# Patient Record
Sex: FEMALE | Race: WHITE | NOT HISPANIC OR LATINO | Employment: OTHER | URBAN - METROPOLITAN AREA
[De-identification: names, ages, dates, MRNs, and addresses within clinical notes are randomized per-mention and may not be internally consistent; named-entity substitution may affect disease eponyms.]

---

## 2018-01-12 NOTE — PROGRESS NOTES
Assessment    1  Trigger index finger of right hand (727 03) (M65 321)    Plan  Trigger index finger of right hand    · Please refer to the patient information sheet that was provided at your office visit today for  information on  your current condition ; Status:Complete;   Done: [id number]   · Follow Up After Surgery Evaluation and Treatment  Follow-up  Status: Complete  Done:  [id number]    Discussion/Summary    Right hand index finger trigger finger  After lengthy discussion patient wishes to undergo surgical release under local medication  Paperwork was filled out today  Follow-up after surgery  Outs were provided  The anatomy and physiology of trigger finger was discussed with the patient today in the office  Edema and increased contact pressure within the flexor tendons at the A1 pulley can cause pain, crepitation, and limitation of function  Treatment options include resting MP blocking splints to decrease edema, oral anti-inflammatory medications, home or formal therapy exercises, up to 2 steroid injections within the tendon sheath, or surgical release  While majority of patients do respond to conservative treatment, up to 20% may require surgical release  The patient has elected to undergo a release of the A1 pulley (trigger finger)  A small incision will be made over the palmar aspect of the hand, the tendon sheath holding the flexor tendons will be released  In the postoperative period, light activities are allowed immediately, driving is allowed when narcotic medication has stopped, and the incision may get wet after 5 days  Heavy activities (lifting more than approximately 10 pounds) will be allowed after the sutures are removed at approximately 10-14 days  While the pain and discomfort within the wrist typically improves rapidly, some residual discomfort may be present for up to 6 weeks   The nodule that is typically palpable in the palmar aspect of the hand will not be removed, as this would necessitate removal of a portion of the flexor tendon, however the catching, clicking, and locking should resolve  Approximate success rate is 98%  The risks and benefits of the procedure were explained to the patient, which include, but are not limited to: Bleeding, infection, recurrence, pain, scar, damage to tendons, damage to nerves, and damage to blood vessels, and complications related to anesthesia  These risks, along with alternative conservative treatment options, and postoperative protocols were voiced back and understood by the patient  All questions were answered to the patient's satisfaction  The patient agrees to comply with a standard postoperative protocol, and is willing to proceed  Education was provided via written and auditory forms  There were no barriers to learning  Written handouts regarding wound care, incision and scar care, and general preoperative information was provided to the patient  Prior to surgery, the patient is requested to stop all anti-inflammatory medications  Prophylactic aspirin, Plavix, and Coumadin are allowed to be continued  Medications including vitamin E , ginkgo, and fish oil are requested to be stopped approximately one week prior to surgery  Hypertensive medications and beta blockers, if taken, should be continued  Chief Complaint    1  Finger Problem    History of Present Illness  HPI: Patient is an 80-year-old female who presents to me today with pain localized into her right hand index finger which is moderate to severe in intensity made worse with gripping associated with catching and pain over the A1 pulley sharp in nature  It does radiate along the flexor tendon sheath with no fevers chills constitutional symptoms change in weight numbness or tingling  No previous injuries  No previous treatments tried      The past medical history, surgical history, family history, social history, medications, allergies, and review of systems have been read and reviewed on the chart and have been updated  Review of Systems was recorded in the office today on a separate evaluation sheet and is listed below  Review of Systems    Constitutional: No fever, no chills, feels well, no tiredness, no recent weight gain or loss  Eyes: No complaints of eyesight problems, no red eyes  ENT: no loss of hearing, no nosebleeds, no sore throat  Cardiovascular: No complaints of chest pain, no palpitations, no leg claudication or lower extremity edema  Respiratory: no compliants of shortness of breath, no wheezing, no cough  Gastrointestinal: no complaints of abdominal pain, no constipation, no nausea or diarrhea, no vomiting, no bloody stools  Genitourinary: no complaints of dysuria, no incontinence  Musculoskeletal: as noted in HPI  Integumentary: no complaints of skin rash or lesion, no itching or dry skin, no skin wounds  Neurological: no complaints of headache, no confusion, no numbness or tingling, no dizziness  Endocrine: No complaints of muscle weakness, no feelings of weakness, no frequent urination, no excessive thirst    Psychiatric: No suicidal thoughts, no anxiety, no feelings of depression  Active Problems    1  Trigger index finger of right hand (727 03) (M65 321)    Social History    · Caffeine use (V49 89) (F15 90)   · Former smoker (V15 82) (Y44 938)   · Occasional alcohol use    Current Meds   1  Acetaminophen 8 Hour TABS Recorded   2  Aspirin TABS; Therapy: (Recorded:21Igy2330) to Recorded   3  D3-50 95430 UNIT Oral Capsule Recorded   4  Lisinopril 20 MG Oral Tablet Recorded   5  Metoprolol Tartrate 25 MG Oral Tablet; Therapy: (Recorded:64Prv3766) to Recorded   6  Probiotic CAPS; Therapy: (Recorded:03Feb2016) to Recorded    Allergies    1  Keflex CAPS   2  PredniSONE TABS   3   Zithromax TABS    Vitals  Signs [Data Includes: Current Encounter]    Heart Rate: 77  Systolic: 095  Diastolic: 76  Height: 5 ft 2 in  Weight: 143 lb   BMI Calculated: 26 15  BSA Calculated: 1 66    Physical Exam      General: No acute distress, age-appropriate  Neck: Supple, trachea midline  HEENT: Normocephalic atraumatic, mucous membranes are moist, sclera are nonicteric  Cardiovascular: No discernable arrhythmia  Respiratory: Breathing is even and unlabored, no stridor or audible wheezing  Psychiatric: Awake alert and oriented x3, normal mood and affect  Abdomen: Without rebound or guarding  Right Trigger Finger: Crepitus, Nodules and Locking (Index finger)    Skin: was evaluated and demonstrated no masses, no abrasions, no erythema, no effusion, no edema, no fluctuance, no induration, no laceration, no ulceration, no lymphadenopathy  Right Upper Neurovascular: were evaluated and demonstrated: The patient has normal motor strength to the median, ulnar and radial nerve  Normal sensation to the median, ulnar, and radial nerve  Normal pulses in the radial and ulnar artery  Capillary refill is < 2 seconds  Future Appointments    Date/Time Provider Specialty Site   02/23/2016 08:30 AM MILI Borja   [address] [place] [address]     Signatures   Electronically signed by : MILI Gavin ; Feb  3 2016  5:07PM EST                       (Author)

## 2018-01-13 NOTE — CONSULTS
Chief Complaint    1  Finger Problem    History of Present Illness  HPI: Patient is an 45-year-old female who presents to me today with pain localized into her right hand index finger which is moderate to severe in intensity made worse with gripping associated with catching and pain over the A1 pulley sharp in nature  It does radiate along the flexor tendon sheath with no fevers chills constitutional symptoms change in weight numbness or tingling  No previous injuries  No previous treatments tried  The past medical history, surgical history, family history, social history, medications, allergies, and review of systems have been read and reviewed on the chart and have been updated  Review of Systems was recorded in the office today on a separate evaluation sheet and is listed below  Review of Systems    Constitutional: No fever, no chills, feels well, no tiredness, no recent weight gain or loss  Eyes: No complaints of eyesight problems, no red eyes  ENT: no loss of hearing, no nosebleeds, no sore throat  Cardiovascular: No complaints of chest pain, no palpitations, no leg claudication or lower extremity edema  Respiratory: no compliants of shortness of breath, no wheezing, no cough  Gastrointestinal: no complaints of abdominal pain, no constipation, no nausea or diarrhea, no vomiting, no bloody stools  Genitourinary: no complaints of dysuria, no incontinence  Musculoskeletal: as noted in HPI  Integumentary: no complaints of skin rash or lesion, no itching or dry skin, no skin wounds  Neurological: no complaints of headache, no confusion, no numbness or tingling, no dizziness  Endocrine: No complaints of muscle weakness, no feelings of weakness, no frequent urination, no excessive thirst    Psychiatric: No suicidal thoughts, no anxiety, no feelings of depression  Active Problems    1   Trigger index finger of right hand (727 03) (M65 321)    Social History    · Caffeine use (V42 89) (F15 90)   · Former smoker (E45 57) (W27 795)   · Occasional alcohol use    Current Meds   1  Acetaminophen 8 Hour TABS Recorded   2  Aspirin TABS; Therapy: (Recorded:81Wfn1198) to Recorded   3  D3-50 17266 UNIT Oral Capsule Recorded   4  Lisinopril 20 MG Oral Tablet Recorded   5  Metoprolol Tartrate 25 MG Oral Tablet; Therapy: (Recorded:58Uvn3567) to Recorded   6  Probiotic CAPS; Therapy: (Recorded:03Feb2016) to Recorded    Allergies    1  Keflex CAPS   2  PredniSONE TABS   3  Zithromax TABS    Vitals  Signs [Data Includes: Current Encounter]    Heart Rate: 12MRCYAUCO: 552MHBXTYVOP: 01DGOGUS: 5 ft 2 inWeight: 143 lb BMI Calculated: 26 15BSA Calculated: 1 66    Physical Exam      General: No acute distress, age-appropriate  Neck: Supple, trachea midline  HEENT: Normocephalic atraumatic, mucous membranes are moist, sclera are nonicteric  Cardiovascular: No discernable arrhythmia  Respiratory: Breathing is even and unlabored, no stridor or audible wheezing  Psychiatric: Awake alert and oriented x3, normal mood and affect  Abdomen: Without rebound or guarding  Right Trigger Finger: Crepitus, Nodules and Locking (Index finger)    Skin: was evaluated and demonstrated no masses, no abrasions, no erythema, no effusion, no edema, no fluctuance, no induration, no laceration, no ulceration, no lymphadenopathy  Right Upper Neurovascular: were evaluated and demonstrated: The patient has normal motor strength to the median, ulnar and radial nerve  Normal sensation to the median, ulnar, and radial nerve  Normal pulses in the radial and ulnar artery  Capillary refill is < 2 seconds  Assessment    1   Trigger index finger of right hand (727 03) (M65 321)    Plan     Trigger index finger of right hand    · Please refer to the patient information sheet that was provided at your office visit today for  information on  your current condition ; Status:Complete;   Done: 20HYY7310   · Follow Up After Surgery Evaluation and Treatment  Follow-up  Status: Complete  Done:  26BKQ4551    Discussion/Summary    Right hand index finger trigger finger  After lengthy discussion patient wishes to undergo surgical release under local medication  Paperwork was filled out today  Follow-up after surgery  Outs were provided  The anatomy and physiology of trigger finger was discussed with the patient today in the office  Edema and increased contact pressure within the flexor tendons at the A1 pulley can cause pain, crepitation, and limitation of function  Treatment options include resting MP blocking splints to decrease edema, oral anti-inflammatory medications, home or formal therapy exercises, up to 2 steroid injections within the tendon sheath, or surgical release  While majority of patients do respond to conservative treatment, up to 20% may require surgical release  The patient has elected to undergo a release of the A1 pulley (trigger finger)  A small incision will be made over the palmar aspect of the hand, the tendon sheath holding the flexor tendons will be released  In the postoperative period, light activities are allowed immediately, driving is allowed when narcotic medication has stopped, and the incision may get wet after 5 days  Heavy activities (lifting more than approximately 10 pounds) will be allowed after the sutures are removed at approximately 10-14 days  While the pain and discomfort within the wrist typically improves rapidly, some residual discomfort may be present for up to 6 weeks  The nodule that is typically palpable in the palmar aspect of the hand will not be removed, as this would necessitate removal of a portion of the flexor tendon, however the catching, clicking, and locking should resolve  Approximate success rate is 98%    The risks and benefits of the procedure were explained to the patient, which include, but are not limited to: Bleeding, infection, recurrence, pain, scar, damage to tendons, damage to nerves, and damage to blood vessels, and complications related to anesthesia  These risks, along with alternative conservative treatment options, and postoperative protocols were voiced back and understood by the patient  All questions were answered to the patient's satisfaction  The patient agrees to comply with a standard postoperative protocol, and is willing to proceed  Education was provided via written and auditory forms  There were no barriers to learning  Written handouts regarding wound care, incision and scar care, and general preoperative information was provided to the patient  Prior to surgery, the patient is requested to stop all anti-inflammatory medications  Prophylactic aspirin, Plavix, and Coumadin are allowed to be continued  Medications including vitamin E , ginkgo, and fish oil are requested to be stopped approximately one week prior to surgery  Hypertensive medications and beta blockers, if taken, should be continued         Signatures   Electronically signed by : MILI Sharif ; Feb  3 2016  5:07PM EST                       (Author)

## 2018-01-17 NOTE — CONSULTS
Chief Complaint    1  Finger Problem    History of Present Illness  HPI: Patient is an 29-year-old female who presents to me today with pain localized into her right hand index finger which is moderate to severe in intensity made worse with gripping associated with catching and pain over the A1 pulley sharp in nature  It does radiate along the flexor tendon sheath with no fevers chills constitutional symptoms change in weight numbness or tingling  No previous injuries  No previous treatments tried  The past medical history, surgical history, family history, social history, medications, allergies, and review of systems have been read and reviewed on the chart and have been updated  Review of Systems was recorded in the office today on a separate evaluation sheet and is listed below  Review of Systems  Focused-Female Orthopedics:   Constitutional: No fever, no chills, feels well, no tiredness, no recent weight gain or loss  Eyes: No complaints of eyesight problems, no red eyes  ENT: no loss of hearing, no nosebleeds, no sore throat  Cardiovascular: No complaints of chest pain, no palpitations, no leg claudication or lower extremity edema  Respiratory: no compliants of shortness of breath, no wheezing, no cough  Gastrointestinal: no complaints of abdominal pain, no constipation, no nausea or diarrhea, no vomiting, no bloody stools  Genitourinary: no complaints of dysuria, no incontinence  Musculoskeletal: as noted in HPI  Integumentary: no complaints of skin rash or lesion, no itching or dry skin, no skin wounds  Neurological: no complaints of headache, no confusion, no numbness or tingling, no dizziness  Endocrine: No complaints of muscle weakness, no feelings of weakness, no frequent urination, no excessive thirst    Psychiatric: No suicidal thoughts, no anxiety, no feelings of depression  Active Problems    1   Trigger index finger of right hand (727 03) (D44 248)    Social History    · Caffeine use (V49 89) (F15 90)   · Former smoker (V15 82) (M05 429)   · Occasional alcohol use    Current Meds   1  Acetaminophen 8 Hour TABS Recorded   2  Aspirin TABS; Therapy: (Recorded:03Feb2016) to Recorded   3  D3-50 99096 UNIT Oral Capsule Recorded   4  Lisinopril 20 MG Oral Tablet Recorded   5  Metoprolol Tartrate 25 MG Oral Tablet; Therapy: (Recorded:03Feb2016) to Recorded   6  Probiotic CAPS; Therapy: (Recorded:03Feb2016) to Recorded    Allergies    1  Keflex CAPS   2  PredniSONE TABS   3  Zithromax TABS    Vitals  Signs [Data Includes: Current Encounter]   Recorded: [id number] 10:57AM   Heart Rate: 77  Systolic: 624  Diastolic: 76  Height: 5 ft 2 in  Weight: 143 lb   BMI Calculated: 26 15  BSA Calculated: 1 66    Physical Exam      General: No acute distress, age-appropriate  Neck: Supple, trachea midline  HEENT: Normocephalic atraumatic, mucous membranes are moist, sclera are nonicteric  Cardiovascular: No discernable arrhythmia  Respiratory: Breathing is even and unlabored, no stridor or audible wheezing  Psychiatric: Awake alert and oriented x3, normal mood and affect  Abdomen: Without rebound or guarding  Right Trigger Finger: Crepitus, Nodules and Locking (Index finger)    Skin: was evaluated and demonstrated no masses, no abrasions, no erythema, no effusion, no edema, no fluctuance, no induration, no laceration, no ulceration, no lymphadenopathy  Right Upper Neurovascular: were evaluated and demonstrated: The patient has normal motor strength to the median, ulnar and radial nerve  Normal sensation to the median, ulnar, and radial nerve  Normal pulses in the radial and ulnar artery  Capillary refill is < 2 seconds  Assessment    1  Trigger index finger of right hand (727 03) (M65 321)    Plan  Trigger index finger of right hand    1   Please refer to the patient information sheet that was provided at your office visit today for   information on  your current condition ; Status:Complete;   Done: 17DDR8740   2  Follow Up After Surgery Evaluation and Treatment  Follow-up  Status: Complete  Done:   18OFD8970    Discussion/Summary  Discussion Summary:   Right hand index finger trigger finger  After lengthy discussion patient wishes to undergo surgical release under local medication  Paperwork was filled out today  Follow-up after surgery  Outs were provided  The anatomy and physiology of trigger finger was discussed with the patient today in the office  Edema and increased contact pressure within the flexor tendons at the A1 pulley can cause pain, crepitation, and limitation of function  Treatment options include resting MP blocking splints to decrease edema, oral anti-inflammatory medications, home or formal therapy exercises, up to 2 steroid injections within the tendon sheath, or surgical release  While majority of patients do respond to conservative treatment, up to 20% may require surgical release  The patient has elected to undergo a release of the A1 pulley (trigger finger)  A small incision will be made over the palmar aspect of the hand, the tendon sheath holding the flexor tendons will be released  In the postoperative period, light activities are allowed immediately, driving is allowed when narcotic medication has stopped, and the incision may get wet after 5 days  Heavy activities (lifting more than approximately 10 pounds) will be allowed after the sutures are removed at approximately 10-14 days  While the pain and discomfort within the wrist typically improves rapidly, some residual discomfort may be present for up to 6 weeks  The nodule that is typically palpable in the palmar aspect of the hand will not be removed, as this would necessitate removal of a portion of the flexor tendon, however the catching, clicking, and locking should resolve  Approximate success rate is 98%    The risks and benefits of the procedure were explained to the patient, which include, but are not limited to: Bleeding, infection, recurrence, pain, scar, damage to tendons, damage to nerves, and damage to blood vessels, and complications related to anesthesia  These risks, along with alternative conservative treatment options, and postoperative protocols were voiced back and understood by the patient  All questions were answered to the patient's satisfaction  The patient agrees to comply with a standard postoperative protocol, and is willing to proceed  Education was provided via written and auditory forms  There were no barriers to learning  Written handouts regarding wound care, incision and scar care, and general preoperative information was provided to the patient  Prior to surgery, the patient is requested to stop all anti-inflammatory medications  Prophylactic aspirin, Plavix, and Coumadin are allowed to be continued  Medications including vitamin E , ginkgo, and fish oil are requested to be stopped approximately one week prior to surgery  Hypertensive medications and beta blockers, if taken, should be continued         Future Appointments    Signatures   Electronically signed by : MILI Mirza ; Feb  3 2016  5:07PM EST                       (Author)

## 2018-08-15 ENCOUNTER — OFFICE VISIT (OUTPATIENT)
Dept: OBGYN CLINIC | Facility: HOSPITAL | Age: 83
End: 2018-08-15
Payer: MEDICARE

## 2018-08-15 VITALS
WEIGHT: 137.6 LBS | HEART RATE: 65 BPM | SYSTOLIC BLOOD PRESSURE: 149 MMHG | DIASTOLIC BLOOD PRESSURE: 73 MMHG | BODY MASS INDEX: 25.32 KG/M2 | HEIGHT: 62 IN

## 2018-08-15 DIAGNOSIS — M65.322 TRIGGER FINGER, LEFT INDEX FINGER: Primary | ICD-10-CM

## 2018-08-15 DIAGNOSIS — R22.31 MASS OF RIGHT FINGER: ICD-10-CM

## 2018-08-15 PROCEDURE — 99214 OFFICE O/P EST MOD 30 MIN: CPT | Performed by: ORTHOPAEDIC SURGERY

## 2018-08-15 PROCEDURE — 1123F ACP DISCUSS/DSCN MKR DOCD: CPT | Performed by: PATHOLOGY

## 2018-08-15 RX ORDER — MESALAMINE 400 MG/1
800 CAPSULE, DELAYED RELEASE ORAL 4 TIMES DAILY
Refills: 3 | Status: ON HOLD | COMMUNITY
Start: 2018-07-22 | End: 2021-08-10 | Stop reason: SDUPTHER

## 2018-08-15 RX ORDER — MELATONIN
1000 DAILY
COMMUNITY
End: 2021-06-08

## 2018-08-15 RX ORDER — TRIAMTERENE AND HYDROCHLOROTHIAZIDE 37.5; 25 MG/1; MG/1
1 CAPSULE ORAL DAILY
Refills: 3 | COMMUNITY
Start: 2018-07-31 | End: 2021-06-08

## 2018-08-15 RX ORDER — CLINDAMYCIN PHOSPHATE 900 MG/50ML
900 INJECTION INTRAVENOUS ONCE
Status: CANCELLED | OUTPATIENT
Start: 2018-08-15 | End: 2018-08-15

## 2018-08-15 RX ORDER — METOPROLOL SUCCINATE 25 MG/1
TABLET, EXTENDED RELEASE ORAL
Refills: 1 | Status: ON HOLD | COMMUNITY
Start: 2018-05-14 | End: 2021-04-27 | Stop reason: ALTCHOICE

## 2018-08-15 RX ORDER — BIMATOPROST 0.01 %
DROPS OPHTHALMIC (EYE)
Refills: 3 | COMMUNITY
Start: 2018-07-24

## 2018-08-15 RX ORDER — ATORVASTATIN CALCIUM 10 MG/1
10 TABLET, FILM COATED ORAL DAILY
Refills: 1 | Status: ON HOLD | COMMUNITY
Start: 2018-05-14 | End: 2021-08-10 | Stop reason: ALTCHOICE

## 2018-08-15 RX ORDER — LISINOPRIL 20 MG/1
20 TABLET ORAL DAILY
Refills: 1 | Status: ON HOLD | COMMUNITY
Start: 2018-07-18 | End: 2021-04-27 | Stop reason: ALTCHOICE

## 2018-08-15 RX ORDER — LIDOCAINE HYDROCHLORIDE AND EPINEPHRINE 10; 10 MG/ML; UG/ML
10 INJECTION, SOLUTION INFILTRATION; PERINEURAL ONCE
Status: CANCELLED | OUTPATIENT
Start: 2018-08-15 | End: 2018-08-15

## 2018-08-15 RX ORDER — METHOCARBAMOL 500 MG/1
500 TABLET, FILM COATED ORAL AS NEEDED
COMMUNITY
End: 2021-06-09 | Stop reason: SDUPTHER

## 2018-08-15 RX ORDER — BUSPIRONE HYDROCHLORIDE 10 MG/1
10 TABLET ORAL AS NEEDED
COMMUNITY

## 2018-08-15 RX ORDER — LANOLIN ALCOHOL/MO/W.PET/CERES
CREAM (GRAM) TOPICAL DAILY
Status: ON HOLD | COMMUNITY
End: 2021-04-27 | Stop reason: ALTCHOICE

## 2018-08-15 RX ORDER — DIPHENOXYLATE HCL/ATROPINE 2.5-.025/5
5 LIQUID (ML) ORAL 2 TIMES DAILY PRN
COMMUNITY

## 2018-08-15 RX ORDER — OMEPRAZOLE 20 MG/1
20 CAPSULE, DELAYED RELEASE ORAL DAILY
Refills: 1 | Status: ON HOLD | COMMUNITY
Start: 2018-06-16 | End: 2021-04-27 | Stop reason: ALTCHOICE

## 2018-08-15 NOTE — H&P
ASSESSMENT/PLAN:    Assessment:   Left hand index finger trigger finger and right hand small finger growing mass over the proximal phalanx    Plan:   Plan is for surgical excision of mass right hand small finger proximal phalanx  We will do this under general anesthesia and sent for biopsy is this is a growing mass over the last 2 months  Patient for left hand index finger trigger finger release under local medication  Paperwork was filled out  We will do this in a staged fashion    To Do Next Visit:  Sutures out    General Discussions:       Operative Discussions:  Trigger Finger Release: The anatomy and physiology of trigger finger was discussed with the patient today in the office  Edema and increased contact pressure within the flexor tendons at the A1 pulley can cause pain, crepitation, and limitation of function  Treatment options include resting MP blocking splints to decrease edema, oral anti-inflammatory medications, home or formal therapy exercises, up to 2 steroid injections or surgical release  While majority of patients do respond to conservative treatment, up to 20% may require surgical release  The patient has elected release of the trigger finger  The patient has elected to undergo a release of the A1 pulley (trigger finger)  A small incision will be made over the palmar aspect of the hand, the tendon sheath holding the flexor tendons will be released  In the postoperative period, light activities are allowed immediately, driving is allowed when narcotic medication has stopped, and the incision may get wet after 2 days  Heavy activities (lifting more than approximately 10 pounds) will be allowed after the follow up appointment in 1-2 weeks  While the pain and discomfort within the wrist typically improves rapidly, some residual discomfort may be present for up to 6 weeks    The nodule that is typically palpable in the palmar aspect of the hand will not be removed, as this would necessitate removal of a portion of the flexor tendon, however the catching, clicking, and locking should resolve  Approximate success rate is 98%  The risks and benefits of the procedure were explained to the patient, which include, but are not limited to: Bleeding, infection, recurrence, pain, scar, damage to tendons, damage to nerves, and damage to blood vessels, need for future surgery and complications related to anesthesia  If bony work is done, risks also include malunion and nonunion  These risks, along with alternative conservative treatment options, and postoperative protocols were voiced back and understood by the patient  All questions were answered to the patient's satisfaction  The patient agrees to comply with a standard postoperative protocol, and is willing to proceed  Education was provided via written and auditory forms  There were no barriers to learning   Written handouts regarding wound care, incision and scar care, and general preoperative information, as well as risks and benefits were provided to the patient       _____________________________________________________  CHIEF COMPLAINT:  Chief Complaint   Patient presents with    Left Index Finger - Locking    Right Little Finger - Cyst         SUBJECTIVE:  Sonny Clements is a 80y o  year old female who presents        PAST MEDICAL HISTORY:  Past Medical History:   Diagnosis Date    Bell's palsy 1940    Hemorrhoids     Hx of colonoscopy 1996, 1998, 2000, 2002, 2003, 2007, 2012       PAST SURGICAL HISTORY:  Past Surgical History:   Procedure Laterality Date    BACK SURGERY  1971    ruptured disk    BACK SURGERY  1972    bone fusion lumbar spine    BLADDER REPAIR  2002    BLADDER REPAIR  2012    BREAST SURGERY  2011   [address]  2014    stent   [address]  08/14/2014    CATARACT EXTRACTION Right 2015    EYE SURGERY Left     GALLBLADDER SURGERY  1995    GANGLION CYST EXCISION  2004    left wrist    HERNIA REPAIR  2001    HERNIA REPAIR  2003    MASTECTOMY  2012    OVARY SURGERY  1984    PARTIAL HYSTERECTOMY  1964    [place] INCISE FINGER TENDON SHEATH Right 4/5/2016    Procedure: INDEX TRIGGER FINGER AND RING TRIGGER FINGER RELEASE ;  Surgeon: aMrcelle Hanley MD;  Location: [facility];  Service: Orthopedics    SPLENECTOMY, PARTIAL  1995    TONSILLECTOMY  1939    WRIST SURGERY Right 1952       FAMILY HISTORY:  Family History   Problem Relation Age of Onset    Cancer Mother     Breast cancer Sister        SOCIAL HISTORY:  Social History   Substance Use Topics    Smoking status: Former Smoker    Smokeless tobacco: Never Used    Alcohol use Not on file       MEDICATIONS:    Current Outpatient Prescriptions:     ACETAMINOPHEN PO, Take by mouth, Disp: , Rfl:     ASPIRIN 81 PO, Take by mouth, Disp: , Rfl:     atorvastatin (LIPITOR) 10 mg tablet, Take 10 mg by mouth daily, Disp: , Rfl: 1    busPIRone (BUSPAR) 10 mg tablet, Take 10 mg by mouth as needed, Disp: , Rfl:     cholecalciferol (VITAMIN D3) 1,000 units tablet, Take 1,000 Units by mouth daily, Disp: , Rfl:     cyanocobalamin (VITAMIN B-12) 1,000 mcg tablet, Take by mouth daily, Disp: , Rfl:     DELZICOL 400 MG, Take 800 mg by mouth 2 (two) times a day, Disp: , Rfl: 3    diphenoxylate-atropine (LOMOTIL) 2 5-0 025 mg/5 mL liquid, Take 5 mL by mouth 2 (two) times a day as needed for diarrhea, Disp: , Rfl:     lisinopril (ZESTRIL) 20 mg tablet, Take 20 mg by mouth daily, Disp: , Rfl: 1    LUMIGAN 0 01 % ophthalmic drops, ONE DROP IN BOTH EYES AT BEDTIME, Disp: , Rfl: 3    methocarbamol (ROBAXIN) 500 mg tablet, Take 500 mg by mouth as needed for muscle spasms, Disp: , Rfl:     metoprolol succinate (TOPROL-XL) 25 mg 24 hr tablet, TAKE 1/2 TABLET BY ORAL ROUTE TWICE EVERY DAY, Disp: , Rfl: 1    NITROGLYCERIN PO, Take by mouth, Disp: , Rfl:     omeprazole (PriLOSEC) 20 mg delayed release capsule, Take 20 mg by mouth daily, Disp: , Rfl: 1    Probiotic Product (PROBIOTIC PO), Take by mouth, Disp: , Rfl:     triamterene-hydrochlorothiazide (DYAZIDE) 37 5-25 mg per capsule, Take 1 capsule by mouth daily, Disp: , Rfl: 3    HYDROcodone-acetaminophen (NORCO) 5-325 mg per tablet, 1 tab by mouth every 6 hours for pain (Patient not taking: Reported on 8/15/2018 ), Disp: 20 tablet, Rfl: 0    ALLERGIES:  Allergies   Allergen Reactions    Amlodipine Shortness Of Breath     And fatigue    Zithromax [Azithromycin] Swelling     tongue    Aspirin Other (See Comments)     bleeding    Corn-Containing Products Other (See Comments)     headache    Effient [Prasugrel]     Keflex [Cephalexin] Other (See Comments)     unknow    Lasix [Furosemide] GI Intolerance    Lipitor [Atorvastatin]     Other      liptol and lipol cause GI upset  Adhesive tape    Prednisone Other (See Comments)     "I go berzerk!"    Sulfamethizole Other (See Comments)     unkown    Latex Rash       REVIEW OF SYSTEMS:  Pertinent items are noted in HPI  A comprehensive review of systems was negative  LABS:  HgA1c: No results found for: HGBA1C  BMP: No results found for: GLUCOSE, CALCIUM, NA, K, CO2, CL, BUN, CREATININE    _____________________________________________________  PHYSICAL EXAMINATION:  General: well developed and well nourished, alert, oriented times 3 and appears comfortable  Psychiatric: Normal  HEENT: Trachea Midline, No torticollis  Cardiovascular: No discernable arrhythmia  Pulmonary: No wheezing or stridor  Skin:  Mass localized to the volar aspect of the right hand small finger proximal phalanx measuring 1 5 x 1 cm  Firm, not attached to flexor tendons  Neurovascular: Sensation Intact to the Median, Ulnar, Radial Nerve, Motor Intact to the Median, Ulnar, Radial Nerve and Pulses Intact    MUSCULOSKELETAL EXAMINATION:  Extremities:  Right hand small finger with decreased range of motion secondary to mass effect    Patient has swelling, crepitation to index finger of left hand with nodule and active triggering  No instability noted  Remainder flexor and extensor tendons are intact  No subluxation of the extensor tendons        _____________________________________________________  STUDIES REVIEWED:  No Studies to review      PROCEDURES PERFORMED:  Procedures  No Procedures performed today

## 2018-08-15 NOTE — PROGRESS NOTES
ASSESSMENT/PLAN:    Assessment:   Left hand index finger trigger finger and right hand small finger growing mass over the proximal phalanx    Plan:   Plan is for surgical excision of mass right hand small finger proximal phalanx  We will do this under general anesthesia and sent for biopsy is this is a growing mass over the last 2 months  Patient for left hand index finger trigger finger release under local medication  Paperwork was filled out  We will do this in a staged fashion    To Do Next Visit:  Sutures out    General Discussions:       Operative Discussions:  Trigger Finger Release: The anatomy and physiology of trigger finger was discussed with the patient today in the office  Edema and increased contact pressure within the flexor tendons at the A1 pulley can cause pain, crepitation, and limitation of function  Treatment options include resting MP blocking splints to decrease edema, oral anti-inflammatory medications, home or formal therapy exercises, up to 2 steroid injections or surgical release  While majority of patients do respond to conservative treatment, up to 20% may require surgical release  The patient has elected release of the trigger finger  The patient has elected to undergo a release of the A1 pulley (trigger finger)  A small incision will be made over the palmar aspect of the hand, the tendon sheath holding the flexor tendons will be released  In the postoperative period, light activities are allowed immediately, driving is allowed when narcotic medication has stopped, and the incision may get wet after 2 days  Heavy activities (lifting more than approximately 10 pounds) will be allowed after the follow up appointment in 1-2 weeks  While the pain and discomfort within the wrist typically improves rapidly, some residual discomfort may be present for up to 6 weeks    The nodule that is typically palpable in the palmar aspect of the hand will not be removed, as this would necessitate removal of a portion of the flexor tendon, however the catching, clicking, and locking should resolve  Approximate success rate is 98%  The risks and benefits of the procedure were explained to the patient, which include, but are not limited to: Bleeding, infection, recurrence, pain, scar, damage to tendons, damage to nerves, and damage to blood vessels, need for future surgery and complications related to anesthesia  If bony work is done, risks also include malunion and nonunion  These risks, along with alternative conservative treatment options, and postoperative protocols were voiced back and understood by the patient  All questions were answered to the patient's satisfaction  The patient agrees to comply with a standard postoperative protocol, and is willing to proceed  Education was provided via written and auditory forms  There were no barriers to learning   Written handouts regarding wound care, incision and scar care, and general preoperative information, as well as risks and benefits were provided to the patient       _____________________________________________________  CHIEF COMPLAINT:  Chief Complaint   Patient presents with    Left Index Finger - Locking    Right Little Finger - Cyst         SUBJECTIVE:  Sonny Clements is a 80y o  year old female who presents        PAST MEDICAL HISTORY:  Past Medical History:   Diagnosis Date    Bell's palsy 1940    Hemorrhoids     Hx of colonoscopy 1996, 1998, 2000, 2002, 2003, 2007, 2012       PAST SURGICAL HISTORY:  Past Surgical History:   Procedure Laterality Date    BACK SURGERY  1971    ruptured disk    BACK SURGERY  1972    bone fusion lumbar spine    BLADDER REPAIR  2002    BLADDER REPAIR  2012    BREAST SURGERY  2011   [address]  2014    [address]ent   [address]  08/14/2014    CATARACT EXTRACTION Right 2015    EYE SURGERY Left     GALLBLADDER SURGERY  1995    GANGLION CYST EXCISION  2004    left wrist    HERNIA REPAIR  2001    HERNIA REPAIR  2003    MASTECTOMY  2012    OVARY SURGERY  1984    PARTIAL HYSTERECTOMY  1964    [place] INCISE FINGER TENDON SHEATH Right 4/5/2016    Procedure: INDEX TRIGGER FINGER AND RING TRIGGER FINGER RELEASE ;  Surgeon: Elkin Fermin MD;  Location: [facility];  Service: Orthopedics    SPLENECTOMY, PARTIAL  1995    TONSILLECTOMY  1939    WRIST SURGERY Right 1952       FAMILY HISTORY:  Family History   Problem Relation Age of Onset    Cancer Mother     Breast cancer Sister        SOCIAL HISTORY:  Social History   Substance Use Topics    Smoking status: Former Smoker    Smokeless tobacco: Never Used    Alcohol use Not on file       MEDICATIONS:    Current Outpatient Prescriptions:     ACETAMINOPHEN PO, Take by mouth, Disp: , Rfl:     ASPIRIN 81 PO, Take by mouth, Disp: , Rfl:     atorvastatin (LIPITOR) 10 mg tablet, Take 10 mg by mouth daily, Disp: , Rfl: 1    busPIRone (BUSPAR) 10 mg tablet, Take 10 mg by mouth as needed, Disp: , Rfl:     cholecalciferol (VITAMIN D3) 1,000 units tablet, Take 1,000 Units by mouth daily, Disp: , Rfl:     cyanocobalamin (VITAMIN B-12) 1,000 mcg tablet, Take by mouth daily, Disp: , Rfl:     DELZICOL 400 MG, Take 800 mg by mouth 2 (two) times a day, Disp: , Rfl: 3    diphenoxylate-atropine (LOMOTIL) 2 5-0 025 mg/5 mL liquid, Take 5 mL by mouth 2 (two) times a day as needed for diarrhea, Disp: , Rfl:     lisinopril (ZESTRIL) 20 mg tablet, Take 20 mg by mouth daily, Disp: , Rfl: 1    LUMIGAN 0 01 % ophthalmic drops, ONE DROP IN BOTH EYES AT BEDTIME, Disp: , Rfl: 3    methocarbamol (ROBAXIN) 500 mg tablet, Take 500 mg by mouth as needed for muscle spasms, Disp: , Rfl:     metoprolol succinate (TOPROL-XL) 25 mg 24 hr tablet, TAKE 1/2 TABLET BY ORAL ROUTE TWICE EVERY DAY, Disp: , Rfl: 1    NITROGLYCERIN PO, Take by mouth, Disp: , Rfl:     omeprazole (PriLOSEC) 20 mg delayed release capsule, Take 20 mg by mouth daily, Disp: , Rfl: 1    Probiotic Product (PROBIOTIC PO), Take by mouth, Disp: , Rfl:     triamterene-hydrochlorothiazide (DYAZIDE) 37 5-25 mg per capsule, Take 1 capsule by mouth daily, Disp: , Rfl: 3    HYDROcodone-acetaminophen (NORCO) 5-325 mg per tablet, 1 tab by mouth every 6 hours for pain (Patient not taking: Reported on 8/15/2018 ), Disp: 20 tablet, Rfl: 0    ALLERGIES:  Allergies   Allergen Reactions    Amlodipine Shortness Of Breath     And fatigue    Zithromax [Azithromycin] Swelling     tongue    Aspirin Other (See Comments)     bleeding    Corn-Containing Products Other (See Comments)     headache    Effient [Prasugrel]     Keflex [Cephalexin] Other (See Comments)     unknow    Lasix [Furosemide] GI Intolerance    Lipitor [Atorvastatin]     Other      liptol and lipol cause GI upset  Adhesive tape    Prednisone Other (See Comments)     "I go berzerk!"    Sulfamethizole Other (See Comments)     unkown    Latex Rash       REVIEW OF SYSTEMS:  Pertinent items are noted in HPI  A comprehensive review of systems was negative  LABS:  HgA1c: No results found for: HGBA1C  BMP: No results found for: GLUCOSE, CALCIUM, NA, K, CO2, CL, BUN, CREATININE    _____________________________________________________  PHYSICAL EXAMINATION:  General: well developed and well nourished, alert, oriented times 3 and appears comfortable  Psychiatric: Normal  HEENT: Trachea Midline, No torticollis  Cardiovascular: No discernable arrhythmia  Pulmonary: No wheezing or stridor  Skin:  Mass localized to the volar aspect of the right hand small finger proximal phalanx measuring 1 5 x 1 cm  Firm, not attached to flexor tendons  Neurovascular: Sensation Intact to the Median, Ulnar, Radial Nerve, Motor Intact to the Median, Ulnar, Radial Nerve and Pulses Intact    MUSCULOSKELETAL EXAMINATION:  Extremities:  Right hand small finger with decreased range of motion secondary to mass effect    Patient has swelling, crepitation to index finger of left hand with nodule and active triggering  No instability noted  Remainder flexor and extensor tendons are intact  No subluxation of the extensor tendons        _____________________________________________________  STUDIES REVIEWED:  No Studies to review      PROCEDURES PERFORMED:  Procedures  No Procedures performed today

## 2018-09-25 ENCOUNTER — ANESTHESIA EVENT (OUTPATIENT)
Dept: PERIOP | Facility: HOSPITAL | Age: 83
End: 2018-09-25
Payer: MEDICARE

## 2018-09-25 NOTE — PRE-PROCEDURE INSTRUCTIONS
Pre-Surgery Instructions:   Medication Instructions    ACETAMINOPHEN PO Instructed patient per Anesthesia Guidelines   ASPIRIN 81 PO Patient was instructed to contact Physician for medication instruction   atorvastatin (LIPITOR) 10 mg tablet epic    busPIRone (BUSPAR) 10 mg tablet epic    cholecalciferol (VITAMIN D3) 1,000 units tablet Instructed patient per Anesthesia Guidelines   cyanocobalamin (VITAMIN B-12) 1,000 mcg tablet Instructed patient per Anesthesia Guidelines   DELZICOL 400 MG epic    diphenoxylate-atropine (LOMOTIL) 2 5-0 025 mg/5 mL liquid epic    HYDROcodone-acetaminophen (NORCO) 5-325 mg per tablet epic    lisinopril (ZESTRIL) 20 mg tablet epic    LUMIGAN 0 01 % ophthalmic drops Instructed patient per Anesthesia Guidelines   methocarbamol (ROBAXIN) 500 mg tablet epic    metoprolol succinate (TOPROL-XL) 25 mg 24 hr tablet epic    NITROGLYCERIN PO epic    omeprazole (PriLOSEC) 20 mg delayed release capsule Instructed patient per Anesthesia Guidelines   triamterene-hydrochlorothiazide (DYAZIDE) 37 5-25 mg per capsule epic   Education Index     Med Instructions Troubleshoot   ACE/ARB Med Class     Do not take this medication the day before and the morning of the day of surgery/procedure  Diuretic Med Class     Continue this medication up to the evening before surgery/procedure, but do not take the morning of the day of surgery  Acetaminophen Med Class     Continue to take this medication on your normal schedule  If this is an oral medication and you take it in the morning, then you may take this medicine with a sip of water  ASA Med Class: Aspirin     Should be discontinued at least one week prior to planned operation, unless specifically stated otherwise by surgical service  Your Surgeon may have patient stop taking aspirin up to a week before surgery if having intracranial, middle ear, posterior eye, spine surgery or prostate surgery    [Patients taking aspirin for coronary stents should be reviewed by an anesthesiologist in the optimization clinic  Please do not discontinue aspirin in patients with coronary stents unless given specific permission to do so by the cardiologist who prescribed medication ]   If your surgeon approves please continue to take this medication on your normal schedule  You may take this medication on the morning of your surgery with a sip of water  Beta blocker Med Class     Continue to take this heart medication on your normal schedule  If this is an oral medication and you take it in the morning, then you may take this medicine with a sip of water  Buspirone Med Class     Continue to take this medication on your normal schedule  If this is an oral medication and you take it in the morning, then you may take this medicine with a sip of water  Nitroglycerin Med Class     Continue to take your nitroglyerin as directed by your prescribing Physician and notify your Physician and Anesthesiologist if you have needed to increase the frequency or dosage  Statin Med Class     Continue to take this medication on your normal schedule  If this is an oral medication and you take it in the morning, then you may take this medicine with a sip of water  Sulfasalazine/Azathioprine Med Class     Stop taking this medication 7 days prior to surgery/procedure          Pre procedure instructions given verbalizes understanding  Pt to call cardiologist concerning ASA (cardiac stent in place)  Delzicol may be taken dos, not to be stopped per anesthesia

## 2018-10-04 ENCOUNTER — PREP FOR PROCEDURE (OUTPATIENT)
Dept: OBGYN CLINIC | Facility: HOSPITAL | Age: 83
End: 2018-10-04

## 2018-10-08 ENCOUNTER — PREP FOR PROCEDURE (OUTPATIENT)
Dept: OBGYN CLINIC | Facility: HOSPITAL | Age: 83
End: 2018-10-08

## 2018-10-09 ENCOUNTER — HOSPITAL ENCOUNTER (OUTPATIENT)
Facility: HOSPITAL | Age: 83
Setting detail: OUTPATIENT SURGERY
Discharge: HOME | End: 2018-10-09
Attending: ORTHOPAEDIC SURGERY | Admitting: ORTHOPAEDIC SURGERY
Payer: MEDICARE

## 2018-10-09 ENCOUNTER — ANESTHESIA (OUTPATIENT)
Dept: PERIOP | Facility: HOSPITAL | Age: 83
End: 2018-10-09
Payer: MEDICARE

## 2018-10-09 VITALS
DIASTOLIC BLOOD PRESSURE: 92 MMHG | BODY MASS INDEX: 25.21 KG/M2 | SYSTOLIC BLOOD PRESSURE: 208 MMHG | TEMPERATURE: 98.9 F | RESPIRATION RATE: 20 BRPM | HEIGHT: 62 IN | OXYGEN SATURATION: 97 % | HEART RATE: 75 BPM | WEIGHT: 137 LBS

## 2018-10-09 DIAGNOSIS — R22.31 MASS OF RIGHT FINGER: Primary | ICD-10-CM

## 2018-10-09 PROCEDURE — 26160 REMOVE TENDON SHEATH LESION: CPT | Performed by: ORTHOPAEDIC SURGERY

## 2018-10-09 PROCEDURE — 88304 TISSUE EXAM BY PATHOLOGIST: CPT | Performed by: PATHOLOGY

## 2018-10-09 RX ORDER — FENTANYL CITRATE/PF 50 MCG/ML
25 SYRINGE (ML) INJECTION
Status: DISCONTINUED | OUTPATIENT
Start: 2018-10-09 | End: 2018-10-09 | Stop reason: HOSPADM

## 2018-10-09 RX ORDER — HYDROCODONE BITARTRATE AND ACETAMINOPHEN 5; 325 MG/1; MG/1
1 TABLET ORAL EVERY 6 HOURS PRN
Status: DISCONTINUED | OUTPATIENT
Start: 2018-10-09 | End: 2018-10-09 | Stop reason: HOSPADM

## 2018-10-09 RX ORDER — ONDANSETRON 2 MG/ML
INJECTION INTRAMUSCULAR; INTRAVENOUS AS NEEDED
Status: DISCONTINUED | OUTPATIENT
Start: 2018-10-09 | End: 2018-10-09 | Stop reason: SURG

## 2018-10-09 RX ORDER — ALBUTEROL SULFATE 2.5 MG/3ML
2.5 SOLUTION RESPIRATORY (INHALATION) ONCE AS NEEDED
Status: DISCONTINUED | OUTPATIENT
Start: 2018-10-09 | End: 2018-10-09 | Stop reason: HOSPADM

## 2018-10-09 RX ORDER — SODIUM CHLORIDE, SODIUM LACTATE, POTASSIUM CHLORIDE, CALCIUM CHLORIDE 600; 310; 30; 20 MG/100ML; MG/100ML; MG/100ML; MG/100ML
125 INJECTION, SOLUTION INTRAVENOUS CONTINUOUS
Status: DISCONTINUED | OUTPATIENT
Start: 2018-10-09 | End: 2018-10-09 | Stop reason: HOSPADM

## 2018-10-09 RX ORDER — ACETAMINOPHEN 325 MG/1
650 TABLET ORAL ONCE
Status: DISCONTINUED | OUTPATIENT
Start: 2018-10-09 | End: 2018-10-09 | Stop reason: HOSPADM

## 2018-10-09 RX ORDER — LIDOCAINE HYDROCHLORIDE AND EPINEPHRINE 10; 10 MG/ML; UG/ML
10 INJECTION, SOLUTION INFILTRATION; PERINEURAL ONCE
Status: DISCONTINUED | OUTPATIENT
Start: 2018-10-09 | End: 2018-10-09 | Stop reason: HOSPADM

## 2018-10-09 RX ORDER — SODIUM CHLORIDE, SODIUM LACTATE, POTASSIUM CHLORIDE, CALCIUM CHLORIDE 600; 310; 30; 20 MG/100ML; MG/100ML; MG/100ML; MG/100ML
50 INJECTION, SOLUTION INTRAVENOUS CONTINUOUS
Status: DISCONTINUED | OUTPATIENT
Start: 2018-10-09 | End: 2018-10-09 | Stop reason: HOSPADM

## 2018-10-09 RX ORDER — ONDANSETRON 2 MG/ML
4 INJECTION INTRAMUSCULAR; INTRAVENOUS ONCE AS NEEDED
Status: DISCONTINUED | OUTPATIENT
Start: 2018-10-09 | End: 2018-10-09 | Stop reason: HOSPADM

## 2018-10-09 RX ORDER — HYDROCODONE BITARTRATE AND ACETAMINOPHEN 5; 325 MG/1; MG/1
1 TABLET ORAL EVERY 6 HOURS PRN
Qty: 5 TABLET | Refills: 0 | Status: SHIPPED | OUTPATIENT
Start: 2018-10-09 | End: 2018-10-14

## 2018-10-09 RX ORDER — SENNOSIDES 8.6 MG
650 CAPSULE ORAL EVERY 8 HOURS
Qty: 15 TABLET | Refills: 0 | Status: ON HOLD | OUTPATIENT
Start: 2018-10-09 | End: 2021-04-27 | Stop reason: SDUPTHER

## 2018-10-09 RX ORDER — BUPIVACAINE HYDROCHLORIDE AND EPINEPHRINE 2.5; 5 MG/ML; UG/ML
INJECTION, SOLUTION EPIDURAL; INFILTRATION; INTRACAUDAL; PERINEURAL AS NEEDED
Status: DISCONTINUED | OUTPATIENT
Start: 2018-10-09 | End: 2018-10-09 | Stop reason: HOSPADM

## 2018-10-09 RX ORDER — CLINDAMYCIN PHOSPHATE 900 MG/50ML
900 INJECTION INTRAVENOUS ONCE
Status: COMPLETED | OUTPATIENT
Start: 2018-10-09 | End: 2018-10-09

## 2018-10-09 RX ORDER — MEPERIDINE HYDROCHLORIDE 25 MG/ML
12.5 INJECTION INTRAMUSCULAR; INTRAVENOUS; SUBCUTANEOUS AS NEEDED
Status: DISCONTINUED | OUTPATIENT
Start: 2018-10-09 | End: 2018-10-09 | Stop reason: HOSPADM

## 2018-10-09 RX ORDER — PROPOFOL 10 MG/ML
INJECTION, EMULSION INTRAVENOUS AS NEEDED
Status: DISCONTINUED | OUTPATIENT
Start: 2018-10-09 | End: 2018-10-09 | Stop reason: SURG

## 2018-10-09 RX ORDER — FENTANYL CITRATE 50 UG/ML
INJECTION, SOLUTION INTRAMUSCULAR; INTRAVENOUS AS NEEDED
Status: DISCONTINUED | OUTPATIENT
Start: 2018-10-09 | End: 2018-10-09 | Stop reason: SURG

## 2018-10-09 RX ORDER — MAGNESIUM HYDROXIDE 1200 MG/15ML
LIQUID ORAL AS NEEDED
Status: DISCONTINUED | OUTPATIENT
Start: 2018-10-09 | End: 2018-10-09 | Stop reason: HOSPADM

## 2018-10-09 RX ORDER — ONDANSETRON 2 MG/ML
4 INJECTION INTRAMUSCULAR; INTRAVENOUS EVERY 4 HOURS PRN
Status: DISCONTINUED | OUTPATIENT
Start: 2018-10-09 | End: 2018-10-09 | Stop reason: HOSPADM

## 2018-10-09 RX ORDER — LABETALOL HYDROCHLORIDE 5 MG/ML
5 INJECTION, SOLUTION INTRAVENOUS
Status: DISCONTINUED | OUTPATIENT
Start: 2018-10-09 | End: 2018-10-09 | Stop reason: HOSPADM

## 2018-10-09 RX ORDER — NAPROXEN SODIUM 220 MG
220 TABLET ORAL 2 TIMES DAILY WITH MEALS
Qty: 10 TABLET | Refills: 0 | Status: SHIPPED | OUTPATIENT
Start: 2018-10-09 | End: 2021-04-01 | Stop reason: ALTCHOICE

## 2018-10-09 RX ADMIN — FENTANYL CITRATE 25 MCG: 50 INJECTION, SOLUTION INTRAMUSCULAR; INTRAVENOUS at 09:36

## 2018-10-09 RX ADMIN — ONDANSETRON 4 MG: 2 INJECTION INTRAMUSCULAR; INTRAVENOUS at 09:35

## 2018-10-09 RX ADMIN — FENTANYL CITRATE 50 MCG: 50 INJECTION, SOLUTION INTRAMUSCULAR; INTRAVENOUS at 09:15

## 2018-10-09 RX ADMIN — CLINDAMYCIN PHOSPHATE 900 MG: 18 INJECTION, SOLUTION INTRAMUSCULAR; INTRAVENOUS at 09:10

## 2018-10-09 RX ADMIN — FENTANYL CITRATE 25 MCG: 50 INJECTION, SOLUTION INTRAMUSCULAR; INTRAVENOUS at 09:49

## 2018-10-09 RX ADMIN — PROPOFOL 150 MG: 10 INJECTION, EMULSION INTRAVENOUS at 09:21

## 2018-10-09 RX ADMIN — SODIUM CHLORIDE, SODIUM LACTATE, POTASSIUM CHLORIDE, AND CALCIUM CHLORIDE: .6; .31; .03; .02 INJECTION, SOLUTION INTRAVENOUS at 09:10

## 2018-10-09 NOTE — DISCHARGE INSTRUCTIONS
Post Operative Instructions    You have had surgery on your arm today, please read and follow the information below:  · Elevate your hand above your elbow during the next 24-48 hours to help with swelling  · Place your hand and arm over your head with motion at your shoulder three times a day  · Do not apply any cream/ointment/oil to your incisions including antibiotics  · Do not soak your hands in standing water (dishwater, tubs, Jacuzzi's, pools, etc ) until given permission (typically 2-3 weeks after injury)    Call the office if you notice any:  · Increased numbness or tingling of your hand or fingers that is not relieved with elevation  · Increasing pain that is not controlled with medication  · Difficulty chewing, breathing, swallowing  · Chest pains or shortness of breath  · Fever over 101 4 degrees  Bandage: Remove bandage after 5 days  Motion: Move fingers into a fist 5 times a day, DO NOT move any splinted fingers  Weight bearing status: Avoid heavy lifting (>5 pounds) with the extremity that was operated on until follow up appointment  Normal activities of daily living are OK  Ice: Ice for 10 minutes every hour as needed for swelling x 24 hours  Sling: No sling necessary  Pain medication:   Naproxen 220 mg two times a day   Tylenol Extended Release 650 mg every 8 hours  Norco/Hydrocodone one tab every 6 hours AS NEEDED for pain     Follow-up Appointment: 7-10 days  Please call the office if you have any questions or concerns regarding your post-operative care  Following the program below will greatly decrease your post-operative pain  1  Aleve (naproxen) 220 mg and Tylenol Arthritis 650 mg on the afternoon/evening of surgery  Do NOT take Aleve if you have a history of gastric ulcers, uncontrolled reflux or have been told previously by a physician that you should not take anti-inflammatory medications such as Advil/Aleve/Motrin      2  Aleve (naproxen) 220 mg in the morning and afternoon, for about 2-3 days after the surgery; even if you have no pain  You can stop two days after surgery if your hand does not hurt  3  Tylenol Arthritis (or any brand of acetaminophen 8-hour), 650 mg every eight hours, with a maximum dose of 3000 mg per day, for about 2-3 days after the surgery, even if you have no pain  Tylenol Arthritis plus Aleve is a case of 1+1=3, not 2  That is, they work together as a team to make each other stronger     a  The maximum amount of Tylenol is 3000 mg per day, which is the same as 4 of the 650 mg pills  Remember; dont substitute any other medication for the Tylenol: dont take Motrin, aspirin, or any other over the counter medication  It must be Tylenol (or any brand of acetaminophen) for it to work as a team  Remember as well that Tylenol Arthritis is taken every 8 hours, the Aleve is twice a day  4  Norco (hydrocodone/acetaminophen) 5/325 mg or a similar medication to assist with sleeping at night, and possibly every 6 hours during the day, ONLY IF NEEDED, for the first few days  You will be given a written prescription, but many patients find they do not need to take any or all of the Norco  Do not take the medication just because it was given to you; only take it if you need it  Remember that Tiarno Di Sopra is an opioid pain medication and can lead to addiction, respiratory sedation, and death  In 2015 over 17,500 Americans  from opioid overdoses and I dont want this to happen to you  Opioid medications can also cause constipation, so please plan for that, as well as possible mental confusion and drowsiness  Do not drive while you are taking this medication  With this protocol, you can expect your post-operative pain to be very manageable  The worst pain only lasts for the first 48 hours and improves significantly after that   By the time you see your surgeon for your post-operative visit you probably will no longer require any pain medication on a daily basis  Important Information about Painkillers    You are being prescribed an opioid pain medication to help with severe pain after surgery  Use the medication sparingly as needed to reduce your pain  The goal is not to be pain-free, but to make the pain more tolerable  If you are able to take non-steroidal anti-inflammatory drugs (NSAIDs), alternate the prescription pain medication with over-the-counter Ibuprofen or Naproxen (if you do not have a history of reflux or stomach ulcers)  This will allow you to take less opioid medication  Also, elevate the hand to reduce swelling and consider applying ice to the affected area for 15 minutes at a time, several times per day  Opioid medication is powerful and has the risk of overdose, abuse, and addiction  Only use the medication as directed by your physician and keep the pills in a safe place  When you no longer need the medication, please dispose of the pills properly as directed below  Allowing someone else to use your opioid prescription is illegal     Also, possible side effects from opioid medications are over-sedation, itching, nausea/vomiting, and constipation  Do not drive a vehicle or operate machinery while taking the pain medications  Drink plenty of fluids and consider a stool softener to prevent constipation  If the pain you are experiencing is not severe, stop taking the opioid pills and only take over-the-counter medications such as Tylenol and Ibuprofen  Disposing of unused pain medications:  (1) Follow pharmacist instructions on the bottle if available, or  (2) Call 3-606.666.8359 for a [facility] authorized collection site in your area, or   (3) If no collection site is available in your area, mix the pills with an undesirable substance such as used coffee grounds, дмитрий litter, or dirt  Place this mixture in a sealed plastic bag  Place the bag in the household garbage        Adapted from the opoid awareness section of the [address] [facility], thanks to Rosanne Cruz and Maria Eugenia Radford

## 2018-10-09 NOTE — H&P
H&P Exam - Orthopedics   Juliane Watts 80 y o  female MRN: [id number]  Unit/Bed#: [facility] 03    Assessment/Plan   Assessment:  Right small finger mass and left index trigger finger  Plan:  Excision of right small finger mass today  Left index trigger finger release to be performed at a later date    History of Present Illness   HPI:  Juliane Watts is a 80 y o  y o  female who presents with mass on the right small finger which has grown over the last few months  Patient also with continued complaints of pain and clicking of the left index finger      Historical Information  Review Of Systems:   · Skin: Normal  · Neuro: See HPI  · Musculoskeletal: See HPI  · 14 point review of systems negative except as stated above     Past Medical History:   Past Medical History:   Diagnosis Date    Arthritis     Bell's palsy 1940    Colitis     Hemorrhoids     History of transfusion     Hx of colonoscopy 1996, 1998, 2000, 2002, 2003, 2007, 2012       Past Surgical History:   Past Surgical History:   Procedure Laterality Date   [address] [facility]    ruptured disk    BACK SURGERY  1972    bone fusion lumbar spine    BLADDER REPAIR  2002   [address]  2012    BREAST SURGERY  2011   Isidra Alberts CARDIAC SURGERY  2014    stent    CARPAL TUNNEL RELEASE  08/14/2014    CATARACT EXTRACTION Right 2015    CHOLECYSTECTOMY      COLONOSCOPY      EYE SURGERY Left    [facility]    GANGLION CYST EXCISION  2004    left wrist    HERNIA REPAIR  2001    HERNIA REPAIR  2003    [facility]ST[facility]OMY  2012   [facility]    PARTIAL HYSTERECTOMY  1964    CT INCISE FINGER TENDON SHEATH Right 4/5/2016    Procedure: INDEX TRIGGER FINGER AND RING TRIGGER FINGER RELEASE ;  Surgeon: Paz Cummins MD;  Location: [facility];  Service: Orthopedics    SPLENECTOMY, PARTIAL  1995    TONSILLECTOMY  1939    WRIST SURGERY Right 1952       Family History:  Family history reviewed and non-contributory  Family History   Problem Relation Age of Onset    Cancer Mother     Breast cancer Sister        Social History:  Social History     Social History    Marital status: /Civil Union     Spouse name: N/A    Number of children: N/A    Years of education: N/A     Social History Main Topics    Smoking status: Former Smoker     Quit date: 1950    Smokeless tobacco: Never Used    Alcohol use No    Drug use: No    Sexual activity: No     Other Topics Concern    None     Social History Narrative    None       Allergies: Allergies   Allergen Reactions    Amlodipine Shortness Of Breath     And fatigue    Zithromax [Azithromycin] Swelling     tongue    Aspirin Other (See Comments)     Bleeding  Tolerates baby asapirin    Corn-Containing Products Other (See Comments)     headache    Effient [Prasugrel]     Keflex [Cephalexin] Other (See Comments)     unknow    Lasix [Furosemide] GI Intolerance    Lipitor [Atorvastatin]     Other      liptol and lipol cause GI upset  Adhesive tape  USE PAPER TAPE    Prednisone Other (See Comments)     "I go berzerk!"    Sulfamethizole Other (See Comments)     unkown    Latex Rash     Pt cannot remember           Labs:  No results found for: HCT, HGB, PT, INR, WBC, ESR, CRP    Meds:    Current Facility-Administered Medications:     lactated ringers infusion, 125 mL/hr, Intravenous, Continuous, [place] Counter, MD    Blood Culture:   No results found for: BLOODCX    Wound Culture:   No results found for: WOUNDCULT    Ins and Outs:  No intake/output data recorded  Physical Exam  BP (!) 220/92   Pulse 80   Temp 98 4 °F (36 9 °C) (Tympanic Core)   Resp 16   SpO2 96%   Gen: Alert and oriented to person, place, time  HEENT: EOMI, eyes clear, moist mucus membranes, hearing intact  Respiratory: Bilateral chest rise  No audible wheezing found  Cardiovascular: Regular Rate and Rhythm  Abdomen: soft nontender/nondistended  Ortho Exam: LROM of right hand small finger 2/2 mass   + edema, + nodule and + triggering of left index finger     Neuro Exam: Sensation and motor grossly intact    Lab Results: Reviewed  Imaging: Reviewed

## 2018-10-09 NOTE — OP NOTE
OPERATIVE REPORT  PATIENT NAME: Joesph Stewart  :  1932  MRN: [id number]  Pt Location: [facility]    SURGERY DATE: 10/09/18    Surgeon(s) and Role:     * Jearldine Goodell, MD - Primary     * Tho Rachel PA-C - Assisting    Pre-Op Diagnosis:  Mass of right finger [R22 31]    Post-Op Diagnosis Codes:     * Mass of right finger [R22 31]    Procedure(s):  EXCISION GANGLION CYST RIGHT SMALL FINGER (Right)    Specimen(s):    Order Name Source Comment Collection Info Order Time   TISSUE EXAM Finger, Right  Collected By: Jearldine Goodell, MD 10/9/2018  9:38 AM       Estimated Blood Loss:   Minimal      Anesthesia Type:   * No anesthesia type entered *    Operative Indications: The patient has a history of right small finger proximal phalanx ganglion cyst of the flexor tendon sheath that was recalcitrant to conservative management  The decision was made to bring the patient to the operating room for right hand small finger proximal phalanx excision ganglion cyst of the tendon sheath  Risks of the procedure were explained which include, but are not limited to bleeding; infection; damage to nerves, arteries,veins, tendons; scar; pain; need for reoperation; failure to give desired result; and risks of anaesthesia  All questions were answered to satisfaction and they were willing to proceed  Operative Findings:  1 cm x 1 cm x 8 mm ganglion cyst off the flexor tendon sheath just distal to the A2 pulley  Complications:   None    Procedure and Technique:  After the patient, site, and procedure were identified, the patient was brought into the operating room in a supine position  General anaesthesia and local medication were provided  A well padded tourniquet was applied to the extremity, set at 250 mmHg  The  right upper extremity was then prepped and drapped in a normal, sterile, orthopedic fashion      After the patient, site, and procedure were identified attention was turned towards the right hand   An Esmarch bandage was used to exsanguinate the limb after infiltration of local medication and the tourniquet was inflated to 250 mm of mercury  A oblique incision over the proximal phalanx was then made  We dissected down through the skin and subcutaneous tissues and identified the radial and ulnar digital artery and nerve and protected these structures through the entirety of the procedure  A 1 cm x 1 cm x 8 mm ganglion off the flexor tendon sheath just distal to the A2 pulley was identified  This was circumferentially dissected and removed and sent for routine pathologic specimen  A small area of the flexor tendon sheath involving the C1 pulley as well as the distal aspect of the A2 pulley was then removed to prevent recurrence  Tourniquet was then deflated  At the completion of the procedure, hemostasis was obtained with cautery and direct pressure  The wounds were copiously irrigated with sterile solution  The wounds were closed with Prolene  Sterile dressings were applied, including Xeroform, gauze, tweeners, webril, ACE  Please note, all sponge, needle, and instrument counts were correct prior to closure  Loupe magnification was utilized  The patient tolerated the procedure well       I was present for the entire procedure and A qualified resident physician was not available    Patient Disposition:  PACU , hemodynamically stable and extubated and stable    SIGNATURE: Marifer Amezquita MD  DATE: 10/09/18  TIME: 9:45 AM

## 2018-10-09 NOTE — ANESTHESIA POSTPROCEDURE EVALUATION
Post-Op Assessment Note      CV Status:  Stable    Mental Status:  Awake    Hydration Status:  Stable    PONV Controlled:  None    Airway Patency:  Patent    Post Op Vitals Reviewed: Yes          Staff: CRNA       Comments: hr 70, rr 12, bp 163/69, O2 sat 96%          BP     Temp      Pulse     Resp      SpO2

## 2018-10-09 NOTE — ANESTHESIA PREPROCEDURE EVALUATION
Review of Systems/Medical History  Patient summary reviewed        Cardiovascular  Negative cardio ROS Exercise tolerance (METS): >4,     Pulmonary  Negative pulmonary ROS        GI/Hepatic  Negative GI/hepatic ROS          Negative  ROS        Endo/Other  Negative endo/other ROS      GYN  Negative gynecology ROS          Hematology  Negative hematology ROS      Musculoskeletal  Negative musculoskeletal ROS   Arthritis     Neurology  Negative neurology ROS      Psychology   Negative psychology ROS              Physical Exam    Airway    Mallampati score: II  TM Distance: >3 FB  Neck ROM: full     Dental   No notable dental hx     Cardiovascular  Comment: Negative ROS,     Pulmonary      Other Findings        Anesthesia Plan  ASA Score- 2     Anesthesia Type- IV sedation with anesthesia with ASA Monitors  Additional Monitors:   Airway Plan:     Comment: I have seen the patient and reviewed the history  Patient to receive IV sedation with full ASA monitors  Risks discussed with the patient, consent signed        Plan Factors-    Induction- intravenous  Postoperative Plan-     Informed Consent- Anesthetic plan and risks discussed with patient  I personally reviewed this patient with the CRNA  Discussed and agreed on the Anesthesia Plan with the HILL Eddy

## 2018-10-16 ENCOUNTER — HOSPITAL ENCOUNTER (OUTPATIENT)
Facility: HOSPITAL | Age: 83
Setting detail: OUTPATIENT SURGERY
Discharge: HOME | End: 2018-10-16
Attending: ORTHOPAEDIC SURGERY | Admitting: ORTHOPAEDIC SURGERY
Payer: MEDICARE

## 2018-10-16 VITALS
TEMPERATURE: 97.4 F | RESPIRATION RATE: 18 BRPM | HEIGHT: 62 IN | BODY MASS INDEX: 25.21 KG/M2 | HEART RATE: 72 BPM | WEIGHT: 137 LBS | OXYGEN SATURATION: 98 % | DIASTOLIC BLOOD PRESSURE: 84 MMHG | SYSTOLIC BLOOD PRESSURE: 213 MMHG

## 2018-10-16 PROCEDURE — 26055 INCISE FINGER TENDON SHEATH: CPT | Performed by: ORTHOPAEDIC SURGERY

## 2018-10-16 RX ORDER — MAGNESIUM HYDROXIDE 1200 MG/15ML
LIQUID ORAL AS NEEDED
Status: DISCONTINUED | OUTPATIENT
Start: 2018-10-16 | End: 2018-10-16 | Stop reason: HOSPADM

## 2018-10-16 RX ADMIN — SODIUM BICARBONATE: 84 INJECTION, SOLUTION INTRAVENOUS at 07:56

## 2018-10-16 NOTE — H&P (VIEW-ONLY)
H&P Exam - Orthopedics   Flower Boothe 80 y o  female MRN: [id number]  Unit/Bed#: [facility] 03    Assessment/Plan   Assessment:  Right small finger mass and left index trigger finger  Plan:  Excision of right small finger mass today  Left index trigger finger release to be performed at a later date    History of Present Illness   HPI:  Flower Boothe is a 80 y o  y o  female who presents with mass on the right small finger which has grown over the last few months  Patient also with continued complaints of pain and clicking of the left index finger      Historical Information  Review Of Systems:   · Skin: Normal  · Neuro: See HPI  · Musculoskeletal: See HPI  · 14 point review of systems negative except as stated above     Past Medical History:   Past Medical History:   Diagnosis Date    Arthritis     Bell's palsy 1940    Colitis     Hemorrhoids     History of transfusion     Hx of colonoscopy 1996, 1998, 2000, 2002, 2003, 2007, 2012       Past Surgical History:   Past Surgical History:   Procedure Laterality Date   [address] [facility]    ruptured disk    BACK SURGERY  1972    bone fusion lumbar spine    BLADDER REPAIR  2002   [address]  2012    BREAST SURGERY  2011   Jamison Daubs CARDIAC SURGERY  2014    stent    CARPAL TUNNEL RELEASE  08/14/2014    CATARACT EXTRACTION Right 2015    CHOLECYSTECTOMY      COLONOSCOPY      E[facility] SURGERY Left    [address] [facility] [address]    GANGLION CYST EXCISION  2004    left wrist    HERNIA REPAIR  2001    HERNIA REPAIR  2003    MASTECTOMY  2012   [facility]    PARTIAL HYSTERECTOMY  1964    PA INCISE FINGER TENDON SHEATH Right 4/5/2016    Procedure: INDEX TRIGGER FINGER AND RING TRIGGER FINGER RELEASE ;  Surgeon: Helder Mack MD;  [facility];  Service: Orthopedics    SPLENECTOMY, PARTIAL  1995    TONSILLECTOMY  1939    WRIST SURGERY Right 1952       Family History:  Family history reviewed and non-contributory  Family History   Problem Relation Age of Onset    Cancer Mother     Breast cancer Sister        Social History:  Social History     Social History    Marital status: /Civil Union     Spouse name: N/A    Number of children: N/A    Years of education: N/A     Social History Main Topics    Smoking status: Former Smoker     Quit date: 1950    Smokeless tobacco: Never Used    Alcohol use No    Drug use: No    Sexual activity: No     Other Topics Concern    None     Social History Narrative    None       Allergies: Allergies   Allergen Reactions    Amlodipine Shortness Of Breath     And fatigue    Zithromax [Azithromycin] Swelling     tongue    Aspirin Other (See Comments)     Bleeding  Tolerates baby asapirin    Corn-Containing Products Other (See Comments)     headache    Effient [Prasugrel]     Keflex [Cephalexin] Other (See Comments)     unknow    Lasix [Furosemide] GI Intolerance    Lipitor [Atorvastatin]     Other      liptol and lipol cause GI upset  Adhesive tape  USE PAPER TAPE    Prednisone Other (See Comments)     "I go berzerk!"    Sulfamethizole Other (See Comments)     unkown    Latex Rash     Pt cannot remember           Labs:  No results found for: HCT, HGB, PT, INR, WBC, ESR, CRP    Meds:    Current Facility-Administered Medications:     lactated ringers infusion, 125 mL/hr, Intravenous, Continuous, Bonifacio Hernandez MD    Blood Culture:   No results found for: BLOODCX    Wound Culture:   No results found for: WOUNDCULT    Ins and Outs:  No intake/output data recorded  Physical Exam  BP (!) 220/92   Pulse 80   Temp 98 4 °F (36 9 °C) (Tympanic Core)   Resp 16   SpO2 96%   Gen: Alert and oriented to person, place, time  HEENT: EOMI, eyes clear, moist mucus membranes, hearing intact  Respiratory: Bilateral chest rise  No audible wheezing found  Cardiovascular: Regular Rate and Rhythm  Abdomen: soft nontender/nondistended  Ortho Exam: LROM of right hand small finger 2/2 mass   + edema, + nodule and + triggering of left index finger     Neuro Exam: Sensation and motor grossly intact    Lab Results: Reviewed  Imaging: Reviewed

## 2018-10-16 NOTE — INTERVAL H&P NOTE
H&P updated  The patient was examined and is doing well after mass excision right small finger  Today, patient for left hand index finger trigger finger release

## 2018-10-16 NOTE — OP NOTE
OPERATIVE REPORT  PATIENT NAME: Apolinar York  :  1932  MRN: [id number]  Pt Location[facility]    SURGERY DATE: 10/16/18    Surgeon(s) and Role:     * Gabriella Lui MD - Primary     * Caron Florian MD - Assisting    Pre-Op Diagnosis:  Trigger finger, left index finger [M65 322]    Post-Op Diagnosis Codes:     * Trigger finger, left index finger [M65 322]    Procedure(s):  RELEASE TRIGGER FINGER - LEFT INDEX (Left)    Specimen(s):  * No orders in the log *    Estimated Blood Loss:   Minimal      Anesthesia Type:   Local    Operative Indications: The patient has a history of Trigger Finger  left  index finger that was recalcitrant to conservative management  The decision was made to bring the patient to the operating room for Trigger Finger Release  left  index finger  Risks of the procedure were explained which include, but are not limited to bleeding; infection; damage to nerves, arteries,veins, tendons; scar; pain; need for reoperation; failure to give desired result; and risks of anaesthesia  All questions were answered to satisfaction and they were willing to proceed  Operative Findings:  Left index finger trigger finger  Healed right hand small finger mass excision incision-sutures removed    Complications:   None    Procedure and Technique:  After the patient, site, and procedure were identified, the patient was brought into the operating room in a supine position  Local anaesthesia was adminstered in the preoperative holding area  A tourniquet was not used  The  left upper extremity was then prepped and drapped in a normal, sterile, orthopedic fashion  After the patient, site, and procedure were once again identified, attention was turned to the left index finger  An incision was made over the flexor tendon sheath at the level of the A1 pulley    Dissection was carried out in-line with the flexor tendon sheath and the radial and ulnar digital artery and nerve were protected  The A1 pulley was identified at the base of the incision  Under direct visualization, the A1 pulley was divided along the midline in its entirety with care taken to avoid injury to the underlying tendon  The tendons were examined to ensure that no further catching, popping, clicking or locking occurred with motion of the finger  At the completion of the procedure, hemostasis was obtained with cautery and direct pressure  The wounds were copiously irrigated with sterile solution  The wounds were closed with Prolene  Sterile dressings were applied, including Xeroform, gauze, tweeners, webril, ACE  Please note, all sponge, needle, and instrument counts were correct prior to closure  Loupe magnification was utilized  The patient tolerated the procedure well       I was present for the entire procedure    Patient Disposition:  APU and hemodynamically stable    SIGNATURE: Mena Honeycutt MD  DATE: 10/16/18  TIME: 8:18 AM

## 2018-10-16 NOTE — DISCHARGE INSTRUCTIONS

## 2018-10-26 ENCOUNTER — OFFICE VISIT (OUTPATIENT)
Dept: OBGYN CLINIC | Facility: HOSPITAL | Age: 83
End: 2018-10-26

## 2018-10-26 VITALS
WEIGHT: 138.8 LBS | HEART RATE: 66 BPM | DIASTOLIC BLOOD PRESSURE: 78 MMHG | HEIGHT: 62 IN | BODY MASS INDEX: 25.54 KG/M2 | SYSTOLIC BLOOD PRESSURE: 151 MMHG

## 2018-10-26 DIAGNOSIS — R22.31 MASS OF RIGHT FINGER: Primary | ICD-10-CM

## 2018-10-26 DIAGNOSIS — M65.322 TRIGGER FINGER, LEFT INDEX FINGER: ICD-10-CM

## 2018-10-26 PROCEDURE — 99024 POSTOP FOLLOW-UP VISIT: CPT | Performed by: ORTHOPAEDIC SURGERY

## 2018-10-26 NOTE — PROGRESS NOTES
Assessment:   S/p right small finger ganglion cyst excision on 10/9/18  S/p left index finger trigger finger release on 10/16/18    Plan:   Resume activities as tolerated, scar tissue massage was discussed with the patient and her son today,     Follow Up:  PRN    To Do Next Visit:         CHIEF COMPLAINT:  Chief Complaint   Patient presents with    Left Index Finger - Post-op         SUBJECTIVE:  Eli Lopez is a 80y o  year old female who presents for follow up after S/p right small finger ganglion cyst excision on 10/9/18 and S/p left index finger trigger finger release on 10/16/18   Today patient has No Complaints  Patient states that she is fully back to full activities and is coloring and knitting         PHYSICAL EXAMINATION:  General: well developed and well nourished, alert, oriented times 3 and appears comfortable  Psychiatric: Normal    MUSCULOSKELETAL EXAMINATION:  Incision: healed  Range of Motion: As expected, opposition intact and full composite fist possible  Neurovascular status: Neuro intact, good cap refill  Activity Restrictions: No restrictions  Done today: Sutures out      STUDIES REVIEWED:  No Studies to review      PROCEDURES PERFORMED:  Procedures  No Procedures performed today   Scribe Attestation    I,:   Jonathan Lopez am acting as a scribe while in the presence of the attending physician :        I,:   Gaurav Jiménez MD personally performed the services described in this documentation    as scribed in my presence :

## 2020-06-04 ENCOUNTER — OFFICE VISIT (OUTPATIENT)
Dept: UROLOGY | Facility: CLINIC | Age: 85
End: 2020-06-04
Payer: MEDICARE

## 2020-06-04 VITALS
WEIGHT: 118 LBS | SYSTOLIC BLOOD PRESSURE: 124 MMHG | DIASTOLIC BLOOD PRESSURE: 82 MMHG | BODY MASS INDEX: 21.58 KG/M2 | HEART RATE: 82 BPM

## 2020-06-04 DIAGNOSIS — R32 URINARY INCONTINENCE, UNSPECIFIED TYPE: Primary | ICD-10-CM

## 2020-06-04 DIAGNOSIS — N39.0 RECURRENT UTI: ICD-10-CM

## 2020-06-04 LAB — POST-VOID RESIDUAL VOLUME, ML POC: 0 ML

## 2020-06-04 PROCEDURE — 51798 US URINE CAPACITY MEASURE: CPT | Performed by: PHYSICIAN ASSISTANT

## 2020-06-04 PROCEDURE — 99203 OFFICE O/P NEW LOW 30 MIN: CPT | Performed by: PHYSICIAN ASSISTANT

## 2020-06-04 PROCEDURE — 87086 URINE CULTURE/COLONY COUNT: CPT | Performed by: PHYSICIAN ASSISTANT

## 2020-06-05 LAB — BACTERIA UR CULT: NORMAL

## 2020-07-29 ENCOUNTER — TRANSCRIBE ORDERS (OUTPATIENT)
Dept: ADMINISTRATIVE | Facility: HOSPITAL | Age: 85
End: 2020-07-29

## 2020-07-29 DIAGNOSIS — R19.7 DIARRHEA OF PRESUMED INFECTIOUS ORIGIN: Primary | ICD-10-CM

## 2020-09-28 ENCOUNTER — TRANSCRIBE ORDERS (OUTPATIENT)
Dept: ADMINISTRATIVE | Facility: HOSPITAL | Age: 85
End: 2020-09-28

## 2020-09-28 ENCOUNTER — APPOINTMENT (OUTPATIENT)
Dept: LAB | Facility: MEDICAL CENTER | Age: 85
End: 2020-09-28
Payer: MEDICARE

## 2020-09-28 DIAGNOSIS — R53.83 OTHER FATIGUE: ICD-10-CM

## 2020-09-28 DIAGNOSIS — I25.10 ATHEROSCLEROSIS OF NATIVE CORONARY ARTERY, ANGINA PRESENCE UNSPECIFIED, UNSPECIFIED WHETHER NATIVE OR TRANSPLANTED HEART: ICD-10-CM

## 2020-09-28 DIAGNOSIS — E78.2 MIXED HYPERLIPIDEMIA: Primary | ICD-10-CM

## 2020-09-28 DIAGNOSIS — E78.2 MIXED HYPERLIPIDEMIA: ICD-10-CM

## 2020-09-28 DIAGNOSIS — I10 ESSENTIAL HYPERTENSION, BENIGN: ICD-10-CM

## 2020-09-28 DIAGNOSIS — Z79.899 ENCOUNTER FOR LONG-TERM (CURRENT) USE OF OTHER MEDICATIONS: Primary | ICD-10-CM

## 2020-09-28 LAB
ALBUMIN SERPL BCP-MCNC: 3.8 G/DL (ref 3.5–5)
ALP SERPL-CCNC: 92 U/L (ref 46–116)
ALT SERPL W P-5'-P-CCNC: 21 U/L (ref 12–78)
ANION GAP SERPL CALCULATED.3IONS-SCNC: 4 MMOL/L (ref 4–13)
AST SERPL W P-5'-P-CCNC: 18 U/L (ref 5–45)
BASOPHILS # BLD AUTO: 0.02 THOUSANDS/ΜL (ref 0–0.1)
BASOPHILS NFR BLD AUTO: 0 % (ref 0–1)
BILIRUB SERPL-MCNC: 0.45 MG/DL (ref 0.2–1)
BUN SERPL-MCNC: 13 MG/DL (ref 5–25)
CALCIUM SERPL-MCNC: 10 MG/DL (ref 8.3–10.1)
CHLORIDE SERPL-SCNC: 106 MMOL/L (ref 100–108)
CHOLEST SERPL-MCNC: 190 MG/DL (ref 50–200)
CO2 SERPL-SCNC: 30 MMOL/L (ref 21–32)
CREAT SERPL-MCNC: 0.61 MG/DL (ref 0.6–1.3)
EOSINOPHIL # BLD AUTO: 0.06 THOUSAND/ΜL (ref 0–0.61)
EOSINOPHIL NFR BLD AUTO: 1 % (ref 0–6)
ERYTHROCYTE [DISTWIDTH] IN BLOOD BY AUTOMATED COUNT: 14.5 % (ref 11.6–15.1)
ERYTHROCYTE [SEDIMENTATION RATE] IN BLOOD: 49 MM/HOUR (ref 0–29)
GFR SERPL CREATININE-BSD FRML MDRD: 81 ML/MIN/1.73SQ M
GLUCOSE P FAST SERPL-MCNC: 87 MG/DL (ref 65–99)
HCT VFR BLD AUTO: 38.5 % (ref 34.8–46.1)
HDLC SERPL-MCNC: 52 MG/DL
HGB BLD-MCNC: 13 G/DL (ref 11.5–15.4)
IMM GRANULOCYTES # BLD AUTO: 0.03 THOUSAND/UL (ref 0–0.2)
IMM GRANULOCYTES NFR BLD AUTO: 0 % (ref 0–2)
LDLC SERPL CALC-MCNC: 102 MG/DL (ref 0–100)
LYMPHOCYTES # BLD AUTO: 3.96 THOUSANDS/ΜL (ref 0.6–4.47)
LYMPHOCYTES NFR BLD AUTO: 45 % (ref 14–44)
MCH RBC QN AUTO: 33 PG (ref 26.8–34.3)
MCHC RBC AUTO-ENTMCNC: 33.8 G/DL (ref 31.4–37.4)
MCV RBC AUTO: 98 FL (ref 82–98)
MONOCYTES # BLD AUTO: 0.78 THOUSAND/ΜL (ref 0.17–1.22)
MONOCYTES NFR BLD AUTO: 9 % (ref 4–12)
NEUTROPHILS # BLD AUTO: 3.88 THOUSANDS/ΜL (ref 1.85–7.62)
NEUTS SEG NFR BLD AUTO: 45 % (ref 43–75)
NONHDLC SERPL-MCNC: 138 MG/DL
NRBC BLD AUTO-RTO: 0 /100 WBCS
PLATELET # BLD AUTO: 549 THOUSANDS/UL (ref 149–390)
PMV BLD AUTO: 10.8 FL (ref 8.9–12.7)
POTASSIUM SERPL-SCNC: 3.4 MMOL/L (ref 3.5–5.3)
PROT SERPL-MCNC: 8.4 G/DL (ref 6.4–8.2)
RBC # BLD AUTO: 3.94 MILLION/UL (ref 3.81–5.12)
SODIUM SERPL-SCNC: 140 MMOL/L (ref 136–145)
TRIGL SERPL-MCNC: 181 MG/DL
TSH SERPL DL<=0.05 MIU/L-ACNC: 1.83 UIU/ML (ref 0.36–3.74)
WBC # BLD AUTO: 8.73 THOUSAND/UL (ref 4.31–10.16)

## 2020-09-28 PROCEDURE — 85025 COMPLETE CBC W/AUTO DIFF WBC: CPT | Performed by: INTERNAL MEDICINE

## 2020-09-28 PROCEDURE — 80061 LIPID PANEL: CPT | Performed by: INTERNAL MEDICINE

## 2020-09-28 PROCEDURE — 85652 RBC SED RATE AUTOMATED: CPT

## 2020-09-28 PROCEDURE — 80053 COMPREHEN METABOLIC PANEL: CPT | Performed by: INTERNAL MEDICINE

## 2020-09-28 PROCEDURE — 84443 ASSAY THYROID STIM HORMONE: CPT | Performed by: INTERNAL MEDICINE

## 2020-09-28 PROCEDURE — 36415 COLL VENOUS BLD VENIPUNCTURE: CPT

## 2021-02-24 ENCOUNTER — LAB (OUTPATIENT)
Dept: LAB | Facility: MEDICAL CENTER | Age: 86
End: 2021-02-24
Payer: MEDICARE

## 2021-02-24 ENCOUNTER — TRANSCRIBE ORDERS (OUTPATIENT)
Dept: ADMINISTRATIVE | Facility: HOSPITAL | Age: 86
End: 2021-02-24

## 2021-02-24 DIAGNOSIS — I10 ESSENTIAL HYPERTENSION, BENIGN: ICD-10-CM

## 2021-02-24 DIAGNOSIS — I25.10 ATHEROSCLEROSIS OF NATIVE CORONARY ARTERY, ANGINA PRESENCE UNSPECIFIED, UNSPECIFIED WHETHER NATIVE OR TRANSPLANTED HEART: ICD-10-CM

## 2021-02-24 DIAGNOSIS — E78.00 HYPERCHOLESTEROLEMIA: Primary | ICD-10-CM

## 2021-02-24 DIAGNOSIS — Z79.899 ENCOUNTER FOR LONG-TERM (CURRENT) USE OF OTHER MEDICATIONS: ICD-10-CM

## 2021-02-24 LAB
ANION GAP SERPL CALCULATED.3IONS-SCNC: 6 MMOL/L (ref 4–13)
BUN SERPL-MCNC: 15 MG/DL (ref 5–25)
CALCIUM SERPL-MCNC: 9.7 MG/DL (ref 8.3–10.1)
CHLORIDE SERPL-SCNC: 105 MMOL/L (ref 100–108)
CO2 SERPL-SCNC: 30 MMOL/L (ref 21–32)
CREAT SERPL-MCNC: 0.64 MG/DL (ref 0.6–1.3)
GFR SERPL CREATININE-BSD FRML MDRD: 80 ML/MIN/1.73SQ M
GLUCOSE SERPL-MCNC: 105 MG/DL (ref 65–140)
POTASSIUM SERPL-SCNC: 3.2 MMOL/L (ref 3.5–5.3)
SODIUM SERPL-SCNC: 141 MMOL/L (ref 136–145)

## 2021-02-24 PROCEDURE — 36415 COLL VENOUS BLD VENIPUNCTURE: CPT | Performed by: SPECIALIST

## 2021-02-24 PROCEDURE — 80048 BASIC METABOLIC PNL TOTAL CA: CPT | Performed by: SPECIALIST

## 2021-03-02 ENCOUNTER — TELEPHONE (OUTPATIENT)
Dept: OBGYN CLINIC | Facility: HOSPITAL | Age: 86
End: 2021-03-02

## 2021-03-02 NOTE — TELEPHONE ENCOUNTER
Patient is calling with a problem with her lt 4th digit  Patient had a splinter back in October 2020 which she let go but she did take it out but it would not stop bleeding  Patient went to pcp in December for it & was put on abx  Patient states that it has yet to heal correctly & it still bleeds  Patient states that it bleeds a lot & it is typically once or twice a week  Please advise if it is ok for this patient to wait until 4/14/21 for the first available or if she should or can be seen sooner       [phone]

## 2021-03-05 ENCOUNTER — OFFICE VISIT (OUTPATIENT)
Dept: OBGYN CLINIC | Facility: HOSPITAL | Age: 86
End: 2021-03-05
Payer: MEDICARE

## 2021-03-05 VITALS
HEIGHT: 62 IN | HEART RATE: 79 BPM | WEIGHT: 126 LBS | BODY MASS INDEX: 23.19 KG/M2 | DIASTOLIC BLOOD PRESSURE: 75 MMHG | SYSTOLIC BLOOD PRESSURE: 154 MMHG

## 2021-03-05 DIAGNOSIS — L98.0 PYOGENIC GRANULOMA OF SKIN: Primary | ICD-10-CM

## 2021-03-05 PROCEDURE — 99214 OFFICE O/P EST MOD 30 MIN: CPT | Performed by: ORTHOPAEDIC SURGERY

## 2021-03-05 RX ORDER — LIDOCAINE HYDROCHLORIDE AND EPINEPHRINE 10; 10 MG/ML; UG/ML
20 INJECTION, SOLUTION INFILTRATION; PERINEURAL
Status: CANCELLED | OUTPATIENT
Start: 2021-03-05 | End: 2021-03-06

## 2021-03-05 NOTE — PROGRESS NOTES
ASSESSMENT/PLAN:    Assessment:     Pyogenic granuloma left ring finger    Plan:   Excision of pyogenic granuloma left ring finger    Follow Up: After Surgery    To Do Next Visit:  Sutures out    Operative Discussions:     Standard Consent: The risks and benefits of the procedure were explained to the patient, which include, but are not limited to: Bleeding, infection, recurrence, pain, scar, damage to tendons, damage to nerves, and damage to blood vessels, failure to give desired results and complications related to anesthesia  These risks, along with alternative conservative treatment options, and postoperative protocols were voiced back and understood by the patient  All questions were answered to the patient's satisfaction  The patient agrees to comply with a standard postoperative protocol, and is willing to proceed  Education was provided via written and auditory forms  There were no barriers to learning  Written handouts regarding wound care, incision and scar care, and general preoperative information was provided to the patient  Prior to surgery, the patient may be requested to stop all anti-inflammatory medications  Prophylactic aspirin, Plavix, and Coumadin may be allowed to be continued  Medications including vitamin E , ginkgo, and fish oil are requested to be stopped approximately one week prior to surgery  Hypertensive medications and beta blockers, if taken, should be continued  patient like to move forward with operative intervention of her pyogenic granuloma of the left ring finger  Discussed with patient risks and benefits of operative intervention  Discussed with patient expected recovery and postoperative course    Patient would like to move forward with operative intervention under local anesthesia       _____________________________________________________  CHIEF COMPLAINT:  Chief Complaint   Patient presents with    Left Ring Finger - Mass         SUBJECTIVE:  Jaswinder Douglas is a 80 y o  female who presents with  A bleeding lesion on her right ring finger  Patient states that she got a splinter in her finger back in October  She successfully remove the splinter but has had issues with healing ever since  Patient states that this lesion bleeds all the time, especially if she bumps it on anything  She states that is not gotten better since October  She states that she saw her PCP for this in the past as it was infected and did take a course of antibiotics for it  She did follow-up with her PCP this past week who told her it was likely a pyogenic granuloma  Patient denies any other signs of infection  She denies any numbness or tingling  She denies any other acute complaints      PAST MEDICAL HISTORY:  Past Medical History:   Diagnosis Date    Arthritis     Bell's palsy 1940    Colitis     Hemorrhoids     History of transfusion     Hx of colonoscopy 1996, 1998, 2000, 2002, 2003, 2007, 2012       PAST SURGICAL HISTORY:  Past Surgical History:   Procedure Laterality Date    BACK SURGERY  1971    ruptured disk    BACK SURGERY  1972    bone fusion lumbar spine    BLADDER REPAIR  2002    BLADDER REPAIR  2012    BREAST SURGERY  2011   [facility]ARDIAC SURGERY  2014    stent    CARPAL TUNNEL RELEASE  08/14/2014    CATARACT EXTRACTION Right 2015    CHOLECYSTECTOMY      COLONOSCOPY      EYE SURGERY Left     GALLBLADDER SURGERY  1995    GANGLION CYST EXCISION  2004    left wrist    HERNIA REPAIR  2001   [facility] HERNIA REPAIR  2003    MASTECTOMY  2012    OVARY SURGERY  1984    PARTIAL HYSTERECTOMY  1964    AK EXCIS TENDON SHEATH LESION, HAND/FINGER Right 10/9/2018    Procedure: EXCISION GANGLION CYST RIGHT SMALL FINGER;  Surgeon: Ibis Bauer MD;  [facility];  Service: Orthopedics    AK INCISE FINGER TENDON SHEATH Right 4/5/2016    Procedure: INDEX TRIGGER FINGER AND RING TRIGGER FINGER RELEASE ;  Surgeon: Ibis Bauer MD;  [facility];  Service: Orthopedics    [place] INCISE FINGER TENDON SHEATH Left 10/16/2018    Procedure: RELEASE TRIGGER FINGER - LEFT INDEX;  Surgeon: Cohn Mandel MD;  [facility];  Service: Orthopedics    SPLENECTOMY, PARTIAL  1995    TONSILLECTOMY      WRIST SURGERY Right        FAMILY HISTORY:  Family History   Problem Relation Age of Onset    Cancer Mother     Breast cancer Sister        SOCIAL HISTORY:  Social History     Tobacco Use    Smoking status: Former Smoker     Quit date: 1950     Years since quittin 2    Smokeless tobacco: Never Used   Substance Use Topics    Alcohol use: No    Drug use: No       MEDICATIONS:    Current Outpatient Medications:     mupirocin (BACTROBAN) 2 % ointment, Apply topically 3 (three) times a day, Disp: , Rfl:     acetaminophen (TYLENOL 8 HOUR) 650 mg CR tablet, Take 1 tablet (650 mg total) by mouth every 8 (eight) hours, Disp: 15 tablet, Rfl: 0    ACETAMINOPHEN PO, Take by mouth, Disp: , Rfl:     AMLODIPINE BESYLATE PO, Take by mouth, Disp: , Rfl:     ASPIRIN 81 PO, Take by mouth, Disp: , Rfl:     atorvastatin (LIPITOR) 10 mg tablet, Take 10 mg by mouth daily, Disp: , Rfl: 1    busPIRone (BUSPAR) 10 mg tablet, Take 10 mg by mouth as needed, Disp: , Rfl:     cholecalciferol (VITAMIN D3) 1,000 units tablet, Take 1,000 Units by mouth daily, Disp: , Rfl:     cyanocobalamin (VITAMIN B-12) 1,000 mcg tablet, Take by mouth daily, Disp: , Rfl:     DELZICOL 400 MG, Take 800 mg by mouth 4 (four) times a day  , Disp: , Rfl: 3    diphenoxylate-atropine (LOMOTIL) 2 5-0 025 mg/5 mL liquid, Take 5 mL by mouth 2 (two) times a day as needed for diarrhea, Disp: , Rfl:     lisinopril (ZESTRIL) 20 mg tablet, Take 20 mg by mouth daily, Disp: , Rfl: 1    LUMIGAN 0 01 % ophthalmic drops, ONE DROP IN BOTH EYES AT BEDTIME, Disp: , Rfl: 3    methocarbamol (ROBAXIN) 500 mg tablet, Take 500 mg by mouth as needed for muscle spasms, Disp: , Rfl:     metoprolol succinate (TOPROL-XL) 25 mg 24 hr tablet, TAKE 1/2 TABLET BY ORAL ROUTE TWICE EVERY DAY, Disp: , Rfl: 1    naproxen sodium (ALEVE) 220 MG tablet, Take 1 tablet (220 mg total) by mouth 2 (two) times a day with meals for 5 days, Disp: 10 tablet, Rfl: 0    NITROGLYCERIN PO, Take by mouth, Disp: , Rfl:     omeprazole (PriLOSEC) 20 mg delayed release capsule, Take 20 mg by mouth daily, Disp: , Rfl: 1    Potassium 75 MG TABS, Take by mouth, Disp: , Rfl:     Probiotic Product (PROBIOTIC PO), Take by mouth, Disp: , Rfl:     triamterene-hydrochlorothiazide (DYAZIDE) 37 5-25 mg per capsule, Take 1 capsule by mouth daily, Disp: , Rfl: 3    ALLERGIES:  Allergies   Allergen Reactions    Amlodipine Shortness Of Breath     And fatigue    Zithromax [Azithromycin] Swelling     tongue    Aspirin Other (See Comments)     Bleeding  Tolerates baby asapirin    Corn-Containing Products Other (See Comments)     headache    Effient [Prasugrel]     Keflex [Cephalexin] Other (See Comments)     unknow    Lasix [Furosemide] GI Intolerance    Lipitor [Atorvastatin]     Other      liptol and lipol cause GI upset  Adhesive tape  USE PAPER TAPE    Prednisone Other (See Comments)     "I go berzerk!"    Sulfamethizole Other (See Comments)     unkown    Latex Rash     Pt cannot remember       REVIEW OF SYSTEMS:  Pertinent items are noted in HPI  A comprehensive review of systems was negative      LABS:  HgA1c: No results found for: HGBA1C  BMP:   Lab Results   Component Value Date    CALCIUM 9 7 02/24/2021    K 3 2 (L) 02/24/2021    CO2 30 02/24/2021     02/24/2021    BUN 15 02/24/2021    CREATININE 0 64 02/24/2021         _____________________________________________________  PHYSICAL EXAMINATION:  Vital signs: /75   Pulse 79   Ht 5' 2" (1 575 m)   Wt 57 2 kg (126 lb)   BMI 23 05 kg/m²   General: well developed and well nourished, alert, oriented times 3 and appears comfortable  Psychiatric: Normal  HEENT: Trachea Midline, No torticollis  Cardiovascular: No discernable arrhythmia  Pulmonary: No wheezing or stridor  Skin: No Erythema, No Lacerations, No Ulcerations  Neurovascular: Sensation Intact to the Median, Ulnar, Radial Nerve, Motor Intact to the Median, Ulnar, Radial Nerve and Pulses Intact    MUSCULOSKELETAL EXAMINATION:  LEFT SIDE:  Ring finger       Patient has a pyogenic granuloma on the radial volar border the left ring finger    _____________________________________________________  STUDIES REVIEWED:  No Studies to review      PROCEDURES PERFORMED:  Procedures  No Procedures performed today     Scribe Attestation    I,:  Sirena Sloan PA-C am acting as a scribe while in the presence of the attending physician :       I,:  Christiano Live MD personally performed the services described in this documentation    as scribed in my presence :           Scribe Attestation    I,:  Sirena Sloan PA-C am acting as a scribe while in the presence of the attending physician :       I,:  Christiano Live MD personally performed the services described in this documentation    as scribed in my presence :

## 2021-03-05 NOTE — H&P
ASSESSMENT/PLAN:    Assessment:     Pyogenic granuloma left ring finger    Plan:   Excision of pyogenic granuloma left ring finger    Follow Up: After Surgery    To Do Next Visit:  Sutures out    Operative Discussions:     Standard Consent: The risks and benefits of the procedure were explained to the patient, which include, but are not limited to: Bleeding, infection, recurrence, pain, scar, damage to tendons, damage to nerves, and damage to blood vessels, failure to give desired results and complications related to anesthesia  These risks, along with alternative conservative treatment options, and postoperative protocols were voiced back and understood by the patient  All questions were answered to the patient's satisfaction  The patient agrees to comply with a standard postoperative protocol, and is willing to proceed  Education was provided via written and auditory forms  There were no barriers to learning  Written handouts regarding wound care, incision and scar care, and general preoperative information was provided to the patient  Prior to surgery, the patient may be requested to stop all anti-inflammatory medications  Prophylactic aspirin, Plavix, and Coumadin may be allowed to be continued  Medications including vitamin E , ginkgo, and fish oil are requested to be stopped approximately one week prior to surgery  Hypertensive medications and beta blockers, if taken, should be continued  patient like to move forward with operative intervention of her pyogenic granuloma of the left ring finger  Discussed with patient risks and benefits of operative intervention  Discussed with patient expected recovery and postoperative course    Patient would like to move forward with operative intervention under local anesthesia       _____________________________________________________  CHIEF COMPLAINT:  Chief Complaint   Patient presents with    Left Ring Finger - Mass         SUBJECTIVE:  Nile Todd is a 80 y o  female who presents with  A bleeding lesion on her right ring finger  Patient states that she got a splinter in her finger back in October  She successfully remove the splinter but has had issues with healing ever since  Patient states that this lesion bleeds all the time, especially if she bumps it on anything  She states that is not gotten better since October  She states that she saw her PCP for this in the past as it was infected and did take a course of antibiotics for it  She did follow-up with her PCP this past week who told her it was likely a pyogenic granuloma  Patient denies any other signs of infection  She denies any numbness or tingling  She denies any other acute complaints      PAST MEDICAL HISTORY:  Past Medical History:   Diagnosis Date    Arthritis     Bell's palsy 1940    Colitis     Hemorrhoids     History of transfusion     Hx of colonoscopy 1996, 1998, 2000, 2002, 2003, 2007, 2012       PAST SURGICAL HISTORY:  Past Surgical History:   Procedure Laterality Date    BACK SURGERY  1971    ruptured disk    BACK SURGERY  1972    bone fusion lumbar spine    BLADDER REPAIR  2002    BLADDER REPAIR  2012    BREAST SURGERY  2011   [place] [facility]ERY  2014    stent    CARPAL TUNNEL RELEASE  08/14/2014    CATARACT EXTRACTION Right 2015    CHOLECYSTECTOMY      COLONOSCOPY      EYE SURGERY Left     GALLBLADDER SURGERY  1995    GANGLION CYST EXCISION  2004    left wrist    HERNIA REPAIR  2001   [place] [facility] HERNIA REPAIR  2003    MASTECTOMY  2012    OVARY SURGERY  1984    PARTIAL HYSTERECTOMY  1964    ME EXCIS TENDON SHEATH LESION, HAND/FINGER Right 10/9/2018    Procedure: EXCISION GANGLION CYST RIGHT SMALL FINGER;  Surgeon: Nishant Batxer MD;  [facility];  Service: Orthopedics    ME INCISE FINGER TENDON SHEATH Right 4/5/2016    Procedure: INDEX TRIGGER FINGER AND RING TRIGGER FINGER RELEASE ;  Surgeon: Nishant Baxter MD;  [facility];  Service: Orthopedics    [place] INCISE FINGER TENDON SHEATH Left 10/16/2018    Procedure: RELEASE TRIGGER FINGER - LEFT INDEX;  Surgeon: Cathy Garcia MD;  [facility];  Service: Orthopedics    SPLENECTOMY, PARTIAL      TONSILLECTOMY      WRIST SURGERY Right        FAMILY HISTORY:  Family History   Problem Relation Age of Onset    Cancer Mother     Breast cancer Sister        SOCIAL HISTORY:  Social History     Tobacco Use    Smoking status: Former Smoker     Quit date: 1950     Years since quittin 2    Smokeless tobacco: Never Used   Substance Use Topics    Alcohol use: No    Drug use: No       MEDICATIONS:    Current Outpatient Medications:     mupirocin (BACTROBAN) 2 % ointment, Apply topically 3 (three) times a day, Disp: , Rfl:     acetaminophen (TYLENOL 8 HOUR) 650 mg CR tablet, Take 1 tablet (650 mg total) by mouth every 8 (eight) hours, Disp: 15 tablet, Rfl: 0    ACETAMINOPHEN PO, Take by mouth, Disp: , Rfl:     AMLODIPINE BESYLATE PO, Take by mouth, Disp: , Rfl:     ASPIRIN 81 PO, Take by mouth, Disp: , Rfl:     atorvastatin (LIPITOR) 10 mg tablet, Take 10 mg by mouth daily, Disp: , Rfl: 1    busPIRone (BUSPAR) 10 mg tablet, Take 10 mg by mouth as needed, Disp: , Rfl:     cholecalciferol (VITAMIN D3) 1,000 units tablet, Take 1,000 Units by mouth daily, Disp: , Rfl:     cyanocobalamin (VITAMIN B-12) 1,000 mcg tablet, Take by mouth daily, Disp: , Rfl:     DELZICOL 400 MG, Take 800 mg by mouth 4 (four) times a day  , Disp: , Rfl: 3    diphenoxylate-atropine (LOMOTIL) 2 5-0 025 mg/5 mL liquid, Take 5 mL by mouth 2 (two) times a day as needed for diarrhea, Disp: , Rfl:     lisinopril (ZESTRIL) 20 mg tablet, Take 20 mg by mouth daily, Disp: , Rfl: 1    LUMIGAN 0 01 % ophthalmic drops, ONE DROP IN BOTH EYES AT BEDTIME, Disp: , Rfl: 3    methocarbamol (ROBAXIN) 500 mg tablet, Take 500 mg by mouth as needed for muscle spasms, Disp: , Rfl:     metoprolol succinate (TOPROL-XL) 25 mg 24 hr tablet, TAKE 1/2 TABLET BY ORAL ROUTE TWICE EVERY DAY, Disp: , Rfl: 1    naproxen sodium (ALEVE) 220 MG tablet, Take 1 tablet (220 mg total) by mouth 2 (two) times a day with meals for 5 days, Disp: 10 tablet, Rfl: 0    NITROGLYCERIN PO, Take by mouth, Disp: , Rfl:     omeprazole (PriLOSEC) 20 mg delayed release capsule, Take 20 mg by mouth daily, Disp: , Rfl: 1    Potassium 75 MG TABS, Take by mouth, Disp: , Rfl:     Probiotic Product (PROBIOTIC PO), Take by mouth, Disp: , Rfl:     triamterene-hydrochlorothiazide (DYAZIDE) 37 5-25 mg per capsule, Take 1 capsule by mouth daily, Disp: , Rfl: 3    ALLERGIES:  Allergies   Allergen Reactions    Amlodipine Shortness Of Breath     And fatigue    Zithromax [Azithromycin] Swelling     tongue    Aspirin Other (See Comments)     Bleeding  Tolerates baby asapirin    Corn-Containing Products Other (See Comments)     headache    Effient [Prasugrel]     Keflex [Cephalexin] Other (See Comments)     unknow    Lasix [Furosemide] GI Intolerance    Lipitor [Atorvastatin]     Other      liptol and lipol cause GI upset  Adhesive tape  USE PAPER TAPE    Prednisone Other (See Comments)     "I go berzerk!"    Sulfamethizole Other (See Comments)     unkown    Latex Rash     Pt cannot remember       REVIEW OF SYSTEMS:  Pertinent items are noted in HPI  A comprehensive review of systems was negative      LABS:  HgA1c: No results found for: HGBA1C  BMP:   Lab Results   Component Value Date    CALCIUM 9 7 02/24/2021    K 3 2 (L) 02/24/2021    CO2 30 02/24/2021     02/24/2021    BUN 15 02/24/2021    CREATININE 0 64 02/24/2021         _____________________________________________________  PHYSICAL EXAMINATION:  Vital signs: /75   Pulse 79   Ht 5' 2" (1 575 m)   Wt 57 2 kg (126 lb)   BMI 23 05 kg/m²   General: well developed and well nourished, alert, oriented times 3 and appears comfortable  Psychiatric: Normal  HEENT: Trachea Midline, No torticollis  Cardiovascular: No discernable arrhythmia  Pulmonary: No wheezing or stridor  Skin: No Erythema, No Lacerations, No Ulcerations  Neurovascular: Sensation Intact to the Median, Ulnar, Radial Nerve, Motor Intact to the Median, Ulnar, Radial Nerve and Pulses Intact    MUSCULOSKELETAL EXAMINATION:  LEFT SIDE:  Ring finger       Patient has a pyogenic granuloma on the radial volar border the left ring finger    _____________________________________________________  STUDIES REVIEWED:  No Studies to review      PROCEDURES PERFORMED:  Procedures  No Procedures performed today     Scribe Attestation    I,:  Robe Cuevas PA-C am acting as a scribe while in the presence of the attending physician :       I,:  Trace Gonzalez MD personally performed the services described in this documentation    as scribed in my presence :           Scribe Attestation    I,:  Robe Cuevas PA-C am acting as a scribe while in the presence of the attending physician :       I,:  Trace Gonzalez MD personally performed the services described in this documentation    as scribed in my presence :

## 2021-03-26 ENCOUNTER — OFFICE VISIT (OUTPATIENT)
Dept: URGENT CARE | Facility: MEDICAL CENTER | Age: 86
End: 2021-03-26
Payer: MEDICARE

## 2021-03-26 ENCOUNTER — TELEPHONE (OUTPATIENT)
Dept: OBGYN CLINIC | Facility: HOSPITAL | Age: 86
End: 2021-03-26

## 2021-03-26 ENCOUNTER — APPOINTMENT (OUTPATIENT)
Dept: RADIOLOGY | Facility: MEDICAL CENTER | Age: 86
End: 2021-03-26
Payer: MEDICARE

## 2021-03-26 VITALS
DIASTOLIC BLOOD PRESSURE: 70 MMHG | HEIGHT: 62 IN | SYSTOLIC BLOOD PRESSURE: 132 MMHG | OXYGEN SATURATION: 94 % | TEMPERATURE: 97.8 F | WEIGHT: 125 LBS | BODY MASS INDEX: 23 KG/M2 | RESPIRATION RATE: 18 BRPM | HEART RATE: 87 BPM

## 2021-03-26 DIAGNOSIS — M79.645 FINGER PAIN, LEFT: ICD-10-CM

## 2021-03-26 DIAGNOSIS — M79.645 FINGER PAIN, LEFT: Primary | ICD-10-CM

## 2021-03-26 PROCEDURE — G0463 HOSPITAL OUTPT CLINIC VISIT: HCPCS | Performed by: PHYSICIAN ASSISTANT

## 2021-03-26 PROCEDURE — 73140 X-RAY EXAM OF FINGER(S): CPT

## 2021-03-26 PROCEDURE — 99213 OFFICE O/P EST LOW 20 MIN: CPT | Performed by: PHYSICIAN ASSISTANT

## 2021-03-26 NOTE — TELEPHONE ENCOUNTER
Patients son Katheryn Rodriguez is asking to speak with the nurse to confirm the pictures he emailed were received        CB: [phone]

## 2021-03-26 NOTE — TELEPHONE ENCOUNTER
Patient is calling to state the lump on her finger is open again & peeling off & bleeding       [phone]

## 2021-03-26 NOTE — PATIENT INSTRUCTIONS
Finger lesion/ contusion  bactroban as directed  Follow up with PCP in 3-5 days  Proceed to  ER if symptoms worsen  Hematoma   WHAT YOU NEED TO KNOW:   A hematoma is a collection of blood  A bruise is a type of hematoma  A hematoma may form in a muscle or in the tissues just under the skin  A hematoma that forms under the skin will feel like a bump or hard mass  Hematomas can happen anywhere in your body, including in your brain  Your body may break down and absorb a mild hematoma on its own  A more serious hematoma may need treatment  DISCHARGE INSTRUCTIONS:   Medicines: You may need any of the following:  · Prescription pain medicine  may be given  Ask how to take this medicine safely  · NSAIDs , such as ibuprofen, help decrease swelling, pain, and fever  This medicine is available with or without a doctor's order  NSAIDs can cause stomach bleeding or kidney problems in certain people  If you take blood thinner medicine, always ask your healthcare provider if NSAIDs are safe for you  Always read the medicine label and follow directions  · Antibiotics  prevent or treat a bacterial infection  · Take your medicine as directed  Contact your healthcare provider if you think your medicine is not helping or if you have side effects  Tell him of her if you are allergic to any medicine  Keep a list of the medicines, vitamins, and herbs you take  Include the amounts, and when and why you take them  Bring the list or the pill bottles to follow-up visits  Carry your medicine list with you in case of an emergency  Return to the emergency department if:   · You have new or worsening pain, or pain that does not get better with medicine  · You have a fever  · You have trouble moving the body part that has the hematoma  Contact your healthcare provider if:   · You have questions or concerns about your condition or care  Follow up with your healthcare provider as directed:   You may need to have surgery if your hematoma is severe  You may also need other tests to make sure there is no other damage that needs to be treated  Write down your questions so you remember to ask them during your visits  Self-care:   · Rest the area  Rest will help your body heal and will also help prevent more damage  · Apply ice as directed  Ice helps reduce swelling  Ice may also help prevent tissue damage  Use an ice pack, or put crushed ice in a bag  Cover it with a towel  Place it on your hematoma for 20 minutes every hour, or as directed  Ask how many times each day to apply ice, and for how many days  · Compress the injury if possible  Lightly wrap the injury with an elastic or soft bandage  This may help control swelling  Ask your healthcare provider how to wrap your injury properly  · Elevate the area as directed  If possible, raise the area above the level of your heart as often as you can  This will help decrease swelling  · Keep the hematoma covered with a bandage  This will help protect the area while it heals  © Copyright [address] Information is for End User's use only and may not be sold, redistributed or otherwise used for commercial purposes  All illustrations and images included in CareNotes® are the copyrighted property of [employer]  or [address] [place] [address]  The above information is an  only  It is not intended as medical advice for individual conditions or treatments  Talk to your doctor, nurse or pharmacist before following any medical regimen to see if it is safe and effective for you

## 2021-03-26 NOTE — TELEPHONE ENCOUNTER
Spoke to patient  She reports that her finger is very sore and swollen  Stated it is red and warm to touch  Stated that this has been going on for a week  Patient's son stated he will sent to my email  Will pass along once received

## 2021-03-26 NOTE — PROGRESS NOTES
[facility]        NAME: Nile Todd is a 80 y o  female  : 1932    MRN: [id number]  DATE: 2021  TIME: 5:27 PM    Assessment and Plan   Finger pain, left [M79 645]  1  Finger pain, left  XR finger left fourth digit-ring         Patient Instructions     Finger lesion/ contusion  bactroban as directed  Follow up with PCP in 3-5 days  Proceed to  ER if symptoms worsen  Chief Complaint     Chief Complaint   Patient presents with    Hand Pain     left hand ring finger          History of Present Illness       79 y/o female presents c/o pain and swelling to left 4th finger  Patient states she had a pyogenic hemangioma and was removing her wedding ring and now the finger is swollen and painful  Denies fever, chills      Review of Systems   Review of Systems   Constitutional: Negative  HENT: Negative  Eyes: Negative  Respiratory: Negative  Negative for cough, chest tightness, shortness of breath, wheezing and stridor  Cardiovascular: Negative  Negative for chest pain, palpitations and leg swelling           Current Medications       Current Outpatient Medications:     acetaminophen (TYLENOL 8 HOUR) 650 mg CR tablet, Take 1 tablet (650 mg total) by mouth every 8 (eight) hours, Disp: 15 tablet, Rfl: 0    ACETAMINOPHEN PO, Take by mouth, Disp: , Rfl:     AMLODIPINE BESYLATE PO, Take by mouth, Disp: , Rfl:     ASPIRIN 81 PO, Take by mouth, Disp: , Rfl:     atorvastatin (LIPITOR) 10 mg tablet, Take 10 mg by mouth daily, Disp: , Rfl: 1    busPIRone (BUSPAR) 10 mg tablet, Take 10 mg by mouth as needed, Disp: , Rfl:     cholecalciferol (VITAMIN D3) 1,000 units tablet, Take 1,000 Units by mouth daily, Disp: , Rfl:     cyanocobalamin (VITAMIN B-12) 1,000 mcg tablet, Take by mouth daily, Disp: , Rfl:     DELZICOL 400 MG, Take 800 mg by mouth 4 (four) times a day  , Disp: , Rfl: 3    diphenoxylate-atropine (LOMOTIL) 2 5-0 025 mg/5 mL liquid, Take 5 mL by mouth 2 (two) times a day as needed for diarrhea, Disp: , Rfl:     lisinopril (ZESTRIL) 20 mg tablet, Take 20 mg by mouth daily, Disp: , Rfl: 1    LUMIGAN 0 01 % ophthalmic drops, ONE DROP IN BOTH EYES AT BEDTIME, Disp: , Rfl: 3    methocarbamol (ROBAXIN) 500 mg tablet, Take 500 mg by mouth as needed for muscle spasms, Disp: , Rfl:     metoprolol succinate (TOPROL-XL) 25 mg 24 hr tablet, TAKE 1/2 TABLET BY ORAL ROUTE TWICE EVERY DAY, Disp: , Rfl: 1    mupirocin (BACTROBAN) 2 % ointment, Apply topically 3 (three) times a day, Disp: , Rfl:     NITROGLYCERIN PO, Take by mouth, Disp: , Rfl:     omeprazole (PriLOSEC) 20 mg delayed release capsule, Take 20 mg by mouth daily, Disp: , Rfl: 1    Potassium 75 MG TABS, Take by mouth, Disp: , Rfl:     Probiotic Product (PROBIOTIC PO), Take by mouth, Disp: , Rfl:     triamterene-hydrochlorothiazide (DYAZIDE) 37 5-25 mg per capsule, Take 1 capsule by mouth daily, Disp: , Rfl: 3    naproxen sodium (ALEVE) 220 MG tablet, Take 1 tablet (220 mg total) by mouth 2 (two) times a day with meals for 5 days, Disp: 10 tablet, Rfl: 0    Current Allergies     Allergies as of 03/26/2021 - Reviewed 03/26/2021   Allergen Reaction Noted    Amlodipine Shortness Of Breath 04/05/2016    Zithromax [azithromycin] Swelling 04/05/2016    Aspirin Other (See Comments)     Corn-containing products Other (See Comments) 04/05/2016    Effient [prasugrel]  08/15/2018    Keflex [cephalexin] Other (See Comments) 04/05/2016    Lasix [furosemide] GI Intolerance 04/05/2016    Lipitor [atorvastatin]  08/15/2018    Other  04/05/2016    Prednisone Other (See Comments) 04/05/2016    Sulfamethizole Other (See Comments) 04/05/2016    Latex Rash             The following portions of the patient's history were reviewed and updated as appropriate: allergies, current medications, past family history, past medical history, past social history, past surgical history and problem list      Past Medical History:   Diagnosis Date  Arthritis     Bell's palsy 1940    Colitis     Hemorrhoids     History of transfusion     Hx of colonoscopy 1996, 1998, 2000, 2002, 2003, 2007, 2012       Past Surgical History:   Procedure Laterality Date    BACK SURGERY  1971    ruptured disk    BACK SURGERY  1972    bone fusion lumbar spine    BLADDER REPAIR  2002   [address]  2012    BREAST SURGERY  2011   Charwendy Granger CARDIAC SURGERY  2014    stent    CARPAL TUNNEL RELEASE  08/14/2014    CATARACT EXTRACTION Right 2015    CHOLECYSTECTOMY      COLONOSCOPY      EYE SURGERY Left     GALLBLADDER SURGERY  1995    GANGLION CYST EXCISION  2004    left wrist    HERNIA REPAIR  2001   Charlyne Jaksch HERNIA REPAIR  2003    MASTECTOMY  2012    OVARY SURGERY  1984    PARTIAL HYSTERECTOMY  1964    [place] EXCIS TENDON SHEATH LESION, HAND/FINGER Right 10/9/2018    Procedure: EXCISION GANGLION CYST RIGHT SMALL FINGER;  Surgeon: Norma King MD;  [facility];  Service: Orthopedics    AZ INCISE FINGER TENDON SHEATH Right 4/5/2016    Procedure: INDEX TRIGGER FINGER AND RING TRIGGER FINGER RELEASE ;  Surgeon: Norma King MD;  [facility];  Service: Orthopedics    AZ INCISE FINGER TENDON SHEATH Left 10/16/2018    Procedure: RELEASE TRIGGER FINGER - LEFT INDEX;  Surgeon: Norma King MD;  [facility];  Service: Orthopedics    SPLENECTOMY, PARTIAL  1995    TONSILLECTOMY  1939    WRIST SURGERY Right 1952       Family History   Problem Relation Age of Onset    Cancer Mother     Breast cancer Sister          Medications have been verified  Objective   /70   Pulse 87   Temp 97 8 °F (36 6 °C)   Resp 18   Ht 5' 2" (1 575 m)   Wt 56 7 kg (125 lb)   SpO2 94%   BMI 22 86 kg/m²        Physical Exam     Physical Exam  Constitutional:       General: She is not in acute distress  Appearance: She is well-developed and normal weight  She is not diaphoretic  HENT:      Head: Normocephalic and atraumatic        Right Ear: Hearing and external ear normal       Left Ear: Hearing and external ear normal       Mouth/Throat:      Pharynx: Uvula midline  Eyes:      Conjunctiva/sclera: Conjunctivae normal       Pupils: Pupils are equal, round, and reactive to light  Neck:      Musculoskeletal: Normal range of motion and neck supple  Cardiovascular:      Rate and Rhythm: Normal rate and regular rhythm  Heart sounds: Normal heart sounds  Pulmonary:      Effort: Pulmonary effort is normal       Breath sounds: Normal breath sounds  Musculoskeletal:        Hands:    Lymphadenopathy:      Cervical: No cervical adenopathy  Neurological:      Mental Status: She is alert

## 2021-03-26 NOTE — TELEPHONE ENCOUNTER
Photos still not received  No provider in the office at this time Jesus Job is in tomorrow  Advised that if that patient feels this is an infection which she has symptoms of she should be seen in Urgent care for active infection symptoms and follow up next week with Reanna Oliveira if we can  Please advise if she can follow up with you next week in [place] as she is not PO but a patient of Dr Oli Gill

## 2021-03-26 NOTE — TELEPHONE ENCOUNTER
Alva Wells, patient's son is calling asking to speak to nurse again about previous conversation  Alva Wells did send the pics & would like to confirm receipt & that they are ok  I did confirm the email address which he had wrong so he will resend the pics now

## 2021-04-01 ENCOUNTER — OFFICE VISIT (OUTPATIENT)
Dept: OBGYN CLINIC | Facility: HOSPITAL | Age: 86
End: 2021-04-01
Payer: MEDICARE

## 2021-04-01 VITALS
SYSTOLIC BLOOD PRESSURE: 171 MMHG | BODY MASS INDEX: 22.26 KG/M2 | HEART RATE: 79 BPM | DIASTOLIC BLOOD PRESSURE: 79 MMHG | WEIGHT: 121 LBS | HEIGHT: 62 IN

## 2021-04-01 DIAGNOSIS — L98.0 PYOGENIC GRANULOMA OF SKIN: Primary | ICD-10-CM

## 2021-04-01 DIAGNOSIS — G56.02 CARPAL TUNNEL SYNDROME ON LEFT: ICD-10-CM

## 2021-04-01 PROCEDURE — 99213 OFFICE O/P EST LOW 20 MIN: CPT | Performed by: PHYSICIAN ASSISTANT

## 2021-04-01 RX ORDER — POTASSIUM CHLORIDE 750 MG/1
10 CAPSULE, EXTENDED RELEASE ORAL 2 TIMES DAILY
COMMUNITY
End: 2021-06-08

## 2021-04-01 NOTE — PROGRESS NOTES
ASSESSMENT/PLAN:    Assessment:    pyogenic granuloma left ring finger   carpal tunnel syndrome left    Plan:    treated pyogenic granuloma with silver nitrate in the office today as patient has continuously and persistently bumped and irritated this wound, causing significant bleeding episodes  Patient to continue with operative intervention for excision of pyogenic granuloma, scheduled for 4/27/21  Provided patient with cock-up wrist splint for nighttime splinting to help improve carpal tunnel symptoms    Follow Up: After Surgery    To Do Next Visit:  Sutures out      _____________________________________________________  CHIEF COMPLAINT:  Chief Complaint   Patient presents with    Left Index Finger - Follow-up         SUBJECTIVE:  Dee Moraes is a 80 y o  female who presents for follow up regarding  Paget granuloma  Patient had an episode where she irritated the pyogenic granuloma, causing significant bleeding  Patient had concerns that this might be infected and came to the office for evaluation as she is scheduled for surgery in the coming month  Patient also notes that she has been having numbness and tingling in her "whole hand", which is worse with certain activities and at night      PAST MEDICAL HISTORY:  Past Medical History:   Diagnosis Date    Arthritis     Bell's palsy 1940    Colitis     Hemorrhoids     History of transfusion     Hx of colonoscopy 1996, 1998, 2000, 2002, 2003, 2007, 2012       PAST SURGICAL HISTORY:  Past Surgical History:   Procedure Laterality Date    BACK SURGERY  1971    ruptured disk    BACK SURGERY  1972    bone fusion lumbar spine    BLADDER REPAIR  2002    BLADDER REPAIR  2012    BREAST SURGERY  2011    CARDIAC SURGERY  2014    stent    CARPAL TUNNEL RELEASE  08/14/2014    CATARACT EXTRACTION Right 2015    CHOLECYSTECTOMY      COLONOSCOPY      EYE SURGERY Left     GALLBLADDER SURGERY  1995    GANGLION CYST EXCISION  2004    left wrist    HERNIA REPAIR      HERNIA REPAIR  2003    [facility]OMY     [facility]    PARTIAL HYSTERECTOMY  1964    NE EXCIS TENDON SHEATH LESION, HAND/FINGER Right 10/9/2018    Procedure: EXCISION GANGLION CYST RIGHT SMALL FINGER;  Surgeon: Ysabel Ochoa MD;  [facility];  Service: Orthopedics    NE INCISE FINGER TENDON SHEATH Right 2016    Procedure: INDEX TRIGGER FINGER AND RING TRIGGER FINGER RELEASE ;  Surgeon: Ysabel Ochoa MD;  [facility];  Service: Orthopedics    NE INCISE FINGER TENDON SHEATH Left 10/16/2018    Procedure: RELEASE TRIGGER FINGER - LEFT INDEX;  Surgeon: Ysabel Ochoa MD;  [facility];  Service: Orthopedics    SPLENECTOMY, PARTIAL  1995    TONSILLECTOMY  1939    WRIST SURGERY Right        FAMILY HISTORY:  Family History   Problem Relation Age of Onset    Cancer Mother     Breast cancer Sister        SOCIAL HISTORY:  Social History     Tobacco Use    Smoking status: Former Smoker     Quit date:      Years since quittin 2    Smokeless tobacco: Never Used   Substance Use Topics    Alcohol use: No    Drug use: No       MEDICATIONS:    Current Outpatient Medications:     acetaminophen (TYLENOL 8 HOUR) 650 mg CR tablet, Take 1 tablet (650 mg total) by mouth every 8 (eight) hours, Disp: 15 tablet, Rfl: 0    ACETAMINOPHEN PO, Take by mouth, Disp: , Rfl:     AMLODIPINE BESYLATE PO, Take by mouth, Disp: , Rfl:     ASPIRIN 81 PO, Take by mouth, Disp: , Rfl:     atorvastatin (LIPITOR) 10 mg tablet, Take 10 mg by mouth daily, Disp: , Rfl: 1    busPIRone (BUSPAR) 10 mg tablet, Take 10 mg by mouth as needed, Disp: , Rfl:     cholecalciferol (VITAMIN D3) 1,000 units tablet, Take 1,000 Units by mouth daily, Disp: , Rfl:     cyanocobalamin (VITAMIN B-12) 1,000 mcg tablet, Take by mouth daily, Disp: , Rfl:     DELZICOL 400 MG, Take 800 mg by mouth 4 (four) times a day  , Disp: , Rfl: 3    diphenoxylate-atropine (LOMOTIL) 2 5-0 025 mg/5 mL liquid, Take 5 mL by mouth 2 (two) times a day as needed for diarrhea, Disp: , Rfl:     LUMIGAN 0 01 % ophthalmic drops, ONE DROP IN BOTH EYES AT BEDTIME, Disp: , Rfl: 3    methocarbamol (ROBAXIN) 500 mg tablet, Take 500 mg by mouth as needed for muscle spasms, Disp: , Rfl:     metoprolol succinate (TOPROL-XL) 25 mg 24 hr tablet, TAKE 1/2 TABLET BY ORAL ROUTE TWICE EVERY DAY, Disp: , Rfl: 1    mupirocin (BACTROBAN) 2 % ointment, Apply topically 3 (three) times a day, Disp: , Rfl:     NITROGLYCERIN PO, Take by mouth, Disp: , Rfl:     omeprazole (PriLOSEC) 20 mg delayed release capsule, Take 20 mg by mouth daily, Disp: , Rfl: 1    potassium chloride (MICRO-K) 10 MEQ CR capsule, Take 10 mEq by mouth 2 (two) times a day, Disp: , Rfl:     triamterene-hydrochlorothiazide (DYAZIDE) 37 5-25 mg per capsule, Take 1 capsule by mouth daily Takes 1 tablet 2 days a week, Disp: , Rfl: 3    lisinopril (ZESTRIL) 20 mg tablet, Take 20 mg by mouth daily, Disp: , Rfl: 1    mupirocin (BACTROBAN) 2 % ointment, Apply topically 3 (three) times a day, Disp: 22 g, Rfl: 0    Potassium 75 MG TABS, Take by mouth, Disp: , Rfl:     Probiotic Product (PROBIOTIC PO), Take by mouth, Disp: , Rfl:     ALLERGIES:  Allergies   Allergen Reactions    Amlodipine Shortness Of Breath     And fatigue    Zithromax [Azithromycin] Swelling     tongue    Aspirin Other (See Comments)     Bleeding  Tolerates baby asapirin    Corn-Containing Products - Food Allergy Other (See Comments)     headache    Effient [Prasugrel]     Keflex [Cephalexin] Other (See Comments)     unknow    Lasix [Furosemide] GI Intolerance    Lipitor [Atorvastatin]     Other      liptol and lipol cause GI upset  Adhesive tape  USE PAPER TAPE    Prednisone Other (See Comments)     "I go berzerk!"    Sulfamethizole Other (See Comments)     unkown    Latex - Food Allergy Rash     Pt cannot remember       REVIEW OF SYSTEMS:  Pertinent items are noted in HPI   A comprehensive review of systems was negative  LABS:  HgA1c: No results found for: HGBA1C  BMP:   Lab Results   Component Value Date    CALCIUM 9 7 02/24/2021    K 3 2 (L) 02/24/2021    CO2 30 02/24/2021     02/24/2021    BUN 15 02/24/2021    CREATININE 0 64 02/24/2021           _____________________________________________________  PHYSICAL EXAMINATION:  Vital signs: BP (!) 171/79   Pulse 79   Ht 5' 2" (1 575 m)   Wt 54 9 kg (121 lb)   BMI 22 13 kg/m²   General: well developed and well nourished, alert, oriented times 3 and appears comfortable  Psychiatric: Normal  HEENT: Trachea Midline, No torticollis  Cardiovascular: No discernable arrhythmia  Pulmonary: No wheezing or stridor  Skin: erythema, lacerations, fluctation, ulcerations  Neurovascular: Sensation Intact to the Median, Ulnar, Radial Nerve, Motor Intact to the Median, Ulnar, Radial Nerve and Pulses Intact    MUSCULOSKELETAL EXAMINATION:    Left ring finger has a pyogenic granuloma on the radial volar border about the middle of the   Middle phalanx      _____________________________________________________  STUDIES REVIEWED:  No Studies to review      PROCEDURES PERFORMED:  Procedures    wound care provided to pyogenic granuloma in the office today with silver nitrate

## 2021-04-27 ENCOUNTER — HOSPITAL ENCOUNTER (OUTPATIENT)
Facility: HOSPITAL | Age: 86
Setting detail: OUTPATIENT SURGERY
Discharge: HOME | End: 2021-04-27
Attending: ORTHOPAEDIC SURGERY | Admitting: ORTHOPAEDIC SURGERY
Payer: MEDICARE

## 2021-04-27 VITALS
HEIGHT: 62 IN | BODY MASS INDEX: 23 KG/M2 | WEIGHT: 125 LBS | SYSTOLIC BLOOD PRESSURE: 160 MMHG | TEMPERATURE: 97.9 F | DIASTOLIC BLOOD PRESSURE: 69 MMHG | OXYGEN SATURATION: 96 % | RESPIRATION RATE: 18 BRPM | HEART RATE: 74 BPM

## 2021-04-27 DIAGNOSIS — R22.31 MASS OF RIGHT FINGER: ICD-10-CM

## 2021-04-27 DIAGNOSIS — L98.0 PYOGENIC GRANULOMA OF SKIN: ICD-10-CM

## 2021-04-27 PROCEDURE — 26115 EXC HAND LES SC < 1.5 CM: CPT | Performed by: PHYSICIAN ASSISTANT

## 2021-04-27 PROCEDURE — 88305 TISSUE EXAM BY PATHOLOGIST: CPT | Performed by: PATHOLOGY

## 2021-04-27 PROCEDURE — 26115 EXC HAND LES SC < 1.5 CM: CPT | Performed by: ORTHOPAEDIC SURGERY

## 2021-04-27 PROCEDURE — NC001 PR NO CHARGE: Performed by: ORTHOPAEDIC SURGERY

## 2021-04-27 RX ORDER — MAGNESIUM HYDROXIDE 1200 MG/15ML
LIQUID ORAL AS NEEDED
Status: DISCONTINUED | OUTPATIENT
Start: 2021-04-27 | End: 2021-04-27 | Stop reason: HOSPADM

## 2021-04-27 RX ORDER — LIDOCAINE HYDROCHLORIDE AND EPINEPHRINE 10; 10 MG/ML; UG/ML
20 INJECTION, SOLUTION INFILTRATION; PERINEURAL
Status: COMPLETED | OUTPATIENT
Start: 2021-04-27 | End: 2021-04-27

## 2021-04-27 RX ORDER — SENNOSIDES 8.6 MG
650 CAPSULE ORAL EVERY 8 HOURS
Qty: 15 TABLET | Refills: 0 | Status: SHIPPED | OUTPATIENT
Start: 2021-04-27 | End: 2021-06-18 | Stop reason: ALTCHOICE

## 2021-04-27 RX ORDER — HYDROCODONE BITARTRATE AND ACETAMINOPHEN 5; 325 MG/1; MG/1
1 TABLET ORAL EVERY 6 HOURS PRN
Qty: 5 TABLET | Refills: 0 | Status: SHIPPED | OUTPATIENT
Start: 2021-04-27 | End: 2021-05-02

## 2021-04-27 RX ADMIN — LIDOCAINE HYDROCHLORIDE,EPINEPHRINE BITARTRATE 20 ML: 10; .01 INJECTION, SOLUTION INFILTRATION; PERINEURAL at 08:23

## 2021-04-27 NOTE — OP NOTE
OPERATIVE REPORT  PATIENT NAME: Imelda Shafer  :  1932  MRN: [id number]  Pt [facility]    SURGERY DATE: 21    Surgeon(s) and Role:     * Charles Morataya MD - Primary     * Lindsay Boateng PA-C - Assisting    Pre-Op Diagnosis:  Pyogenic granuloma of skin [L98 0]    Post-Op Diagnosis:    Pyogenic granuloma of skin [L98 0]    Procedure(s) (LRB):  Excision pyogenic granuloma left ring finger (Left)    Specimen(s):  Order Name Source Comment Collection Info Order Time   TISSUE EXAM Finger, Left  Collected By: Charles Morataya MD 2021  8:41 AM     Release to patient through Mychart   Immediate            Estimated Blood Loss:   Minimal      Anesthesia Type:   Local    Operative Indications: The patient has a history of a pyogenic granuloma to the ulnar aspect of the left ring finger measuring 1 cm x 8 mm x 3 mm that was recalcitrant to conservative management  The decision was made to bring the patient to the operating room for excision of pyogenic granuloma left ring finger  Risks of the procedure were explained which include, but are not limited to bleeding; infection; damage to nerves, arteries,veins, tendons; scar; pain; need for reoperation; failure to give desired result; and risks of anaesthesia  All questions were answered to satisfaction and they were willing to proceed  Operative Findings:  Echogenic granuloma left ring finger 1 cm x 8 mm x 3 mm    Complications:   None    Procedure and Technique:  After the patient, site, and procedure were identified, the patient was brought into the operating room in a supine position  Local anaesthesia was adminstered in the preoperative holding area  A tourniquet was not used  The  left upper extremity was then prepped and drapped in a normal, sterile, orthopedic fashion  After the patient, site, procedure identified attention was turned towards the left hand  Pyogenic granuloma was identified    Elliptical incision was then made around the pyogenic granuloma on the ulnar aspect of the ring finger excising the stalk down to the tissue layers  This was then sent for routine pathologic evaluation measuring 1 cm x 8 mm x 3 mm  Electrocautery was then utilized for coagulation of the base of the surrounding subcutaneous tissues to prevent recurrence and bleeding  We were satisfied with complete excision at this point  The wounds were copiously irrigated with sterile solution  The wounds were closed with Prolene  Sterile dressings were applied, including Xeroform, Gauze and Finger Dressing  Please note, all sponge, needle, and instrument counts were correct prior to closure  Loupe magnification was utilized  The patient tolerated the procedure well  I was present for all critical portions of the procedure and A qualified resident physician was not available  Physician assistant was utilized for prepping, draping, retraction, assistance with closure      Patient Disposition:  APU and hemodynamically stable    SIGNATURE: Belel Berry MD  DATE: 04/27/21  TIME: 8:46 AM

## 2021-04-27 NOTE — DISCHARGE INSTRUCTIONS

## 2021-04-27 NOTE — H&P
H&P Exam - Orthopedics   Dee Moraes 80 y o  female MRN: [id number]  Unit/Bed#: OR POOL    Assessment/Plan   Assessment:  Pyogenic granuloma left ring finger    Plan:  Excision of pyogenic granuloma left ring finger    History of Present Illness   HPI:  Dee Moraes is a 80 y o  female who presents with a bleeding lesion on her left ring finger  Patient states that it started about October when she had caught on her finger  She has been seen and evaluated for this numerous times and is taking antibiotics for due to concern infection  Patient states that every time she bumps it bleeds  She has not had any improvement with non operative modalities       Historical Information  Review Of Systems:   · Skin: Normal  · Neuro: See HPI  · Musculoskeletal: See HPI  · 14 point review of systems negative except as stated above     Past Medical History:   Past Medical History:   Diagnosis Date    Arthritis     Bell's palsy 1940    Colitis     Hemorrhoids     History of transfusion     Hx of colonoscopy 1996, 1998, 2000, 2002, 2003, 2007, 2012       Past Surgical History:   Past Surgical History:   Procedure Laterality Date   [address] [facility]    ruptured disk    BACK SURGERY  1972    bone fusion lumbar spine    BLADDER REPAIR  2002   [address]  2012    BREAST SURGERY  2011   [address]  2014    stent    CARPAL TUNNEL RELEASE  08/14/2014    CATARACT EXTRACTION Right 2015    CHOLECYSTECTOMY      COLONOSCOPY      EYE SURGERY Left    [address] [facility] [address]    GANGLION CYST EXCISION  2004    left wrist    HERNIA REPAIR  2001   Laurann Gates HERNIA REPAIR  2003    MASTECTOMY  2012   [address]    PARTIAL HYSTERECTOMY  1964    [place] EXCIS TENDON SHEATH LESION, HAND/FINGER Right 10/9/2018    Procedure: EXCISION GANGLION CYST RIGHT SMALL FINGER;  Surgeon: Mack Cruz MD;  [facility];  Service: Orthopedics    RI INCISE FINGER TENDON SHEATH Right 4/5/2016    Procedure: INDEX TRIGGER FINGER AND RING TRIGGER FINGER RELEASE ;  Surgeon: Gaurav Jiménez MD;  [facility];  Service: Orthopedics    NE INCISE FINGER TENDON SHEATH Left 10/16/2018    Procedure: RELEASE TRIGGER FINGER - LEFT INDEX;  Surgeon: Gaurav Jiménez MD;  [facility];  Service: Orthopedics    SPLENECTOMY, PARTIAL      TONSILLECTOMY      WRIST SURGERY Right        Family History:  Family history reviewed and non-contributory  Family History   Problem Relation Age of Onset    Cancer Mother     Breast cancer Sister        Social History:  Social History     Socioeconomic History    Marital status: /Civil Union     Spouse name: Not on file    Number of children: Not on file    Years of education: Not on file    Highest education level: Not on file   Occupational History    Not on file   Social Needs    Financial resource strain: Not on file    Food insecurity     Worry: Not on file     Inability: Not on file   [employer] needs     Medical: Not on file     Non-medical: Not on file   Tobacco Use    Smoking status: Former Smoker     Quit date: 1950     Years since quittin 3    Smokeless tobacco: Never Used   Substance and Sexual Activity    Alcohol use: No    Drug use: No    Sexual activity: Never   Lifestyle    Physical activity     Days per week: Not on file     Minutes per session: Not on file    Stress: Not on file   Relationships    Social connections     Talks on phone: Not on file     Gets together: Not on file     Attends [employer]ism service: Not on file     Active member of club or organization: Not on file     Attends meetings of clubs or organizations: Not on file     Relationship status: Not on file    Intimate partner violence     Fear of current or ex partner: Not on file     Emotionally abused: Not on file     Physically abused: Not on file     Forced sexual activity: Not on file   Other Topics Concern    Not on file   Social History Narrative  Not on file       Allergies: Allergies   Allergen Reactions    Amlodipine Shortness Of Breath     And fatigue    Zithromax [Azithromycin] Swelling     tongue    Aspirin Other (See Comments)     Bleeding  Tolerates baby asapirin    Corn-Containing Products - Food Allergy Other (See Comments)     headache    Effient [Prasugrel]     Keflex [Cephalexin] Other (See Comments)     unknow    Lasix [Furosemide] GI Intolerance    Lipitor [Atorvastatin]     Other      liptol and lipol cause GI upset  Adhesive tape  USE PAPER TAPE    Prednisone Other (See Comments)     "I go berzerk!"    Sulfamethizole Other (See Comments)     unkown    Latex Rash     Pt cannot remember           Labs:  0   Lab Value Date/Time    HCT 38 5 09/28/2020 1141    HGB 13 0 09/28/2020 1141    WBC 8 73 09/28/2020 1141    ESR 49 (H) 09/28/2020 1141       Meds:    Current Facility-Administered Medications:     lidocaine-epinephrine (XYLOCAINE/EPINEPHRINE) 1 %-1:100,000 injection 20 mL, 20 mL, Infiltration, On Call To OR, Franko Hernández PA-C    Blood Culture:   No results found for: BLOODCX    Wound Culture:   No results found for: WOUNDCULT    Ins and Outs:  No intake/output data recorded  Physical Exam  BP (!) 183/79   Pulse 77   Temp 98 4 °F (36 9 °C) (Temporal)   Resp 18   Ht 5' 2" (1 575 m)   Wt 56 7 kg (125 lb)   SpO2 93%   BMI 22 86 kg/m²   BP (!) 183/79   Pulse 77   Temp 98 4 °F (36 9 °C) (Temporal)   Resp 18   Ht 5' 2" (1 575 m)   Wt 56 7 kg (125 lb)   SpO2 93%   BMI 22 86 kg/m²   Gen: Alert and oriented to person, place, time  HEENT: EOMI, eyes clear, moist mucus membranes, hearing intact  Respiratory: Bilateral chest rise   No audible wheezing found  Cardiovascular: Regular Rate and Rhythm  Abdomen: soft nontender/nondistended  Ortho Exam:  Pyogenic granuloma on the radial volar border of the left ring  Neuro Exam:  Sensation intact distally    Lab Results: Reviewed  Imaging: Reviewed

## 2021-05-06 ENCOUNTER — OFFICE VISIT (OUTPATIENT)
Dept: OBGYN CLINIC | Facility: HOSPITAL | Age: 86
End: 2021-05-06

## 2021-05-06 VITALS
WEIGHT: 125.8 LBS | SYSTOLIC BLOOD PRESSURE: 169 MMHG | BODY MASS INDEX: 23.15 KG/M2 | HEIGHT: 62 IN | HEART RATE: 75 BPM | DIASTOLIC BLOOD PRESSURE: 72 MMHG

## 2021-05-06 DIAGNOSIS — G56.02 CARPAL TUNNEL SYNDROME ON LEFT: Primary | ICD-10-CM

## 2021-05-06 PROCEDURE — 99024 POSTOP FOLLOW-UP VISIT: CPT | Performed by: PHYSICIAN ASSISTANT

## 2021-05-06 NOTE — PROGRESS NOTES
Assessment:   S/P Excision pyogenic granuloma left ring finger - Left on 4/27/2021    Plan:   Resume activities as tolerated     US MSK - attn carpal tunnel    Follow Up:  1 week Dr Fadia Walters    To Do Next Visit:         CHIEF COMPLAINT:  Chief Complaint   Patient presents with    Left Ring Finger - Post-op         SUBJECTIVE:  Ashley Pierce is a 80 y o  female who presents for follow up after Excision pyogenic granuloma left ring finger - Left on 4/27/2021  Today patient has No Complaints related to the finger  She notes today that she gets intermittent numbness in the thumb, index, and long fingers that seems to be getting worse  This comes on any time she has her elbow fully flexed and she has stopped making phone calls due to this  PHYSICAL EXAMINATION:  Vital signs: /72   Pulse 75   Ht 5' 2" (1 575 m)   Wt 57 1 kg (125 lb 12 8 oz)   BMI 23 01 kg/m²   General: well developed and well nourished, alert, oriented times 3 and appears comfortable  Psychiatric: Normal    MUSCULOSKELETAL EXAMINATION:  Incision: Clean, dry, intact  Range of Motion: As expected  Neurovascular status:       Intermittent numbness in the thumb, index, long, and to a lesser extent the ring finger  + phalens    + tinels at the wrist        Her symptoms are also immediately reproduced by full elbow flexion  Activity Restrictions: No restrictions  Done today: Sutures out and Steri strips applied      STUDIES REVIEWED:  No Studies to review      PROCEDURES PERFORMED:  Procedures  No Procedures performed today

## 2021-05-26 ENCOUNTER — TRANSCRIBE ORDERS (OUTPATIENT)
Dept: ADMINISTRATIVE | Facility: HOSPITAL | Age: 86
End: 2021-05-26

## 2021-05-27 ENCOUNTER — HOSPITAL ENCOUNTER (OUTPATIENT)
Dept: RADIOLOGY | Facility: HOSPITAL | Age: 86
Discharge: HOME | End: 2021-05-27
Payer: MEDICARE

## 2021-05-27 DIAGNOSIS — G56.02 CARPAL TUNNEL SYNDROME ON LEFT: ICD-10-CM

## 2021-05-27 PROCEDURE — 76882 US LMTD JT/FCL EVL NVASC XTR: CPT

## 2021-06-08 ENCOUNTER — APPOINTMENT (OUTPATIENT)
Dept: RADIOLOGY | Facility: HOSPITAL | Age: 86
End: 2021-06-08
Payer: MEDICARE

## 2021-06-08 ENCOUNTER — APPOINTMENT (EMERGENCY)
Dept: RADIOLOGY | Facility: HOSPITAL | Age: 86
End: 2021-06-08
Payer: MEDICARE

## 2021-06-08 ENCOUNTER — HOSPITAL ENCOUNTER (OUTPATIENT)
Facility: HOSPITAL | Age: 86
Setting detail: OBSERVATION
Discharge: HOME HEALTH | End: 2021-06-09
Attending: EMERGENCY MEDICINE | Admitting: INTERNAL MEDICINE
Payer: MEDICARE

## 2021-06-08 DIAGNOSIS — R26.2 AMBULATORY DYSFUNCTION: ICD-10-CM

## 2021-06-08 DIAGNOSIS — M54.42 BILATERAL LOW BACK PAIN WITH LEFT-SIDED SCIATICA, UNSPECIFIED CHRONICITY: Primary | ICD-10-CM

## 2021-06-08 DIAGNOSIS — Z98.1 HISTORY OF FUSION OF LUMBAR SPINE: ICD-10-CM

## 2021-06-08 DIAGNOSIS — M54.9 BACK PAIN: ICD-10-CM

## 2021-06-08 PROBLEM — K51.919 ULCERATIVE COLITIS WITH COMPLICATION (HCC): Status: ACTIVE | Noted: 2021-06-08

## 2021-06-08 LAB
ANION GAP SERPL CALCULATED.3IONS-SCNC: 11 MMOL/L (ref 4–13)
BASOPHILS # BLD AUTO: 0.03 THOUSANDS/ΜL (ref 0–0.1)
BASOPHILS NFR BLD AUTO: 0 % (ref 0–1)
BUN SERPL-MCNC: 11 MG/DL (ref 5–25)
CALCIUM SERPL-MCNC: 9.9 MG/DL (ref 8.3–10.1)
CHLORIDE SERPL-SCNC: 102 MMOL/L (ref 100–108)
CO2 SERPL-SCNC: 26 MMOL/L (ref 21–32)
CREAT SERPL-MCNC: 0.62 MG/DL (ref 0.6–1.3)
EOSINOPHIL # BLD AUTO: 0.04 THOUSAND/ΜL (ref 0–0.61)
EOSINOPHIL NFR BLD AUTO: 0 % (ref 0–6)
ERYTHROCYTE [DISTWIDTH] IN BLOOD BY AUTOMATED COUNT: 14.3 % (ref 11.6–15.1)
GFR SERPL CREATININE-BSD FRML MDRD: 81 ML/MIN/1.73SQ M
GLUCOSE SERPL-MCNC: 111 MG/DL (ref 65–140)
HCT VFR BLD AUTO: 36.5 % (ref 34.8–46.1)
HGB BLD-MCNC: 12.4 G/DL (ref 11.5–15.4)
IMM GRANULOCYTES # BLD AUTO: 0.06 THOUSAND/UL (ref 0–0.2)
IMM GRANULOCYTES NFR BLD AUTO: 1 % (ref 0–2)
LYMPHOCYTES # BLD AUTO: 3.58 THOUSANDS/ΜL (ref 0.6–4.47)
LYMPHOCYTES NFR BLD AUTO: 34 % (ref 14–44)
MCH RBC QN AUTO: 32 PG (ref 26.8–34.3)
MCHC RBC AUTO-ENTMCNC: 34 G/DL (ref 31.4–37.4)
MCV RBC AUTO: 94 FL (ref 82–98)
MONOCYTES # BLD AUTO: 0.74 THOUSAND/ΜL (ref 0.17–1.22)
MONOCYTES NFR BLD AUTO: 7 % (ref 4–12)
NEUTROPHILS # BLD AUTO: 6.16 THOUSANDS/ΜL (ref 1.85–7.62)
NEUTS SEG NFR BLD AUTO: 58 % (ref 43–75)
NRBC BLD AUTO-RTO: 0 /100 WBCS
PLATELET # BLD AUTO: 487 THOUSANDS/UL (ref 149–390)
PMV BLD AUTO: 10.1 FL (ref 8.9–12.7)
POTASSIUM SERPL-SCNC: 3.6 MMOL/L (ref 3.5–5.3)
RBC # BLD AUTO: 3.87 MILLION/UL (ref 3.81–5.12)
SODIUM SERPL-SCNC: 139 MMOL/L (ref 136–145)
WBC # BLD AUTO: 10.61 THOUSAND/UL (ref 4.31–10.16)

## 2021-06-08 PROCEDURE — 99285 EMERGENCY DEPT VISIT HI MDM: CPT | Performed by: EMERGENCY MEDICINE

## 2021-06-08 PROCEDURE — 80048 BASIC METABOLIC PNL TOTAL CA: CPT | Performed by: EMERGENCY MEDICINE

## 2021-06-08 PROCEDURE — 99220 PR INITIAL OBSERVATION CARE/DAY 70 MINUTES: CPT | Performed by: INTERNAL MEDICINE

## 2021-06-08 PROCEDURE — 99284 EMERGENCY DEPT VISIT MOD MDM: CPT

## 2021-06-08 PROCEDURE — 73502 X-RAY EXAM HIP UNI 2-3 VIEWS: CPT

## 2021-06-08 PROCEDURE — 1123F ACP DISCUSS/DSCN MKR DOCD: CPT | Performed by: EMERGENCY MEDICINE

## 2021-06-08 PROCEDURE — 85025 COMPLETE CBC W/AUTO DIFF WBC: CPT | Performed by: EMERGENCY MEDICINE

## 2021-06-08 PROCEDURE — 36415 COLL VENOUS BLD VENIPUNCTURE: CPT | Performed by: EMERGENCY MEDICINE

## 2021-06-08 PROCEDURE — 72100 X-RAY EXAM L-S SPINE 2/3 VWS: CPT

## 2021-06-08 RX ORDER — POTASSIUM CHLORIDE 750 MG/1
10 TABLET, EXTENDED RELEASE ORAL DAILY
COMMUNITY
Start: 2021-03-19

## 2021-06-08 RX ORDER — TRIAMTERENE AND HYDROCHLOROTHIAZIDE 37.5; 25 MG/1; MG/1
1 TABLET ORAL 2 TIMES WEEKLY
COMMUNITY

## 2021-06-08 RX ORDER — OMEPRAZOLE 20 MG/1
20 CAPSULE, DELAYED RELEASE ORAL DAILY
COMMUNITY
Start: 2021-05-13

## 2021-06-08 RX ORDER — ACETAMINOPHEN 325 MG/1
975 TABLET ORAL ONCE
Status: COMPLETED | OUTPATIENT
Start: 2021-06-08 | End: 2021-06-08

## 2021-06-08 RX ORDER — TRAMADOL HYDROCHLORIDE 50 MG/1
50 TABLET ORAL EVERY 6 HOURS PRN
COMMUNITY

## 2021-06-08 RX ORDER — LABETALOL 20 MG/4 ML (5 MG/ML) INTRAVENOUS SYRINGE
10 ONCE
Status: COMPLETED | OUTPATIENT
Start: 2021-06-08 | End: 2021-06-08

## 2021-06-08 RX ORDER — METHOCARBAMOL 500 MG/1
500 TABLET, FILM COATED ORAL ONCE
Status: COMPLETED | OUTPATIENT
Start: 2021-06-08 | End: 2021-06-08

## 2021-06-08 RX ORDER — LIDOCAINE 50 MG/G
1 PATCH TOPICAL ONCE
Status: COMPLETED | OUTPATIENT
Start: 2021-06-08 | End: 2021-06-09

## 2021-06-08 RX ORDER — MONTELUKAST SODIUM 10 MG/1
10 TABLET ORAL
COMMUNITY

## 2021-06-08 RX ADMIN — LIDOCAINE 5% 1 PATCH: 700 PATCH TOPICAL at 16:39

## 2021-06-08 RX ADMIN — ACETAMINOPHEN 975 MG: 325 TABLET, FILM COATED ORAL at 16:39

## 2021-06-08 RX ADMIN — LABETALOL 20 MG/4 ML (5 MG/ML) INTRAVENOUS SYRINGE 10 MG: at 20:55

## 2021-06-08 RX ADMIN — METHOCARBAMOL 500 MG: 500 TABLET ORAL at 16:40

## 2021-06-08 NOTE — ED PROVIDER NOTES
History  Chief Complaint   Patient presents with    Back Pain     Per EMS patient comes from home with increased chronic back pain  patient now reports inability to ambulate     Patient is an 72-year-old female with a history of previous lumbar back surgery who presents with lumbar back pain  Patient states that her pain started about 1 week ago  It has been intermittent and worse today  She describes it as lower back pain, worse on the left  She describes intermittent radiation into her left posterior  She reports difficulty ambulating today  She typically uses a cane in to ambulate  She denies any numbness, tingling, change in bowel or bladder habits  She denies any abdominal pain, nausea, vomiting, diarrhea or other concerns  Patient has been using Tylenol intermittently as well as a TENS unit  She has no other complaints at this time  History provided by:  Patient  Back Pain  Location:  Lumbar spine  Radiates to:  L posterior upper leg  Pain is:  Same all the time  Duration:  1 week  Timing:  Constant  Progression:  Worsening  Chronicity:  Chronic  Associated symptoms: no abdominal pain, no bladder incontinence, no bowel incontinence, no chest pain, no dysuria, no fever, no headaches, no numbness, no paresthesias, no pelvic pain and no weakness        Prior to Admission Medications   Prescriptions Last Dose Informant Patient Reported? Taking?    ACETAMINOPHEN PO  Self Yes No   Sig: Take by mouth   AMLODIPINE BESYLATE PO 6/8/2021 at Unknown time  Yes Yes   Sig: Take by mouth   ASPIRIN 81 PO 6/7/2021 at Unknown time Self Yes Yes   Sig: Take by mouth   DELZICOL 400 MG 6/7/2021 at Unknown time Self Yes Yes   Sig: Take 800 mg by mouth 4 (four) times a day     Klor-Con M10 10 MEQ tablet 6/7/2021 at Unknown time  Yes Yes   Sig: Take 10 mEq by mouth daily   LUMIGAN 0 01 % ophthalmic drops 6/7/2021 at Unknown time Self Yes Yes   Sig: ONE DROP IN BOTH EYES AT BEDTIME   NITROGLYCERIN PO  Self Yes Yes   Sig: Take by mouth   Probiotic Product (PROBIOTIC PO)  Self Yes No   Sig: Take by mouth   acetaminophen (Tylenol 8 Hour) 650 mg CR tablet 6/8/2021 at Unknown time  No Yes   Sig: Take 1 tablet (650 mg total) by mouth every 8 (eight) hours   atorvastatin (LIPITOR) 10 mg tablet Not Taking at Unknown time Self Yes No   Sig: Take 10 mg by mouth daily   busPIRone (BUSPAR) 10 mg tablet More than a month at Unknown time Self Yes No   Sig: Take 10 mg by mouth as needed   diphenoxylate-atropine (LOMOTIL) 2 5-0 025 mg/5 mL liquid  Self Yes No   Sig: Take 5 mL by mouth 2 (two) times a day as needed for diarrhea   methocarbamol (ROBAXIN) 500 mg tablet  Self Yes Yes   Sig: Take 500 mg by mouth as needed for muscle spasms   montelukast (SINGULAIR) 10 mg tablet 6/7/2021 at Unknown time  Yes Yes   Sig: Take 10 mg by mouth daily at bedtime   omeprazole (PriLOSEC) 20 mg delayed release capsule 6/7/2021 at Unknown time  Yes Yes   Sig: Take 20 mg by mouth daily   traMADol (ULTRAM) 50 mg tablet 6/7/2021 at Unknown time  Yes Yes   Sig: Take 50 mg by mouth every 6 (six) hours as needed for moderate pain   triamterene-hydrochlorothiazide (MAXZIDE-25) 37 5-25 mg per tablet 6/7/2021 at Unknown time  Yes Yes   Sig: Take 1 tablet by mouth 2 (two) times a week Monday & Thursday      Facility-Administered Medications: None       Past Medical History:   Diagnosis Date    Arthritis     Bell's palsy 1940    Colitis     Hemorrhoids     History of transfusion     Hx of colonoscopy 1996, 1998, 2000, 2002, 2003, 2007, 2012       Past Surgical History:   Procedure Laterality Date    BACK SURGERY  1971    ruptured disk    BACK SURGERY  1972    bone fusion lumbar spine    BLADDER REPAIR  2002    BLADDER REPAIR  2012    BREAST SURGERY  2011    CARDIAC SURGERY  2014    stent    CARPAL TUNNEL RELEASE  08/14/2014    CATARACT EXTRACTION Right 2015    CHOLECYSTECTOMY      COLONOSCOPY      EYE SURGERY Left     GALLBLADDER SURGERY  1995    GANGLION CYST EXCISION  2004    left wrist    HERNIA REPAIR     Marcy Riis HERNIA REPAIR  2003    MASTECTOMY  2012    OVARY SURGERY  1984    PARTIAL HYSTERECTOMY  1964    SC EXC SKIN BENIG <0 5 CM TRUNK,ARM,LEG Left 2021    Procedure: Excision pyogenic granuloma left ring finger;  Surgeon: Amarilis Jimenes MD;  Location: [facility];  Service: Orthopedics    SC EXCIS TENDON SHEATH LESION, HAND/FINGER Right 10/9/2018    Procedure: EXCISION GANGLION CYST RIGHT SMALL FINGER;  Surgeon: Amarilis Jimenes MD;  [facility];  Service: Orthopedics    SC INCISE FINGER TENDON SHEATH Right 2016    Procedure: INDEX TRIGGER FINGER AND RING TRIGGER FINGER RELEASE ;  Surgeon: Amarilis Jimenes MD;  [facility];  Service: Orthopedics    SC INCISE FINGER TENDON SHEATH Left 10/16/2018    Procedure: RELEASE TRIGGER FINGER - LEFT INDEX;  Surgeon: Amarilis Jimenes MD;  [facility];  Service: Orthopedics    SPLENECTOMY, PARTIAL  1995    TONSILLECTOMY  1939    WRIST SURGERY Right 195       Family History   Problem Relation Age of Onset    Cancer Mother     Breast cancer Sister      I have reviewed and agree with the history as documented  E-Cigarette/Vaping     E-Cigarette/Vaping Substances     Social History     Tobacco Use    Smoking status: Former Smoker     Quit date: 1950     Years since quittin 4    Smokeless tobacco: Never Used   Substance Use Topics    Alcohol use: No    Drug use: No       Review of Systems   Constitutional: Negative for chills, diaphoresis and fever  HENT: Negative for nosebleeds, sore throat and trouble swallowing  Eyes: Negative for photophobia, pain and visual disturbance  Respiratory: Negative for cough, chest tightness and shortness of breath  Cardiovascular: Negative for chest pain, palpitations and leg swelling  Gastrointestinal: Negative for abdominal pain, bowel incontinence, constipation, diarrhea, nausea and vomiting     Endocrine: Negative for polydipsia and polyuria  Genitourinary: Negative for bladder incontinence, difficulty urinating, dysuria, hematuria, pelvic pain, vaginal bleeding and vaginal discharge  Musculoskeletal: Positive for back pain  Negative for neck pain and neck stiffness  Skin: Negative for pallor and rash  Neurological: Negative for dizziness, seizures, weakness, light-headedness, numbness, headaches and paresthesias  All other systems reviewed and are negative  Physical Exam  Physical Exam  Vitals signs and nursing note reviewed  Constitutional:       General: She is not in acute distress  Appearance: She is well-developed  HENT:      Head: Normocephalic and atraumatic  Eyes:      Pupils: Pupils are equal, round, and reactive to light  Neck:      Musculoskeletal: Normal range of motion and neck supple  Cardiovascular:      Rate and Rhythm: Normal rate and regular rhythm  Pulses: Normal pulses  Heart sounds: Normal heart sounds  Pulmonary:      Effort: Pulmonary effort is normal  No respiratory distress  Breath sounds: Normal breath sounds  Abdominal:      General: There is no distension  Palpations: Abdomen is soft  Abdomen is not rigid  Tenderness: There is no abdominal tenderness  There is no guarding or rebound  Musculoskeletal: Normal range of motion  Lumbar back: She exhibits tenderness (tenderness in lower lumbar region along midline and left paraspinal musculature)  Lymphadenopathy:      Cervical: No cervical adenopathy  Skin:     General: Skin is warm and dry  Capillary Refill: Capillary refill takes less than 2 seconds  Neurological:      Mental Status: She is alert and oriented to person, place, and time  Cranial Nerves: No cranial nerve deficit  Sensory: No sensory deficit           Vital Signs  ED Triage Vitals [06/08/21 1609]   Temperature Pulse Respirations Blood Pressure SpO2   98 4 °F (36 9 °C) 86 18 (!) 207/91 96 %      Temp Source Heart Rate Source Patient Position - Orthostatic VS BP Location FiO2 (%)   Oral Monitor Sitting Right arm --      Pain Score       No Pain           Vitals:    06/09/21 0300 06/09/21 0425 06/09/21 0500 06/09/21 0932   BP: 167/74 149/74 167/72 (!) 184/81   Pulse: 64 72 62    Patient Position - Orthostatic VS: Lying Lying Lying          Visual Acuity  Visual Acuity      Most Recent Value   L Pupil Size (mm)  2   R Pupil Size (mm)  2   L Pupil Shape  Round   R Pupil Shape  Round          ED Medications  Medications   amLODIPine (NORVASC) tablet 5 mg (5 mg Oral Given 6/9/21 0936)   aspirin chewable tablet 81 mg (81 mg Oral Given 6/9/21 0935)   pantoprazole (PROTONIX) EC tablet 20 mg (20 mg Oral Given 6/9/21 0546)   triamterene-hydrochlorothiazide (MAXZIDE-25) 37 5-25 mg per tablet 1 tablet (has no administration in time range)   montelukast (SINGULAIR) tablet 10 mg (10 mg Oral Given 6/9/21 0109)   bimatoprost (LUMIGAN) 0 01 % ophthalmic solution 1 drop (1 drop Both Eyes Given 6/9/21 0109)   mesalamine (DELZICOL) delayed release capsule 800 mg (800 mg Oral Given 6/9/21 0937)   enoxaparin (LOVENOX) subcutaneous injection 40 mg (40 mg Subcutaneous Given 6/9/21 0935)   busPIRone (BUSPAR) tablet 10 mg (has no administration in time range)   methocarbamol (ROBAXIN) tablet 500 mg (has no administration in time range)   cholecalciferol (VITAMIN D3) tablet 1,000 Units (1,000 Units Oral Given 6/9/21 0936)   potassium chloride 20 mEq IVPB (premix) (20 mEq Intravenous New Bag 6/9/21 0927)   acetaminophen (TYLENOL) tablet 650 mg (650 mg Oral Given 6/9/21 0932)   lidocaine (LIDODERM) 5 % patch 1 patch (1 patch Topical Medication Applied 6/8/21 1639)   methocarbamol (ROBAXIN) tablet 500 mg (500 mg Oral Given 6/8/21 1640)   acetaminophen (TYLENOL) tablet 975 mg (975 mg Oral Given 6/8/21 1639)   Labetalol HCl (NORMODYNE) injection 10 mg (10 mg Intravenous Given 6/8/21 2055)       Diagnostic Studies  Results Reviewed     Procedure Component Value Units Date/Time    Basic metabolic panel [[id number]]  (Abnormal) Collected: 06/09/21 0426    Lab Status: Final result Specimen: Blood from Arm, Right Updated: 06/09/21 0444     Sodium 145 mmol/L      Potassium 3 0 mmol/L      Chloride 107 mmol/L      CO2 28 mmol/L      ANION GAP 10 mmol/L      BUN 10 mg/dL      Creatinine 0 53 mg/dL      Glucose 100 mg/dL      Calcium 9 4 mg/dL      eGFR 85 ml/min/1 73sq m     Narrative:      [employer] guidelines for Chronic Kidney Disease (CKD):     Stage 1 with normal or high GFR (GFR > 90 mL/min/1 73 square meters)    Stage 2 Mild CKD (GFR = 60-89 mL/min/1 73 square meters)    Stage 3A Moderate CKD (GFR = 45-59 mL/min/1 73 square meters)    Stage 3B Moderate CKD (GFR = 30-44 mL/min/1 73 square meters)    Stage 4 Severe CKD (GFR = 15-29 mL/min/1 73 square meters)    Stage 5 End Stage CKD (GFR <15 mL/min/1 73 square meters)  Note: GFR calculation is accurate only with a steady state creatinine    CBC and Platelet [[id number]]  (Abnormal) Collected: 06/09/21 0426    Lab Status: Final result Specimen: Blood from Arm, Right Updated: 06/09/21 0437     WBC 8 79 Thousand/uL      RBC 3 74 Million/uL      Hemoglobin 11 9 g/dL      Hematocrit 35 3 %      MCV 94 fL      MCH 31 8 pg      MCHC 33 7 g/dL      RDW 14 2 %      Platelets 875 Thousands/uL      MPV 10 0 fL     Urine Microscopic [[id number]]  (Abnormal) Collected: 06/09/21 0300    Lab Status: Final result Specimen: Urine Updated: 06/09/21 0401     RBC, UA None Seen /hpf      WBC, UA 1-2 /hpf      Epithelial Cells None Seen /hpf      Bacteria, UA Innumerable /hpf     UA w Reflex to Microscopic w Reflex to Culture [[id number]]  (Abnormal) Collected: 06/09/21 0300    Lab Status: Final result Specimen: Urine Updated: 06/09/21 0306     Color, UA Yellow     Clarity, UA Clear     Specific Gravity, UA 1 010     pH, UA 6 0     Leukocytes, UA Trace     Nitrite, UA Positive     Protein, UA Negative mg/dl      Glucose, UA Negative mg/dl      Ketones, UA Negative mg/dl      Urobilinogen, UA 0 2 E U /dl      Bilirubin, UA Negative     Blood, UA Negative    C-reactive protein [[id number]]  (Abnormal) Collected: 06/09/21 0111    Lab Status: Final result Specimen: Blood from Arm, Right Updated: 06/09/21 0142     CRP 7 5 mg/L     Platelet count [[id number]]  (Abnormal) Collected: 06/09/21 0111    Lab Status: Final result Specimen: Blood from Arm, Right Updated: 06/09/21 0120     Platelets 807 Thousands/uL      MPV 10 0 fL     Basic metabolic panel [[id number]] Collected: 06/08/21 1854    Lab Status: Final result Specimen: Blood from Arm, Right Updated: 06/08/21 1909     Sodium 139 mmol/L      Potassium 3 6 mmol/L      Chloride 102 mmol/L      CO2 26 mmol/L      ANION GAP 11 mmol/L      BUN 11 mg/dL      Creatinine 0 62 mg/dL      Glucose 111 mg/dL      Calcium 9 9 mg/dL      eGFR 81 ml/min/1 73sq m     Narrative:      [place] guidelines for Chronic Kidney Disease (CKD):     Stage 1 with normal or high GFR (GFR > 90 mL/min/1 73 square meters)    Stage 2 Mild CKD (GFR = 60-89 mL/min/1 73 square meters)    Stage 3A Moderate CKD (GFR = 45-59 mL/min/1 73 square meters)    Stage 3B Moderate CKD (GFR = 30-44 mL/min/1 73 square meters)    Stage 4 Severe CKD (GFR = 15-29 mL/min/1 73 square meters)    Stage 5 End Stage CKD (GFR <15 mL/min/1 73 square meters)  Note: GFR calculation is accurate only with a steady state creatinine    CBC and differential [[id number]]  (Abnormal) Collected: 06/08/21 1854    Lab Status: Final result Specimen: Blood from Arm, Right Updated: 06/08/21 1904     WBC 10 61 Thousand/uL      RBC 3 87 Million/uL      Hemoglobin 12 4 g/dL      Hematocrit 36 5 %      MCV 94 fL      MCH 32 0 pg      MCHC 34 0 g/dL      RDW 14 3 %      MPV 10 1 fL      Platelets 551 Thousands/uL      nRBC 0 /100 WBCs      Neutrophils Relative 58 %      Immat GRANS % 1 %      Lymphocytes Relative 34 %      Monocytes Relative 7 %      Eosinophils Relative 0 %      Basophils Relative 0 %      Neutrophils Absolute 6 16 Thousands/µL      Immature Grans Absolute 0 06 Thousand/uL      Lymphocytes Absolute 3 58 Thousands/µL      Monocytes Absolute 0 74 Thousand/µL      Eosinophils Absolute 0 04 Thousand/µL      Basophils Absolute 0 03 Thousands/µL                  XR spine lumbar 2 or 3 views injury   ED Interpretation by Regina Swartz DO (06/08 1720)   No acute fracture  Straightening of the normal lumbar lordosis  Final Result by Prashant Willis MD (06/09 0830)      No acute osseous abnormality  Degenerative changes as described  This report is in agreement with the preliminary interpretation  Workstation performed: [id number]         XR hip/pelv 2-3 vws left if performed    (Results Pending)              Procedures  Procedures         ED Course  ED Course as of Jun 09 1038   Tue Jun 08, 2021 1926 Bedside ultrasound somewhat limited by bowel gas  However visualized portion of abdominal aorta has normal caliber  No evidence of aneurysm  SBIRT 20yo+      Most Recent Value   SBIRT (24 yo +)   In order to provide better care to our patients, we are screening all of our patients for alcohol and drug use  Would it be okay to ask you these screening questions? No Filed at: 06/09/2021 0212                    MDM  Number of Diagnoses or Management Options  Ambulatory dysfunction: new and requires workup  Bilateral low back pain with left-sided sciatica, unspecified chronicity: established and worsening  Diagnosis management comments: Patient presents with lower back pain and ambulatory dysfunction  Patient treated symptomatically with minimal improvement  She typically ambulates well with a cane but is requiring assistance just to get into a wheelchair  Do not feel she is safe for discharge  Will hospitalize for further workup and treatment         Amount and/or Complexity of Data Reviewed  Clinical lab tests: ordered and reviewed  Tests in the radiology section of CPT®: reviewed and ordered  Tests in the medicine section of CPT®: ordered and reviewed  Review and summarize past medical records: yes  Discuss the patient with other providers: yes  Independent visualization of images, tracings, or specimens: yes    Risk of Complications, Morbidity, and/or Mortality  Presenting problems: high  Diagnostic procedures: moderate  Management options: moderate    Patient Progress  Patient progress: stable      Disposition  Final diagnoses:   Bilateral low back pain with left-sided sciatica, unspecified chronicity   Ambulatory dysfunction     Time reflects when diagnosis was documented in both MDM as applicable and the Disposition within this note     Time User Action Codes Description Comment    6/8/2021  7:40 PM Sharron Kramer [M54 [id number]] Bilateral low back pain with left-sided sciatica, unspecified chronicity     6/8/2021  7:40 PM Sharron Kramer [R26 [id number]] Ambulatory dysfunction       ED Disposition     ED Disposition Condition Date/Time Comment    Admit Stable Tue Jun 8, 2021  7:39 PM Case was discussed with PENG and the patient's admission status was agreed to be Admission Status: observation status to the service of Dr Gardenia White   Follow-up Information    None         Patient's Medications   Discharge Prescriptions    No medications on file     No discharge procedures on file      PDMP Review     None          ED Provider  Electronically Signed by           Meredith Wadsworth DO  06/09/21 1038

## 2021-06-08 NOTE — Clinical Note
Case was discussed with PENG and the patient's admission status was agreed to be Admission Status: observation status to the service of Dr Scooter Kebede

## 2021-06-09 VITALS
HEART RATE: 69 BPM | SYSTOLIC BLOOD PRESSURE: 150 MMHG | RESPIRATION RATE: 16 BRPM | DIASTOLIC BLOOD PRESSURE: 66 MMHG | OXYGEN SATURATION: 96 % | TEMPERATURE: 98.4 F

## 2021-06-09 LAB
ANION GAP SERPL CALCULATED.3IONS-SCNC: 10 MMOL/L (ref 4–13)
ANION GAP SERPL CALCULATED.3IONS-SCNC: 11 MMOL/L (ref 4–13)
BACTERIA UR QL AUTO: ABNORMAL /HPF
BILIRUB UR QL STRIP: NEGATIVE
BUN SERPL-MCNC: 10 MG/DL (ref 5–25)
BUN SERPL-MCNC: 12 MG/DL (ref 5–25)
CALCIUM SERPL-MCNC: 9 MG/DL (ref 8.3–10.1)
CALCIUM SERPL-MCNC: 9.4 MG/DL (ref 8.3–10.1)
CHLORIDE SERPL-SCNC: 106 MMOL/L (ref 100–108)
CHLORIDE SERPL-SCNC: 107 MMOL/L (ref 100–108)
CLARITY UR: CLEAR
CO2 SERPL-SCNC: 25 MMOL/L (ref 21–32)
CO2 SERPL-SCNC: 28 MMOL/L (ref 21–32)
COLOR UR: YELLOW
CREAT SERPL-MCNC: 0.53 MG/DL (ref 0.6–1.3)
CREAT SERPL-MCNC: 0.65 MG/DL (ref 0.6–1.3)
CRP SERPL QL: 7.5 MG/L
ERYTHROCYTE [DISTWIDTH] IN BLOOD BY AUTOMATED COUNT: 14.2 % (ref 11.6–15.1)
GFR SERPL CREATININE-BSD FRML MDRD: 80 ML/MIN/1.73SQ M
GFR SERPL CREATININE-BSD FRML MDRD: 85 ML/MIN/1.73SQ M
GLUCOSE SERPL-MCNC: 100 MG/DL (ref 65–140)
GLUCOSE SERPL-MCNC: 113 MG/DL (ref 65–140)
GLUCOSE UR STRIP-MCNC: NEGATIVE MG/DL
HCT VFR BLD AUTO: 35.3 % (ref 34.8–46.1)
HGB BLD-MCNC: 11.9 G/DL (ref 11.5–15.4)
HGB UR QL STRIP.AUTO: NEGATIVE
KETONES UR STRIP-MCNC: NEGATIVE MG/DL
LEUKOCYTE ESTERASE UR QL STRIP: ABNORMAL
MCH RBC QN AUTO: 31.8 PG (ref 26.8–34.3)
MCHC RBC AUTO-ENTMCNC: 33.7 G/DL (ref 31.4–37.4)
MCV RBC AUTO: 94 FL (ref 82–98)
NITRITE UR QL STRIP: POSITIVE
NON-SQ EPI CELLS URNS QL MICRO: ABNORMAL /HPF
PH UR STRIP.AUTO: 6 [PH]
PLATELET # BLD AUTO: 443 THOUSANDS/UL (ref 149–390)
PLATELET # BLD AUTO: 449 THOUSANDS/UL (ref 149–390)
PMV BLD AUTO: 10 FL (ref 8.9–12.7)
PMV BLD AUTO: 10 FL (ref 8.9–12.7)
POTASSIUM SERPL-SCNC: 3 MMOL/L (ref 3.5–5.3)
POTASSIUM SERPL-SCNC: 3.1 MMOL/L (ref 3.5–5.3)
PROT UR STRIP-MCNC: NEGATIVE MG/DL
RBC # BLD AUTO: 3.74 MILLION/UL (ref 3.81–5.12)
RBC #/AREA URNS AUTO: ABNORMAL /HPF
SODIUM SERPL-SCNC: 142 MMOL/L (ref 136–145)
SODIUM SERPL-SCNC: 145 MMOL/L (ref 136–145)
SP GR UR STRIP.AUTO: 1.01 (ref 1–1.03)
UROBILINOGEN UR QL STRIP.AUTO: 0.2 E.U./DL
WBC # BLD AUTO: 8.79 THOUSAND/UL (ref 4.31–10.16)
WBC #/AREA URNS AUTO: ABNORMAL /HPF

## 2021-06-09 PROCEDURE — 97163 PT EVAL HIGH COMPLEX 45 MIN: CPT

## 2021-06-09 PROCEDURE — 85027 COMPLETE CBC AUTOMATED: CPT | Performed by: CHIROPRACTOR

## 2021-06-09 PROCEDURE — 81001 URINALYSIS AUTO W/SCOPE: CPT | Performed by: CHIROPRACTOR

## 2021-06-09 PROCEDURE — 86140 C-REACTIVE PROTEIN: CPT | Performed by: CHIROPRACTOR

## 2021-06-09 PROCEDURE — 80048 BASIC METABOLIC PNL TOTAL CA: CPT | Performed by: CHIROPRACTOR

## 2021-06-09 PROCEDURE — 97167 OT EVAL HIGH COMPLEX 60 MIN: CPT

## 2021-06-09 PROCEDURE — 36415 COLL VENOUS BLD VENIPUNCTURE: CPT | Performed by: CHIROPRACTOR

## 2021-06-09 PROCEDURE — 99217 PR OBSERVATION CARE DISCHARGE MANAGEMENT: CPT | Performed by: INTERNAL MEDICINE

## 2021-06-09 PROCEDURE — 80048 BASIC METABOLIC PNL TOTAL CA: CPT | Performed by: PSYCHIATRY & NEUROLOGY

## 2021-06-09 PROCEDURE — 85049 AUTOMATED PLATELET COUNT: CPT | Performed by: CHIROPRACTOR

## 2021-06-09 RX ORDER — METHOCARBAMOL 500 MG/1
500 TABLET, FILM COATED ORAL
Status: DISCONTINUED | OUTPATIENT
Start: 2021-06-09 | End: 2021-06-09 | Stop reason: DRUGHIGH

## 2021-06-09 RX ORDER — AMLODIPINE BESYLATE 5 MG/1
5 TABLET ORAL 2 TIMES DAILY
Status: DISCONTINUED | OUTPATIENT
Start: 2021-06-09 | End: 2021-06-09 | Stop reason: HOSPADM

## 2021-06-09 RX ORDER — TRAMADOL HYDROCHLORIDE 50 MG/1
50 TABLET ORAL EVERY 6 HOURS PRN
Status: DISCONTINUED | OUTPATIENT
Start: 2021-06-09 | End: 2021-06-09

## 2021-06-09 RX ORDER — CHOLECALCIFEROL (VITAMIN D3) 10(400)/ML
1000 DROPS ORAL DAILY
Status: DISCONTINUED | OUTPATIENT
Start: 2021-06-09 | End: 2021-06-09

## 2021-06-09 RX ORDER — METHOCARBAMOL 500 MG/1
500 TABLET, FILM COATED ORAL EVERY 6 HOURS PRN
Qty: 30 TABLET | Refills: 0 | Status: SHIPPED | OUTPATIENT
Start: 2021-06-09

## 2021-06-09 RX ORDER — ACETAMINOPHEN 325 MG/1
650 TABLET ORAL EVERY 6 HOURS PRN
Status: DISCONTINUED | OUTPATIENT
Start: 2021-06-09 | End: 2021-06-09

## 2021-06-09 RX ORDER — POTASSIUM CHLORIDE 20 MEQ/1
40 TABLET, EXTENDED RELEASE ORAL ONCE
Status: COMPLETED | OUTPATIENT
Start: 2021-06-09 | End: 2021-06-09

## 2021-06-09 RX ORDER — PANTOPRAZOLE SODIUM 20 MG/1
20 TABLET, DELAYED RELEASE ORAL
Status: DISCONTINUED | OUTPATIENT
Start: 2021-06-09 | End: 2021-06-09 | Stop reason: HOSPADM

## 2021-06-09 RX ORDER — METHOCARBAMOL 500 MG/1
500 TABLET, FILM COATED ORAL 3 TIMES DAILY PRN
Status: DISCONTINUED | OUTPATIENT
Start: 2021-06-09 | End: 2021-06-09 | Stop reason: HOSPADM

## 2021-06-09 RX ORDER — POTASSIUM CHLORIDE 20 MEQ/1
40 TABLET, EXTENDED RELEASE ORAL 2 TIMES DAILY
Status: DISCONTINUED | OUTPATIENT
Start: 2021-06-09 | End: 2021-06-09

## 2021-06-09 RX ORDER — TRIAMTERENE AND HYDROCHLOROTHIAZIDE 37.5; 25 MG/1; MG/1
1 TABLET ORAL 2 TIMES WEEKLY
Status: DISCONTINUED | OUTPATIENT
Start: 2021-06-10 | End: 2021-06-09 | Stop reason: HOSPADM

## 2021-06-09 RX ORDER — MELATONIN
1000 DAILY
Status: DISCONTINUED | OUTPATIENT
Start: 2021-06-09 | End: 2021-06-09 | Stop reason: HOSPADM

## 2021-06-09 RX ORDER — POTASSIUM CHLORIDE 14.9 MG/ML
20 INJECTION INTRAVENOUS
Status: DISCONTINUED | OUTPATIENT
Start: 2021-06-09 | End: 2021-06-09

## 2021-06-09 RX ORDER — TRAMADOL HYDROCHLORIDE 50 MG/1
50 TABLET ORAL
Status: DISCONTINUED | OUTPATIENT
Start: 2021-06-09 | End: 2021-06-09

## 2021-06-09 RX ORDER — BUSPIRONE HYDROCHLORIDE 10 MG/1
10 TABLET ORAL DAILY PRN
Status: DISCONTINUED | OUTPATIENT
Start: 2021-06-09 | End: 2021-06-09 | Stop reason: HOSPADM

## 2021-06-09 RX ORDER — POTASSIUM CHLORIDE 750 MG/1
10 TABLET, EXTENDED RELEASE ORAL DAILY
Status: DISCONTINUED | OUTPATIENT
Start: 2021-06-09 | End: 2021-06-09

## 2021-06-09 RX ORDER — BUSPIRONE HYDROCHLORIDE 10 MG/1
10 TABLET ORAL
Status: DISCONTINUED | OUTPATIENT
Start: 2021-06-09 | End: 2021-06-09

## 2021-06-09 RX ORDER — MONTELUKAST SODIUM 10 MG/1
10 TABLET ORAL
Status: DISCONTINUED | OUTPATIENT
Start: 2021-06-09 | End: 2021-06-09 | Stop reason: HOSPADM

## 2021-06-09 RX ORDER — ACETAMINOPHEN 325 MG/1
650 TABLET ORAL EVERY 8 HOURS SCHEDULED
Status: DISCONTINUED | OUTPATIENT
Start: 2021-06-09 | End: 2021-06-09 | Stop reason: HOSPADM

## 2021-06-09 RX ORDER — ASPIRIN 81 MG/1
81 TABLET, CHEWABLE ORAL DAILY
Status: DISCONTINUED | OUTPATIENT
Start: 2021-06-09 | End: 2021-06-09 | Stop reason: HOSPADM

## 2021-06-09 RX ORDER — MESALAMINE 400 MG/1
800 CAPSULE, DELAYED RELEASE ORAL 2 TIMES DAILY
Status: DISCONTINUED | OUTPATIENT
Start: 2021-06-09 | End: 2021-06-09 | Stop reason: HOSPADM

## 2021-06-09 RX ADMIN — POTASSIUM CHLORIDE 40 MEQ: 1500 TABLET, EXTENDED RELEASE ORAL at 13:41

## 2021-06-09 RX ADMIN — ENOXAPARIN SODIUM 40 MG: 40 INJECTION SUBCUTANEOUS at 09:35

## 2021-06-09 RX ADMIN — AMLODIPINE BESYLATE 5 MG: 5 TABLET ORAL at 01:21

## 2021-06-09 RX ADMIN — POTASSIUM CHLORIDE 20 MEQ: 14.9 INJECTION, SOLUTION INTRAVENOUS at 09:27

## 2021-06-09 RX ADMIN — ACETAMINOPHEN 650 MG: 325 TABLET ORAL at 09:32

## 2021-06-09 RX ADMIN — BIMATOPROST 1 DROP: 0.1 SOLUTION/ DROPS OPHTHALMIC at 01:09

## 2021-06-09 RX ADMIN — MONTELUKAST 10 MG: 10 TABLET, FILM COATED ORAL at 01:09

## 2021-06-09 RX ADMIN — PANTOPRAZOLE SODIUM 20 MG: 20 TABLET, DELAYED RELEASE ORAL at 05:46

## 2021-06-09 RX ADMIN — ASPIRIN 81 MG: 81 TABLET, CHEWABLE ORAL at 09:35

## 2021-06-09 RX ADMIN — VITAMIN D, TAB 1000IU (100/BT) 1000 UNITS: 25 TAB at 09:36

## 2021-06-09 RX ADMIN — MESALAMINE 800 MG: 400 CAPSULE, DELAYED RELEASE ORAL at 09:37

## 2021-06-09 RX ADMIN — AMLODIPINE BESYLATE 5 MG: 5 TABLET ORAL at 09:36

## 2021-06-09 NOTE — ASSESSMENT & PLAN NOTE
Acute chronic low back and left lower extremity pain superimposed upon scoliosis, osteopenia and history of lumbosacral fusion  Plan:  Medicate as necessary for pain, continue to ensure positional comfort  Further evaluations pending progress

## 2021-06-09 NOTE — ASSESSMENT & PLAN NOTE
The patient patient states she was diagnosed with ulcerative colitis in 1995 in general, it is well controlled except for periodic bouts  She states flare-ups her most commonly associated with infection      Plan:  Monitor and follow,  continue current mesalamine administration

## 2021-06-09 NOTE — ASSESSMENT & PLAN NOTE
The patient patient states she was diagnosed with ulcerative colitis in 1995 in general, it is well controlled except for periodic bouts  She states flare-ups her most commonly associated with infection      Plan:  Monitor and follow,  continue current mesalamine administration  Continue mesalamine for discharge

## 2021-06-09 NOTE — PHYSICAL THERAPY NOTE
Physical Therapy Evaluation    Patient's Name: Imelda Shafer    Admitting Diagnosis  Back pain [M54 9]    Problem List  Patient Active Problem List   Diagnosis    Mass of right finger    Trigger finger, left index finger    Back pain    Ambulatory dysfunction    Ulcerative colitis with complication ([address] )    History of fusion of lumbar spine       Past Medical History  Past Medical History:   Diagnosis Date    Arthritis     Bell's palsy 1940    Colitis     Hemorrhoids     History of transfusion     Hx of colonoscopy 1996, 1998, 2000, 2002, 2003, 2007, 2012       Past Surgical History  Past Surgical History:   Procedure Laterality Date   [address]    ruptured disk    BACK SURGERY  1972    bone fusion lumbar spine    BLADDER REPAIR  2002   [address]  2012    BREAST SURGERY  2011   [address]  2014    stent    CARPAL TUNNEL RELEASE  08/14/2014    CATARACT EXTRACTION Right 2015    CHOLECYSTECTOMY      COLONOSCOPY      EYE SURGERY Left     GALLBLADDER SURGERY  1995    GANGLION CYST EXCISION  2004    left wrist    HERNIA REPAIR  2001   Wash Caller HERNIA REPAIR  2003    MASTECTOMY  2012    OVARY SURGERY  1984    PARTIAL HYSTERECTOMY  1964    AL EXC SKIN BENIG <0 5 CM TRUNK,ARM,LEG Left 4/27/2021    Procedure: Excision pyogenic granuloma left ring finger;  Surgeon: Charles Morataya MD;  [facility];  Service: Orthopedics    AL [address], HAND/FINGER Right 10/9/2018    Procedure: EXCISION GANGLION CYST RIGHT SMALL FINGER;  Surgeon: Charles Morataya MD;  [facility];  Service: Orthopedics    AL INCISE FINGER TENDON SHEATH Right 4/5/2016    Procedure: INDEX TRIGGER FINGER AND RING TRIGGER FINGER RELEASE ;  Surgeon: Charles Morataya MD;  [facility];  Service: Orthopedics    AL INCISE FINGER TENDON SHEATH Left 10/16/2018    Procedure: RELEASE TRIGGER FINGER - LEFT INDEX;  Surgeon: Charles Morataya MD;  [facility]; Service: Orthopedics    SPLENECTOMY, PARTIAL  [address]    WRIST SURGERY Right 19 0908   PT Last Visit   PT Visit Date 21   Note Type   Note type Evaluation   Pain Assessment   Pain Assessment Tool 0-10   Pain Score Worst Possible Pain   Pain Location/Orientation Orientation: Right;Orientation: Lower; Location: Back; Location: Leg   Home Living   Type of [address] [place] [address] One level; Able to live on main level with bedroom/bathroom;Stairs to enter with rails  (1 ELIEL)   Bathroom Shower/Tub Tub/shower unit   Bathroom Toilet Standard   Bathroom Equipment Grab bars in shower; Shower chair;Grab bars around toilet   [facility]  (rollator)   Prior Function   Level of [place] Independent with ADLs and functional mobility   Lives With Son   ADL Assistance Independent   Falls in the last 6 months 0   Vocational Retired   Comments son drives to MD appt   Restrictions/Precautions   Wells Shahida Bearing Precautions Per Order No   General   Additional Pertinent History BP upon arrival 207/91  Aditional Precautions Chair alarm, bed alarm, falls, pain   Family/Caregiver Present No   Cognition   Arousal/Participation Alert   Orientation Level Oriented X4   Following Commands Follows one step commands without difficulty   Comments Pt ID by wrist band, name and     RLE Assessment   RLE Assessment WFL  (grossly 3+/5)   LLE Assessment   LLE Assessment WFL  (grossly 3+/5)   Bed Mobility   Rolling R 5  Supervision   Additional items Assist x 1   Supine to Sit 4  Minimal assistance   Additional items Assist x 1;HOB elevated; Increased time required  (2* to ED bed)   Sit to Supine 5  Supervision   Additional items Assist x 1; Increased time required   Transfers   Sit to Stand 5  Supervision   Additional items Assist x 1; Increased time required   Stand to Sit 5  Supervision   Additional items Assist x 1; Increased time required Ambulation/Elevation   Gait pattern Antalgic;Narrow PHIL; Decreased foot clearance   Gait Assistance 5  Supervision   Additional items Assist x 1;Verbal cues   Assistive Device Rolling walker   Distance 75 ft x 1  (patient limited by pain )   Balance   Static Sitting Fair   Static Standing Fair   Ambulatory Fair   Activity Tolerance   Activity Tolerance Patient limited by pain   Nurse Made Aware cleared by RN prior to and updated post   Assessment   Prognosis Good   Problem List Decreased strength;Decreased range of motion;Decreased endurance; Impaired balance;Decreased mobility; Impaired vision;Pain   Assessment Leandro Sandoval is a 80 y o  female seen for PT evaluation s/p admit to [facility] on 6/8/2021  Pt was admitted with a primary dx of: bilateral low back pain, ambulatory dysfunction, ulcerative colitis, history of fusion of lumbar spine  PT now consulted for assessment of mobility and d/c needs  Pt with Up with assistance orders  Pts current comorbidities effecting treatment include: HTN, HLD, GERD and chronic back pain  Karrie Nissen Pts current clinical presentation is Unstable/ Unpredictable (high complexity) due to Ongoing medical management for primary dx, Increased reliance on more restrictive AD compared to baseline, Decreased activity tolerance compared to baseline, Fall risk, Increased assistance needed from caregiver at current time, Ongoing telemetry monitoring  Prior to admission, pt was independnet at home with her son  Upon evaluation, pt currently is requiring min asssit x1 for bed mobility; contact guard for transfers and contact guard for ambulation 75 ft w/ RW  Pt presents at PT eval functioning below baseline and currently w/ overall mobility deficits 2* to: BLE weakness, decreased ROM, impaired balance, decreased endurance, gait deviations, pain, decreased activity tolerance compared to baseline, fall risk, decreased skin integrity  Pt currently at a fall risk 2* to impairments listed above  Pt will continue to benefit from skilled acute PT interventions to address stated impairments; to maximize functional mobility; for ongoing pt/ family training; and DME needs  At conclusion of PT session pt returned BTB, 2 side rails up and RN notified of session findings/recommendations with phone and call bell within reach  Pt denies any further questions at this time  Recommend home with outpatient PT upon hospital D/C  Goals   Patient Goals to go home   STG Expiration Date 06/19/21   Short Term Goal #1 In 10 days pt will be able to: 1  Demonstrate ability to perform all aspects of bed mobility with independnetly to increase functional independence  2  Perform functional transfers with independnetly to facilitate safe return to previous living environment  3   Ambulate 200 ft with RW and independnetly with stable vitals to improve safety with household distances and reduce fall risk  4  Improve LE strength grades by 1 to increase ease of functional mobility with transfers and gait  5  Pt will demonstrate improved balance by one grade in order to decrease risk of falls  6  Climb 1 steps independnetly and 1 HR to simulate entrance to home  7/ Pt will improve Barthel index score to 75 for improved independence and decreased caregiver support  Plan   Treatment/Interventions Functional transfer training;LE strengthening/ROM; Therapeutic exercise;Patient/family training;Equipment eval/education; Bed mobility;Gait training;Spoke to nursing   PT Frequency Other (Comment)  (3-5x/wk)   Recommendation   PT Discharge Recommendation Home with outpatient rehabilitation   [address] [place] [address] Mobility Inpatient   Turning in Bed Without Bedrails 3   Lying on Back to Sitting on Edge of Flat Bed 3   Moving Bed to Chair 4   Standing Up From Chair 4   Walk in Room 4   Climb 3-5 Stairs 2   Basic Mobility Inpatient Raw Score 20   Basic Mobility Standardized Score 43 99   Barthel Index   Feeding 10   Bathing 5   Grooming Score 0 Dressing Score 5   Bladder Score 0   Bowels Score 10   Toilet Use Score 5   Transfers (Bed/Chair) Score 10   Mobility (Level Surface) Score 0   Stairs Score 5   Barthel Index Score 50   The patient's AM-PAC Basic Mobility Inpatient Short Form Raw Score is 20, Standardized Score is 43 99  A standardized score greater than 42 9 suggests the patient may benefit from discharge to home  Please also refer to the recommendation of the Physical Therapist for safe discharge planning              Emre Farrar, PT, DPT

## 2021-06-09 NOTE — OCCUPATIONAL THERAPY NOTE
Occupational Therapy Evaluation     Patient Name: Demetrio Keller  PGPJD'D Date: 6/9/2021  Problem List  Principal Problem:    Ambulatory dysfunction  Active Problems:    Back pain    Ulcerative colitis with complication ([address] )    History of fusion of lumbar spine    Past Medical History  Past Medical History:   Diagnosis Date    Arthritis     Bell's palsy 1940    Colitis     Hemorrhoids     History of transfusion     Hx of colonoscopy 1996, 1998, 2000, 2002, 2003, 2007, 2012     Past Surgical History  Past Surgical History:   Procedure Laterality Date   [address]    ruptured disk    BACK SURGERY  1972    bone fusion lumbar spine    BLADDER REPAIR  2002   [address]  2012    BREAST SURGERY  2011   [address]  2014    stent    CARPAL TUNNEL RELEASE  08/14/2014    CATARACT EXTRACTION Right 2015    CHOLECYSTECTOMY      COLONOSCOPY      EYE SURGERY Left    [facility] CYST EXCISION  2004    left wrist    HERNIA REPAIR  2001   Tyler Etelvina HERNIA REPAIR  2003    MASTECTOMY  2012    [address]    PARTIAL HYSTERECTOMY  1964    MD EXC SKIN Michelle [place] <0 5 CM TRUNK,ARM,LEG Left 4/27/2021    Procedure: Excision pyogenic granuloma left ring finger;  Surgeon: Bryanna Wright MD;  [facility];  Service: Orthopedics    MD [address], HAND/FINGER Right 10/9/2018    Procedure: EXCISION GANGLION CYST RIGHT SMALL FINGER;  Surgeon: Bryanna Wright MD;  [facility];  Service: Orthopedics    MD INCISE FINGER TENDON SHEATH Right 4/5/2016    Procedure: INDEX TRIGGER FINGER AND RING TRIGGER FINGER RELEASE ;  Surgeon: Bryanna Wright MD;  [facility];  Service: Orthopedics    MD INCISE FINGER TENDON SHEATH Left 10/16/2018    Procedure: RELEASE TRIGGER FINGER - LEFT INDEX;  Surgeon: Bryanna Wright MD;  [facility];  Service: Orthopedics    SPLENECTOMY, [address]    WRIST SURGERY Right 1952 06/09/21 1312   OT Last Visit   OT Visit Date 06/09/21  (wednesday)   Note Type   Note type Evaluation   Restrictions/Precautions   Weight Bearing Precautions Per Order No   Other Precautions Fall Risk;Pain  (pure wick)   Pain Assessment   Pain Assessment Tool 0-10   Pain Score No Pain   Home Living   Type of [address] One level;Performs ADLs on one level; Able to live on main level with bedroom/bathroom  (1 ELIEL)   Bathroom Shower/Tub Tub/shower unit   Bathroom Toilet Raised   Bathroom Equipment Shower chair;Grab bars in shower;Commode   Bathroom Accessibility Accessible   Home Equipment Cane;Grab bars  (rollator for community mobility)   Additional Comments pt lives in [facility] w/ 1 ELIEL  Pt uses cane while home and uses rollator for community mobility  Pt has tub/shower w/ shower chair and grab bars  Pt has rasied toliet seat, also has commode in bedroom she uses during night  Prior Function   Level of [place] Independent with ADLs and functional mobility   Lives With Son   Receives Help From Family  (son is home w/ pt)   ADL Assistance Independent   IADLs Needs assistance   Falls in the last 6 months 1 to 4  (reports 1 fall )   Vocational Retired   Comments PTA pt I w/ ADLs  Son A w/ IADLs including meal prep, cleaning, driving  Pt I w/ laundry and med management   Lifestyle   Autonomy Pt I w/ ADLs, medication management, and laundry tasks   Reciprocal Relationships Pt lives w/ son, Daniella Michelle  Has another son who lives in [place]  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, 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 [employer] for a short period of time  Pt also reports donating knitted products   Intrinsic Gratification Pt enjoys knitting, sewing   reports having a dog   Psychosocial   Psychosocial (WDL) WDL   Subjective   Subjective "I will do whatever you would like me to do"   ADL   Where Assessed Edge of bed   Eating Assistance 6  Modified independent   Eating Deficit Setup; Increased time to complete   Grooming Assistance 6  Modified Independent   Grooming Deficit Supervision/safety; Increased time to complete;Steadying   UB Dressing Assistance 6  Modified independent   UB Dressing Deficit Increased time to complete   LB Dressing Assistance 4  Minimal Assistance   LB Dressing Deficit Setup;Verbal cueing;Supervision/safety; Increased time to complete; Don/doff R sock; Don/doff L sock  (seated EOB, secondary to lower back discomfort)   Additional Comments Pt I w/ eating, grooming, and UB dressing  Pt required min A w/ LB dressing  Bed Mobility   Supine to Sit 5  Supervision   Additional items Increased time required;Verbal cues   Sit to Supine 5  Supervision   Additional items Increased time required   Additional Comments Pt completed Supine <> sit bed mobility w/ S on ED stretcher  Beginning of session, pt supine in stretcher w/ HOB elevated  End eval, pt supine w/ HOB elevated call bell within reach and needs met   Transfers   Sit to Stand 4  Minimal assistance   Additional items Increased time required   Stand to Sit 4  Minimal assistance   Additional items Increased time required   Additional Comments Pt completed sit <> stand transfers w/ min A  Upon standing, pt reached fro BUE support and may recommend the use of RW during this time     Functional Mobility   Functional Mobility   (NT  d/t dizziness)   Balance   Static Sitting Fair +   Static Standing Poor +   Ambulatory   (NT  limited by dizziness)   Activity Tolerance   Activity Tolerance Patient limited by fatigue   Medical Staff Made Aware spoke to PTFranklyn prior to session   Nurse Made Aware spoke to RN, [facility] pt appropriate to see   RUE Assessment   RUE Assessment WFL   LUE Assessment   LUE Assessment WFL   Hand Function   Gross Motor Coordination Functional   Fine Motor Coordination Functional   Sensation   Light Touch   (responded to light touch BUE)   Cognition   Overall Cognitive Status [facility]   Arousal/Participation Alert; Cooperative;Responsive   Attention Within functional limits   Orientation Level Oriented X4   Memory Within functional limits   Following Commands Follows one step commands without difficulty   Comments Identified by full name and   Pt was cooperative, pleasant, able to participate appropriately in converstion and provide ample details  Assessment   Assessment Pt is a 79 yo female admitted to ED on 2021 d/t acute chronic low back pain  OT and up with assistance orders are documented  The pt's significant PMH impacting occupational performance includes fusion of lumbar spine, ulcerative colitis with complication, osteopenia, scoliosis, carpal tunnel release, HTN, L trigger finger, arthritis  PTA pt lived w/ son in single story house  Pt has tub/shower w/ a shower chair and grab bars  Pt has raised toilet seat  Pt keeps a bedside commode in her bedroom to use during night  Pt I w/ ADLs including bathing, grooming, and getting dressed  Requires A w/ IADLs of meal prep, cleaning, and driving  Pt I w/ IADLs med management and laundry  Personal factors limiting the patient currently includes decreased ROM, increased pain, increased fatigue, decreased endurance, decreased standing tolerance  Currently the pt demonstrates eating, grooming, and UBD w/ S  Pt requires min A w/ LBD d/t increased pain and discomfort of lower back  Pt's bed mobility supine <> sit requires S, functional transfers of sit <> stand requires min A  While standing pt did require increased physical support to safely stand  While standing, pt had increased dizziness  Pt was not able to demonstrate don/doff socks d/t low back pain/discomfort and required A w/ sock donning/doffing  Pt was educated on potential AE she can use to preform dressing ADL's independently  Pt is currently functioning below baseline level of I and would benefit from occupational therapy services while admitted in acute care   When pt is medically stable, recommendation for discharge is home w/ occupational therapy services  OT will follow while in acute care  Goals   Patient Goals to return home   Recommendation   OT Discharge Recommendation Home with home health rehabilitation   Equipment Recommended Shower/Tub chair with back ($);Raised toilet seat ($);Reacher ($);Sock aid ($)   AM-PAC Daily Activity Inpatient   Lower Body Dressing 3   Bathing 3   Toileting 3   Upper Body Dressing 4   Grooming 4   Eating 4   Daily Activity Raw Score 21   Daily Activity Standardized Score (Calc for Raw Score >=11) 44 27   AM-PAC Applied Cognition Inpatient   Following a Speech/Presentation 4   Understanding Ordinary Conversation 4   Taking Medications 4   Remembering Where Things Are Placed or Put Away 4   Remembering List of 4-5 Errands 3   Taking Care of Complicated Tasks 3   Applied Cognition Raw Score 22   Applied Cognition Standardized Score 47 83   Barthel Index   Feeding 10   Bathing 5   Grooming Score 5   Dressing Score 5   Bladder Score 10   Bowels Score 10   Toilet Use Score 5   Transfers (Bed/Chair) Score 0   Mobility (Level Surface) Score 0   Stairs Score 0   Barthel Index Score 50      The patient's raw score on the AM-PAC Daily Activity inpatient short form is 21, standardized score is 44 27, greater than 39 4  Patients at this level are likely to benefit from discharge to home  Please refer to the recommendation of the Occupational Therapist for safe discharge planning  Goals:    Pt will increase standing balance to fair+ and standing endurance to 3 minutes to max I w/ ADLs and return home  Pt will demonstrate UBD and LBD, mod I with the use of LHAE as needed to reduce burden of care  Pt will demonstrate functional transfers to chair, bed, and toilet w/ mod I using least restrictive device to max I w/ ADLs    Pt will acknowledge and actively participate in continued education of LHAE to use during dressing tasks       Pt will demonstrate functional mobility w/ fair balance and least restrictive device and participate in household distances w/ no signs of fatigue  Pt will demonstrate bed mobility w/ mod I to increase I w/ ADLs and reduce burden of care       Evin Amis OTS

## 2021-06-09 NOTE — PLAN OF CARE
Problem: OCCUPATIONAL THERAPY ADULT  Goal: Performs self-care activities at highest level of function for planned discharge setting  See evaluation for individualized goals  Description:    Equipment Recommended: Shower/Tub chair with back ($), Raised toilet seat ($), Reacher ($), Sock aid ($)       See flowsheet documentation for full assessment, interventions and recommendations     Note:             OT Discharge Recommendation: Home with home health rehabilitation

## 2021-06-09 NOTE — DISCHARGE INSTRUCTIONS
Back Pain   AMBULATORY CARE:   Back pain  is common  You may feel sore or stiff on one or both sides of your back  The pain may spread to your buttocks or thighs  Back pain may be caused by an injury, lack of exercise, or obesity  Repeated bending, lifting, twisting, or lifting heavy items can also cause back pain  Seek care immediately if:   · You have pain, numbness, or weakness in one or both legs  · Your pain becomes so severe that you cannot walk  · You cannot control your urine or bowel movements  · You have severe back pain with chest pain  · You have severe back pain, nausea, and vomiting  · You have severe back pain that spreads to your side or genital area  Contact your healthcare provider if:   · You have back pain that does not get better with rest and pain medicine  · You have a fever  · You have pain that worsens when you are on your back or when you rest     · You have pain that worsens when you cough or sneeze  · You lose weight without trying  · You have questions or concerns about your condition or care  Treatment for back pain  may include any of the following:  · NSAIDs  help decrease swelling and pain  This medicine is available with or without a doctor's order  NSAIDs can cause stomach bleeding or kidney problems in certain people  If you take blood thinner medicine, always ask your healthcare provider if NSAIDs are safe for you  Always read the medicine label and follow directions  · Acetaminophen  decreases pain and fever  It is available without a doctor's order  Ask how much to take and how often to take it  Follow directions  Read the labels of all other medicines you are using to see if they also contain acetaminophen, or ask your doctor or pharmacist  Acetaminophen can cause liver damage if not taken correctly  Do not use more than 4 grams (4,000 milligrams) total of acetaminophen in one day       · Muscle relaxers  help decrease muscle spasms and back pain  · Prescription pain medicine  may be given  Ask your healthcare provider how to take this medicine safely  Some prescription pain medicines contain acetaminophen  Do not take other medicines that contain acetaminophen without talking to your healthcare provider  Too much acetaminophen may cause liver damage  Prescription pain medicine may cause constipation  Ask your healthcare provider how to prevent or treat constipation  Manage your back pain:   · Apply ice  on your back for 15 to 20 minutes every hour or as directed  Use an ice pack, or put crushed ice in a plastic bag  Cover it with a towel before you apply it to your skin  Ice helps prevent tissue damage and decreases pain  · Apply heat  on your back for 20 to 30 minutes every 2 hours for as many days as directed  Heat helps decrease pain and muscle spasms  · Stay active  as much as you can without causing more pain  Bed rest could make your back pain worse  Avoid heavy lifting until your pain is gone  · Go to physical therapy as directed  A physical therapist can teach you exercises to help improve movement and strength, and to decrease pain  Follow up with your healthcare provider in 2 weeks, or as directed:  Write down your questions so you remember to ask them during your visits  © Copyright [address] Information is for End User's use only and may not be sold, redistributed or otherwise used for commercial purposes  All illustrations and images included in CareNotes® are the copyrighted property of A D A M , Inc  or [facility] Xiomara Ortega   The above information is an  only  It is not intended as medical advice for individual conditions or treatments  Talk to your doctor, nurse or pharmacist before following any medical regimen to see if it is safe and effective for you

## 2021-06-09 NOTE — PLAN OF CARE
Problem: PHYSICAL THERAPY ADULT  Goal: Performs mobility at highest level of function for planned discharge setting  See evaluation for individualized goals  Description: Treatment/Interventions: Functional transfer training, LE strengthening/ROM, Therapeutic exercise, Patient/family training, Equipment eval/education, Bed mobility, Gait training, Spoke to nursing          See flowsheet documentation for full assessment, interventions and recommendations  Outcome: Progressing  Note: Prognosis: Good  Problem List: Decreased strength, Decreased range of motion, Decreased endurance, Impaired balance, Decreased mobility, Impaired vision, Pain  Assessment: Omar Cody is a 80 y o  female seen for PT evaluation s/p admit to [facility] on 6/8/2021  Pt was admitted with a primary dx of: bilateral low back pain, ambulatory dysfunction, ulcerative colitis, history of fusion of lumbar spine  PT now consulted for assessment of mobility and d/c needs  Pt with Up with assistance orders  Pts current comorbidities effecting treatment include: HTN, HLD, GERD and chronic back pain  [facility] Pts current clinical presentation is Unstable/ Unpredictable (high complexity) due to Ongoing medical management for primary dx, Increased reliance on more restrictive AD compared to baseline, Decreased activity tolerance compared to baseline, Fall risk, Increased assistance needed from caregiver at current time, Ongoing telemetry monitoring  Prior to admission, pt was independnet at home with her son  Upon evaluation, pt currently is requiring min asssit x1 for bed mobility; contact guard for transfers and contact guard for ambulation 75 ft w/ RW  Pt presents at PT eval functioning below baseline and currently w/ overall mobility deficits 2* to: BLE weakness, decreased ROM, impaired balance, decreased endurance, gait deviations, pain, decreased activity tolerance compared to baseline, fall risk, decreased skin integrity   Pt currently at a fall risk 2* to impairments listed above  Pt will continue to benefit from skilled acute PT interventions to address stated impairments; to maximize functional mobility; for ongoing pt/ family training; and DME needs  At conclusion of PT session pt returned BTB, 2 side rails up and RN notified of session findings/recommendations with phone and call bell within reach  Pt denies any further questions at this time  Recommend home with outpatient PT upon hospital D/C  PT Discharge Recommendation: Home with outpatient rehabilitation          See flowsheet documentation for full assessment

## 2021-06-09 NOTE — ASSESSMENT & PLAN NOTE
The patient states she had a lumbar spine fusion in 1972  No instrumentation, iliac bone graft    Based upon plain films performed today this appears to be lumbosacral     Plan:  PT OT assessment for mobility and ambulatory dysfunction (Recommended home health services)

## 2021-06-09 NOTE — ASSESSMENT & PLAN NOTE
The patient states she had a lumbar spine fusion in 1972  No instrumentation, iliac bone graft    Based upon plain films performed today this appears to be lumbosacral     Plan:  PT OT assessment for mobility and ambulatory dysfunction etc

## 2021-06-09 NOTE — QUICK NOTE
Pt is a 81 yo female admitted to ED on 6/8/2021 d/t acute chronic low back pain  OT and up with assistance orders are documented  The pt's intensive PMH is impacting occupational performance includes history of fusion of lumbar spine, ulcerative colitis with complication, osteopenia, scoliosis, carpal tunnel release, HTN, L trigger finger, arthritis  PTA pt lived w/ son in single story house  Pt has tub/shower w/ a shower chair and grab bars  Pt has raised toilet seat  Pt keeps a bedside commode in her bedroom to use during night  Pt I w/ ADLs including bathing, grooming, and getting dressed  Requires A w/ IADLs of meal prep, cleaning, and driving  Pt I w/ IADLs med management and laundry  Personal factors limiting the patient currently includes decreased ROM, increased pain, increased fatigue, decreased endurance, decreased standing tolerance  Currently the pt demonstrates eating, grooming, and UBD w/ S  Pt requires min A w/ LBD d/t increased pain and discomfort of lower back  Pt's bed mobility supine <> sit requires S, functional transfers of sit <> stand requires S  While standing pt did require increased physical support to safely stand  While standing, pt had increased dizziness  Pt was not able to demonstrate don/doff socks d/t low back pain/discomfort and required A w/ sock donning/doffing  Pt was educated on potential AE she can use to preform dressing ADL's independently  Pt is currently functioning below baseline level of I and would benefit from occupational therapy services while admitted in acute care  When pt is medically stable, recommendation for discharge is home w/ occupational therapy services  OT will follow while in acute care  Goals:    Pt will increase standing balance to fair+ and standing endurance to 3 minutes to max I w/ ADLs and return home  Pt will demonstrate UBD and LBD, mod I with the use of LHAE as needed to reduce burden of care      Pt will demonstrate functional transfers to chair, bed, and toilet w/ mod I using least restrictive device to max I w/ ADLs    Pt will acknowledge and actively participate in continued education of LHAE to use during dressing tasks  Pt will demonstrate functional mobility w/ fair balance and participate in household distances w/ no signs of fatigue  Pt will demonstrate bed mobility w/ mod I to increase I w/ ADLs and reduce burden of care

## 2021-06-09 NOTE — ASSESSMENT & PLAN NOTE
Worsening ambulatory difficult associated with progressive left hip and back pain for 1 month  Ambulates with cane at home  Plan:  Maintain fall precautions    PT OT assessment to help delineate further management

## 2021-06-09 NOTE — ASSESSMENT & PLAN NOTE
Worsening ambulatory difficult associated with progressive left hip and back pain for 1 month  Ambulates with cane at home      Plan:  Patient discharged with home health services

## 2021-06-09 NOTE — CASE MANAGEMENT
Admitted under observation, observation notice previously reviewed and provided  Not a bundle or readmission  Patient requesting CM to call her son  CM called and spoke to [facility] to introduce self, explain role, and discuss DC planning  CM discussed therapy's evaluations and the recommendation for OPPT and home OT  Son reports patient does not drive and he would typically bring patient to and from OP if needed; however, he would feel more comfortable with home therapies at first following DC  Patient confirms she is in agreement with this, and son would prefer [facility] then no preference in agency  [facility] referral made  [facility] able to accept and added to follow up providers  Son to provide transportation today  No further needs noted at this time

## 2021-06-09 NOTE — DISCHARGE SUMMARY
[facility]  Discharge- Demetrio Keller 6/30/1932, 80 y o  female MRN: [id number]  Unit/Bed#: ED 07 Encounter: [id number]  Primary Care Provider: Paulo Bustillo MD   Date and time admitted to hospital: 6/8/2021  4:04 PM    * Ambulatory dysfunction  Assessment & Plan  Worsening ambulatory difficult associated with progressive left hip and back pain for 1 month  Ambulates with cane at home  Plan:  Patient discharged with home health services    History of fusion of lumbar spine  Assessment & Plan  The patient states she had a lumbar spine fusion in 1972  No instrumentation, iliac bone graft  Based upon plain films performed today this appears to be lumbosacral     Plan:  PT OT assessment for mobility and ambulatory dysfunction (Recommended home health services)    Ulcerative colitis with complication [facility])  Assessment & Plan  The patient patient states she was diagnosed with ulcerative colitis in 1995 in general, it is well controlled except for periodic bouts  She states flare-ups her most commonly associated with infection  Plan:  Monitor and follow,  continue current mesalamine administration  Continue mesalamine for discharge    Back pain  Assessment & Plan  Acute chronic low back and left lower extremity pain superimposed upon scoliosis, osteopenia and history of lumbosacral fusion  Plan:  Represcribed robaxin as needed for pain    Prednisone was not prescribed for patient due patient's allergy noted and patient's declining to take prednisone    PT/OT recommended Home health services and has been ordered and sent for patient          Discharging Resident Physician: Tiffany Lopez MD  Attending: No att  providers found (Dr Radha Mclean)  PCP: Paulo Bustillo MD  Admission Date: 6/8/2021  Discharge Date: 06/09/21    Disposition:     Home with [facility] Services (Reminder: Complete face to face encounter)    Reason for Admission: Ambulatory dysfunction/back pain    Consultations During [facility]:  · None    Procedures Performed:     · None    Significant Findings / Test Results:     · Potassium of 3 0 that was repleted with IV and oral potassium  · Potassium of 3 1 repleted with oral potassium    Incidental Findings:   · None     Test Results Pending at Discharge (will require follow up): · Followup with PCP in 1-2 weeks     Outpatient Tests Requested:  · None    Complications:  None    Hospital Course:     Caryle Gray is a 80 y o  female patient who originally presented to the hospital on 6/8/2021 due to Ambulatory dysfunction and back pain  Patient denies urinary incontinence or retention  Patient was found to have low potassium 3 0 that was repleted with potassium IV and potassium oral most likely due to deconditioning and poor oral intake  PT and OT saw patient and recommended home health services for her deconditioning  Patient had hypertension >296F systolic which is due to anxiety, which has trended down  Patient was represcribed robaxin to be taken as needed for her back pain  Patient is to followup with her family practice doctor in 1-2 weeks  Patient declined prednisone 20mg X10 days for possible back inflammation due to patient's reaction to it ("It make's me go berserk")  Condition at Discharge: stable     Discharge Day Visit / Exam:     Subjective:  Patient has chronic back pain but otherwise feels well  Patient does state difficulty with ambulation recently  Patient is eager to go home  Vitals: Blood Pressure: 150/66 (06/09/21 1329)  Pulse: 69 (06/09/21 1329)  Temperature: 98 4 °F (36 9 °C) (06/08/21 1609)  Temp Source: Oral (06/08/21 1609)  Respirations: 16 (06/09/21 1329)  SpO2: 96 % (06/09/21 1329)  Exam:   Physical Exam  Constitutional:       Appearance: She is normal weight  HENT:      Head: Normocephalic and atraumatic  Nose: Nose normal       Mouth/Throat:      Mouth: Mucous membranes are moist    Eyes:      Extraocular Movements: Extraocular movements intact  Pupils: Pupils are equal, round, and reactive to light  Cardiovascular:      Rate and Rhythm: Normal rate and regular rhythm  Pulses: Normal pulses  Heart sounds: Normal heart sounds  Pulmonary:      Effort: Pulmonary effort is normal       Breath sounds: Normal breath sounds  Abdominal:      General: Bowel sounds are normal       Palpations: Abdomen is soft  Tenderness: There is no abdominal tenderness  Musculoskeletal: Normal range of motion  Skin:     General: Skin is warm and dry  Capillary Refill: Capillary refill takes less than 2 seconds  Neurological:      General: No focal deficit present  Mental Status: She is alert and oriented to person, place, and time  Sensory: Sensation is intact  Motor: Weakness (Chronic) present  Deep Tendon Reflexes: Babinski sign absent on the right side  Babinski sign absent on the left side  Reflex Scores:       Patellar reflexes are 2+ on the right side and 2+ on the left side  Achilles reflexes are 2+ on the right side and 2+ on the left side  Comments: Chronic Back pain noted   Psychiatric:         Mood and Affect: Mood normal          Behavior: Behavior normal          Discussion with Family: Discussed with son    Discharge instructions/Information to patient and family:   See after visit summary for information provided to patient and family  Provisions for Follow-Up Care:  See after visit summary for information related to follow-up care and any pertinent home health orders  Planned Readmission: None     Discharge Medications:  See after visit summary for reconciled discharge medications provided to patient and family        ** Please Note: This note has been constructed using a voice recognition system **

## 2021-06-09 NOTE — ASSESSMENT & PLAN NOTE
Acute chronic low back and left lower extremity pain superimposed upon scoliosis, osteopenia and history of lumbosacral fusion  Plan:  Represcribed robaxin as needed for pain    Prednisone was not prescribed for patient due patient's allergy noted and patient's declining to take prednisone    PT/OT recommended Home health services and has been ordered and sent for patient

## 2021-06-09 NOTE — QUICK NOTE
Senior Resident Supervision Note - Admission  [facility] Internal Medicine Residency    Patient Information:     Name: Aníbal Obregon    MRN: [id number]  Age / Sex: 80 y o  female  Unit/Bed #: @DBLINK (UNR,27005)  @ Encounter: [id number]    Senior Resident Statement:    Patient seen and examined personally  History, assessment, and plan detailed below as well as all orders were reviewed with PGY1 resident, Dr Holland Grand  I agree with the assessment and plan with the following additions / corrections  See Resident H&P for details  Assessment/Plan: Principal Problem:    Ambulatory dysfunction  Active Problems:    Back pain    Ulcerative colitis with complication (HCC)    History of fusion of lumbar spine    80year old female with a history of  Hypertension, HLD, chronic back pain s/p spinal fusion in 1990's, and GERD, presents with lower back pain worsening over last few days, and new onset left hip pain  Denies recent falls/trauma  Walks with a walker  Denies fevers, chills  On admission, she had initial hypertension with a blood pressure of 207/91, mild leukocytosis  UA is positive for nitrates, bacteria, however patient is asymptomatic  CRP 7  X-ray of the lumbar spine shows possible spondylolisthesis with some scoliosis, hip shows osteophytes possible osteoarthritis  Official read pending  Examination:  Patient is lying comfortably, no acute distress  Has tenderness in the lumbar spine, midline  Lungs clear to auscultation, normal heart sounds  No lower extremity swelling  Plan  1  PT/OT eval and treatment  2  Fall precautions  3  Will continue Robaxin  4  Tylenol scheduled for mild pain, may require escalating pain meds if pain is not controlled  5   Will hold off on treating nocturia as patient is asymptomatic      Disposition:  INPATIENT     Expected LOS: Jose Espinal MD

## 2021-06-09 NOTE — DISCHARGE INSTR - AVS FIRST PAGE
Dear Elise Velasquez,     It was our pleasure to care for you here at [facility], [address]  It is our hope that we were always able to exceed the expected standards for your care during your stay  You were hospitalized due to ambulatory dysfunction  You were cared for on the ED floor by Khanh Lopez MD under the service of Dhiraj Richard MD with the [facility] Internal [facility]ist [facility] who covers for your primary care physician (PCP), Chalino Lee MD, while you were hospitalized  If you have any questions or concerns related to this hospitalization, you may contact us at [phone]  For follow up as well as any medication refills, we recommend that you follow up with your primary care physician  A registered nurse will reach out to you by phone within a few days after your discharge to answer any additional questions that you may have after going home  However, at this time we provide for you here, the most important instructions / recommendations at discharge:     · Notable Medication Adjustments -   · Continue robaxin 500mg every 6hrs by mouth as needed for pain  · Testing Required after Discharge -   · None  · Important follow up information -   · Follow up with Dr Erma Mireles in 1-2 weeks concerning ED visit  · Follow instructions with [facility] [employer]  · Other Instructions -   · Please come back to ED if worsening pain or frequent falling especially with problems of difficulty urinating, numbness around the groin area  · Please review this entire after visit summary as additional general instructions including medication list, appointments, activity, diet, any pertinent wound care, and other additional recommendations from your care team that may be provided for you        Sincerely,     Khanh Lopez MD

## 2021-06-09 NOTE — H&P
[address] 6/30/1932, 80 y o  female MRN: [id number]  Unit/Bed#: ED 07 Encounter: [id number]  Primary Care Provider: Maye Kebede MD   Date and time admitted to hospital: 6/8/2021  4:04 PM    History of fusion of lumbar spine  Assessment & Plan  The patient states she had a lumbar spine fusion in 1972  No instrumentation, iliac bone graft  Based upon plain films performed today this appears to be lumbosacral     Plan:  PT OT assessment for mobility and ambulatory dysfunction etc     Ulcerative colitis with complication [facility])  Assessment & Plan  The patient patient states she was diagnosed with ulcerative colitis in 1995 in general, it is well controlled except for periodic bouts  She states flare-ups her most commonly associated with infection  Plan:  Monitor and follow,  continue current mesalamine administration    Ambulatory dysfunction  Assessment & Plan  Worsening ambulatory difficult associated with progressive left hip and back pain for 1 month  Ambulates with cane at home  Plan:  Maintain fall precautions  PT OT assessment to help delineate further management    Back pain  Assessment & Plan  Acute chronic low back and left lower extremity pain superimposed upon scoliosis, osteopenia and history of lumbosacral fusion  Plan:  Medicate as necessary for pain, continue to ensure positional comfort  Further evaluations pending progress  VTE Prophylaxis: Enoxaparin (Lovenox)  / sequential compression device   Code Status: Level 3 Per Patient DNR/DNI  POLST: POLST is not applicable to this patient    Anticipated Length of Stay:  Patient will be admitted on an Observation basis with an anticipated length of stay of  Less than 2 midnights     Justification for Hospital Stay: Observation  Chief Complaint:   Low back pain, pain and cramping left hip back and posterior thigh    History of Present Illness:    Christal Tarango is a 80 y o  female who presents with progressively worsening low back and left lower extremity pain  She reports today the symptoms began approximately a month ago and have continue to to worsen  She lives at home with family, and regularly uses a cane and a Rollator  She feels that part of this worsening back and thigh pain is excess sitting and not enough moving at home as she has been previously advised to do by her physician  This patient has a long history of back pain, she had a lumbosacral spine fusion back in 1972  She also reports she was diagnosed with ulcerative colitis, 1st episode in 1995  She states that her UC is relatively stable and the only time she seems to be significantly bothered is when she has an infection  She has no food allergies and consumes a regular diet  Review of Systems:    Review of Systems   Constitutional: Negative for chills and fever  HENT: Positive for sore throat (Allergies/Sinus Related)  Negative for rhinorrhea  Respiratory: Negative for shortness of breath  Cardiovascular: Negative for chest pain  Gastrointestinal: Positive for constipation  Negative for diarrhea and vomiting  Genitourinary: Negative for difficulty urinating, dysuria and hematuria  Skin: Negative for rash  Neurological: Negative for dizziness and headaches         Past Medical and Surgical History:     Past Medical History:   Diagnosis Date    Arthritis     Bell's palsy 1940    Colitis     Hemorrhoids     History of transfusion     Hx of colonoscopy 1996, 1998, 2000, 2002, 2003, 2007, 2012       Past Surgical History:   Procedure Laterality Date    BACK SURGERY  1971    ruptured disk    BACK SURGERY  1972    bone fusion lumbar spine    BLADDER REPAIR  2002    BLADDER REPAIR  2012    BREAST SURGERY  2011   [facility] CARDIAC SURGERY  2014    stent    CARPAL TUNNEL RELEASE  08/14/2014    CATARACT EXTRACTION Right 2015    CHOLECYSTECTOMY      COLONOSCOPY      EYE SURGERY Left     GALLBLADDER SURGERY [address] CYST EXCISION  2004    left wrist    HERNIA REPAIR  2001   Shoshana Neal HERNIA REPAIR  2003    MASTECTOMY  2012    OVARY SURGERY  1984    PARTIAL HYSTERECTOMY  1964    SD EXC SKIN BENIG <0 5 CM TRUNK,ARM,LEG Left 4/27/2021    Procedure: Excision pyogenic granuloma left ring finger;  Surgeon: Macie Brennan MD;  [facility];  Service: Orthopedics    SD EXCIS TENDON SHEATH LESION, HAND/FINGER Right 10/9/2018    Procedure: EXCISION GANGLION CYST RIGHT SMALL FINGER;  Surgeon: Macie Brennan MD;  [facility];  Service: Orthopedics    SD INCISE FINGER TENDON SHEATH Right 4/5/2016    Procedure: INDEX TRIGGER FINGER AND RING TRIGGER FINGER RELEASE ;  Surgeon: Macie Brennan MD;  [facility];  Service: Orthopedics    SD INCISE FINGER TENDON SHEATH Left 10/16/2018    Procedure: RELEASE TRIGGER FINGER - LEFT INDEX;  Surgeon: Macie Brennan MD;  [facility];  Service: Orthopedics    SPLENECTOMY, PARTIAL  1995    TONSILLECTOMY  1939    WRIST SURGERY Right 1952       Meds/Allergies:    Prior to Admission medications    Medication Sig Start Date End Date Taking?  Authorizing Provider   acetaminophen (Tylenol 8 Hour) 650 mg CR tablet Take 1 tablet (650 mg total) by mouth every 8 (eight) hours 4/27/21  Yes Uzma Epps PA-C   AMLODIPINE BESYLATE PO Take by mouth   Yes Historical Provider, MD   ASPIRIN 81 PO Take by mouth   Yes Historical Provider, MD   DELZICOL 400 MG Take 800 mg by mouth 4 (four) times a day   7/22/18  Yes Historical Provider, MD   Klor-Con M10 10 MEQ tablet Take 10 mEq by mouth daily 3/19/21  Yes Historical Provider, MD TUCKER 0 01 % ophthalmic drops ONE DROP IN BOTH EYES AT BEDTIME 7/24/18  Yes Historical Provider, MD   methocarbamol (ROBAXIN) 500 mg tablet Take 500 mg by mouth as needed for muscle spasms   Yes Historical Provider, MD   montelukast (SINGULAIR) 10 mg tablet Take 10 mg by mouth daily at bedtime   Yes Historical Provider, MD NITROGLYCERIN PO Take by mouth   Yes Historical Provider, MD   omeprazole (PriLOSEC) 20 mg delayed release capsule Take 20 mg by mouth daily 5/13/21  Yes Historical Provider, MD   traMADol (ULTRAM) 50 mg tablet Take 50 mg by mouth every 6 (six) hours as needed for moderate pain   Yes Historical Provider, MD   triamterene-hydrochlorothiazide (MAXZIDE-25) 37 5-25 mg per tablet Take 1 tablet by mouth 2 (two) times a week Monday & Thursday   Yes Historical Provider, MD   potassium chloride (MICRO-K) 10 MEQ CR capsule Take 10 mEq by mouth 2 (two) times a day  6/8/21 Yes Historical Provider, MD   ACETAMINOPHEN PO Take by mouth    Historical Provider, MD   atorvastatin (LIPITOR) 10 mg tablet Take 10 mg by mouth daily 5/14/18   Historical Provider, MD   busPIRone (BUSPAR) 10 mg tablet Take 10 mg by mouth as needed    Historical Provider, MD   diphenoxylate-atropine (LOMOTIL) 2 5-0 025 mg/5 mL liquid Take 5 mL by mouth 2 (two) times a day as needed for diarrhea    Historical Provider, MD   Probiotic Product (PROBIOTIC PO) Take by mouth    Historical Provider, MD   cholecalciferol (VITAMIN D3) 1,000 units tablet Take 1,000 Units by mouth daily  6/8/21  Historical Provider, MD   triamterene-hydrochlorothiazide (DYAZIDE) 37 5-25 mg per capsule Take 1 capsule by mouth daily Takes 1 tablet 2 days a week 7/31/18 6/8/21  Historical Provider, MD     I have reviewed home medications with patient personally  Allergies:    Allergies   Allergen Reactions    Amlodipine Shortness Of Breath     And fatigue    Zithromax [Azithromycin] Swelling     tongue    Aspirin Other (See Comments)     Bleeding  Tolerates baby asapirin    Corn-Containing Products - Food Allergy Other (See Comments)     headache    Effient [Prasugrel]     Keflex [Cephalexin] Other (See Comments)     unknow    Lasix [Furosemide] GI Intolerance    Lipitor [Atorvastatin]     Other      liptol and lipol cause GI upset  Adhesive tape  USE PAPER TAPE    Prednisone Other (See Comments)     "I go berzerk!"    Sulfamethizole Other (See Comments)     unkown    Latex Rash     Pt cannot remember       Social History:     Marital Status: /Civil Union   Occupation:    Patient Pre-hospital Living Situation:  With family  Patient Pre-hospital Level of Mobility:  Uses cane, declining mobility  Patient Pre-hospital Diet Restrictions: None  Substance Use History: Former smoker quit   Social History     Substance and Sexual Activity   Alcohol Use No     Social History     Tobacco Use   Smoking Status Former Smoker    Quit date:     Years since quittin 4   Smokeless Tobacco Never Used     Social History     Substance and Sexual Activity   Drug Use No       Family History:    non-contributory    Physical Exam:     Vitals:   Blood Pressure: (!) 187/79 (21)  Pulse: 80 (21)  Temperature: 98 4 °F (36 9 °C) (21 160)  Temp Source: Oral (21 160)  Respirations: 21 (21)  SpO2: 95 % (21)    Physical Exam  Constitutional:       General: She is not in acute distress  Appearance: She is not ill-appearing  HENT:      Head: Atraumatic  Mouth/Throat:      Mouth: Mucous membranes are moist    Eyes:      General: No scleral icterus  Extraocular Movements: Extraocular movements intact  Cardiovascular:      Rate and Rhythm: Normal rate and regular rhythm  Heart sounds: No murmur  Pulmonary:      Effort: Pulmonary effort is normal       Breath sounds: No wheezing or rales  Abdominal:      General: Abdomen is flat  There is no distension  Palpations: Abdomen is soft  There is no mass  Tenderness: There is no abdominal tenderness  Musculoskeletal:      Right lower leg: No edema  Left lower leg: No edema  Comments: Tenderness noted left lateral hip and sciatic notch, left posterior thigh  Skin:     General: Skin is warm and dry     Neurological:      General: No focal deficit present  Mental Status: She is alert and oriented to person, place, and time  Comments: Plantar responses flexor bilaterally, ankle reflexes absent, weakness noted left EHL, 4/5, lesser weakness noted on left plantar flexion and extension  No apparent sensory deficit lower legs and feet  Psychiatric:         Mood and Affect: Mood normal          Behavior: Behavior normal            Additional Data:     Lab Results: I have personally reviewed pertinent reports  Results from last 7 days   Lab Units 06/08/21  1854   WBC Thousand/uL 10 61*   HEMOGLOBIN g/dL 12 4   HEMATOCRIT % 36 5   PLATELETS Thousands/uL 487*   NEUTROS PCT % 58   LYMPHS PCT % 34   MONOS PCT % 7   EOS PCT % 0     Results from last 7 days   Lab Units 06/08/21  1854   POTASSIUM mmol/L 3 6   CHLORIDE mmol/L 102   CO2 mmol/L 26   BUN mg/dL 11   CREATININE mg/dL 0 62   CALCIUM mg/dL 9 9           My Wet reads on today's Imaging:  Plain films of the lumbar spine demonstrate scoliosis, degenerative disc disease, osteopenia and apparent bony lumbosacral fusion  No instrumentation  Plain films of the left hip were negative for fracture  [facility] [employer]    Result Date: 5/27/2021  Narrative: ULTRASOUND LEFT WRIST (CARPAL TUNNEL SYNDROME PROTOCOL) TECHNIQUE:   Using a high frequency transducer, the LEFT WRIST median nerve was scanned to evaluate for carpal tunnel syndrome  Gray scale and color doppler ultrasound was used  INDICATION:   G56 02: Carpal tunnel syndrome, left upper limb  COMPARISON: FINDINGS: Median nerve cross sectional area distal forearm (CSAp): 7 sq mm  Maximum median nerve cross sectional area within carpal tunnel (CSAc): 12 sq mm  Delta CSA (CSAc-CSAp): 5 sq mm  Wrist to Forearm ratio (WFR ratio) (CSAc/CSAp): 1 7 Vascularity: Abnormal Hypervascularity detected       Impression: Findings suspicious for carpal tunnel syndrome based on increased cross sectional area of median nerve (CSA >/= 11 sq cm) as well as hyperemia  Workstation performed: [id number]       EKG, Pathology, and Other Studies Reviewed on Admission:   · EKG:  None in [facility] Everywhere Records Reviewed: No     ** Please Note: This note has been constructed using a voice recognition system   **

## 2021-06-18 ENCOUNTER — OFFICE VISIT (OUTPATIENT)
Dept: OBGYN CLINIC | Facility: HOSPITAL | Age: 86
End: 2021-06-18
Payer: MEDICARE

## 2021-06-18 VITALS
DIASTOLIC BLOOD PRESSURE: 65 MMHG | WEIGHT: 125 LBS | HEIGHT: 62 IN | SYSTOLIC BLOOD PRESSURE: 149 MMHG | BODY MASS INDEX: 23 KG/M2 | HEART RATE: 72 BPM

## 2021-06-18 DIAGNOSIS — M65.342 TRIGGER RING FINGER OF LEFT HAND: ICD-10-CM

## 2021-06-18 DIAGNOSIS — M65.332 TRIGGER MIDDLE FINGER OF LEFT HAND: ICD-10-CM

## 2021-06-18 DIAGNOSIS — G56.02 CARPAL TUNNEL SYNDROME ON LEFT: Primary | ICD-10-CM

## 2021-06-18 PROCEDURE — 99024 POSTOP FOLLOW-UP VISIT: CPT | Performed by: ORTHOPAEDIC SURGERY

## 2021-06-18 PROCEDURE — 99214 OFFICE O/P EST MOD 30 MIN: CPT | Performed by: ORTHOPAEDIC SURGERY

## 2021-06-18 RX ORDER — LIDOCAINE HYDROCHLORIDE AND EPINEPHRINE 10; 10 MG/ML; UG/ML
20 INJECTION, SOLUTION INFILTRATION; PERINEURAL ONCE
Status: CANCELLED | OUTPATIENT
Start: 2021-06-18 | End: 2021-06-18

## 2021-06-18 NOTE — PROGRESS NOTES
ASSESSMENT/PLAN:    Assessment:   S/P Excision pyogenic granuloma left ring finger - Left on 4/27/2021  Left CTS  Left long and ring trigger finger     Plan:   Endoscopic Carpal Tunnel Release  left and Trigger Finger Release  left  long finger, ring finger under local    Follow Up: After Surgery    To Do Next Visit:    and Sutures out    General Discussions:       Operative Discussions:     Endoscopic Carpal Tunnel Release: The anatomy and physiology of carpal tunnel syndrome was discussed with the patient today  Increase pressure localized under the transverse carpal ligament can cause pain, numbness, tingling, or dysesthesias within the median nerve distribution as well as feelings of fatigue, clumsiness, or awkwardness  These symptoms typically occur at night and worse in the morning upon waking  Eventually, untreated carpal tunnel syndrome can result in weakness and permanent loss of muscle within the thenar compartment of the hand  Treatment options were discussed with the patient  Conservative treatment includes nocturnal resting splints to keep the nerve in a neutral position, ergonomic changes within the work or home environment, activity modification, and tendon gliding exercises  Steroid injections within the carpal canal can help a majority of patients, however this is often self-limited in a majority of patients  Surgical intervention to divide the transverse carpal ligament typically results in a long-lasting relief of the patient's complaints, with the recurrence rate of less than 1%  The patient has elected to undergo an endoscopic carpal tunnel release  The 2 incision technique was discussed with the patient, which results in approximately a two-week less recovery time, and less wound complications    In the postoperative period, light activities are allowed immediately, driving is allowed when narcotic medication has stopped, and the bandages may be removed and incision may get wet after 2 days  Heavy activities (lifting more than approximately 10 pounds) will be allowed after follow up appointment in 1-2 weeks  While the pain and discomfort in the hands generally improves rapidly, the numbness and tingling as well as the strength will slowly improve over weeks to months depending on the severity of the carpal tunnel syndrome  Pillar pain was discussed with the patient, which is typically a common but self-limiting condition  The risks of bleeding and infection from the surgery are less than 1%  Risk of recurrence is approximately 0 5%  The risks of nerve injury or nerve damage or damage to the blood vessels is approximately 1 in 1200  The patient has an understanding of the above mentioned discussion  The risks and benefits of the procedure were explained to the patient, which include, but are not limited to: Bleeding, infection, recurrence, pain, scar, damage to tendons, damage to nerves, and damage to blood vessels, failure to give desired results and complications related to anesthesia   These risks, along with alternative conservative treatment options, and postoperative protocols were voiced back and understood by the patient   All questions were answered to the patient's satisfaction   The patient agrees to comply with a standard postoperative protocol, and is willing to proceed   Education was provided via written and auditory forms   There were no barriers to learning  Written handouts regarding wound care, incision and scar care, and general preoperative information was provided to the patient   Prior to surgery, the patient may be requested to stop all anti-inflammatory medications   Prophylactic aspirin, Plavix, and Coumadin may be allowed to be continued   Medications including vitamin E , ginkgo, and fish oil are requested to be stopped approximately one week prior to surgery   Hypertensive medications and beta blockers, if taken, should be continued  Trigger Finger Release:  The anatomy and physiology of trigger finger was discussed with the patient today in the office  Edema and increased contact pressure within the flexor tendons at the A1 pulley can cause pain, crepitation, and limitation of function  Treatment options include resting MP blocking splints to decrease edema, oral anti-inflammatory medications, home or formal therapy exercises, up to 2 steroid injections or surgical release  While majority of patients do respond to conservative treatment, up to 20% may require surgical release  The patient has elected release of the trigger finger  The patient has elected to undergo a release of the A1 pulley (trigger finger)  A small incision will be made over the palmar aspect of the hand, the tendon sheath holding the flexor tendons will be released  In the postoperative period, light activities are allowed immediately, driving is allowed when narcotic medication has stopped, and the incision may get wet after 2 days  Heavy activities (lifting more than approximately 10 pounds) will be allowed after the follow up appointment in 1-2 weeks  While the pain and discomfort within the wrist typically improves rapidly, some residual discomfort may be present for up to 6 weeks  The nodule that is typically palpable in the palmar aspect of the hand will not be removed, as this would necessitate removal of a portion of the flexor tendon, however the catching, clicking, and locking should resolve  Approximate success rate is 98%  The risks and benefits of the procedure were explained to the patient, which include, but are not limited to: Bleeding, infection, recurrence, pain, scar, damage to tendons, damage to nerves, and damage to blood vessels, need for future surgery and complications related to anesthesia  If bony work is done, risks also include malunion and nonunion    These risks, along with alternative conservative treatment options, and postoperative protocols were voiced back and understood by the patient  All questions were answered to the patient's satisfaction  The patient agrees to comply with a standard postoperative protocol, and is willing to proceed  Education was provided via written and auditory forms  There were no barriers to learning  Written handouts regarding wound care, incision and scar care, and general preoperative information, as well as risks and benefits were provided to the patient     _____________________________________________________  CHIEF COMPLAINT:  Chief Complaint   Patient presents with    Left Wrist - Follow-up         SUBJECTIVE:  Christopher Paz is a 80 y o  female who presents for follow up regarding s/p left ring finger pyogenic granuloma excision with relief  Today there is Numbness to the left hand  She states that this has been ongoing for the past 2 years as insidious  She has tried night time bracing without relief  Patient states that she is dropping items  She likes to knit and described difficulties doing this  She does ambulate with a walker and describes a positive flick sign  Radiation: Yes to the  left hand  Associated symptoms: Catching and Locking of left long and ring finger  She states that this happened for the first time a few weeks ago and describes manually having to extend her fingers on her own     Handedness: right    PAST MEDICAL HISTORY:  Past Medical History:   Diagnosis Date    Arthritis     Bell's palsy 1940    Colitis     Hemorrhoids     History of transfusion     Hx of colonoscopy 1996, 1998, 2000, 2002, 2003, 2007, 2012       PAST SURGICAL HISTORY:  Past Surgical History:   Procedure Laterality Date    BACK SURGERY  1971    ruptured disk    BACK SURGERY  1972    bone fusion lumbar spine    BLADDER REPAIR  2002    BLADDER REPAIR  2012    BREAST SURGERY  2011   [facility] CARDI[facility] SURGERY  2014    stent    CARPAL TUNNEL RELEASE  08/14/2014    CATARACT EXTRACTION Right 2015    CHOLECYSTECTOMY      COLONOSCOPY  EYE SURGERY Left     GALLBLADDER SURGERY      GANGLION CYST EXCISION      left wrist    HERNIA REPAIR     [id number]  2003    MASTECTOMY  2012    OVARY SURGERY  1984    PARTIAL HYSTERECTOMY  1964    IL EXC SKIN BENIG <0 5 CM TRUNK,ARM,LEG Left 2021    Procedure: Excision pyogenic granuloma left ring finger;  Surgeon: Tonia Sawant MD;  [facility]: [facility];  Service: Orthopedics    IL EXCIS TENDON SHEATH LESION, HAND/FINGER Right 10/9/2018    Procedure: EXCISION GANGLION CYST RIGHT SMALL FINGER;  Surgeon: Tonia Sawant MD;  [facility];  Service: Orthopedics    IL INCISE FINGER TENDON SHEATH Right 2016    Procedure: INDEX TRIGGER FINGER AND RING TRIGGER FINGER RELEASE ;  Surgeon: Tonia Sawant MD;  [facility];  Service: Orthopedics    IL INCISE FINGER TENDON SHEATH Left 10/16/2018    Procedure: RELEASE TRIGGER FINGER - LEFT INDEX;  Surgeon: Tonia Sawant MD;  [facility];  Service: Orthopedics    SPLENECTOMY, PARTIAL      TONSILLECTOMY  9    WRIST SURGERY Right        FAMILY HISTORY:  Family History   Problem Relation Age of Onset    Cancer Mother     Breast cancer Sister        SOCIAL HISTORY:  Social History     Tobacco Use    Smoking status: Former Smoker     Quit date: 1950     Years since quittin 5    Smokeless tobacco: Never Used   Substance Use Topics    Alcohol use: No    Drug use: No       MEDICATIONS:    Current Outpatient Medications:     ACETAMINOPHEN PO, Take by mouth, Disp: , Rfl:     AMLODIPINE BESYLATE PO, Take by mouth, Disp: , Rfl:     ASPIRIN 81 PO, Take by mouth, Disp: , Rfl:     atorvastatin (LIPITOR) 10 mg tablet, Take 10 mg by mouth daily, Disp: , Rfl: 1    busPIRone (BUSPAR) 10 mg tablet, Take 10 mg by mouth as needed, Disp: , Rfl:     DELZICOL 400 MG, Take 800 mg by mouth 4 (four) times a day  , Disp: , Rfl: 3    diphenoxylate-atropine (LOMOTIL) 2 5-0 025 mg/5 mL liquid, Take 5 mL by mouth 2 (two) times a day as needed for diarrhea, Disp: , Rfl:     Klor-Con M10 10 MEQ tablet, Take 10 mEq by mouth daily, Disp: , Rfl:     LUMIGAN 0 01 % ophthalmic drops, ONE DROP IN BOTH EYES AT BEDTIME, Disp: , Rfl: 3    methocarbamol (ROBAXIN) 500 mg tablet, Take 1 tablet (500 mg total) by mouth every 6 (six) hours as needed for muscle spasms, Disp: 30 tablet, Rfl: 0    montelukast (SINGULAIR) 10 mg tablet, Take 10 mg by mouth daily at bedtime, Disp: , Rfl:     NITROGLYCERIN PO, Take by mouth, Disp: , Rfl:     omeprazole (PriLOSEC) 20 mg delayed release capsule, Take 20 mg by mouth daily, Disp: , Rfl:     Probiotic Product (PROBIOTIC PO), Take by mouth, Disp: , Rfl:     traMADol (ULTRAM) 50 mg tablet, Take 50 mg by mouth every 6 (six) hours as needed for moderate pain, Disp: , Rfl:     triamterene-hydrochlorothiazide (MAXZIDE-25) 37 5-25 mg per tablet, Take 1 tablet by mouth 2 (two) times a week Monday & Thursday, Disp: , Rfl:     ALLERGIES:  Allergies   Allergen Reactions    Zithromax [Azithromycin] Swelling     tongue    Aspirin Other (See Comments)     Bleeding  Tolerates baby asapirin    Corn-Containing Products - Food Allergy Other (See Comments)     headache    Effient [Prasugrel]     Keflex [Cephalexin] Other (See Comments)     unknow    Lasix [Furosemide] GI Intolerance    Lipitor [Atorvastatin]     Other      liptol and lipol cause GI upset  Adhesive tape  USE PAPER TAPE    Prednisone Other (See Comments)     "I go berzerk!"    Sulfamethizole Other (See Comments)     unkown    Latex Rash     Pt cannot remember       REVIEW OF SYSTEMS:  Pertinent items are noted in HPI      LABS:  HgA1c: No results found for: HGBA1C  BMP:   Lab Results   Component Value Date    CALCIUM 9 0 06/09/2021    K 3 1 (L) 06/09/2021    CO2 25 06/09/2021     06/09/2021    BUN 12 06/09/2021    CREATININE 0 65 06/09/2021           _____________________________________________________  PHYSICAL EXAMINATION:  Vital signs: Ht 5' 2" (1 575 m)   Wt 56 7 kg (125 lb)   BMI 22 86 kg/m²   General: well developed and well nourished, alert, oriented times 3 and appears comfortable  Psychiatric: Normal  HEENT: Trachea Midline, No torticollis  Cardiovascular: No discernable arrhythmia  Pulmonary: No wheezing or stridor  Abdomen: No rebound or guarding  Extremities: No peripheral edema  Skin: No Erythema  Neurovascular: Pulses Intact    MUSCULOSKELETAL EXAMINATION:  LEFT SIDE:  Carpal tunnel:  No atrophy thenar muscles, Weakness APB, Postive Tinel's and Positive Derkin's Compression Test and Finger:  Tenderness at A1 pulley of long and ring finger, Palpable clicking long finger, ring finger and Nodules  long finger, ring finger    _____________________________________________________  STUDIES REVIEWED:  Images were reviewed in PACS by Dr Amelia Luong and demonstrate: US of left wrist on 6/8/2021 demonstrates left carpal tunnel syndrome       PROCEDURES PERFORMED:  Procedures  No Procedures performed today   Scribe Attestation    I,:  Ruiz Moore am acting as a scribe while in the presence of the attending physician :       I,:  Helder Mack MD personally performed the services described in this documentation    as scribed in my presence :

## 2021-06-18 NOTE — H&P
ASSESSMENT/PLAN:    Assessment:   S/P Excision pyogenic granuloma left ring finger - Left on 4/27/2021  Left CTS  Left long and ring trigger finger     Plan:   Endoscopic Carpal Tunnel Release  left and Trigger Finger Release  left  long finger, ring finger under local    Follow Up: After Surgery    To Do Next Visit:    and Sutures out    General Discussions:       Operative Discussions:     Endoscopic Carpal Tunnel Release: The anatomy and physiology of carpal tunnel syndrome was discussed with the patient today  Increase pressure localized under the transverse carpal ligament can cause pain, numbness, tingling, or dysesthesias within the median nerve distribution as well as feelings of fatigue, clumsiness, or awkwardness  These symptoms typically occur at night and worse in the morning upon waking  Eventually, untreated carpal tunnel syndrome can result in weakness and permanent loss of muscle within the thenar compartment of the hand  Treatment options were discussed with the patient  Conservative treatment includes nocturnal resting splints to keep the nerve in a neutral position, ergonomic changes within the work or home environment, activity modification, and tendon gliding exercises  Steroid injections within the carpal canal can help a majority of patients, however this is often self-limited in a majority of patients  Surgical intervention to divide the transverse carpal ligament typically results in a long-lasting relief of the patient's complaints, with the recurrence rate of less than 1%  The patient has elected to undergo an endoscopic carpal tunnel release  The 2 incision technique was discussed with the patient, which results in approximately a two-week less recovery time, and less wound complications    In the postoperative period, light activities are allowed immediately, driving is allowed when narcotic medication has stopped, and the bandages may be removed and incision may get wet after 2 days  Heavy activities (lifting more than approximately 10 pounds) will be allowed after follow up appointment in 1-2 weeks  While the pain and discomfort in the hands generally improves rapidly, the numbness and tingling as well as the strength will slowly improve over weeks to months depending on the severity of the carpal tunnel syndrome  Pillar pain was discussed with the patient, which is typically a common but self-limiting condition  The risks of bleeding and infection from the surgery are less than 1%  Risk of recurrence is approximately 0 5%  The risks of nerve injury or nerve damage or damage to the blood vessels is approximately 1 in 1200  The patient has an understanding of the above mentioned discussion  The risks and benefits of the procedure were explained to the patient, which include, but are not limited to: Bleeding, infection, recurrence, pain, scar, damage to tendons, damage to nerves, and damage to blood vessels, failure to give desired results and complications related to anesthesia   These risks, along with alternative conservative treatment options, and postoperative protocols were voiced back and understood by the patient   All questions were answered to the patient's satisfaction   The patient agrees to comply with a standard postoperative protocol, and is willing to proceed   Education was provided via written and auditory forms   There were no barriers to learning  Written handouts regarding wound care, incision and scar care, and general preoperative information was provided to the patient   Prior to surgery, the patient may be requested to stop all anti-inflammatory medications   Prophylactic aspirin, Plavix, and Coumadin may be allowed to be continued   Medications including vitamin E , ginkgo, and fish oil are requested to be stopped approximately one week prior to surgery   Hypertensive medications and beta blockers, if taken, should be continued  Trigger Finger Release:  The anatomy and physiology of trigger finger was discussed with the patient today in the office  Edema and increased contact pressure within the flexor tendons at the A1 pulley can cause pain, crepitation, and limitation of function  Treatment options include resting MP blocking splints to decrease edema, oral anti-inflammatory medications, home or formal therapy exercises, up to 2 steroid injections or surgical release  While majority of patients do respond to conservative treatment, up to 20% may require surgical release  The patient has elected release of the trigger finger  The patient has elected to undergo a release of the A1 pulley (trigger finger)  A small incision will be made over the palmar aspect of the hand, the tendon sheath holding the flexor tendons will be released  In the postoperative period, light activities are allowed immediately, driving is allowed when narcotic medication has stopped, and the incision may get wet after 2 days  Heavy activities (lifting more than approximately 10 pounds) will be allowed after the follow up appointment in 1-2 weeks  While the pain and discomfort within the wrist typically improves rapidly, some residual discomfort may be present for up to 6 weeks  The nodule that is typically palpable in the palmar aspect of the hand will not be removed, as this would necessitate removal of a portion of the flexor tendon, however the catching, clicking, and locking should resolve  Approximate success rate is 98%  The risks and benefits of the procedure were explained to the patient, which include, but are not limited to: Bleeding, infection, recurrence, pain, scar, damage to tendons, damage to nerves, and damage to blood vessels, need for future surgery and complications related to anesthesia  If bony work is done, risks also include malunion and nonunion    These risks, along with alternative conservative treatment options, and postoperative protocols were voiced back and understood by the patient  All questions were answered to the patient's satisfaction  The patient agrees to comply with a standard postoperative protocol, and is willing to proceed  Education was provided via written and auditory forms  There were no barriers to learning  Written handouts regarding wound care, incision and scar care, and general preoperative information, as well as risks and benefits were provided to the patient     _____________________________________________________  CHIEF COMPLAINT:  Chief Complaint   Patient presents with    Left Wrist - Follow-up         SUBJECTIVE:  Karl Castro is a 80 y o  female who presents for follow up regarding s/p left ring finger pyogenic granuloma excision with relief  Today there is Numbness to the left hand  She states that this has been ongoing for the past 2 years as insidious  She has tried night time bracing without relief  Patient states that she is dropping items  She likes to knit and described difficulties doing this  She does ambulate with a walker and describes a positive flick sign  Radiation: Yes to the  left hand  Associated symptoms: Catching and Locking of left long and ring finger  She states that this happened for the first time a few weeks ago and describes manually having to extend her fingers on her own     Handedness: right    PAST MEDICAL HISTORY:  Past Medical History:   Diagnosis Date    Arthritis     Bell's palsy 1940    Colitis     Hemorrhoids     History of transfusion     Hx of colonoscopy 1996, 1998, 2000, 2002, 2003, 2007, 2012       PAST SURGICAL HISTORY:  Past Surgical History:   Procedure Laterality Date    BACK SURGERY  1971    ruptured disk    BACK SURGERY  1972    bone fusion lumbar spine    BLADDER REPAIR  2002    BLADDER REPAIR  2012    BREAST SURGERY  2011   [facility] CARDIAC SURGERY  2014    stent    CARPAL TUNNEL RELEASE  08/14/2014    CATARACT EXTRACTION Right 2015    CHOLECYSTECTOMY      COLONOSCOPY  EYE SURGERY Left     GALLBLADDER SURGERY      GANGLION CYST EXCISION      left wrist    HERNIA REPAIR     [address]  2003    MASTECTOMY  2012    OVARY SURGERY  1984    PARTIAL HYSTERECTOMY  1964    PA EXC SKIN BENIG <0 5 CM TRUNK,ARM,LEG Left 2021    Procedure: Excision pyogenic granuloma left ring finger;  Surgeon: Tho Cruz MD;  [facility];  Service: Orthopedics    PA EXCIS TENDON SHEATH LESION, HAND/FINGER Right 10/9/2018    Procedure: EXCISION GANGLION CYST RIGHT SMALL FINGER;  Surgeon: Tho Cruz MD;  [facility];  Service: Orthopedics    PA INCISE FINGER TENDON SHEATH Right 2016    Procedure: INDEX TRIGGER FINGER AND RING TRIGGER FINGER RELEASE ;  Surgeon: Tho Cruz MD;  [facility];  Service: Orthopedics    PA INCISE FINGER TENDON SHEATH Left 10/16/2018    Procedure: RELEASE TRIGGER FINGER - LEFT INDEX;  Surgeon: Tho Cruz MD;  [facility];  Service: Orthopedics    SPLENECTOMY, PARTIAL      TONSILLECTOMY  9    WRIST SURGERY Right        FAMILY HISTORY:  Family History   Problem Relation Age of Onset    Cancer Mother     Breast cancer Sister        SOCIAL HISTORY:  Social History     Tobacco Use    Smoking status: Former Smoker     Quit date:      Years since quittin 5    Smokeless tobacco: Never Used   Substance Use Topics    Alcohol use: No    Drug use: No       MEDICATIONS:    Current Outpatient Medications:     ACETAMINOPHEN PO, Take by mouth, Disp: , Rfl:     AMLODIPINE BESYLATE PO, Take by mouth, Disp: , Rfl:     ASPIRIN 81 PO, Take by mouth, Disp: , Rfl:     atorvastatin (LIPITOR) 10 mg tablet, Take 10 mg by mouth daily, Disp: , Rfl: 1    busPIRone (BUSPAR) 10 mg tablet, Take 10 mg by mouth as needed, Disp: , Rfl:     DELZICOL 400 MG, Take 800 mg by mouth 4 (four) times a day  , Disp: , Rfl: 3    diphenoxylate-atropine (LOMOTIL) 2 5-0 025 mg/5 mL liquid, Take 5 mL by mouth 2 (two) times a day as needed for diarrhea, Disp: , Rfl:     Klor-Con M10 10 MEQ tablet, Take 10 mEq by mouth daily, Disp: , Rfl:     LUMIGAN 0 01 % ophthalmic drops, ONE DROP IN BOTH EYES AT BEDTIME, Disp: , Rfl: 3    methocarbamol (ROBAXIN) 500 mg tablet, Take 1 tablet (500 mg total) by mouth every 6 (six) hours as needed for muscle spasms, Disp: 30 tablet, Rfl: 0    montelukast (SINGULAIR) 10 mg tablet, Take 10 mg by mouth daily at bedtime, Disp: , Rfl:     NITROGLYCERIN PO, Take by mouth, Disp: , Rfl:     omeprazole (PriLOSEC) 20 mg delayed release capsule, Take 20 mg by mouth daily, Disp: , Rfl:     Probiotic Product (PROBIOTIC PO), Take by mouth, Disp: , Rfl:     traMADol (ULTRAM) 50 mg tablet, Take 50 mg by mouth every 6 (six) hours as needed for moderate pain, Disp: , Rfl:     triamterene-hydrochlorothiazide (MAXZIDE-25) 37 5-25 mg per tablet, Take 1 tablet by mouth 2 (two) times a week Monday & Thursday, Disp: , Rfl:     ALLERGIES:  Allergies   Allergen Reactions    Zithromax [Azithromycin] Swelling     tongue    Aspirin Other (See Comments)     Bleeding  Tolerates baby asapirin    Corn-Containing Products - Food Allergy Other (See Comments)     headache    Effient [Prasugrel]     Keflex [Cephalexin] Other (See Comments)     unknow    Lasix [Furosemide] GI Intolerance    Lipitor [Atorvastatin]     Other      liptol and lipol cause GI upset  Adhesive tape  USE PAPER TAPE    Prednisone Other (See Comments)     "I go berzerk!"    Sulfamethizole Other (See Comments)     unkown    Latex Rash     Pt cannot remember       REVIEW OF SYSTEMS:  Pertinent items are noted in HPI      LABS:  HgA1c: No results found for: HGBA1C  BMP:   Lab Results   Component Value Date    CALCIUM 9 0 06/09/2021    K 3 1 (L) 06/09/2021    CO2 25 06/09/2021     06/09/2021    BUN 12 06/09/2021    CREATININE 0 65 06/09/2021           _____________________________________________________  PHYSICAL EXAMINATION:  Vital signs: Ht 5' 2" (1 575 m)   Wt 56 7 kg (125 lb)   BMI 22 86 kg/m²   General: well developed and well nourished, alert, oriented times 3 and appears comfortable  Psychiatric: Normal  HEENT: Trachea Midline, No torticollis  Cardiovascular: No discernable arrhythmia  Pulmonary: No wheezing or stridor  Abdomen: No rebound or guarding  Extremities: No peripheral edema  Skin: No Erythema  Neurovascular: Pulses Intact    MUSCULOSKELETAL EXAMINATION:  LEFT SIDE:  Carpal tunnel:  No atrophy thenar muscles, Weakness APB, Postive Tinel's and Positive Derkin's Compression Test and Finger:  Tenderness at A1 pulley of long and ring finger, Palpable clicking long finger, ring finger and Nodules  long finger, ring finger    _____________________________________________________  STUDIES REVIEWED:  Images were reviewed in PACS by Dr Boni Ramirez and demonstrate: US of left wrist on 6/8/2021 demonstrates left carpal tunnel syndrome       PROCEDURES PERFORMED:  Procedures  No Procedures performed today   Scribe Attestation    I,:  Иван Lui am acting as a scribe while in the presence of the attending physician :       I,:  Tho Cruz MD personally performed the services described in this documentation    as scribed in my presence :

## 2021-07-06 ENCOUNTER — OFFICE VISIT (OUTPATIENT)
Dept: URGENT CARE | Facility: MEDICAL CENTER | Age: 86
End: 2021-07-06
Payer: MEDICARE

## 2021-07-06 VITALS
RESPIRATION RATE: 18 BRPM | WEIGHT: 123 LBS | DIASTOLIC BLOOD PRESSURE: 70 MMHG | BODY MASS INDEX: 22.5 KG/M2 | SYSTOLIC BLOOD PRESSURE: 149 MMHG | HEART RATE: 80 BPM | TEMPERATURE: 97.9 F

## 2021-07-06 DIAGNOSIS — R21 RASH: Primary | ICD-10-CM

## 2021-07-06 PROCEDURE — 99213 OFFICE O/P EST LOW 20 MIN: CPT | Performed by: PHYSICIAN ASSISTANT

## 2021-07-06 PROCEDURE — G0463 HOSPITAL OUTPT CLINIC VISIT: HCPCS | Performed by: PHYSICIAN ASSISTANT

## 2021-07-06 RX ORDER — TRIAMCINOLONE ACETONIDE 0.25 MG/G
CREAM TOPICAL 2 TIMES DAILY
Qty: 30 G | Refills: 0 | Status: SHIPPED | OUTPATIENT
Start: 2021-07-06

## 2021-07-06 RX ORDER — MELATONIN
5000 DAILY
COMMUNITY

## 2021-07-06 RX ORDER — HYDROXYZINE HYDROCHLORIDE 10 MG/1
10 TABLET, FILM COATED ORAL EVERY 6 HOURS PRN
Qty: 30 TABLET | Refills: 0 | Status: SHIPPED | OUTPATIENT
Start: 2021-07-06

## 2021-07-06 NOTE — PROGRESS NOTES
[facility]        NAME: Marlene Lacy is a 80 y o  female  : 1932    MRN: [id number]  DATE: 2021  TIME: 1:32 PM    Assessment and Plan   Rash [R21]  1  Rash  hydrOXYzine HCL (ATARAX) 10 mg tablet    triamcinolone (KENALOG) 0 025 % cream       Patient cannot have oral steroids so will give topical   Atarax for itching  Appears to be some type of contact dermatitis  Patient Instructions     Triamcinolone as directed   Atarax as needed for itching  Follow up with PCP in 3-5 days  Proceed to  ER if symptoms worsen  Chief Complaint     Chief Complaint   Patient presents with    Rash     to legs x 7days  pt went into the woods and got rash on both legs  left worse than right   is improving from 7 days ago   iitchy   using anti itch cream for same         History of Present Illness        Patient is an 80-year-old female who presents today with complaints of rash to her bilateral lower legs for the past week  Rash is itchy and not painful  She denies any new medications, foods, soaps, detergents, lotions  She was outside near the woods prior to the rash erupting  Has been trying and OTC anti-itch cream with little relief of symptoms  Review of Systems   Review of Systems   Constitutional: Negative for fever  Respiratory: Negative for shortness of breath  Cardiovascular: Negative for chest pain  Skin: Positive for rash           Current Medications       Current Outpatient Medications:     ACETAMINOPHEN PO, Take by mouth, Disp: , Rfl:     AMLODIPINE BESYLATE PO, Take 5 mg by mouth , Disp: , Rfl:     ASPIRIN 81 PO, Take by mouth, Disp: , Rfl:     atorvastatin (LIPITOR) 10 mg tablet, Take 10 mg by mouth daily , Disp: , Rfl: 1    busPIRone (BUSPAR) 10 mg tablet, Take 10 mg by mouth as needed, Disp: , Rfl:     cholecalciferol (VITAMIN D3) 1,000 units tablet, Take 5,000 Units by mouth daily, Disp: , Rfl:     DELZICOL 400 MG, Take 800 mg by mouth 4 (four) times a day  , Disp: , Rfl: 3    diphenoxylate-atropine (LOMOTIL) 2 5-0 025 mg/5 mL liquid, Take 5 mL by mouth 2 (two) times a day as needed for diarrhea, Disp: , Rfl:     Klor-Con M10 10 MEQ tablet, Take 10 mEq by mouth daily, Disp: , Rfl:     LUMIGAN 0 01 % ophthalmic drops, ONE DROP IN BOTH EYES AT BEDTIME, Disp: , Rfl: 3    methocarbamol (ROBAXIN) 500 mg tablet, Take 1 tablet (500 mg total) by mouth every 6 (six) hours as needed for muscle spasms, Disp: 30 tablet, Rfl: 0    montelukast (SINGULAIR) 10 mg tablet, Take 10 mg by mouth daily at bedtime, Disp: , Rfl:     NITROGLYCERIN PO, Take by mouth, Disp: , Rfl:     omeprazole (PriLOSEC) 20 mg delayed release capsule, Take 20 mg by mouth daily, Disp: , Rfl:     Probiotic Product (PROBIOTIC PO), Take by mouth , Disp: , Rfl:     traMADol (ULTRAM) 50 mg tablet, Take 50 mg by mouth every 6 (six) hours as needed for moderate pain, Disp: , Rfl:     triamterene-hydrochlorothiazide (MAXZIDE-25) 37 5-25 mg per tablet, Take 1 tablet by mouth 2 (two) times a week Monday & Thursday, Disp: , Rfl:     hydrOXYzine HCL (ATARAX) 10 mg tablet, Take 1 tablet (10 mg total) by mouth every 6 (six) hours as needed for itching, Disp: 30 tablet, Rfl: 0    triamcinolone (KENALOG) 0 025 % cream, Apply topically 2 (two) times a day, Disp: 30 g, Rfl: 0    Current Allergies     Allergies as of 07/06/2021 - Reviewed 07/06/2021   Allergen Reaction Noted    Zithromax [azithromycin] Swelling 04/05/2016    Aspirin Other (See Comments)     Corn-containing products - food allergy Other (See Comments) 04/05/2016    Effient [prasugrel]  08/15/2018    Keflex [cephalexin] Other (See Comments) 04/05/2016    Lasix [furosemide] GI Intolerance 04/05/2016    Lipitor [atorvastatin]  08/15/2018    Other  04/05/2016    Prednisone Other (See Comments) 04/05/2016    Sulfamethizole Other (See Comments) 04/05/2016    Latex Rash             The following portions of the patient's history were reviewed and updated as appropriate: allergies, current medications, past family history, past medical history, past social history, past surgical history and problem list      Past Medical History:   Diagnosis Date    Arthritis     Bell's palsy 1940    Colitis     Hemorrhoids     History of transfusion     Hx of colonoscopy 1996, 1998, 2000, 2002, 2003, 2007, 2012       Past Surgical History:   Procedure Laterality Date   [address] [facility]    ruptured disk    BACK SURGERY  1972    bone fusion lumbar spine    BLADDER REPAIR  2002   [address]  2012    BREAST SURGERY  2011   [address]  2014    stent    CARPAL TUNNEL RELEASE  08/14/2014    CATARACT EXTRACTION Right 2015    CHOLECYSTECTOMY      COLONOSCOPY      EYE SURGERY Left    [address] [id number]    GANGLION CYST EXCISION  2004    left wrist    HERNIA REPAIR  2001   [facility] Porras HERNIA REPAIR  2003    MASTECTOMY  2012   [address] [facility]    PARTIAL HYSTERECTOMY  1964    NH EXC SKIN Mardella Bearded <0 5 CM TRUNK,ARM,LEG Left 4/27/2021    Procedure: Excision pyogenic granuloma left ring finger;  Surgeon: Genny Ng MD;  [facility];  Service: Orthopedics    NH EXCIS TENDON SHEATH LESION, HAND/FINGER Right 10/9/2018    Procedure: EXCISION GANGLION CYST RIGHT SMALL FINGER;  Surgeon: Genny Ng MD;  [facility];  Service: Orthopedics    NH INCISE FINGER TENDON SHEATH Right 4/5/2016    Procedure: INDEX TRIGGER FINGER AND RING TRIGGER FINGER RELEASE ;  Surgeon: Genny Ng MD;  [facility];  Service: Orthopedics    NH INCISE FINGER TENDON SHEATH Left 10/16/2018    Procedure: RELEASE TRIGGER FINGER - LEFT INDEX;  Surgeon: Genny Ng MD;  [facility];  Service: Orthopedics    SPLENECTOMY, PARTIAL  1995    TONSILLECTOMY  1939    WRIST SURGERY Right 1952       Family History   Problem Relation Age of Onset    Cancer Mother     Breast cancer Sister          Medications have been verified  Objective   /70   Pulse 80   Temp 97 9 °F (36 6 °C)   Resp 18   Wt 55 8 kg (123 lb)   BMI 22 50 kg/m²        Physical Exam     Physical Exam  Constitutional:       General: She is not in acute distress  Appearance: Normal appearance  She is not ill-appearing  Cardiovascular:      Rate and Rhythm: Normal rate and regular rhythm  Pulmonary:      Effort: Pulmonary effort is normal    Skin:     General: Skin is warm and dry  Findings: Rash present  Rash is macular and papular  Neurological:      Mental Status: She is alert

## 2021-07-13 DIAGNOSIS — K51.919 ULCERATIVE COLITIS WITH COMPLICATION, UNSPECIFIED LOCATION (HCC): Primary | ICD-10-CM

## 2021-07-13 NOTE — TELEPHONE ENCOUNTER
PT REQUESTING REFILL FOR MESALAMINE 400MG CAPS TO [facility], MESSAGE ALSO SENT TO CLERICAL POOL FOR PT TO SCHEDULE F/U OV WITH DR Mendes Gather

## 2021-07-14 RX ORDER — MESALAMINE 400 MG/1
800 CAPSULE, DELAYED RELEASE ORAL 2 TIMES DAILY
Qty: 360 CAPSULE | Refills: 1 | Status: SHIPPED | OUTPATIENT
Start: 2021-07-14 | End: 2022-03-31

## 2021-08-10 ENCOUNTER — HOSPITAL ENCOUNTER (OUTPATIENT)
Facility: HOSPITAL | Age: 86
Setting detail: OUTPATIENT SURGERY
Discharge: HOME | End: 2021-08-10
Attending: ORTHOPAEDIC SURGERY | Admitting: ORTHOPAEDIC SURGERY
Payer: MEDICARE

## 2021-08-10 VITALS
RESPIRATION RATE: 18 BRPM | OXYGEN SATURATION: 95 % | HEART RATE: 72 BPM | DIASTOLIC BLOOD PRESSURE: 71 MMHG | WEIGHT: 125 LBS | BODY MASS INDEX: 23 KG/M2 | SYSTOLIC BLOOD PRESSURE: 158 MMHG | TEMPERATURE: 98.2 F | HEIGHT: 62 IN

## 2021-08-10 DIAGNOSIS — G56.02 CARPAL TUNNEL SYNDROME ON LEFT: Primary | ICD-10-CM

## 2021-08-10 PROCEDURE — NC001 PR NO CHARGE: Performed by: ORTHOPAEDIC SURGERY

## 2021-08-10 PROCEDURE — 26055 INCISE FINGER TENDON SHEATH: CPT | Performed by: PHYSICIAN ASSISTANT

## 2021-08-10 PROCEDURE — 29848 WRIST ENDOSCOPY/SURGERY: CPT | Performed by: ORTHOPAEDIC SURGERY

## 2021-08-10 PROCEDURE — 26055 INCISE FINGER TENDON SHEATH: CPT | Performed by: ORTHOPAEDIC SURGERY

## 2021-08-10 PROCEDURE — 29848 WRIST ENDOSCOPY/SURGERY: CPT | Performed by: PHYSICIAN ASSISTANT

## 2021-08-10 RX ORDER — LIDOCAINE HYDROCHLORIDE AND EPINEPHRINE 10; 10 MG/ML; UG/ML
20 INJECTION, SOLUTION INFILTRATION; PERINEURAL ONCE
Status: DISCONTINUED | OUTPATIENT
Start: 2021-08-10 | End: 2021-08-10 | Stop reason: HOSPADM

## 2021-08-10 RX ORDER — HYDROCODONE BITARTRATE AND ACETAMINOPHEN 5; 325 MG/1; MG/1
1 TABLET ORAL EVERY 6 HOURS PRN
Qty: 5 TABLET | Refills: 0 | Status: SHIPPED | OUTPATIENT
Start: 2021-08-10 | End: 2021-08-15

## 2021-08-10 RX ORDER — SENNOSIDES 8.6 MG
650 CAPSULE ORAL EVERY 8 HOURS
Qty: 15 TABLET | Refills: 0 | Status: SHIPPED | OUTPATIENT
Start: 2021-08-10

## 2021-08-10 RX ORDER — MAGNESIUM HYDROXIDE 1200 MG/15ML
LIQUID ORAL AS NEEDED
Status: DISCONTINUED | OUTPATIENT
Start: 2021-08-10 | End: 2021-08-10 | Stop reason: HOSPADM

## 2021-08-10 RX ADMIN — SODIUM BICARBONATE: 84 INJECTION, SOLUTION INTRAVENOUS at 09:13

## 2021-08-10 NOTE — H&P
H&P Exam - Orthopedics   Marlene Lacy 80 y o  female MRN: [id number]  Unit/Bed#: P311 B PRE    Assessment/Plan   Assessment:  Left carpal tunnel, left long and ring finger trigger finger  Plan:  Left endoscopic carpal tunnel release, left long and ring finger trigger finger release under local    History of Present Illness   HPI:  Marlene Lacy is a 80 y o  female who presents with left carpal tunnel and left long and ring finger trigger fingers the recalcitrant conservative management for surgical release today      Historical Information  Review Of Systems:   · Skin: Normal  · Neuro: See HPI  · Musculoskeletal: See HPI  · 14 point review of systems negative except as stated above     Past Medical History:   Past Medical History:   Diagnosis Date    Arthritis     Bell's palsy 1940    Colitis     Hemorrhoids     History of transfusion     Hx of colonoscopy 1996, 1998, 2000, 2002, 2003, 2007, 2012       Past Surgical History:   Past Surgical History:   Procedure Laterality Date   [address] [facility]    ruptured disk    BACK SURGERY  1972    bone fusion lumbar spine    BLADDER REPAIR  2002   [place] [address]  2012    BREAST SURGERY  2011   [place] 43  2014    stent    CARPAL TUNNEL RELEASE  08/14/2014    CATARACT EXTRACTION Right 2015    CHOLECYSTECTOMY      COLONOSCOPY      EYE SURGERY Left    [address] [facility] [id number]    GANGLION CYST EXCISION  2004    left wrist    HERNIA REPAIR  2001   Vena Orly HERNIA REPAIR  2003    MASTECTOMY  2012   [facility]    PARTIAL HYSTERECTOMY  1964    [place] EXC SKIN BENIG <0 5 CM TRUNK,ARM,LEG Left 4/27/2021    Procedure: Excision pyogenic granuloma left ring finger;  Surgeon: Steven Kearney MD;  [facility];  Service: Orthopedics    [place] EXCIS TENDON SHEATH LESION, HAND/FINGER Right 10/9/2018    Procedure: EXCISION GANGLION CYST RIGHT SMALL FINGER;  Surgeon: Steven Kearney MD;  [facility];  Service: Orthopedics    [place] INCISE FINGER TENDON SHEATH Right 2016    Procedure: INDEX TRIGGER FINGER AND RING TRIGGER FINGER RELEASE ;  Surgeon: Ashley Perkins MD;  [facility];  Service: Orthopedics    [place] INCISE FINGER TENDON SHEATH Left 10/16/2018    Procedure: RELEASE TRIGGER FINGER - LEFT INDEX;  Surgeon: Ashley Perkins MD;  [facility];  Service: Orthopedics    SPLENECTOMY, PARTIAL  1995    TONSILLECTOMY  1939    WRIST SURGERY Right        Family History:  Family history reviewed and non-contributory  Family History   Problem Relation Age of Onset    Cancer Mother     Breast cancer Sister        Social History:  Social History     Socioeconomic History    Marital status: /Civil Union     Spouse name: None    Number of children: None    Years of education: None    Highest education level: None   Occupational History    None   Tobacco Use    Smoking status: Former Smoker     Quit date: 1950     Years since quittin 5    Smokeless tobacco: Never Used   Substance and Sexual Activity    Alcohol use: No    Drug use: No    Sexual activity: Never   Other Topics Concern    None   Social History Narrative    None     Social Determinants of Health     Financial Resource Strain:     Difficulty of Paying Living Expenses:    Food Insecurity:     Worried About Running Out of Food in the Last Year:     Ran Out of Food in the Last Year:    Transportation Needs:     Lack of Transportation (Medical):      Lack of Transportation (Non-Medical):    Physical Activity:     Days of Exercise per Week:     Minutes of Exercise per Session:    Stress:     Feeling of Stress :    Social Connections:     Frequency of Communication with Friends and Family:     Frequency of Social Gatherings with Friends and Family:     Attends [employer] Services:     Active Member of Clubs or Organizations:     Attends Club or Organization Meetings:     Marital Status:    Intimate Partner Violence:     Fear of Current or Ex-Partner:     Emotionally Abused:     Physically Abused:     Sexually Abused: Allergies: Allergies   Allergen Reactions    Zithromax [Azithromycin] Swelling     tongue    Aspirin Other (See Comments)     Bleeding  Tolerates baby asapirin    Corn-Containing Products - Food Allergy Other (See Comments)     headache    Effient [Prasugrel]     Keflex [Cephalexin] Other (See Comments)     unknow    Lasix [Furosemide] GI Intolerance    Lipitor [Atorvastatin]     Other      liptol and lipol cause GI upset  Adhesive tape  USE PAPER TAPE    Prednisone Other (See Comments)     "I go berzerk!"    Sulfamethizole Other (See Comments)     unkown    Latex Rash     Pt cannot remember           Labs:  0   Lab Value Date/Time    HCT 35 3 06/09/2021 0426    HCT 36 5 06/08/2021 1854    HCT 38 5 09/28/2020 1141    HGB 11 9 06/09/2021 0426    HGB 12 4 06/08/2021 1854    HGB 13 0 09/28/2020 1141    WBC 8 79 06/09/2021 0426    WBC 10 61 (H) 06/08/2021 1854    WBC 8 73 09/28/2020 1141    ESR 49 (H) 09/28/2020 1141    CRP 7 5 (H) 06/09/2021 0111       Meds:  No current facility-administered medications for this encounter  Blood Culture:   No results found for: BLOODCX    Wound Culture:   No results found for: WOUNDCULT    Ins and Outs:  No intake/output data recorded  Physical Exam  BP (!) 171/54   Pulse 81   Temp 97 5 °F (36 4 °C) (Temporal)   Resp 20   Ht 5' 2" (1 575 m)   Wt 56 7 kg (125 lb)   SpO2 96%   BMI 22 86 kg/m²   BP (!) 171/54   Pulse 81   Temp 97 5 °F (36 4 °C) (Temporal)   Resp 20   Ht 5' 2" (1 575 m)   Wt 56 7 kg (125 lb)   SpO2 96%   BMI 22 86 kg/m²   Gen: Alert and oriented to person, place, time  HEENT: EOMI, eyes clear, moist mucus membranes, hearing intact  Respiratory: Bilateral chest rise   No audible wheezing found  Cardiovascular: Regular Rate and Rhythm  Abdomen: soft nontender/nondistended  Ortho Exam:  Tenderness to palpation left carpal tunnel with positive Tinel's carpal tunnel compression test weakness abductor pollicis brevis  Patient with nodules crepitation localized over the long and ring finger  No extensor tendon subluxation  Full composite fist formation noted  Neuro Exam:  Ulnar and radial nerve intact      Lab Results: Reviewed  Imaging: Reviewed

## 2021-08-10 NOTE — OP NOTE
OPERATIVE REPORT  PATIENT NAME: Silviano Delgado  :  1932  MRN: [id number]  Pt [facility]    SURGERY DATE: 08/10/21    Surgeon(s) and Role:     * Roopa Barraza MD - Primary     * Teri Cox PA-C - Assisting    Pre-Op Diagnosis:  Carpal tunnel syndrome on left [G56 02]  Trigger middle finger of left hand [M65 332]  Trigger ring finger of left hand [M65 342]    Post-Op Diagnosis:   Carpal tunnel syndrome on left [G56 02]  Trigger middle finger of left hand [M65 332]  Trigger ring finger of left hand [M65 342]    Procedure(s) (LRB):  Left endoscopic carpal tunnel release (Left)  Left long and ring finger trigger finger release (Left)    Specimen(s):  * No orders in the log *    Estimated Blood Loss:   Minimal      Anesthesia Type:   Local    Operative Indications: The patient has a history of Carpal Tunnel Syndrome  left and Trigger Finger  left  long finger, ring finger that was recalcitrant to conservative management  The decision was made to bring the patient to the operating room for Endoscopic Carpal Tunnel Release  left and Trigger Finger Release  left  long finger, ring finger  Risks of the procedure were explained which include, but are not limited to bleeding; infection; damage to nerves, arteries,veins, tendons; scar; pain; need for reoperation; failure to give desired result; and risks of anaesthesia  All questions were answered to satisfaction and they were willing to proceed  Operative Findings:  Left carpal tunnel successfully released  Left long and ring finger trigger fingers successfully released    Complications:   None    Procedure and Technique:  After the patient, site, and procedure were identified, the patient was brought into the operating room in a supine position  Local anaesthesia was adminstered in the preoperative holding area  A tourniquet was not used    The  left upper extremity was then prepped and drapped in a normal, sterile, orthopedic fashion  After reconfirmation of the patient, site, and surgical procedure, which was agreed upon by the entire surgical team, attention was turned to the left wrist   The sites of the proximal and distal incisions were marked  The lionel of the proximal incision was placed horizontally at the midline of the wrist   The distal incision lionel was longitudinal extending distally from the point of intersection of the line between the long finger and ring finger and the line along the distal border of the fully abducted thumb  The proximal incision was performed  Subcutaneous tissues were dissected  Then the transverse volar antebrachial fascia was perforated with a scalpel  The edges of the skin incision where retracted and the forearm fascia was incised for approximately 1 5 cm proximally with care taken to identify and protect the median nerve  Retractors were used to inspect the transverse carpal ligament distally  A curved Medina dissector was used to glide under the transverse carpal ligament and superficial to the median nerve with confirmation via the washboard feeling  Then the curved Medina was pushed into the palm toward the distal incision site  When the location of the distal skin lionel was adequate, the distal incision was made  Then with retraction of the skin, further dissection and perforation of the palmar fascia was performed with the use of tenotomy scissors  The curved Medina was guided from proximal to distal out the distal incisions without any twisting to allow for dilation of the tract  The curved Medina was removed, and the cannula for the camera was inserted along the same tract, making sure to keep the alignment post on the cannula perpendicular to the plane of the hand without twisting  Then while keeping the wrist in extension, and holding the cannula of the camera in place, the wrist was placed on the hyperextension board    The scope was inserted distally, and a cotton-tip applicator was used proximally to clean the tract as well as the scope  A curved cutting knife was introduced from proximal to distal while keeping visualization with the use of the camera  Without twisting of the canula, the knife was used to cut the transverse carpal ligament completely, making sure there were no remnant fibers  Then after this was accomplished, the hand was removed from the extension block  Three maneuvers were used to confirm the full release of the transverse carpal ligament  First, the ease of twisting the trocar of the camera confirmed the release of the ligament  Second, the curved Medina was introduced to make sure there were no remnant fibers that could be felt palmarly  Third, the scope was introduced again to visualize that the whole ligament was released proximally to distally  Additional confirmation of full release included retraction and inspection in the distal and proximal incisions to make sure there were no remnant fibers distally or proximally respectively  After the patient, site, and procedure were once again identified, attention was turned to the left long finger  An incision was made over the flexor tendon sheath at the level of the A1 pulley  Dissection was carried out in-line with the flexor tendon sheath and the radial and ulnar digital artery and nerve were protected  The A1 pulley was identified at the base of the incision  Under direct visualization, the A1 pulley was divided along the midline in its entirety with care taken to avoid injury to the underlying tendon  The tendons were examined to ensure that no further catching, popping, clicking or locking occurred with motion of the finger  The above mentioned procedure was then replicated on the left ring finger  At the completion of the procedure, hemostasis was obtained with cautery and direct pressure  The wounds were copiously irrigated with sterile solution  The wounds were closed with Prolene  Sterile dressings were applied, including Xeroform, gauze, tweeners, webril, ACE  Please note, all sponge, needle, and instrument counts were correct prior to closure  Loupe magnification was utilized  The patient tolerated the procedure well       I was present for all critical portions of the procedure and A qualified resident physician was not available    Patient Disposition:  APU and hemodynamically stable    SIGNATURE: Yeni Currie MD  DATE: 08/10/21  TIME: 10:36 AM

## 2021-08-20 ENCOUNTER — OFFICE VISIT (OUTPATIENT)
Dept: OBGYN CLINIC | Facility: HOSPITAL | Age: 86
End: 2021-08-20

## 2021-08-20 VITALS
HEIGHT: 62 IN | BODY MASS INDEX: 23 KG/M2 | HEART RATE: 81 BPM | WEIGHT: 125 LBS | SYSTOLIC BLOOD PRESSURE: 160 MMHG | DIASTOLIC BLOOD PRESSURE: 78 MMHG

## 2021-08-20 DIAGNOSIS — G56.02 CARPAL TUNNEL SYNDROME OF LEFT WRIST: Primary | ICD-10-CM

## 2021-08-20 PROCEDURE — 99024 POSTOP FOLLOW-UP VISIT: CPT | Performed by: ORTHOPAEDIC SURGERY

## 2021-08-20 NOTE — PROGRESS NOTES
Assessment:   S/P Left endoscopic carpal tunnel release - Left and Left long and ring finger trigger finger release - Left on 8/10/2021    Plan:   Resume activities as tolerated    Follow Up:  PRN      CHIEF COMPLAINT:  Chief Complaint   Patient presents with    Left Hand - Post-op, Suture / Staple Removal     DOS-8/10/21  Left endoscopic carpal tunnel release  Left long and ring finger trigger finger release          SUBJECTIVE:  Gemma Lui is a 80 y o  female who presents for follow up after Left endoscopic carpal tunnel release - Left and Left long and ring finger trigger finger release - Left on 8/10/2021  Today patient has no complaints  She states that the numbness and tingling have gotten better, and just has some residual tingling in the fingertips  She denies any triggering in the fingers  She denies any pain  She denies any s/s of an infection  She denies any other acute complaints          PHYSICAL EXAMINATION:  Vital signs: /78   Pulse 81   Ht 5' 2" (1 575 m)   Wt 56 7 kg (125 lb)   BMI 22 86 kg/m²   General: well developed and well nourished, alert, oriented times 3 and appears comfortable  Psychiatric: Normal    MUSCULOSKELETAL EXAMINATION:  Incision: Clean, dry, intact  Range of Motion: full composite fist possible  Neurovascular status: Neuro intact, good cap refill  Activity Restrictions: No restrictions  Done today: Sutures out      STUDIES REVIEWED:  No Studies to review      PROCEDURES PERFORMED:  Procedures  No Procedures performed today    Scribe Attestation    I,:  Natacha Nichole PA-C am acting as a scribe while in the presence of the attending physician :       I,:  Kristian Goldstein MD personally performed the services described in this documentation    as scribed in my presence :           Scribe Attestation    I,:  Natacha Nichole PA-C am acting as a scribe while in the presence of the attending physician :       I,:  Kristian Goldstein MD personally performed the services described in this documentation    as scribed in my presence :

## 2021-09-20 ENCOUNTER — OFFICE VISIT (OUTPATIENT)
Dept: GASTROENTEROLOGY | Facility: CLINIC | Age: 86
End: 2021-09-20
Payer: MEDICARE

## 2021-09-20 VITALS
DIASTOLIC BLOOD PRESSURE: 73 MMHG | SYSTOLIC BLOOD PRESSURE: 168 MMHG | HEART RATE: 64 BPM | WEIGHT: 125 LBS | BODY MASS INDEX: 23 KG/M2 | HEIGHT: 62 IN

## 2021-09-20 DIAGNOSIS — K21.9 GASTROESOPHAGEAL REFLUX DISEASE WITHOUT ESOPHAGITIS: Primary | ICD-10-CM

## 2021-09-20 DIAGNOSIS — K51.919 ULCERATIVE COLITIS WITH COMPLICATION, UNSPECIFIED LOCATION (HCC): ICD-10-CM

## 2021-09-20 PROCEDURE — 99213 OFFICE O/P EST LOW 20 MIN: CPT | Performed by: INTERNAL MEDICINE

## 2021-09-20 RX ORDER — METOPROLOL SUCCINATE 50 MG/1
50 TABLET, EXTENDED RELEASE ORAL DAILY
COMMUNITY
Start: 2021-09-17

## 2021-09-20 RX ORDER — AMLODIPINE BESYLATE 5 MG/1
5 TABLET ORAL 2 TIMES DAILY
COMMUNITY
Start: 2021-09-07

## 2021-09-20 RX ORDER — ASPIRIN 81 MG/1
81 TABLET ORAL DAILY
COMMUNITY

## 2021-09-20 RX ORDER — TRIAMTERENE AND HYDROCHLOROTHIAZIDE 37.5; 25 MG/1; MG/1
1 CAPSULE ORAL 2 TIMES WEEKLY
COMMUNITY
Start: 2021-07-30

## 2021-09-20 RX ORDER — DIPHENOXYLATE HYDROCHLORIDE AND ATROPINE SULFATE 2.5; .025 MG/1; MG/1
TABLET ORAL
COMMUNITY
Start: 2021-08-31

## 2021-09-20 NOTE — PROGRESS NOTES
[facility] - Outpatient Follow-up Note  Marlene Lacy 80 y o  female MRN: [id number]  Encounter: [id number]          ASSESSMENT AND PLAN:      1  Gastroesophageal reflux disease without esophagitis   reflux is under control with omeprazole 20 mg a day    2  Ulcerative colitis with complication, unspecified location [facility])   controlled with 800 mg of mesalamine twice a day sometimes she misses this  Renal function is CBC were normal recently  She will otherwise follow-up in a year     ______________________________________________________________________    SUBJECTIVE:    Very pleasant 59-year-old lady longstanding ulcerative colitis very well controlled on 800 mg of mesalamine twice a day  Most recent renal function is CBC normal   She likewise has gastroesophageal reflux which is controlled on omeprazole 20 mg a day  She has no particular gastrointestinal complaints at this time  She does not want any more colonoscopies  REVIEW OF SYSTEMS IS OTHERWISE NEGATIVE        Historical Information   Past Medical History:   Diagnosis Date    Arthritis     Bell's palsy 1940    Colitis     Hemorrhoids     History of transfusion     Hx of colonoscopy 1996, 1998, 2000, 2002, 2003, 2007, 2012     Past Surgical History:   Procedure Laterality Date    BACK SURGERY  1971    ruptured disk    BACK SURGERY  1972    bone fusion lumbar spine    BLADDER REPAIR  2002    BLADDER REPAIR  2012    BREAST SURGERY  2011   Vena Orly CARDIAC SURGERY  2014    stent    CARPAL TUNNEL RELEASE  08/14/2014    CATARACT EXTRACTION Right 2015    CHOLECYSTECTOMY      COLONOSCOPY      EYE SURGERY Left     GALLBLADDER SURGERY  1995    GANGLION CYST EXCISION  2004    left wrist    HERNIA REPAIR  2001    HERNIA REPAIR  2003    MASTECTOMY  2012    OVARY SURGERY  1984    PARTIAL HYSTERECTOMY  1964    [place] EXC SKIN BENIG <0 5 CM TRUNK,ARM,LEG Left 4/27/2021    Procedure: Excision pyogenic granuloma left ring finger;  Surgeon: Sam Menezes MD;  [facility];  Service: Orthopedics    [place] EXCIS TENDON SHEATH LESION, HAND/FINGER Right 10/9/2018    Procedure: EXCISION GANGLION CYST RIGHT SMALL FINGER;  Surgeon: Sam Menezes MD;  [facility];  Service: Orthopedics    [place] INCISE FINGER TENDON SHEATH Right 2016    Procedure: INDEX TRIGGER FINGER AND RING TRIGGER FINGER RELEASE ;  Surgeon: Sam Menezes MD;  [facility];  Service: Orthopedics    [place] INCISE FINGER TENDON SHEATH Left 10/16/2018    Procedure: RELEASE TRIGGER FINGER - LEFT INDEX;  Surgeon: Sam Menezes MD;  [facility];  Service: Orthopedics    [place] INCISE FINGER TENDON SHEATH Left 8/10/2021    Procedure: Left long and ring finger trigger finger release;  Surgeon: Sam Menezes MD;  [facility];  Service: Orthopedics    [place] WRIST Jairo Rust LIG Left 8/10/2021    Procedure: Left endoscopic carpal tunnel release;  Surgeon: Sam Menezes MD;  [facility];  Service: Orthopedics    SPLENECTOMY, PARTIAL  1995 [place] [address]    WRIST SURGERY Right      Social History   Social History     Substance and Sexual Activity   Alcohol Use No     Social History     Substance and Sexual Activity   Drug Use No     Social History     Tobacco Use   Smoking Status Former Smoker    Quit date: 1950    Years since quittin 7   Smokeless Tobacco Never Used     Family History   Problem Relation Age of Onset    Cancer Mother     Breast cancer Sister        Meds/Allergies       Current Outpatient Medications:     acetaminophen (Tylenol 8 Hour) 650 mg CR tablet    amLODIPine (NORVASC) 5 mg tablet    aspirin (ECOTRIN LOW STRENGTH) 81 mg EC tablet    busPIRone (BUSPAR) 10 mg tablet    cholecalciferol (VITAMIN D3) 1,000 units tablet    diphenoxylate-atropine (LOMOTIL) 2 5-0 025 mg per tablet    LUMIGAN 0 01 % ophthalmic drops    mesalamine (DELZICOL) 400 mg   methocarbamol (ROBAXIN) 500 mg tablet    metoprolol succinate (TOPROL-XL) 50 mg 24 hr tablet    montelukast (SINGULAIR) 10 mg tablet    NITROGLYCERIN PO    omeprazole (PriLOSEC) 20 mg delayed release capsule    traMADol (ULTRAM) 50 mg tablet    triamterene-hydrochlorothiazide (DYAZIDE) 37 5-25 mg per capsule    AMLODIPINE BESYLATE PO    diphenoxylate-atropine (LOMOTIL) 2 5-0 025 mg/5 mL liquid    hydrOXYzine HCL (ATARAX) 10 mg tablet    Klor-Con M10 10 MEQ tablet    triamcinolone (KENALOG) 0 025 % cream    triamterene-hydrochlorothiazide (MAXZIDE-25) 37 5-25 mg per tablet    Allergies   Allergen Reactions    Zithromax [Azithromycin] Swelling     tongue    Aspirin Other (See Comments)     Bleeding  Tolerates baby asapirin    Corn-Containing Products - Food Allergy Other (See Comments)     headache    Effient [Prasugrel]     Keflex [Cephalexin] Other (See Comments)     unknow    Lasix [Furosemide] GI Intolerance    Lipitor [Atorvastatin]     Other      liptol and lipol cause GI upset  Adhesive tape  USE PAPER TAPE    Prednisone Other (See Comments)     "I go berzerk!"    Sulfamethizole Other (See Comments)     unkown    Latex Rash     Pt cannot remember           Objective     Blood pressure 168/73, pulse 64, height 5' 2" (1 575 m), weight 56 7 kg (125 lb)  Body mass index is 22 86 kg/m²  PHYSICAL EXAM:      General Appearance:   Alert, cooperative, no distress   HEENT:   Normocephalic, atraumatic, anicteric      Neck:  Supple, symmetrical, trachea midline   Lungs:   Clear to auscultation bilaterally; no rales, rhonchi or wheezing; respirations unlabored    Heart[de-identified]   Regular rate and rhythm; no murmur, rub, or gallop     Abdomen:   Soft, non-tender, non-distended; normal bowel sounds; no masses, no organomegaly    Genitalia:   Deferred    Rectal:   Deferred    Extremities:  No cyanosis, clubbing or edema    Pulses:  2+ and symmetric    Skin:  No jaundice, rashes, or lesions    Lymph nodes:  No palpable cervical lymphadenopathy        Lab Results:   No visits with results within 1 Day(s) from this visit     Latest known visit with results is:   Admission on 06/08/2021, Discharged on 06/09/2021   Component Date Value    Sodium 06/08/2021 139     Potassium 06/08/2021 3 6     Chloride 06/08/2021 102     CO2 06/08/2021 26     ANION GAP 06/08/2021 11     BUN 06/08/2021 11     Creatinine 06/08/2021 0 62     Glucose 06/08/2021 111     Calcium 06/08/2021 9 9     eGFR 06/08/2021 81     WBC 06/08/2021 10 61*    RBC 06/08/2021 3 87     Hemoglobin 06/08/2021 12 4     Hematocrit 06/08/2021 36 5     MCV 06/08/2021 94     MCH 06/08/2021 32 0     MCHC 06/08/2021 34 0     RDW 06/08/2021 14 3     MPV 06/08/2021 10 1     Platelets 90/85/3625 487*    nRBC 06/08/2021 0     Neutrophils Relative 06/08/2021 58     Immat GRANS % 06/08/2021 1     Lymphocytes Relative 06/08/2021 34     Monocytes Relative 06/08/2021 7     Eosinophils Relative 06/08/2021 0     Basophils Relative 06/08/2021 0     Neutrophils Absolute 06/08/2021 6 16     Immature Grans Absolute 06/08/2021 0 06     Lymphocytes Absolute 06/08/2021 3 58     Monocytes Absolute 06/08/2021 0 74     Eosinophils Absolute 06/08/2021 0 04     Basophils Absolute 06/08/2021 0 03     CRP 06/09/2021 7 5*    Color, UA 06/09/2021 Yellow     Clarity, UA 06/09/2021 Clear     Specific Gravity, UA 06/09/2021 1 010     pH, UA 06/09/2021 6 0     Leukocytes, UA 06/09/2021 Trace*    Nitrite, UA 06/09/2021 Positive*    Protein, UA 06/09/2021 Negative     Glucose, UA 06/09/2021 Negative     Ketones, UA 06/09/2021 Negative     Urobilinogen, UA 06/09/2021 0 2     Bilirubin, UA 06/09/2021 Negative     Blood, UA 06/09/2021 Negative     Platelets 68/45/6278 443*    MPV 06/09/2021 10 0     RBC, UA 06/09/2021 None Seen     WBC, UA 06/09/2021 1-2     Epithelial Cells 06/09/2021 None Seen     Bacteria, UA 06/09/2021 Innumerable*    WBC 06/09/2021 8 79     RBC 06/09/2021 3 74*    Hemoglobin 06/09/2021 11 9     Hematocrit 06/09/2021 35 3     MCV 06/09/2021 94     MCH 06/09/2021 31 8     MCHC 06/09/2021 33 7     RDW 06/09/2021 14 2     Platelets 88/38/1419 449*    MPV 06/09/2021 10 0     Sodium 06/09/2021 145     Potassium 06/09/2021 3 0*    Chloride 06/09/2021 107     CO2 06/09/2021 28     ANION GAP 06/09/2021 10     BUN 06/09/2021 10     Creatinine 06/09/2021 0 53*    Glucose 06/09/2021 100     Calcium 06/09/2021 9 4     eGFR 06/09/2021 85     Sodium 06/09/2021 142     Potassium 06/09/2021 3 1*    Chloride 06/09/2021 106     CO2 06/09/2021 25     ANION GAP 06/09/2021 11     BUN 06/09/2021 12     Creatinine 06/09/2021 0 65     Glucose 06/09/2021 113     Calcium 06/09/2021 9 0     eGFR 06/09/2021 80          Radiology Results:   No results found

## 2021-11-03 ENCOUNTER — HOSPITAL ENCOUNTER (EMERGENCY)
Facility: HOSPITAL | Age: 86
Discharge: HOME | End: 2021-11-03
Attending: EMERGENCY MEDICINE | Admitting: EMERGENCY MEDICINE
Payer: MEDICARE

## 2021-11-03 ENCOUNTER — APPOINTMENT (EMERGENCY)
Dept: RADIOLOGY | Facility: HOSPITAL | Age: 86
End: 2021-11-03
Payer: MEDICARE

## 2021-11-03 VITALS
BODY MASS INDEX: 23.05 KG/M2 | SYSTOLIC BLOOD PRESSURE: 188 MMHG | WEIGHT: 126 LBS | DIASTOLIC BLOOD PRESSURE: 86 MMHG | HEART RATE: 74 BPM | RESPIRATION RATE: 20 BRPM | OXYGEN SATURATION: 97 % | TEMPERATURE: 97.6 F

## 2021-11-03 DIAGNOSIS — I10 UNCONTROLLED HYPERTENSION: Primary | ICD-10-CM

## 2021-11-03 DIAGNOSIS — N39.0 UTI (URINARY TRACT INFECTION): ICD-10-CM

## 2021-11-03 LAB
ALBUMIN SERPL BCP-MCNC: 4.1 G/DL (ref 3.4–4.8)
ALP SERPL-CCNC: 83 U/L (ref 35–140)
ALT SERPL W P-5'-P-CCNC: 13 U/L (ref 5–54)
ANION GAP SERPL CALCULATED.3IONS-SCNC: 10 MMOL/L (ref 4–13)
AST SERPL W P-5'-P-CCNC: 16 U/L (ref 15–41)
ATRIAL RATE: 79 BPM
BACTERIA UR QL AUTO: NORMAL /HPF
BASOPHILS # BLD AUTO: 0.01 THOUSANDS/ΜL (ref 0–0.1)
BASOPHILS NFR BLD AUTO: 0 % (ref 0–1)
BILIRUB SERPL-MCNC: 0.56 MG/DL (ref 0.3–1.2)
BILIRUB UR QL STRIP: NEGATIVE
BUN SERPL-MCNC: 17 MG/DL (ref 6–20)
CALCIUM SERPL-MCNC: 9.6 MG/DL (ref 8.4–10.2)
CHLORIDE SERPL-SCNC: 98 MMOL/L (ref 96–108)
CLARITY UR: CLEAR
CO2 SERPL-SCNC: 29 MMOL/L (ref 22–33)
COLOR UR: ABNORMAL
CREAT SERPL-MCNC: 0.61 MG/DL (ref 0.4–1.1)
EOSINOPHIL # BLD AUTO: 0.06 THOUSAND/ΜL (ref 0–0.61)
EOSINOPHIL NFR BLD AUTO: 1 % (ref 0–6)
ERYTHROCYTE [DISTWIDTH] IN BLOOD BY AUTOMATED COUNT: 14 % (ref 11.6–15.1)
GFR SERPL CREATININE-BSD FRML MDRD: 81 ML/MIN/1.73SQ M
GLUCOSE SERPL-MCNC: 111 MG/DL (ref 65–140)
GLUCOSE UR STRIP-MCNC: NEGATIVE MG/DL
HCT VFR BLD AUTO: 39.6 % (ref 34.8–46.1)
HGB BLD-MCNC: 13.6 G/DL (ref 11.5–15.4)
HGB UR QL STRIP.AUTO: NEGATIVE
IMM GRANULOCYTES # BLD AUTO: 0.02 THOUSAND/UL (ref 0–0.2)
IMM GRANULOCYTES NFR BLD AUTO: 0 % (ref 0–2)
KETONES UR STRIP-MCNC: NEGATIVE MG/DL
LEUKOCYTE ESTERASE UR QL STRIP: ABNORMAL
LYMPHOCYTES # BLD AUTO: 3.18 THOUSANDS/ΜL (ref 0.6–4.47)
LYMPHOCYTES NFR BLD AUTO: 33 % (ref 14–44)
MCH RBC QN AUTO: 31.8 PG (ref 26.8–34.3)
MCHC RBC AUTO-ENTMCNC: 34.3 G/DL (ref 31.4–37.4)
MCV RBC AUTO: 93 FL (ref 82–98)
MONOCYTES # BLD AUTO: 0.87 THOUSAND/ΜL (ref 0.17–1.22)
MONOCYTES NFR BLD AUTO: 9 % (ref 4–12)
NEUTROPHILS # BLD AUTO: 5.55 THOUSANDS/ΜL (ref 1.85–7.62)
NEUTS SEG NFR BLD AUTO: 57 % (ref 43–75)
NITRITE UR QL STRIP: POSITIVE
NON-SQ EPI CELLS URNS QL MICRO: NORMAL /HPF
P AXIS: 57 DEGREES
PH UR STRIP.AUTO: 6.5 [PH]
PLATELET # BLD AUTO: 444 THOUSANDS/UL (ref 149–390)
PMV BLD AUTO: 10.1 FL (ref 8.9–12.7)
POTASSIUM SERPL-SCNC: 3.2 MMOL/L (ref 3.5–5)
PR INTERVAL: 175 MS
PROT SERPL-MCNC: 7.5 G/DL (ref 6.4–8.3)
PROT UR STRIP-MCNC: ABNORMAL MG/DL
QRS AXIS: 83 DEGREES
QRSD INTERVAL: 87 MS
QT INTERVAL: 391 MS
QTC INTERVAL: 449 MS
RBC # BLD AUTO: 4.28 MILLION/UL (ref 3.81–5.12)
RBC #/AREA URNS AUTO: NORMAL /HPF
SODIUM SERPL-SCNC: 137 MMOL/L (ref 133–145)
SP GR UR STRIP.AUTO: 1.01 (ref 1–1.03)
T WAVE AXIS: 68 DEGREES
TROPONIN I SERPL-MCNC: <0.03 NG/ML (ref 0–0.07)
UROBILINOGEN UR QL STRIP.AUTO: 0.2 E.U./DL
VENTRICULAR RATE: 79 BPM
WBC # BLD AUTO: 9.69 THOUSAND/UL (ref 4.31–10.16)
WBC #/AREA URNS AUTO: NORMAL /HPF

## 2021-11-03 PROCEDURE — 93010 ELECTROCARDIOGRAM REPORT: CPT | Performed by: INTERNAL MEDICINE

## 2021-11-03 PROCEDURE — 93005 ELECTROCARDIOGRAM TRACING: CPT

## 2021-11-03 PROCEDURE — 81001 URINALYSIS AUTO W/SCOPE: CPT | Performed by: EMERGENCY MEDICINE

## 2021-11-03 PROCEDURE — 99285 EMERGENCY DEPT VISIT HI MDM: CPT | Performed by: EMERGENCY MEDICINE

## 2021-11-03 PROCEDURE — 71045 X-RAY EXAM CHEST 1 VIEW: CPT

## 2021-11-03 PROCEDURE — 84484 ASSAY OF TROPONIN QUANT: CPT | Performed by: EMERGENCY MEDICINE

## 2021-11-03 PROCEDURE — 85025 COMPLETE CBC W/AUTO DIFF WBC: CPT | Performed by: EMERGENCY MEDICINE

## 2021-11-03 PROCEDURE — 36415 COLL VENOUS BLD VENIPUNCTURE: CPT | Performed by: EMERGENCY MEDICINE

## 2021-11-03 PROCEDURE — 99284 EMERGENCY DEPT VISIT MOD MDM: CPT

## 2021-11-03 PROCEDURE — 80053 COMPREHEN METABOLIC PANEL: CPT | Performed by: EMERGENCY MEDICINE

## 2021-11-03 RX ORDER — HYDROCHLOROTHIAZIDE 12.5 MG/1
25 TABLET ORAL ONCE
Status: COMPLETED | OUTPATIENT
Start: 2021-11-03 | End: 2021-11-03

## 2021-11-03 RX ORDER — HYDROCHLOROTHIAZIDE 12.5 MG/1
25 TABLET ORAL DAILY
Status: DISCONTINUED | OUTPATIENT
Start: 2021-11-04 | End: 2021-11-03

## 2021-11-03 RX ORDER — NITROFURANTOIN 25; 75 MG/1; MG/1
100 CAPSULE ORAL 2 TIMES DAILY
Qty: 10 CAPSULE | Refills: 0 | Status: SHIPPED | OUTPATIENT
Start: 2021-11-03

## 2021-11-03 RX ORDER — NITROFURANTOIN 25; 75 MG/1; MG/1
100 CAPSULE ORAL ONCE
Status: COMPLETED | OUTPATIENT
Start: 2021-11-03 | End: 2021-11-03

## 2021-11-03 RX ORDER — MINERAL OIL, PETROLATUM 425; 573 MG/G; MG/G
1 OINTMENT OPHTHALMIC
COMMUNITY

## 2021-11-03 RX ORDER — POTASSIUM CHLORIDE 20MEQ/15ML
40 LIQUID (ML) ORAL ONCE
Status: COMPLETED | OUTPATIENT
Start: 2021-11-03 | End: 2021-11-03

## 2021-11-03 RX ADMIN — HYDROCHLOROTHIAZIDE 25 MG: 12.5 TABLET ORAL at 17:45

## 2021-11-03 RX ADMIN — NITROFURANTOIN (MONOHYDRATE/MACROCRYSTALS) 100 MG: 75; 25 CAPSULE ORAL at 17:45

## 2021-11-03 RX ADMIN — POTASSIUM CHLORIDE 40 MEQ: 20 SOLUTION ORAL at 16:44

## 2021-11-09 ENCOUNTER — OFFICE VISIT (OUTPATIENT)
Dept: UROLOGY | Facility: CLINIC | Age: 86
End: 2021-11-09
Payer: MEDICARE

## 2021-11-09 VITALS — BODY MASS INDEX: 23 KG/M2 | WEIGHT: 125 LBS | HEIGHT: 62 IN

## 2021-11-09 DIAGNOSIS — R32 URINARY INCONTINENCE, UNSPECIFIED TYPE: Primary | ICD-10-CM

## 2021-11-09 DIAGNOSIS — N39.0 RECURRENT UTI: ICD-10-CM

## 2021-11-09 LAB
POST-VOID RESIDUAL VOLUME, ML POC: 18 ML
SL AMB  POCT GLUCOSE, UA: ABNORMAL
SL AMB LEUKOCYTE ESTERASE,UA: POSITIVE
SL AMB POCT BILIRUBIN,UA: ABNORMAL
SL AMB POCT BLOOD,UA: ABNORMAL
SL AMB POCT CLARITY,UA: CLEAR
SL AMB POCT COLOR,UA: YELLOW
SL AMB POCT KETONES,UA: ABNORMAL
SL AMB POCT NITRITE,UA: ABNORMAL
SL AMB POCT PH,UA: 6.5
SL AMB POCT SPECIFIC GRAVITY,UA: 1.01
SL AMB POCT URINE PROTEIN: + 100
SL AMB POCT UROBILINOGEN: 0.2

## 2021-11-09 PROCEDURE — 81002 URINALYSIS NONAUTO W/O SCOPE: CPT | Performed by: UROLOGY

## 2021-11-09 PROCEDURE — 99214 OFFICE O/P EST MOD 30 MIN: CPT | Performed by: UROLOGY

## 2021-11-09 PROCEDURE — 51798 US URINE CAPACITY MEASURE: CPT | Performed by: UROLOGY

## 2021-11-29 ENCOUNTER — HOSPITAL ENCOUNTER (EMERGENCY)
Facility: HOSPITAL | Age: 86
Discharge: HOME | End: 2021-11-30
Attending: EMERGENCY MEDICINE
Payer: MEDICARE

## 2021-11-29 ENCOUNTER — APPOINTMENT (EMERGENCY)
Dept: RADIOLOGY | Facility: HOSPITAL | Age: 86
End: 2021-11-29
Payer: MEDICARE

## 2021-11-29 ENCOUNTER — NURSE TRIAGE (OUTPATIENT)
Dept: OTHER | Facility: OTHER | Age: 86
End: 2021-11-29

## 2021-11-29 DIAGNOSIS — R51.9 HEADACHE: ICD-10-CM

## 2021-11-29 DIAGNOSIS — I10 UNCONTROLLED HYPERTENSION: Primary | ICD-10-CM

## 2021-11-29 LAB
BASOPHILS # BLD AUTO: 0.02 THOUSANDS/ΜL (ref 0–0.1)
BASOPHILS NFR BLD AUTO: 0 % (ref 0–1)
EOSINOPHIL # BLD AUTO: 0.11 THOUSAND/ΜL (ref 0–0.61)
EOSINOPHIL NFR BLD AUTO: 1 % (ref 0–6)
ERYTHROCYTE [DISTWIDTH] IN BLOOD BY AUTOMATED COUNT: 14.7 % (ref 11.6–15.1)
HCT VFR BLD AUTO: 38.8 % (ref 34.8–46.1)
HGB BLD-MCNC: 13.2 G/DL (ref 11.5–15.4)
IMM GRANULOCYTES # BLD AUTO: 0.02 THOUSAND/UL (ref 0–0.2)
IMM GRANULOCYTES NFR BLD AUTO: 0 % (ref 0–2)
LYMPHOCYTES # BLD AUTO: 3.26 THOUSANDS/ΜL (ref 0.6–4.47)
LYMPHOCYTES NFR BLD AUTO: 28 % (ref 14–44)
MCH RBC QN AUTO: 32 PG (ref 26.8–34.3)
MCHC RBC AUTO-ENTMCNC: 34 G/DL (ref 31.4–37.4)
MCV RBC AUTO: 94 FL (ref 82–98)
MONOCYTES # BLD AUTO: 0.86 THOUSAND/ΜL (ref 0.17–1.22)
MONOCYTES NFR BLD AUTO: 8 % (ref 4–12)
NEUTROPHILS # BLD AUTO: 7.25 THOUSANDS/ΜL (ref 1.85–7.62)
NEUTS SEG NFR BLD AUTO: 63 % (ref 43–75)
PLATELET # BLD AUTO: 399 THOUSANDS/UL (ref 149–390)
PMV BLD AUTO: 10.9 FL (ref 8.9–12.7)
RBC # BLD AUTO: 4.13 MILLION/UL (ref 3.81–5.12)
WBC # BLD AUTO: 11.52 THOUSAND/UL (ref 4.31–10.16)

## 2021-11-29 PROCEDURE — 84484 ASSAY OF TROPONIN QUANT: CPT | Performed by: STUDENT IN AN ORGANIZED HEALTH CARE EDUCATION/TRAINING PROGRAM

## 2021-11-29 PROCEDURE — 99284 EMERGENCY DEPT VISIT MOD MDM: CPT

## 2021-11-29 PROCEDURE — 83880 ASSAY OF NATRIURETIC PEPTIDE: CPT | Performed by: STUDENT IN AN ORGANIZED HEALTH CARE EDUCATION/TRAINING PROGRAM

## 2021-11-29 PROCEDURE — 93005 ELECTROCARDIOGRAM TRACING: CPT

## 2021-11-29 PROCEDURE — 99285 EMERGENCY DEPT VISIT HI MDM: CPT | Performed by: STUDENT IN AN ORGANIZED HEALTH CARE EDUCATION/TRAINING PROGRAM

## 2021-11-29 PROCEDURE — 85025 COMPLETE CBC W/AUTO DIFF WBC: CPT | Performed by: STUDENT IN AN ORGANIZED HEALTH CARE EDUCATION/TRAINING PROGRAM

## 2021-11-29 PROCEDURE — 71045 X-RAY EXAM CHEST 1 VIEW: CPT

## 2021-11-29 PROCEDURE — 36415 COLL VENOUS BLD VENIPUNCTURE: CPT | Performed by: STUDENT IN AN ORGANIZED HEALTH CARE EDUCATION/TRAINING PROGRAM

## 2021-11-29 PROCEDURE — 80053 COMPREHEN METABOLIC PANEL: CPT | Performed by: STUDENT IN AN ORGANIZED HEALTH CARE EDUCATION/TRAINING PROGRAM

## 2021-11-29 PROCEDURE — 83735 ASSAY OF MAGNESIUM: CPT | Performed by: STUDENT IN AN ORGANIZED HEALTH CARE EDUCATION/TRAINING PROGRAM

## 2021-11-29 RX ORDER — AMLODIPINE BESYLATE 5 MG/1
10 TABLET ORAL ONCE
Status: COMPLETED | OUTPATIENT
Start: 2021-11-29 | End: 2021-11-29

## 2021-11-29 RX ADMIN — AMLODIPINE BESYLATE 10 MG: 5 TABLET ORAL at 23:31

## 2021-11-30 ENCOUNTER — APPOINTMENT (EMERGENCY)
Dept: CT IMAGING | Facility: HOSPITAL | Age: 86
End: 2021-11-30
Payer: MEDICARE

## 2021-11-30 VITALS
SYSTOLIC BLOOD PRESSURE: 149 MMHG | BODY MASS INDEX: 22.86 KG/M2 | OXYGEN SATURATION: 94 % | HEIGHT: 62 IN | DIASTOLIC BLOOD PRESSURE: 85 MMHG | HEART RATE: 86 BPM | TEMPERATURE: 98.9 F | RESPIRATION RATE: 18 BRPM

## 2021-11-30 LAB
2HR DELTA HS TROPONIN: 3 NG/L
ALBUMIN SERPL BCP-MCNC: 3.9 G/DL (ref 3.4–4.8)
ALP SERPL-CCNC: 70.1 U/L (ref 35–140)
ALT SERPL W P-5'-P-CCNC: 11 U/L (ref 5–54)
ANION GAP SERPL CALCULATED.3IONS-SCNC: 11 MMOL/L (ref 4–13)
AST SERPL W P-5'-P-CCNC: 16 U/L (ref 15–41)
ATRIAL RATE: 90 BPM
BACTERIA UR QL AUTO: ABNORMAL /HPF
BILIRUB SERPL-MCNC: 0.41 MG/DL (ref 0.3–1.2)
BILIRUB UR QL STRIP: NEGATIVE
BNP SERPL-MCNC: 161.6 PG/ML (ref 1–100)
BUN SERPL-MCNC: 16 MG/DL (ref 6–20)
CALCIUM SERPL-MCNC: 9 MG/DL (ref 8.4–10.2)
CARDIAC TROPONIN I PNL SERPL HS: 12 NG/L
CARDIAC TROPONIN I PNL SERPL HS: 15 NG/L
CHLORIDE SERPL-SCNC: 104 MMOL/L (ref 96–108)
CLARITY UR: CLEAR
CO2 SERPL-SCNC: 26 MMOL/L (ref 22–33)
COLOR UR: YELLOW
CREAT SERPL-MCNC: 0.59 MG/DL (ref 0.4–1.1)
GFR SERPL CREATININE-BSD FRML MDRD: 82 ML/MIN/1.73SQ M
GLUCOSE SERPL-MCNC: 108 MG/DL (ref 65–140)
GLUCOSE UR STRIP-MCNC: NEGATIVE MG/DL
HGB UR QL STRIP.AUTO: ABNORMAL
KETONES UR STRIP-MCNC: NEGATIVE MG/DL
LEUKOCYTE ESTERASE UR QL STRIP: NEGATIVE
MAGNESIUM SERPL-MCNC: 1.6 MG/DL (ref 1.6–2.6)
NITRITE UR QL STRIP: NEGATIVE
NON-SQ EPI CELLS URNS QL MICRO: ABNORMAL /HPF
P AXIS: 45 DEGREES
PH UR STRIP.AUTO: 6.5 [PH]
POTASSIUM SERPL-SCNC: 3.1 MMOL/L (ref 3.5–5)
PR INTERVAL: 160 MS
PROT SERPL-MCNC: 7 G/DL (ref 6.4–8.3)
PROT UR STRIP-MCNC: ABNORMAL MG/DL
QRS AXIS: 47 DEGREES
QRSD INTERVAL: 83 MS
QT INTERVAL: 351 MS
QTC INTERVAL: 428 MS
RBC #/AREA URNS AUTO: ABNORMAL /HPF
SODIUM SERPL-SCNC: 141 MMOL/L (ref 133–145)
SP GR UR STRIP.AUTO: 1.01 (ref 1–1.03)
T WAVE AXIS: 44 DEGREES
UROBILINOGEN UR QL STRIP.AUTO: 0.2 E.U./DL
VENTRICULAR RATE: 89 BPM
WBC #/AREA URNS AUTO: ABNORMAL /HPF

## 2021-11-30 PROCEDURE — 84484 ASSAY OF TROPONIN QUANT: CPT | Performed by: STUDENT IN AN ORGANIZED HEALTH CARE EDUCATION/TRAINING PROGRAM

## 2021-11-30 PROCEDURE — 81001 URINALYSIS AUTO W/SCOPE: CPT | Performed by: STUDENT IN AN ORGANIZED HEALTH CARE EDUCATION/TRAINING PROGRAM

## 2021-11-30 PROCEDURE — 93010 ELECTROCARDIOGRAM REPORT: CPT | Performed by: INTERNAL MEDICINE

## 2021-11-30 PROCEDURE — 70498 CT ANGIOGRAPHY NECK: CPT

## 2021-11-30 PROCEDURE — 70496 CT ANGIOGRAPHY HEAD: CPT

## 2021-11-30 PROCEDURE — 36415 COLL VENOUS BLD VENIPUNCTURE: CPT | Performed by: STUDENT IN AN ORGANIZED HEALTH CARE EDUCATION/TRAINING PROGRAM

## 2021-11-30 RX ORDER — POTASSIUM CHLORIDE 750 MG/1
10 CAPSULE, EXTENDED RELEASE ORAL 2 TIMES DAILY
COMMUNITY

## 2021-11-30 RX ORDER — POTASSIUM CHLORIDE 20 MEQ/1
40 TABLET, EXTENDED RELEASE ORAL ONCE
Status: COMPLETED | OUTPATIENT
Start: 2021-11-30 | End: 2021-11-30

## 2021-11-30 RX ADMIN — IOHEXOL 85 ML: 350 INJECTION, SOLUTION INTRAVENOUS at 05:12

## 2021-11-30 RX ADMIN — POTASSIUM CHLORIDE 40 MEQ: 1500 TABLET, EXTENDED RELEASE ORAL at 00:15

## 2021-12-28 ENCOUNTER — APPOINTMENT (OUTPATIENT)
Dept: LAB | Facility: MEDICAL CENTER | Age: 86
End: 2021-12-28
Payer: MEDICARE

## 2022-03-30 DIAGNOSIS — K51.919 ULCERATIVE COLITIS WITH COMPLICATION, UNSPECIFIED LOCATION (HCC): ICD-10-CM

## 2022-03-31 RX ORDER — MESALAMINE 400 MG/1
800 CAPSULE, DELAYED RELEASE ORAL 3 TIMES DAILY
Qty: 90 CAPSULE | Refills: 2 | Status: SHIPPED | OUTPATIENT
Start: 2022-03-31 | End: 2022-07-07 | Stop reason: SDUPTHER

## 2022-04-19 ENCOUNTER — OFFICE VISIT (OUTPATIENT)
Dept: UROLOGY | Facility: CLINIC | Age: 87
End: 2022-04-19
Payer: MEDICARE

## 2022-04-19 VITALS
DIASTOLIC BLOOD PRESSURE: 80 MMHG | BODY MASS INDEX: 23.26 KG/M2 | WEIGHT: 126.4 LBS | OXYGEN SATURATION: 96 % | HEART RATE: 76 BPM | HEIGHT: 62 IN | RESPIRATION RATE: 18 BRPM | SYSTOLIC BLOOD PRESSURE: 140 MMHG

## 2022-04-19 DIAGNOSIS — N39.0 URINARY TRACT INFECTION WITHOUT HEMATURIA, SITE UNSPECIFIED: Primary | ICD-10-CM

## 2022-04-19 LAB
SL AMB  POCT GLUCOSE, UA: NORMAL
SL AMB LEUKOCYTE ESTERASE,UA: NORMAL
SL AMB POCT BILIRUBIN,UA: NORMAL
SL AMB POCT BLOOD,UA: NORMAL
SL AMB POCT CLARITY,UA: CLEAR
SL AMB POCT COLOR,UA: YELLOW
SL AMB POCT KETONES,UA: NORMAL
SL AMB POCT NITRITE,UA: NORMAL
SL AMB POCT PH,UA: 5
SL AMB POCT SPECIFIC GRAVITY,UA: 1005
SL AMB POCT URINE PROTEIN: NORMAL
SL AMB POCT UROBILINOGEN: 0.2

## 2022-04-19 PROCEDURE — 81002 URINALYSIS NONAUTO W/O SCOPE: CPT | Performed by: PHYSICIAN ASSISTANT

## 2022-04-19 PROCEDURE — 99213 OFFICE O/P EST LOW 20 MIN: CPT | Performed by: PHYSICIAN ASSISTANT

## 2022-04-19 NOTE — PROGRESS NOTES
UROLOGY PROGRESS NOTE   Patient Identifiers: Jaxson Chester (MRN [id number])  Date of Service: 4/19/2022    Subjective:    80-year-old female history urinary frequency and urinary tract infection  She was last seen in November  She was started on Myrbetriq 25 mg  She seems to be doing better she is ambulatory with a walker  She lives at home with her son  There is no upper tract imaging        Reason for visit:  Recurrent UTI follow-up     Objective:     VITALS:    Vitals:    04/19/22 1506   BP: 140/80   Pulse: 76   Resp: 18   SpO2: 96%           LABS:  Lab Results   Component Value Date    HGB 13 2 11/29/2021    HCT 38 8 11/29/2021    WBC 11 52 (H) 11/29/2021     (H) 11/29/2021   ]    Lab Results   Component Value Date    K 3 5 12/28/2021     12/28/2021    CO2 28 12/28/2021    BUN 14 12/28/2021    CREATININE 0 66 12/28/2021    CALCIUM 9 6 12/28/2021   ]        INPATIENT MEDS:    Current Outpatient Medications:     acetaminophen (Tylenol 8 Hour) 650 mg CR tablet, Take 1 tablet (650 mg total) by mouth every 8 (eight) hours, Disp: 15 tablet, Rfl: 0    aspirin (ECOTRIN LOW STRENGTH) 81 mg EC tablet, Take 81 mg by mouth daily, Disp: , Rfl:     busPIRone (BUSPAR) 10 mg tablet, Take 10 mg by mouth as needed, Disp: , Rfl:     cholecalciferol (VITAMIN D3) 1,000 units tablet, Take 5,000 Units by mouth daily, Disp: , Rfl:     diphenoxylate-atropine (LOMOTIL) 2 5-0 025 mg per tablet, TAKE 1 TABLET BY ORAL ROUTE 4 TIMES EVERY DAY AS NEEDED, Disp: , Rfl:     LUMIGAN 0 01 % ophthalmic drops, ONE DROP IN BOTH EYES AT BEDTIME, Disp: , Rfl: 3    mesalamine (DELZICOL) 400 mg, Take 2 capsules (800 mg total) by mouth 3 (three) times a day 400 mg in the am, 800 mg afternoon, 400 mg evening, Disp: 90 capsule, Rfl: 2    methocarbamol (ROBAXIN) 500 mg tablet, Take 1 tablet (500 mg total) by mouth every 6 (six) hours as needed for muscle spasms, Disp: 30 tablet, Rfl: 0    metoprolol succinate (TOPROL-XL) 50 mg 24 hr tablet, Take 50 mg by mouth daily, Disp: , Rfl:     Mirabegron ER 25 MG TB24, Take 25 mg by mouth daily, Disp: 60 tablet, Rfl: 6    montelukast (SINGULAIR) 10 mg tablet, Take 10 mg by mouth daily at bedtime, Disp: , Rfl:     Multiple Vitamins-Minerals (PRESERVISION AREDS PO), Take 1 tablet by mouth 2 (two) times a day, Disp: , Rfl:     nitrofurantoin (MACROBID) 100 mg capsule, Take 1 capsule (100 mg total) by mouth 2 (two) times a day, Disp: 10 capsule, Rfl: 0    NITROGLYCERIN PO, Take by mouth, Disp: , Rfl:     omeprazole (PriLOSEC) 20 mg delayed release capsule, Take 20 mg by mouth daily, Disp: , Rfl:     potassium chloride (MICRO-K) 10 MEQ CR capsule, Take 10 mEq by mouth 2 (two) times a day, Disp: , Rfl:     traMADol (ULTRAM) 50 mg tablet, Take 50 mg by mouth every 6 (six) hours as needed for moderate pain, Disp: , Rfl:     White Petrolatum-Mineral Oil (artificial tears), Administer 1 application to both eyes, Disp: , Rfl:     amLODIPine (NORVASC) 5 mg tablet, Take 5 mg by mouth 2 (two) times a day (Patient not taking: Reported on 11/30/2021 ), Disp: , Rfl:     AMLODIPINE BESYLATE PO, Take 5 mg by mouth  (Patient not taking: Reported on 9/20/2021), Disp: , Rfl:     diphenoxylate-atropine (LOMOTIL) 2 5-0 025 mg/5 mL liquid, Take 5 mL by mouth 2 (two) times a day as needed for diarrhea   (Patient not taking: Reported on 4/19/2022 ), Disp: , Rfl:     hydrOXYzine HCL (ATARAX) 10 mg tablet, Take 1 tablet (10 mg total) by mouth every 6 (six) hours as needed for itching (Patient not taking: Reported on 9/20/2021), Disp: 30 tablet, Rfl: 0    Klor-Con M10 10 MEQ tablet, Take 10 mEq by mouth daily (Patient not taking: Reported on 9/20/2021), Disp: , Rfl:     triamcinolone (KENALOG) 0 025 % cream, Apply topically 2 (two) times a day (Patient not taking: Reported on 9/20/2021), Disp: 30 g, Rfl: 0    triamterene-hydrochlorothiazide (DYAZIDE) 37 5-25 mg per capsule, Take 1 capsule by mouth 2 (two) times a week (Patient not taking: Reported on 11/30/2021 ), Disp: , Rfl:     triamterene-hydrochlorothiazide (MAXZIDE-25) 37 5-25 mg per tablet, Take 1 tablet by mouth 2 (two) times a week Monday & Thursday   (Patient not taking: Reported on 11/30/2021 ), Disp: , Rfl:       Physical Exam:   /80 (BP Location: Left arm, Patient Position: Sitting, Cuff Size: Large)   Pulse 76   Resp 18   Ht 5' 2" (1 575 m)   Wt 57 3 kg (126 lb 6 4 oz)   SpO2 96%   BMI 23 12 kg/m²   GEN: no acute distress    RESP: breathing comfortably with no accessory muscle use    ABD: soft, non-tender, non-distended   INCISION:    EXT: no significant peripheral edema   (FeMale): Pelvic: external genitalia normal, urethra without abnormality or discharge, vagina normal without discharge, no bladder tenderness,  moderate vaginal atrophy  No significant caruncle or cystocele  A catheterized specimen was obtained the dip was completely clear  and urethral meatus      RADIOLOGY:     None     Assessment:    1   Recurrent urinary tract infection     Plan:   - continue Myrbetriq  - follow-up in 6 months  - call with questions or concerns  -

## 2022-05-25 ENCOUNTER — OFFICE VISIT (OUTPATIENT)
Dept: GASTROENTEROLOGY | Facility: CLINIC | Age: 87
End: 2022-05-25
Payer: MEDICARE

## 2022-05-25 VITALS
HEIGHT: 62 IN | DIASTOLIC BLOOD PRESSURE: 69 MMHG | WEIGHT: 122 LBS | BODY MASS INDEX: 22.45 KG/M2 | HEART RATE: 58 BPM | SYSTOLIC BLOOD PRESSURE: 149 MMHG

## 2022-05-25 DIAGNOSIS — K51.919 ULCERATIVE COLITIS WITH COMPLICATION, UNSPECIFIED LOCATION (HCC): Primary | ICD-10-CM

## 2022-05-25 DIAGNOSIS — K21.9 GASTROESOPHAGEAL REFLUX DISEASE WITHOUT ESOPHAGITIS: ICD-10-CM

## 2022-05-25 PROCEDURE — 99213 OFFICE O/P EST LOW 20 MIN: CPT | Performed by: PHYSICIAN ASSISTANT

## 2022-05-25 NOTE — PROGRESS NOTES
[facility] - Outpatient Follow-up Note  Naseem Brice 80 y o  female MRN: [id number]  Encounter: [id number]          ASSESSMENT AND PLAN:      1  Ulcerative colitis with complication, unspecified location [facility])  History of ulcerative colitis with symptoms well controlled on Delzicol 800 mg twice daily  She will continue on the same  Mucus in her stool and we will order CBC CMP and CRP  Does not desire to have any more colonoscopy due to advanced age  2  Gastroesophageal reflux disease without esophagitis  Continue omeprazole 20 mg daily  Does not desire to have any further endoscopic workup due to advanced age  We will see her 6 months for follow-up but advised to call if any problems or questions occur in the interim  ______________________________________________________________________    SUBJECTIVE:     This is an 80-year-old lady longstanding ulcerative colitis very well controlled on 800 mg of mesalamine twice a day  Most recent renal function wand CBC was normal   She likewise has gastroesophageal reflux which is controlled on omeprazole 20 mg a day  She has no particular gastrointestinal complaints at this time  She does not want any more colonoscopies  Patient had colonoscopy last in 2014 which had shown internal hemorrhoids as well as prior partial left colectomy but random biopsies were negative at that time  She currently states she has been doing well from a GI standpoint and she did cut butter out of her diet which seems to help with her symptoms  She otherwise reports that she takes four mesalamine a day  Sometimes notices mucus in her stool but no bleeding  Did have some issues with her blood pressure and heart and some macular degeneration but otherwise has been doing well        REVIEW OF SYSTEMS IS OTHERWISE NEGATIVE        Historical Information   Past Medical History:   Diagnosis Date    Arthritis     Bell's palsy 1940    Colitis     Hemorrhoids     History of transfusion     Hx of colonoscopy [facility] [address], 2000, 2002, 2003, 2007, 2012     Past Surgical History:   Procedure Laterality Date    BACK SURGERY  1971    ruptured disk    BACK SURGERY  1972    bone fusion lumbar spine    BLADDER REPAIR  2002   [place] [address]  2012    BREAST SURGERY  2011   [facility] CARDIAC SURGERY  2014    stent    CARPAL TUNNEL RELEASE  08/14/2014    CATARACT EXTRACTION Right 2015    CHOLECYSTECTOMY      COLONOSCOPY      EYE SURGERY Left     GALLBLADDER SURGERY  1995    GANGLION CYST EXCISION  2004    left wrist    HERNIA REPAIR  2001   [facility] HERNIA REPAIR  2003    MASTECTOMY  2012    OVARY SURGERY  1984    PARTIAL HYSTERECTOMY  1964    NV EXC SKIN BENIG <0 5 CM TRUNK,ARM,LEG Left 4/27/2021    Procedure: Excision pyogenic granuloma left ring finger;  Surgeon: Chon Mandel MD;  [facility];  Service: Orthopedics    NV EXCIS TENDON SHEATH LESION, HAND/FINGER Right 10/9/2018    Procedure: EXCISION GANGLION CYST RIGHT SMALL FINGER;  Surgeon: Chon Mandel MD;  [facility];  Service: Orthopedics    NV INCISE FINGER TENDON SHEATH Right 4/5/2016    Procedure: INDEX TRIGGER FINGER AND RING TRIGGER FINGER RELEASE ;  Surgeon: Chon Mandel MD;  [facility];  Service: Orthopedics    NV INCISE FINGER TENDON SHEATH Left 10/16/2018    Procedure: RELEASE TRIGGER FINGER - LEFT INDEX;  Surgeon: Chon Mandel MD;  [facility];  Service: Orthopedics    NV INCISE FINGER TENDON SHEATH Left 8/10/2021    Procedure: Left long and ring finger trigger finger release;  Surgeon: Chon Mandel MD;  [facility];  Service: Orthopedics    NV WRIST Rosemary Camp LIG Left 8/10/2021    Procedure: Left endoscopic carpal tunnel release;  Surgeon: Chon Mandel MD;  [facility];  Service: Orthopedics    SPLENECTOMY, PARTIAL  1995   Hiro Suarez History   Social History     Substance and Sexual Activity   Alcohol Use Yes    Comment: seldom     Social History     Substance and Sexual Activity   Drug Use No     Social History     Tobacco Use   Smoking Status Former Smoker    Quit date: 1950    Years since quittin 4   Smokeless Tobacco Never Used     Family History   Problem Relation Age of Onset    Cancer Mother     Breast cancer Sister        Meds/Allergies       Current Outpatient Medications:     acetaminophen (Tylenol 8 Hour) 650 mg CR tablet    amLODIPine (NORVASC) 5 mg tablet    AMLODIPINE BESYLATE PO    aspirin (ECOTRIN LOW STRENGTH) 81 mg EC tablet    busPIRone (BUSPAR) 10 mg tablet    cholecalciferol (VITAMIN D3) 1,000 units tablet    diphenoxylate-atropine (LOMOTIL) 2 5-0 025 mg per tablet    diphenoxylate-atropine (LOMOTIL) 2 5-0 025 mg/5 mL liquid    hydrOXYzine HCL (ATARAX) 10 mg tablet    Klor-Con M10 10 MEQ tablet    LUMIGAN 0 01 % ophthalmic drops    mesalamine (DELZICOL) 400 mg    methocarbamol (ROBAXIN) 500 mg tablet    metoprolol succinate (TOPROL-XL) 50 mg 24 hr tablet    Mirabegron ER 25 MG TB24    montelukast (SINGULAIR) 10 mg tablet    Multiple Vitamins-Minerals (PRESERVISION AREDS PO)    nitrofurantoin (MACROBID) 100 mg capsule    NITROGLYCERIN PO    omeprazole (PriLOSEC) 20 mg delayed release capsule    potassium chloride (MICRO-K) 10 MEQ CR capsule    traMADol (ULTRAM) 50 mg tablet    triamcinolone (KENALOG) 0 025 % cream    triamterene-hydrochlorothiazide (DYAZIDE) 37 5-25 mg per capsule    triamterene-hydrochlorothiazide (MAXZIDE-25) 37 5-25 mg per tablet    White Petrolatum-Mineral Oil (artificial tears)    Allergies   Allergen Reactions    Zithromax [Azithromycin] Swelling     tongue    Aspirin Other (See Comments)     Bleeding  Tolerates baby asapirin    Corn-Containing Products - Food Allergy Other (See Comments)     headache    Duloxetine Other (See Comments)     Upsets colitis     Effient [Prasugrel]     Keflex [Cephalexin] Other (See Comments)     unknow    Lasix [Furosemide] GI Intolerance    Lipitor [Atorvastatin]     Other      liptol and lipol cause GI upset  Adhesive tape  USE PAPER TAPE    Prednisone Other (See Comments)     "I go berzerk!"    Sulfamethizole Other (See Comments)     unkown    Latex Rash     Pt cannot remember           Objective     Blood pressure 149/69, pulse 58, height 5' 2" (1 575 m), weight 55 3 kg (122 lb)  Body mass index is 22 31 kg/m²  PHYSICAL EXAM:      General Appearance:   Alert, cooperative, no distress   HEENT:   Normocephalic, atraumatic, anicteric      Neck:  Supple, symmetrical, trachea midline   Lungs:   Clear to auscultation bilaterally; no rales, rhonchi or wheezing; respirations unlabored    Heart[de-identified]   Regular rate and rhythm; no murmur, rub, or gallop  Abdomen:   Soft, non-tender, non-distended; normal bowel sounds; no masses, no organomegaly    Genitalia:   Deferred    Rectal:   Deferred    Extremities:  No cyanosis, clubbing or edema    Pulses:  2+ and symmetric    Skin:  No jaundice, rashes, or lesions    Lymph nodes:  No palpable cervical lymphadenopathy        Lab Results:   No visits with results within 1 Day(s) from this visit  Latest known visit with results is:   Office Visit on 04/19/2022   Component Date Value    LEUKOCYTE ESTERASE,UA 04/19/2022 -     NITRITE,UA 04/19/2022 -     SL AMB POCT UROBILINOGEN 04/19/2022 0 2     POCT URINE PROTEIN 04/19/2022 -      PH,UA 04/19/2022 5 0     BLOOD,UA 04/19/2022 -     SPECIFIC GRAVITY,UA 04/19/2022 1,005     Melanie Dewey 04/19/2022 -     BILIRUBIN,UA 04/19/2022 -     GLUCOSE, UA 04/19/2022 -      COLOR,UA 04/19/2022 yellow     CLARITY,UA 04/19/2022 clear          Radiology Results:   No results found

## 2022-07-07 DIAGNOSIS — K51.919 ULCERATIVE COLITIS WITH COMPLICATION, UNSPECIFIED LOCATION (HCC): ICD-10-CM

## 2022-07-08 ENCOUNTER — APPOINTMENT (OUTPATIENT)
Dept: LAB | Facility: MEDICAL CENTER | Age: 87
End: 2022-07-08
Payer: MEDICARE

## 2022-07-08 DIAGNOSIS — K51.919 ULCERATIVE COLITIS WITH COMPLICATION, UNSPECIFIED LOCATION (HCC): ICD-10-CM

## 2022-07-08 LAB
ALBUMIN SERPL BCP-MCNC: 3.4 G/DL (ref 3.5–5)
ALP SERPL-CCNC: 92 U/L (ref 46–116)
ALT SERPL W P-5'-P-CCNC: 21 U/L (ref 12–78)
ANION GAP SERPL CALCULATED.3IONS-SCNC: 6 MMOL/L (ref 4–13)
AST SERPL W P-5'-P-CCNC: 19 U/L (ref 5–45)
BASOPHILS # BLD AUTO: 0.02 THOUSANDS/ΜL (ref 0–0.1)
BASOPHILS NFR BLD AUTO: 0 % (ref 0–1)
BILIRUB SERPL-MCNC: 0.63 MG/DL (ref 0.2–1)
BUN SERPL-MCNC: 17 MG/DL (ref 5–25)
CALCIUM ALBUM COR SERPL-MCNC: 9.6 MG/DL (ref 8.3–10.1)
CALCIUM SERPL-MCNC: 9.1 MG/DL (ref 8.3–10.1)
CHLORIDE SERPL-SCNC: 107 MMOL/L (ref 100–108)
CO2 SERPL-SCNC: 25 MMOL/L (ref 21–32)
CREAT SERPL-MCNC: 0.65 MG/DL (ref 0.6–1.3)
CRP SERPL QL: 4.5 MG/L
EOSINOPHIL # BLD AUTO: 0.2 THOUSAND/ΜL (ref 0–0.61)
EOSINOPHIL NFR BLD AUTO: 2 % (ref 0–6)
ERYTHROCYTE [DISTWIDTH] IN BLOOD BY AUTOMATED COUNT: 14.8 % (ref 11.6–15.1)
GFR SERPL CREATININE-BSD FRML MDRD: 78 ML/MIN/1.73SQ M
GLUCOSE SERPL-MCNC: 106 MG/DL (ref 65–140)
HCT VFR BLD AUTO: 37.9 % (ref 34.8–46.1)
HGB BLD-MCNC: 13.1 G/DL (ref 11.5–15.4)
IMM GRANULOCYTES # BLD AUTO: 0.05 THOUSAND/UL (ref 0–0.2)
IMM GRANULOCYTES NFR BLD AUTO: 0 % (ref 0–2)
LYMPHOCYTES # BLD AUTO: 4.1 THOUSANDS/ΜL (ref 0.6–4.47)
LYMPHOCYTES NFR BLD AUTO: 34 % (ref 14–44)
MCH RBC QN AUTO: 33.6 PG (ref 26.8–34.3)
MCHC RBC AUTO-ENTMCNC: 34.6 G/DL (ref 31.4–37.4)
MCV RBC AUTO: 97 FL (ref 82–98)
MONOCYTES # BLD AUTO: 0.94 THOUSAND/ΜL (ref 0.17–1.22)
MONOCYTES NFR BLD AUTO: 8 % (ref 4–12)
NEUTROPHILS # BLD AUTO: 6.6 THOUSANDS/ΜL (ref 1.85–7.62)
NEUTS SEG NFR BLD AUTO: 56 % (ref 43–75)
NRBC BLD AUTO-RTO: 0 /100 WBCS
PLATELET # BLD AUTO: 399 THOUSANDS/UL (ref 149–390)
PMV BLD AUTO: 10.7 FL (ref 8.9–12.7)
POTASSIUM SERPL-SCNC: 3.6 MMOL/L (ref 3.5–5.3)
PROT SERPL-MCNC: 7.4 G/DL (ref 6.4–8.2)
RBC # BLD AUTO: 3.9 MILLION/UL (ref 3.81–5.12)
SODIUM SERPL-SCNC: 138 MMOL/L (ref 136–145)
WBC # BLD AUTO: 11.91 THOUSAND/UL (ref 4.31–10.16)

## 2022-07-08 PROCEDURE — 80053 COMPREHEN METABOLIC PANEL: CPT

## 2022-07-08 PROCEDURE — 36415 COLL VENOUS BLD VENIPUNCTURE: CPT

## 2022-07-08 PROCEDURE — 86140 C-REACTIVE PROTEIN: CPT

## 2022-07-08 PROCEDURE — 85025 COMPLETE CBC W/AUTO DIFF WBC: CPT

## 2022-07-08 RX ORDER — MESALAMINE 400 MG/1
800 CAPSULE, DELAYED RELEASE ORAL 3 TIMES DAILY
Qty: 90 CAPSULE | Refills: 2 | Status: SHIPPED | OUTPATIENT
Start: 2022-07-08 | End: 2022-07-12 | Stop reason: SDUPTHER

## 2022-07-08 NOTE — TELEPHONE ENCOUNTER
I called and spoke to patient  She states she forgot all about the blood work and she will go get it done   Thank you

## 2022-07-12 DIAGNOSIS — K51.919 ULCERATIVE COLITIS WITH COMPLICATION, UNSPECIFIED LOCATION (HCC): ICD-10-CM

## 2022-07-12 RX ORDER — MESALAMINE 400 MG/1
800 CAPSULE, DELAYED RELEASE ORAL 2 TIMES DAILY
Qty: 120 CAPSULE | Refills: 2 | Status: SHIPPED | OUTPATIENT
Start: 2022-07-12 | End: 2022-08-15 | Stop reason: SDUPTHER

## 2022-08-15 DIAGNOSIS — K51.919 ULCERATIVE COLITIS WITH COMPLICATION, UNSPECIFIED LOCATION (HCC): ICD-10-CM

## 2022-08-16 RX ORDER — MESALAMINE 400 MG/1
800 CAPSULE, DELAYED RELEASE ORAL 2 TIMES DAILY
Qty: 120 CAPSULE | Refills: 2 | Status: SHIPPED | OUTPATIENT
Start: 2022-08-16

## 2022-10-06 ENCOUNTER — TELEPHONE (OUTPATIENT)
Dept: GASTROENTEROLOGY | Facility: AMBULARY SURGERY CENTER | Age: 87
End: 2022-10-06

## 2022-10-06 NOTE — TELEPHONE ENCOUNTER
Patients GI provider:  Dr Reyna Charles     Number to return call: [phone]     Reason for call: Pt calling requesting to speak with a nurse for recommendation for severe abdominal pain       Scheduled procedure/appointment date if applicable: Apt/procedure 11/8/22

## 2022-10-06 NOTE — TELEPHONE ENCOUNTER
SPOKE WITH PT, HX UC ON DELZICOL 800MG BID, REPORTS STARTED WITH DIARRHEA EPISODES YESTERDAY AND TODAY 2-3 TIMES,  TOOK PEPTO BISMOL, LOMOTIL AND GATORADE, NO FEVER OR SICK CONTACTS, C/O ABDOMINAL PAIN CENTRAL AND LEFT SIDED 7-8/10

## 2022-10-06 NOTE — TELEPHONE ENCOUNTER
Spoke to patient on phone she started with diarrhea yesterday 2-3 times which has improved with pepto bismotil, lomitil and drinking gatorade  Diarrhea has stopped  States she is hungry and feeling better  If she has any further problems or increase pain she will call GI  Explained to patient that if she has severe pain she should go to the emergency room  Patient states " I am 80years old, I am not going to go to the emergency room and explain all this to different doctors " Explained to patient if pain increases GI can obtain CT of abdomen and pelvis  Patient stated "I will not go through with CT of abdomen and pelvis and Dr Adriana Rolon is aware this "  Patient states that her pain usually resolves on its own but if she is not feeling any better she will reach out to the GI office  I will forward this message to Dr Adriana Rolon    Thank you

## 2022-10-24 DIAGNOSIS — R32 URINARY INCONTINENCE, UNSPECIFIED TYPE: ICD-10-CM

## 2022-10-24 NOTE — TELEPHONE ENCOUNTER
Patient is calling to request a prescription refill    Medication: Mirabegron ER 25 MG TB24        30 / 90 day supply: 90 supply     Pharmacy: [address] [place] [address] [phone] Fax     Pt can be reached at: [phone] or [phone]

## 2022-11-08 ENCOUNTER — OFFICE VISIT (OUTPATIENT)
Dept: GASTROENTEROLOGY | Facility: CLINIC | Age: 87
End: 2022-11-08

## 2022-11-08 VITALS
WEIGHT: 125 LBS | DIASTOLIC BLOOD PRESSURE: 129 MMHG | HEART RATE: 75 BPM | HEIGHT: 62 IN | BODY MASS INDEX: 23 KG/M2 | SYSTOLIC BLOOD PRESSURE: 155 MMHG

## 2022-11-08 DIAGNOSIS — K21.9 GASTROESOPHAGEAL REFLUX DISEASE, UNSPECIFIED WHETHER ESOPHAGITIS PRESENT: ICD-10-CM

## 2022-11-08 DIAGNOSIS — K51.819 OTHER ULCERATIVE COLITIS WITH COMPLICATION (HCC): Primary | ICD-10-CM

## 2022-11-08 RX ORDER — CLONIDINE HYDROCHLORIDE 0.1 MG/1
0.1 TABLET ORAL EVERY 12 HOURS SCHEDULED
COMMUNITY

## 2022-11-08 NOTE — PROGRESS NOTES
[facility] - Outpatient Follow-up Note  Susan Gallagher 80 y o  female MRN: [id number]  Encounter: [id number]          ASSESSMENT AND PLAN:      1  Gastroesophageal reflux disease, unspecified whether esophagitis present  Patient with history of GERD  Symptoms are controlled on omeprazole 20 mg daily and will continue the same  2  Other ulcerative colitis with complication ([address] )  Patient with history of ulcerative colitis which has been controlled on mesalamine 800 mg twice daily  Will order blood work next year for follow-up including CBC CMP and CRP  Will continue on the same regimen  If diarrheal symptoms can take Lomotil and/or Pepto-Bismol  Patient does not desire to have any more colonoscopy due to advanced age  Patient is aware of dietary triggers will try to continue to avoid those  We will see patient in 6 months for follow-up     ______________________________________________________________________    SUBJECTIVE:    This is an 80-year-old lady longstanding ulcerative colitis which has very well controlled on 800 mg of mesalamine twice a day   Most recent renal function wand CBC was normal   She likewise has gastroesophageal reflux which is controlled on omeprazole 20 mg a day   She has no particular gastrointestinal complaints at this time  Shasha Goel does not want any more colonoscopies  Patient had colonoscopy last in 2014 which had shown internal hemorrhoids as well as prior partial left colectomy but random biopsies were negative at that time  She currently states she has been doing well from a GI standpoint and she did cut butter out of her diet which seems to help with her symptoms  Her son is her primary caretaker and does her cooking which has helped with her symptoms  She otherwise reports that she takes four mesalamine a day  Patient states that last month she was having some diarrhea but that was related to dietary indiscretion      REVIEW OF SYSTEMS IS OTHERWISE NEGATIVE        Historical Information   Past Medical History:   Diagnosis Date   • Arthritis    • Bell's palsy 1940   • Colitis    • GERD (gastroesophageal reflux disease)    • Hemorrhoids    • History of transfusion    • Hx of colonoscopy 1996, 1998, 2000, 2002, 2003, 2007, 2012   • Hypertension    • Ulcerative colitis [facility])      Past Surgical History:   Procedure Laterality Date   • BACK SURGERY  1971    ruptured disk   • BACK SURGERY  1972    bone fusion lumbar spine   • BLADDER REPAIR  2002   • BLADDER REPAIR  2012   • BREAST SURGERY  2011   • CARDIAC SURGERY  2014    stent   • CARPAL TUNNEL RELEASE  08/14/2014   • CATARACT EXTRACTION Right 2015   • CHOLECYSTECTOMY     • COLONOSCOPY     • EYE SURGERY Left    • GALLBLADDER SURGERY  1995   • GANGLION CYST EXCISION  2004    left wrist   • HERNIA REPAIR  2001   • HERNIA REPAIR  2003   • MASTECTOMY  2012   • OVARY SURGERY  1984   • PARTIAL HYSTERECTOMY  1964   • FL EXC SKIN BENIG <0 5 CM TRUNK,ARM,LEG Left 4/27/2021    Procedure: Excision pyogenic granuloma left ring finger;  Surgeon: Prieto Chester MD;  [facility];  Service: Orthopedics   • FL EXCIS TENDON SHEATH LESION, HAND/FINGER Right 10/9/2018    Procedure: EXCISION GANGLION CYST RIGHT SMALL FINGER;  Surgeon: Prieto Chester MD;  [facility];  Service: Orthopedics   • FL INCISE FINGER TENDON SHEATH Right 4/5/2016    Procedure: INDEX TRIGGER FINGER AND RING TRIGGER FINGER RELEASE ;  Surgeon: Prieto Chester MD;  [facility];  Service: Orthopedics   • FL INCISE FINGER TENDON SHEATH Left 10/16/2018    Procedure: RELEASE TRIGGER FINGER - LEFT INDEX;  Surgeon: Prieto Chester MD;  [facility];  Service: Orthopedics   • FL INCISE FINGER TENDON SHEATH Left 8/10/2021    Procedure: Left long and ring finger trigger finger release;  Surgeon: Prieto Chester MD;  [facility];  Service: Orthopedics   • FL WRIST Yenifer Heading LIG Left 8/10/2021    Procedure: Left endoscopic carpal tunnel release;  Surgeon: Tamy Crocker MD;  [facility];  Service: Orthopedics   • SPLENECTOMY, PARTIAL     • TONSILLECTOMY  1939   • WRIST SURGERY Right      Social History   Social History     Substance and Sexual Activity   Alcohol Use Yes    Comment: seldom     Social History     Substance and Sexual Activity   Drug Use No     Social History     Tobacco Use   Smoking Status Former Smoker   • Quit date:    • Years since quittin 9   Smokeless Tobacco Never Used     Family History   Problem Relation Age of Onset   • Cancer Mother    • Breast cancer Sister        Meds/Allergies       Current Outpatient Medications:   •  acetaminophen (Tylenol 8 Hour) 650 mg CR tablet  •  aspirin (ECOTRIN LOW STRENGTH) 81 mg EC tablet  •  busPIRone (BUSPAR) 10 mg tablet  •  cholecalciferol (VITAMIN D3) 1,000 units tablet  •  cloNIDine (CATAPRES) 0 1 mg tablet  •  diphenoxylate-atropine (LOMOTIL) 2 5-0 025 mg per tablet  •  mesalamine (DELZICOL) 400 mg  •  methocarbamol (ROBAXIN) 500 mg tablet  •  metoprolol succinate (TOPROL-XL) 50 mg 24 hr tablet  •  Mirabegron ER 25 MG TB24  •  montelukast (SINGULAIR) 10 mg tablet  •  Multiple Vitamins-Minerals (PRESERVISION AREDS PO)  •  NITROGLYCERIN PO  •  omeprazole (PriLOSEC) 20 mg delayed release capsule  •  traMADol (ULTRAM) 50 mg tablet  •  amLODIPine (NORVASC) 5 mg tablet  •  AMLODIPINE BESYLATE PO  •  diphenoxylate-atropine (LOMOTIL) 2 5-0 025 mg/5 mL liquid  •  hydrOXYzine HCL (ATARAX) 10 mg tablet  •  Klor-Con M10 10 MEQ tablet  •  LUMIGAN 0 01 % ophthalmic drops  •  nitrofurantoin (MACROBID) 100 mg capsule  •  potassium chloride (MICRO-K) 10 MEQ CR capsule  •  triamcinolone (KENALOG) 0 025 % cream  •  triamterene-hydrochlorothiazide (DYAZIDE) 37 5-25 mg per capsule  •  triamterene-hydrochlorothiazide (MAXZIDE-25) 37 5-25 mg per tablet  •  White Petrolatum-Mineral Oil (artificial tears)    Allergies Allergen Reactions   • Zithromax [Azithromycin] Swelling     tongue   • Aspirin Other (See Comments)     Bleeding  Tolerates baby asapirin   • Corn-Containing Products - Food Allergy Other (See Comments)     headache   • Duloxetine Other (See Comments)     Upsets colitis    • Effient [Prasugrel]    • Keflex [Cephalexin] Other (See Comments)     unknow   • Lasix [Furosemide] GI Intolerance   • Lipitor [Atorvastatin]    • Other      liptol and lipol cause GI upset  Adhesive tape  USE PAPER TAPE   • Prednisone Other (See Comments)     "I go berzerk!"   • Sulfamethizole Other (See Comments)     unkown   • Latex Rash     Pt cannot remember           Objective     Blood pressure (!) 155/129, pulse 75, height 5' 2" (1 575 m), weight 56 7 kg (125 lb)  Body mass index is 22 86 kg/m²  PHYSICAL EXAM:      General Appearance:   Alert, cooperative, no distress   HEENT:   Normocephalic, atraumatic, anicteric      Neck:  Supple, symmetrical, trachea midline   Lungs:   Clear to auscultation bilaterally; no rales, rhonchi or wheezing; respirations unlabored    Heart[de-identified]   Regular rate and rhythm; no murmur, rub, or gallop  Abdomen:   Soft, non-tender, non-distended; normal bowel sounds; no masses, no organomegaly    Genitalia:   Deferred    Rectal:   Deferred    Extremities:  No cyanosis, clubbing or edema    Pulses:  2+ and symmetric    Skin:  No jaundice, rashes, or lesions    Lymph nodes:  No palpable cervical lymphadenopathy        Lab Results:   No visits with results within 1 Day(s) from this visit     Latest known visit with results is:   Appointment on 07/08/2022   Component Date Value   • WBC 07/08/2022 11 91 (A)   • RBC 07/08/2022 3 90    • Hemoglobin 07/08/2022 13 1    • Hematocrit 07/08/2022 37 9    • MCV 07/08/2022 97    • MCH 07/08/2022 33 6    • MCHC 07/08/2022 34 6    • RDW 07/08/2022 14 8    • MPV 07/08/2022 10 7    • Platelets 90/12/3240 399 (A)   • nRBC 07/08/2022 0    • Neutrophils Relative 07/08/2022 56 • Immat GRANS % 07/08/2022 0    • Lymphocytes Relative 07/08/2022 34    • Monocytes Relative 07/08/2022 8    • Eosinophils Relative 07/08/2022 2    • Basophils Relative 07/08/2022 0    • Neutrophils Absolute 07/08/2022 6 60    • Immature Grans Absolute 07/08/2022 0 05    • Lymphocytes Absolute 07/08/2022 4 10    • Monocytes Absolute 07/08/2022 0 94    • Eosinophils Absolute 07/08/2022 0 20    • Basophils Absolute 07/08/2022 0 02    • CRP 07/08/2022 4 5 (A)   • Sodium 07/08/2022 138    • Potassium 07/08/2022 3 6    • Chloride 07/08/2022 107    • CO2 07/08/2022 25    • ANION GAP 07/08/2022 6    • BUN 07/08/2022 17    • Creatinine 07/08/2022 0 65    • Glucose 07/08/2022 106    • Calcium 07/08/2022 9 1    • Corrected Calcium 07/08/2022 9 6    • AST 07/08/2022 19    • ALT 07/08/2022 21    • Alkaline Phosphatase 07/08/2022 92    • Total Protein 07/08/2022 7 4    • Albumin 07/08/2022 3 4 (A)   • Total Bilirubin 07/08/2022 0 63    • eGFR 07/08/2022 78          Radiology Results:   No results found

## 2022-11-09 ENCOUNTER — TELEPHONE (OUTPATIENT)
Dept: UROLOGY | Facility: MEDICAL CENTER | Age: 87
End: 2022-11-09

## 2022-11-09 NOTE — TELEPHONE ENCOUNTER
Returned call to patient   Needs office notes for department of aging and human services   Her son help with her care , she is requesting a print out of office visits that do show she is currently a patient and under our care  She does not drive and would like him to pick them up  Son's number is [phone] would like our office to call when he can pick them up

## 2022-11-09 NOTE — TELEPHONE ENCOUNTER
Pt called the office asking for a print out stated that what we treated her for and her son will pick it up tomorrow  Sonkaren's name is Bernadette Garibay and ph# [phone]    Please advise

## 2022-11-09 NOTE — TELEPHONE ENCOUNTER
LM FOR SON THAT RECORDS OF THE OFFICE VISITS WILL BE AT THE  IN THE [place] OFFICE   HE WAS TOLD TO COME BEFORE 4:00PM

## 2022-11-22 ENCOUNTER — OFFICE VISIT (OUTPATIENT)
Dept: UROLOGY | Facility: CLINIC | Age: 87
End: 2022-11-22

## 2022-11-22 VITALS
HEIGHT: 62 IN | BODY MASS INDEX: 23 KG/M2 | DIASTOLIC BLOOD PRESSURE: 82 MMHG | WEIGHT: 125 LBS | SYSTOLIC BLOOD PRESSURE: 122 MMHG

## 2022-11-22 DIAGNOSIS — N39.0 RECURRENT UTI: Primary | ICD-10-CM

## 2022-11-22 RX ORDER — METOPROLOL SUCCINATE 100 MG/1
100 TABLET, EXTENDED RELEASE ORAL DAILY
COMMUNITY
Start: 2022-11-09

## 2022-11-22 NOTE — PROGRESS NOTES
UROLOGY PROGRESS NOTE   Patient Identifiers: Kranthi Valencia (MRN [id number])  Date of Service: 11/22/2022    Subjective:    70-year-old female history of urinary I started her on Myrbetriq 25 mg and she seems to be doing better  She is ambulatory with a walker  She lives at home with her son  She has had no recent infections      Reason for visit:  Urinary frequency and recurrent UTI follow-up      Objective:     VITALS:    Vitals:    11/22/22 1112   BP: 122/82           LABS:  Lab Results   Component Value Date    HGB 13 1 07/08/2022    HCT 37 9 07/08/2022    WBC 11 91 (H) 07/08/2022     (H) 07/08/2022   ]    Lab Results   Component Value Date    K 3 6 07/08/2022     07/08/2022    CO2 25 07/08/2022    BUN 17 07/08/2022    CREATININE 0 65 07/08/2022    CALCIUM 9 1 07/08/2022   ]        INPATIENT MEDS:    Current Outpatient Medications:   •  acetaminophen (Tylenol 8 Hour) 650 mg CR tablet, Take 1 tablet (650 mg total) by mouth every 8 (eight) hours, Disp: 15 tablet, Rfl: 0  •  aspirin (ECOTRIN LOW STRENGTH) 81 mg EC tablet, Take 81 mg by mouth daily, Disp: , Rfl:   •  busPIRone (BUSPAR) 10 mg tablet, Take 10 mg by mouth as needed, Disp: , Rfl:   •  cholecalciferol (VITAMIN D3) 1,000 units tablet, Take 5,000 Units by mouth daily, Disp: , Rfl:   •  cloNIDine (CATAPRES) 0 1 mg tablet, Take 0 1 mg by mouth every 12 (twelve) hours, Disp: , Rfl:   •  diphenoxylate-atropine (LOMOTIL) 2 5-0 025 mg per tablet, TAKE 1 TABLET BY ORAL ROUTE 4 TIMES EVERY DAY AS NEEDED, Disp: , Rfl:   •  LUMIGAN 0 01 % ophthalmic drops, , Disp: , Rfl: 3  •  mesalamine (DELZICOL) 400 mg, Take 2 capsules (800 mg total) by mouth 2 (two) times a day, Disp: 120 capsule, Rfl: 2  •  metoprolol succinate (TOPROL-XL) 100 mg 24 hr tablet, Take 100 mg by mouth daily, Disp: , Rfl:   •  Mirabegron ER 25 MG TB24, Take 25 mg by mouth daily, Disp: 60 tablet, Rfl: 6  •  montelukast (SINGULAIR) 10 mg tablet, Take 10 mg by mouth daily at bedtime, Disp: , Rfl:   •  Multiple Vitamins-Minerals (PRESERVISION AREDS PO), Take 1 tablet by mouth 2 (two) times a day, Disp: , Rfl:   •  NITROGLYCERIN PO, Take by mouth, Disp: , Rfl:   •  omeprazole (PriLOSEC) 20 mg delayed release capsule, Take 20 mg by mouth daily, Disp: , Rfl:   •  traMADol (ULTRAM) 50 mg tablet, Take 50 mg by mouth every 6 (six) hours as needed for moderate pain, Disp: , Rfl:   •  triamcinolone (KENALOG) 0 025 % cream, Apply topically 2 (two) times a day (Patient not taking: Reported on 9/20/2021), Disp: 30 g, Rfl: 0  •  triamterene-hydrochlorothiazide (DYAZIDE) 37 5-25 mg per capsule, Take 1 capsule by mouth 2 (two) times a week  (Patient not taking: Reported on 11/30/2021), Disp: , Rfl:   •  triamterene-hydrochlorothiazide (MAXZIDE-25) 37 5-25 mg per tablet, Take 1 tablet by mouth 2 (two) times a week Monday & Thursday   (Patient not taking: Reported on 11/30/2021), Disp: , Rfl:   •  White Petrolatum-Mineral Oil (artificial tears), Administer 1 application to both eyes (Patient not taking: Reported on 5/25/2022), Disp: , Rfl:       Physical Exam:   /82 (BP Location: Left arm, Patient Position: Sitting, Cuff Size: Standard)   Ht 5' 2" (1 575 m)   Wt 56 7 kg (125 lb)   BMI 22 86 kg/m²   GEN: no acute distress    RESP: breathing comfortably with no accessory muscle use    ABD: soft, non-tender, non-distended   INCISION:    EXT: no significant peripheral edema       RADIOLOGY:   None     Assessment:   1  Recurrent UTI  2   Urinary frequency      Plan:   -continue Myrbetriq  -follow-up in 1 year for annual exam  -  -

## 2022-12-30 ENCOUNTER — HOSPITAL ENCOUNTER (OUTPATIENT)
Dept: MRI IMAGING | Facility: HOSPITAL | Age: 87
Discharge: HOME | End: 2022-12-30

## 2022-12-30 DIAGNOSIS — R42 DIZZINESS AND GIDDINESS: ICD-10-CM

## 2023-01-04 ENCOUNTER — NURSE TRIAGE (OUTPATIENT)
Dept: OTHER | Facility: OTHER | Age: 88
End: 2023-01-04

## 2023-01-04 DIAGNOSIS — K51.819 OTHER ULCERATIVE COLITIS WITH COMPLICATION (HCC): ICD-10-CM

## 2023-01-04 DIAGNOSIS — R19.7 ACUTE DIARRHEA: Primary | ICD-10-CM

## 2023-01-04 NOTE — TELEPHONE ENCOUNTER
Patient stated that she has a flare up of colitis, stated that she feels like she ahs an infection, complains of unusual weakness

## 2023-01-04 NOTE — TELEPHONE ENCOUNTER
Regarding: diarrhea, inflamation, weakness  ----- Message from Prince Booth sent at 1/4/2023  1:22 PM EST -----  " I feel like I have inflammation in my intestines  I have diarrhea and feel very week and tired   Do I need antibiotics?"

## 2023-01-04 NOTE — TELEPHONE ENCOUNTER
Reason for Disposition  • Abdominal pain  (Exception: pain clears completely with each passage of diarrhea stool)    Additional Information  • Negative: MODERATE diarrhea (e g , 4-6 times / day more than normal) and age > 70 years    Answer Assessment - Initial Assessment Questions  1  DIARRHEA SEVERITY: "How bad is the diarrhea?" "How many extra stools have you had in the past 24 hours than normal?"     - NO DIARRHEA (SCALE 0)    - MILD (SCALE 1-3): Few loose or mushy BMs; increase of 1-3 stools over normal daily number of stools; mild increase in ostomy output  -  MODERATE (SCALE 4-7): Increase of 4-6 stools daily over normal; moderate increase in ostomy output  * SEVERE (SCALE 8-10; OR 'WORST POSSIBLE'): Increase of 7 or more stools daily over normal; moderate increase in ostomy output; incontinence  Mild 2 episodes   2  ONSET: "When did the diarrhea begin?"       2 days ago   3  BM CONSISTENCY: "How loose or watery is the diarrhea?"       Loose   4  VOMITING: "Are you also vomiting?" If Yes, ask: "How many times in the past 24 hours?"       No   5  ABDOMINAL PAIN: "Are you having any abdominal pain?" If Yes, ask: "What does it feel like?" (e g , crampy, dull, intermittent, constant)       Pain in lower left abdomen   6  ABDOMINAL PAIN SEVERITY: If present, ask: "How bad is the pain?"  (e g , Scale 1-10; mild, moderate, or severe)    - MILD (1-3): doesn't interfere with normal activities, abdomen soft and not tender to touch     - MODERATE (4-7): interferes with normal activities or awakens from sleep, tender to touch     - SEVERE (8-10): excruciating pain, doubled over, unable to do any normal activities         Mild 3 out of 10   7  ORAL INTAKE: If vomiting, "Have you been able to drink liquids?" "How much fluids have you had in the past 24 hours?"      Patient has been drinking Gatorade   8   HYDRATION: "Any signs of dehydration?" (e g , dry mouth [not just dry lips], too weak to stand, dizziness, new weight loss) "When did you last urinate?"      Normal   9  EXPOSURE: "Have you traveled to a foreign country recently?" "Have you been exposed to anyone with diarrhea?" "Could you have eaten any food that was spoiled?"      No   10  ANTIBIOTIC USE: "Are you taking antibiotics now or have you taken antibiotics in the past 2 months?"        No   11  OTHER SYMPTOMS: "Do you have any other symptoms?" (e g , fever, blood in stool)        No other symptoms   12   PREGNANCY: "Is there any chance you are pregnant?" "When was your last menstrual period?"        N/A    Protocols used: [id number]

## 2023-01-04 NOTE — TELEPHONE ENCOUNTER
Called patient  She reports feeling unwell for the last few days with increased loose stools and some abdominal discomfort  No fevers, no vomiting, no blood in stool  She does not want to go to ER and does not feel she has to  Taking pepto bismol and gatorade, bland diet  Is taking mesalamine regularly  Feels she has "an infection " No recent abx use or abnormal food intake  Advised that the blood work tigre ordered at last visit and stool tests will help us determine infection requiring antibiotics versus inflammation from UC flare  Ordered c diff, stool PCR, and fecal calprotectin  She will have son go get specimen container today or tomorrow and will try to get blood work tomorrow  Advised to call or go to ED with any worsening

## 2023-01-11 ENCOUNTER — APPOINTMENT (OUTPATIENT)
Dept: LAB | Facility: MEDICAL CENTER | Age: 88
End: 2023-01-11

## 2023-01-11 DIAGNOSIS — K51.819 OTHER ULCERATIVE COLITIS WITH COMPLICATION (HCC): ICD-10-CM

## 2023-01-11 LAB
ALBUMIN SERPL BCP-MCNC: 3.8 G/DL (ref 3.5–5)
ALP SERPL-CCNC: 98 U/L (ref 46–116)
ALT SERPL W P-5'-P-CCNC: 21 U/L (ref 12–78)
ANION GAP SERPL CALCULATED.3IONS-SCNC: 7 MMOL/L (ref 4–13)
AST SERPL W P-5'-P-CCNC: 23 U/L (ref 5–45)
BASOPHILS # BLD AUTO: 0.02 THOUSANDS/ÂΜL (ref 0–0.1)
BASOPHILS NFR BLD AUTO: 0 % (ref 0–1)
BILIRUB SERPL-MCNC: 0.46 MG/DL (ref 0.2–1)
BUN SERPL-MCNC: 18 MG/DL (ref 5–25)
CALCIUM SERPL-MCNC: 9.8 MG/DL (ref 8.3–10.1)
CHLORIDE SERPL-SCNC: 104 MMOL/L (ref 96–108)
CO2 SERPL-SCNC: 27 MMOL/L (ref 21–32)
CREAT SERPL-MCNC: 0.6 MG/DL (ref 0.6–1.3)
CRP SERPL QL: 6 MG/L
EOSINOPHIL # BLD AUTO: 0.14 THOUSAND/ÂΜL (ref 0–0.61)
EOSINOPHIL NFR BLD AUTO: 1 % (ref 0–6)
ERYTHROCYTE [DISTWIDTH] IN BLOOD BY AUTOMATED COUNT: 13.8 % (ref 11.6–15.1)
GFR SERPL CREATININE-BSD FRML MDRD: 80 ML/MIN/1.73SQ M
GLUCOSE P FAST SERPL-MCNC: 99 MG/DL (ref 65–99)
HCT VFR BLD AUTO: 39.1 % (ref 34.8–46.1)
HGB BLD-MCNC: 13.3 G/DL (ref 11.5–15.4)
IMM GRANULOCYTES # BLD AUTO: 0.03 THOUSAND/UL (ref 0–0.2)
IMM GRANULOCYTES NFR BLD AUTO: 0 % (ref 0–2)
LYMPHOCYTES # BLD AUTO: 4.34 THOUSANDS/ÂΜL (ref 0.6–4.47)
LYMPHOCYTES NFR BLD AUTO: 39 % (ref 14–44)
MCH RBC QN AUTO: 33.1 PG (ref 26.8–34.3)
MCHC RBC AUTO-ENTMCNC: 34 G/DL (ref 31.4–37.4)
MCV RBC AUTO: 97 FL (ref 82–98)
MONOCYTES # BLD AUTO: 0.99 THOUSAND/ÂΜL (ref 0.17–1.22)
MONOCYTES NFR BLD AUTO: 9 % (ref 4–12)
NEUTROPHILS # BLD AUTO: 5.62 THOUSANDS/ÂΜL (ref 1.85–7.62)
NEUTS SEG NFR BLD AUTO: 51 % (ref 43–75)
NRBC BLD AUTO-RTO: 0 /100 WBCS
PLATELET # BLD AUTO: 423 THOUSANDS/UL (ref 149–390)
PMV BLD AUTO: 10.9 FL (ref 8.9–12.7)
POTASSIUM SERPL-SCNC: 3.5 MMOL/L (ref 3.5–5.3)
PROT SERPL-MCNC: 7.6 G/DL (ref 6.4–8.4)
RBC # BLD AUTO: 4.02 MILLION/UL (ref 3.81–5.12)
SODIUM SERPL-SCNC: 138 MMOL/L (ref 135–147)
WBC # BLD AUTO: 11.14 THOUSAND/UL (ref 4.31–10.16)

## 2023-01-16 DIAGNOSIS — K51.919 ULCERATIVE COLITIS WITH COMPLICATION, UNSPECIFIED LOCATION (HCC): ICD-10-CM

## 2023-01-16 RX ORDER — MESALAMINE 400 MG/1
800 CAPSULE, DELAYED RELEASE ORAL 2 TIMES DAILY
Qty: 120 CAPSULE | Refills: 2 | Status: SHIPPED | OUTPATIENT
Start: 2023-01-16

## 2023-04-24 ENCOUNTER — TELEPHONE (OUTPATIENT)
Dept: GASTROENTEROLOGY | Facility: CLINIC | Age: 88
End: 2023-04-24

## 2023-05-01 ENCOUNTER — EVALUATION (OUTPATIENT)
Dept: PHYSICAL THERAPY | Facility: MEDICAL CENTER | Age: 88
End: 2023-05-01

## 2023-05-01 DIAGNOSIS — M25.511 CHRONIC RIGHT SHOULDER PAIN: Primary | ICD-10-CM

## 2023-05-01 DIAGNOSIS — R29.898 WEAKNESS OF BOTH LOWER EXTREMITIES: ICD-10-CM

## 2023-05-01 DIAGNOSIS — G89.29 CHRONIC RIGHT SHOULDER PAIN: Primary | ICD-10-CM

## 2023-05-01 NOTE — LETTER
May 2, 2023    Sohail Strauss MD  [address] [address]    Patient: Aj Aleman   YOB: 1932   Date of Visit: 2023     Encounter Diagnosis     ICD-10-CM    1  Chronic right shoulder pain  M25 511     G89 29       2  Weakness of both lower extremities  R29 898           Dear Dr Mariama Rust:    Thank you for your recent referral of Aj Aleman  Please review the attached evaluation summary from Latia's recent visit  Please verify that you agree with the plan of care by signing the attached order  If you have any questions or concerns, please do not hesitate to call  I sincerely appreciate the opportunity to share in the care of one of your patients and hope to have another opportunity to work with you in the near future  Sincerely,    Malika Munson, PT      Referring Provider:      I certify that I have read the below Plan of Care and certify the need for these services furnished under this plan of treatment while under my care  Sohail Strauss MD  [address] [address]  Via Fax: [phone]          PT Evaluation     Today's date: 2023  Patient name: Aj Aleman  : 1932  MRN: [id number]  Referring provider: Deep Mccord MD  Dx:   Encounter Diagnosis     ICD-10-CM    1  Chronic right shoulder pain  M25 511     G89 29       2  Weakness of both lower extremities  R29 898                      Assessment  Assessment details: Pt is an alert and oriented 81 yo, RHD, who presents to PT with dx of R shoulder pain and b/l LE weakness  Pt states having shoulder pain since , had injection in Feb, and pain did not subside  Pt states most pain with reaching overhead, lifting and carrying  Pt states she has difficulty with dressing, and has to don shirts on R side first   Pt states she feels muscle weakness all over, but her legs are the most weak  Pt states having previous lumbar fusion in the 's    Pt states she notices most weakness when she is walking, and has to grab onto things in her house to maintain her balance  Pt states she lives with her son  Pt states she uses her rollator around her house, and is convenient in carrying things between the room  Upon examination, pt demonstrates R shoulder pain, decreased R shoulder pain and ROM, b/l LE weakness, gait and balance deficits  Pt will benefit from skilled PT to address the deficits listed above and maximize function  Thank you for your referral   Impairments: abnormal gait, abnormal muscle tone, abnormal or restricted ROM, activity intolerance, impaired balance, impaired physical strength, lacks appropriate home exercise program, weight-bearing intolerance and poor posture   Understanding of Dx/Px/POC: good   Prognosis: good    Goals  ST: Decrease pain by 25% in 4 weeks with all functional activities  2: Improved ROM by 25% in 4 weeks to assist with all functional activities  3: Improve strength by 1/2 grade in 4 weeks to assist with all functional activities  LT:  Decrease pain to 0-1/10 with all functional activities in 4-6 weeks  2: Improved ROM to WFL's in 4-6 weeks to assist with all functional activities  3:  Increase strength to WFL's in 4-6 weeks to assist with all functional activities    Plan  Patient would benefit from: skilled physical therapy  Planned modality interventions: cryotherapy and thermotherapy: hydrocollator packs  Planned therapy interventions: joint mobilization, manual therapy, neuromuscular re-education, patient education, postural training, strengthening, stretching, therapeutic activities, therapeutic exercise, home exercise program, therapeutic training, balance and balance/weight bearing training  Frequency: 2x week  Duration in weeks: 6  Plan of Care beginning date: 2023  Plan of Care expiration date: 2023  Treatment plan discussed with: patient        Subjective Evaluation    Pain  Current pain ratin  At best pain ratin  At worst pain rating: 10  Location: R shoulder  Quality: dull ache  Relieving factors: medications, rest and heat  Aggravating factors: overhead activity, lifting, walking and standing    Social Support  Steps to enter house: no  Stairs in house: yes   1  Lives with: adult children      Diagnostic Tests  X-ray: abnormal  Treatments  Current treatment: physical therapy  Patient Goals  Patient goals for therapy: increased strength, independence with ADLs/IADLs, increased motion, improved balance and decreased pain          Objective     Observations     Additional Observation Details  Posture/observation:  81 yo female, generally deconditioned, mod thoracic kyphosis and scap protraction in sitting, decreased cervical lordosis, mod forward head posture      Palpation     Additional Palpation Details  Palpation:  Pain/tenderness R sup/lat shoulder/RTC insertion site    Neurological Testing     Sensation     Shoulder   Left Shoulder   Intact: light touch    Right Shoulder   Intact: light touch    Active Range of Motion     Right Shoulder   Flexion: 88 degrees with pain  Abduction: 95 degrees with pain    Additional Active Range of Motion Details  AROM ER:  EAM P! IR: lat iliac crest P! Strength/Myotome Testing     Right Shoulder     Planes of Motion   Flexion: 2+   Extension: 2+   Abduction: 3-   External rotation at 0°: 2+   Internal rotation at 0°: 3+     Left Hip   Planes of Motion   Flexion: 3+  Extension: 3-  Abduction: 3+  Adduction: 3+    Right Hip   Planes of Motion   Flexion: 3+  Extension: 3-  Abduction: 3+  Adduction: 3+    Left Knee   Flexion: 3-  Extension: 3-    Right Knee   Flexion: 3+  Extension: 3+    Additional Strength Details  Ankle strength grossly 3+/5 b/l    Tests     Right Shoulder   Positive empty can, Hawkin's and Neer's               Precautions: GERD, ulcerative colitis, bell's palsy, FM      Manuals             PROM R shoulder BG Neuro Re-Ed                                                                                                        Ther Ex 5/1            DERIC mcneill flex/abd HEP            scap retract             pullies             LAQ             HR             Mini squats                                       Ther Activity                                       Gait Training                                       Modalities

## 2023-05-01 NOTE — PROGRESS NOTES
PT Evaluation     Today's date: 2023  Patient name: Aj Aleman  : 1932  MRN: [id number]  Referring provider: Deep Mccord MD  Dx:   Encounter Diagnosis     ICD-10-CM    1  Chronic right shoulder pain  M25 511     G89 29       2  Weakness of both lower extremities  R29 898                      Assessment  Assessment details: Pt is an alert and oriented 81 yo, RHD, who presents to PT with dx of R shoulder pain and b/l LE weakness  Pt states having shoulder pain since , had injection in Feb, and pain did not subside  Pt states most pain with reaching overhead, lifting and carrying  Pt states she has difficulty with dressing, and has to don shirts on R side first   Pt states she feels muscle weakness all over, but her legs are the most weak  Pt states having previous lumbar fusion in the 's  Pt states she notices most weakness when she is walking, and has to grab onto things in her house to maintain her balance  Pt states she lives with her son  Pt states she uses her rollator around her house, and is convenient in carrying things between the room  Upon examination, pt demonstrates R shoulder pain, decreased R shoulder pain and ROM, b/l LE weakness, gait and balance deficits  Pt will benefit from skilled PT to address the deficits listed above and maximize function    Thank you for your referral   Impairments: abnormal gait, abnormal muscle tone, abnormal or restricted ROM, activity intolerance, impaired balance, impaired physical strength, lacks appropriate home exercise program, weight-bearing intolerance and poor posture   Understanding of Dx/Px/POC: good   Prognosis: good    Goals  ST: Decrease pain by 25% in 4 weeks with all functional activities  2: Improved ROM by 25% in 4 weeks to assist with all functional activities  3: Improve strength by 1/2 grade in 4 weeks to assist with all functional activities  LT:  Decrease pain to 0-1/10 with all functional activities in 4-6 weeks  2: Improved ROM to WFL's in 4-6 weeks to assist with all functional activities  3:  Increase strength to WFL's in 4-6 weeks to assist with all functional activities    Plan  Patient would benefit from: skilled physical therapy  Planned modality interventions: cryotherapy and thermotherapy: hydrocollator packs  Planned therapy interventions: joint mobilization, manual therapy, neuromuscular re-education, patient education, postural training, strengthening, stretching, therapeutic activities, therapeutic exercise, home exercise program, therapeutic training, balance and balance/weight bearing training  Frequency: 2x week  Duration in weeks: 6  Plan of Care beginning date: 2023  Plan of Care expiration date: 2023  Treatment plan discussed with: patient        Subjective Evaluation    Pain  Current pain ratin  At best pain ratin  At worst pain rating: 10  Location: R shoulder  Quality: dull ache  Relieving factors: medications, rest and heat  Aggravating factors: overhead activity, lifting, walking and standing    Social Support  Steps to enter house: no  Stairs in house: yes   1  Lives with: adult children      Diagnostic Tests  X-ray: abnormal  Treatments  Current treatment: physical therapy  Patient Goals  Patient goals for therapy: increased strength, independence with ADLs/IADLs, increased motion, improved balance and decreased pain          Objective     Observations     Additional Observation Details  Posture/observation:  79 yo female, generally deconditioned, mod thoracic kyphosis and scap protraction in sitting, decreased cervical lordosis, mod forward head posture      Palpation     Additional Palpation Details  Palpation:  Pain/tenderness R sup/lat shoulder/RTC insertion site    Neurological Testing     Sensation     Shoulder   Left Shoulder   Intact: light touch    Right Shoulder   Intact: light touch    Active Range of Motion     Right Shoulder   Flexion: 88 degrees with pain  Abduction: 95 degrees with pain    Additional Active Range of Motion Details  AROM ER:  EAM P! IR: lat iliac crest P! Strength/Myotome Testing     Right Shoulder     Planes of Motion   Flexion: 2+   Extension: 2+   Abduction: 3-   External rotation at 0°: 2+   Internal rotation at 0°: 3+     Left Hip   Planes of Motion   Flexion: 3+  Extension: 3-  Abduction: 3+  Adduction: 3+    Right Hip   Planes of Motion   Flexion: 3+  Extension: 3-  Abduction: 3+  Adduction: 3+    Left Knee   Flexion: 3-  Extension: 3-    Right Knee   Flexion: 3+  Extension: 3+    Additional Strength Details  Ankle strength grossly 3+/5 b/l    Tests     Right Shoulder   Positive empty can, Hawkin's and Neer's                Precautions: GERD, ulcerative colitis, bell's palsy, FM      Manuals 5/1            PROM R shoulder BG                                                   Neuro Re-Ed                                                                                                        Ther Ex 5/1            AAROM charito flex/abd HEP            scap retract             pullies             LAQ             HR             Mini squats                                       Ther Activity                                       Gait Training                                       Modalities

## 2023-05-03 ENCOUNTER — OFFICE VISIT (OUTPATIENT)
Dept: PHYSICAL THERAPY | Facility: MEDICAL CENTER | Age: 88
End: 2023-05-03

## 2023-05-03 DIAGNOSIS — R29.898 WEAKNESS OF BOTH LOWER EXTREMITIES: ICD-10-CM

## 2023-05-03 DIAGNOSIS — M25.511 CHRONIC RIGHT SHOULDER PAIN: Primary | ICD-10-CM

## 2023-05-03 DIAGNOSIS — G89.29 CHRONIC RIGHT SHOULDER PAIN: Primary | ICD-10-CM

## 2023-05-03 NOTE — PROGRESS NOTES
"Daily Note     Today's date: 5/3/2023  Patient name: Lidya Eugene  : 1932  MRN: [id number]  Referring provider: Lyssa Ramirez MD  Dx:   Encounter Diagnosis     ICD-10-CM    1  Chronic right shoulder pain  M25 511     G89 29       2  Weakness of both lower extremities  R29 898                      Subjective:   Pt states she is sore today and having as \"rough day\" due to the damp weather  Objective: See treatment diary below      Assessment: Tolerated treatment fair  Patient demonstrated fatigue post treatment and would benefit from continued PT  Pt provided with written HEP today for TE initiated  Plan: Continue per plan of care        Precautions: GERD, ulcerative colitis, bell's palsy, FM      Manuals 5/1 5/3           PROM R shoulder BG BG                                                  Neuro Re-Ed                          Stand marching             sidestepping  1 5# x2laps           Bal FA EO/EC  x20ea           Bal FA EO with perts  20\"x2                                     Ther Ex 5/1 5/3           AAROM wand flex/abd HEP x10ea           scap retract  5\"x20           pullies  x5'           LAQ  1 5# 5\"x10ea           HR  x20           Mini squats  nv           Hip add iso  5\"x10                        Ther Activity             Sit-stand  x5                        Gait Training                                       Modalities                                            "

## 2023-05-08 ENCOUNTER — OFFICE VISIT (OUTPATIENT)
Dept: PHYSICAL THERAPY | Facility: MEDICAL CENTER | Age: 88
End: 2023-05-08

## 2023-05-08 DIAGNOSIS — R29.898 WEAKNESS OF BOTH LOWER EXTREMITIES: ICD-10-CM

## 2023-05-08 DIAGNOSIS — M25.511 CHRONIC RIGHT SHOULDER PAIN: Primary | ICD-10-CM

## 2023-05-08 DIAGNOSIS — G89.29 CHRONIC RIGHT SHOULDER PAIN: Primary | ICD-10-CM

## 2023-05-08 NOTE — PROGRESS NOTES
"Daily Note     Today's date: 2023  Patient name: Dora Montgomery  : 1932  MRN: [id number]  Referring provider: Leonidas Solis MD  Dx:   Encounter Diagnosis     ICD-10-CM    1  Chronic right shoulder pain  M25 511     G89 29       2  Weakness of both lower extremities  R29 898                      Subjective:   Pt states she was very tired on Sat and had to take a nap, and she never takes a nap  She took her BP upon waking at the suggestion of her son and states it was 180/something  Objective: See treatment diary below    BP: 166/76 pre      Assessment: Tolerated treatment fair  Patient demonstrated fatigue post treatment and would benefit from continued PT  Pt has poor memory and recall of exercises  VC's and instruction given to pt on correct technique throughout TE program       Plan: Continue per plan of care        Precautions: GERD, ulcerative colitis, bell's palsy, FM      Manuals 5/1 5/3 5/8          PROM R shoulder BG BG BG                                                 Neuro Re-Ed                          Stand marching   1 5# x2laps          sidestepping  1 5# x2laps 1 5# x2laps          Bal FA EO/EC  x20ea foam x20ea          Bal FA EO with perts  20\"x2 20\"x2                                    Ther Ex 5/1 5/3 5/8          AAROM wand flex/abd HEP x10ea x10ea          scap retract  5\"x20 5\"x20          pullies  x5' x5'          LAQ  1 5# 5\"x10ea 1 5# 5\"x10ea          HR  x20 x20          Mini squats  nv           Hip add iso  5\"x10 5\"x20                       Ther Activity             Sit-stand  x5 x7'                       Gait Training                                       Modalities                                            "

## 2023-05-10 ENCOUNTER — OFFICE VISIT (OUTPATIENT)
Dept: PHYSICAL THERAPY | Facility: MEDICAL CENTER | Age: 88
End: 2023-05-10

## 2023-05-10 DIAGNOSIS — M25.511 CHRONIC RIGHT SHOULDER PAIN: Primary | ICD-10-CM

## 2023-05-10 DIAGNOSIS — R29.898 WEAKNESS OF BOTH LOWER EXTREMITIES: ICD-10-CM

## 2023-05-10 DIAGNOSIS — G89.29 CHRONIC RIGHT SHOULDER PAIN: Primary | ICD-10-CM

## 2023-05-10 NOTE — PROGRESS NOTES
"Daily Note     Today's date: 5/10/2023  Patient name: Camellia Homans  : 1932  MRN: [id number]  Referring provider: Maria Scott MD  Dx:   Encounter Diagnosis     ICD-10-CM    1  Chronic right shoulder pain  M25 511     G89 29       2  Weakness of both lower extremities  R29 898                      Subjective:   Pt states she feels she can reach overhead slightly easier and is more \"automatic\"  Pt also states she walked a short distance to her bed without her SPC  Pt states she really feels the exercises are helping her  Objective: See treatment diary below          Assessment: Tolerated treatment fair  Patient demonstrated fatigue post treatment and would benefit from continued PT  Pt appears more alert today than Monday's visit, and states she feels more energetic  Progressed TE today as charted  Plan: Continue per plan of care        Precautions: GERD, ulcerative colitis, bell's palsy, FM      Manuals 5/1 5/3 5/8 5/10         PROM R shoulder BG BG BG                                                 Neuro Re-Ed                          Stand marching   1 5# x2laps 1 5# x2laps         sidestepping  1 5# x2laps 1 5# x2laps 1 5# x2laps         Bal FA EO/EC  x20ea foam x20ea Foam x20ea         Bal FA EO with perts  20\"x2 20\"x2 20\"xea                                   Ther Ex 5/1 5/3 5/8 5/10         AAROM wand flex/abd HEP x10ea x10ea x10ea         scap retract  5\"x20 5\"x20 5\"x20         pullies  x5' x5' x5'         LAQ  1 5# 5\"x10ea 1 5# 5\"x10ea 1 5# 5\"x10ea         HR  x20 x20 x20         Mini squats  nv  x15         Hip add iso  5\"x10 5\"x20 5\"x20                      Ther Activity             Sit-stand  x5 x7' x5'                      Gait Training                                       Modalities                                            " David

## 2023-05-15 ENCOUNTER — OFFICE VISIT (OUTPATIENT)
Dept: PHYSICAL THERAPY | Facility: MEDICAL CENTER | Age: 88
End: 2023-05-15

## 2023-05-15 DIAGNOSIS — G89.29 CHRONIC RIGHT SHOULDER PAIN: Primary | ICD-10-CM

## 2023-05-15 DIAGNOSIS — R29.898 WEAKNESS OF BOTH LOWER EXTREMITIES: ICD-10-CM

## 2023-05-15 DIAGNOSIS — M25.511 CHRONIC RIGHT SHOULDER PAIN: Primary | ICD-10-CM

## 2023-05-15 NOTE — PROGRESS NOTES
"Daily Note     Today's date: 5/15/2023  Patient name: Angeles Cruz  : 1932  MRN: [id number]  Referring provider: Anni Cardoso MD  Dx:   Encounter Diagnosis     ICD-10-CM    1  Chronic right shoulder pain  M25 511     G89 29       2  Weakness of both lower extremities  R29 740                      Subjective:   Pt states she really feels the change in weather affects her, and almost called to cancel today due to feeling off  Objective: See treatment diary below          Assessment: Tolerated treatment fair  Patient demonstrated fatigue post treatment and would benefit from continued PT  Pt does not require A with sit-stand and able to perform with minimal cueing to control descent  Pt has mod difficulty weigh shifting a-p, and states she feels \"sea-sick\" when on the foam with EC  Initiated gastroc stretch which pt states really feeling a good stretch with in B ankles  Plan: Continue per plan of care        Precautions: GERD, ulcerative colitis, bell's palsy, FM      Manuals 5/1 5/3 5/8 5/10 5/15        PROM R shoulder BG BG BG                                                 Neuro Re-Ed                          Stand marching   1 5# x2laps 1 5# x2laps 1 5# x3laps        sidestepping  1 5# x2laps 1 5# x2laps 1 5# x2laps 1 5# x3laps        Bal FA EO/EC  x20ea foam x20\"ea Foam x20\"ea foam x20\"ea        Bal FA EO with perts  20\"x2 20\"x2 20\"xea 20\"ea                                  Ther Ex  5/3 5/ 5/10 515        AAROM wand flex/abd HEP x10ea x10ea x10ea x10ea        scap retract  5\"x20 5\"x20 5\"x20 5\"x20        pullies  x5' x5' x5' x5'        LAQ  1 5# 5\"x10ea 1 5# 5\"x10ea 1 5# 5\"x10ea 1 5# 5\"x10ea        HR  x20 x20 x20 x20        Mini squats  nv  x15 x15        Hip add iso  5\"x10 5\"x20 5\"x20 5\"x20        Standing gastroc stretch     20\"x4ea        Ther Activity             Sit-stand  x5 x7' x5' x5'                     Gait Training                                       Modalities           "

## 2023-05-16 ENCOUNTER — TELEPHONE (OUTPATIENT)
Dept: UROLOGY | Facility: MEDICAL CENTER | Age: 88
End: 2023-05-16

## 2023-05-16 DIAGNOSIS — R32 URINARY INCONTINENCE, UNSPECIFIED TYPE: Primary | ICD-10-CM

## 2023-05-16 NOTE — TELEPHONE ENCOUNTER
Patient seen By Harley Jesus at [facility]    Patient called stating she is taking miraberon ER 25 mg and its not helping her  She still gets up 4 times or more at night  She is not resting and wants to know if she needs to be seen or have the medication changed to another  Please review and advise      Patient can be reached at [phone]

## 2023-05-16 NOTE — TELEPHONE ENCOUNTER
Spoke with patient and she states she has been experiencing nocturia for the past month or so especially when she does not have to take her BP medicine prior to bedtime  Patient states she has had UTI in the past   Suggested to patient to have UA and urine culture done first to rule out infection  If no infection, then perhaps will get med adjusted or changed to something else  Patient agrees with plan  UA and urine culture orders are placed in chart  Patient will go tomorrow afternoon  Advised her we will monitor for results and contact her in approx 48-72 hr from the time she provided specimen  She verbalized understanding and agrees with plan

## 2023-05-17 ENCOUNTER — OFFICE VISIT (OUTPATIENT)
Dept: PHYSICAL THERAPY | Facility: MEDICAL CENTER | Age: 88
End: 2023-05-17

## 2023-05-17 ENCOUNTER — APPOINTMENT (OUTPATIENT)
Dept: LAB | Facility: MEDICAL CENTER | Age: 88
End: 2023-05-17

## 2023-05-17 DIAGNOSIS — R32 URINARY INCONTINENCE, UNSPECIFIED TYPE: ICD-10-CM

## 2023-05-17 DIAGNOSIS — G89.29 CHRONIC RIGHT SHOULDER PAIN: Primary | ICD-10-CM

## 2023-05-17 DIAGNOSIS — M25.511 CHRONIC RIGHT SHOULDER PAIN: Primary | ICD-10-CM

## 2023-05-17 DIAGNOSIS — R29.898 WEAKNESS OF BOTH LOWER EXTREMITIES: ICD-10-CM

## 2023-05-17 LAB
BACTERIA UR QL AUTO: ABNORMAL /HPF
BILIRUB UR QL STRIP: NEGATIVE
CLARITY UR: CLEAR
COLOR UR: YELLOW
GLUCOSE UR STRIP-MCNC: NEGATIVE MG/DL
HGB UR QL STRIP.AUTO: NEGATIVE
HYALINE CASTS #/AREA URNS LPF: ABNORMAL /LPF
KETONES UR STRIP-MCNC: NEGATIVE MG/DL
LEUKOCYTE ESTERASE UR QL STRIP: NEGATIVE
MUCOUS THREADS UR QL AUTO: ABNORMAL
NITRITE UR QL STRIP: NEGATIVE
NON-SQ EPI CELLS URNS QL MICRO: ABNORMAL /HPF
PH UR STRIP.AUTO: 6 [PH]
PROT UR STRIP-MCNC: ABNORMAL MG/DL
RBC #/AREA URNS AUTO: ABNORMAL /HPF
SP GR UR STRIP.AUTO: 1.02 (ref 1–1.03)
UROBILINOGEN UR STRIP-ACNC: <2 MG/DL
WBC #/AREA URNS AUTO: ABNORMAL /HPF

## 2023-05-17 NOTE — PROGRESS NOTES
"Daily Note     Today's date: 2023  Patient name: Adamaris Stearns  : 1932  MRN: [id number]  Referring provider: Ra Roblero MD  Dx:   Encounter Diagnosis     ICD-10-CM    1  Chronic right shoulder pain  M25 511     G89 29       2  Weakness of both lower extremities  R29 456                      Subjective:   Pt states the weather is really affecting her today, and feels very stiff  Objective: See treatment diary below          Assessment: Tolerated treatment fair  Patient demonstrated fatigue post treatment and would benefit from continued PT  Pt cont to have most difficulty with a-p weight shifting and tends to have LOB posteriorly  Plan: Continue per plan of care        Precautions: GERD, ulcerative colitis, bell's palsy, FM      Manuals 5/1 5/3 5/8 5/10 5/15 5/17       PROM R shoulder BG BG BG                                                 Neuro Re-Ed                          Stand marching   1 5# x2laps 1 5# x2laps 1 5# x3laps 1 5# x4laps       sidestepping  1 5# x2laps 1 5# x2laps 1 5# x2laps 1 5# x3laps 1 5# x4laps       Bal FA EO/EC  x20ea foam x20\"ea Foam x20\"ea foam x20\"ea Foam 20\"ea       Bal FA EO with perts  20\"x2 20\"x2 20\"xea 20\"ea 20\"x1                                  Ther Ex 5/1 5/3 5/8 5/10 5/15 5/17       AAROM wand flex/abd HEP x10ea x10ea x10ea x10ea x10ea       scap retract  5\"x20 5\"x20 5\"x20 5\"x20 5\"x20       pullies  x5' x5' x5' x5' x5'       LAQ  1 5# 5\"x10ea 1 5# 5\"x10ea 1 5# 5\"x10ea 1 5# 5\"x10ea 1 5# 5\"x12ea       HR  x20 x20 x20 x20 x20       Mini squats  nv  x15 x15 x20       Hip add iso  5\"x10 5\"x20 5\"x20 5\"x20 5\"x20       Standing gastroc stretch     20\"x4ea 20'x4ea       Ther Activity             Sit-stand  x5 x7' x5' x5' x5                    Gait Training                                       Modalities                                            "

## 2023-05-18 LAB — BACTERIA UR CULT: NORMAL

## 2023-05-19 DIAGNOSIS — N32.81 OAB (OVERACTIVE BLADDER): Primary | ICD-10-CM

## 2023-05-19 RX ORDER — TROSPIUM CHLORIDE 20 MG/1
20 TABLET, FILM COATED ORAL 2 TIMES DAILY
Qty: 60 TABLET | Refills: 6 | Status: SHIPPED | OUTPATIENT
Start: 2023-05-19 | End: 2023-06-12

## 2023-05-19 NOTE — TELEPHONE ENCOUNTER
Called and spoke to patient  Reviewed new script that was sent in  Advised on how to take it  Patient verbalized understanding

## 2023-05-22 ENCOUNTER — OFFICE VISIT (OUTPATIENT)
Dept: PHYSICAL THERAPY | Facility: MEDICAL CENTER | Age: 88
End: 2023-05-22

## 2023-05-22 DIAGNOSIS — M25.511 CHRONIC RIGHT SHOULDER PAIN: Primary | ICD-10-CM

## 2023-05-22 DIAGNOSIS — G89.29 CHRONIC RIGHT SHOULDER PAIN: Primary | ICD-10-CM

## 2023-05-22 DIAGNOSIS — R29.898 WEAKNESS OF BOTH LOWER EXTREMITIES: ICD-10-CM

## 2023-05-22 NOTE — PROGRESS NOTES
"Daily Note     Today's date: 2023  Patient name: Lisa Martin  : 1932  MRN: [id number]  Referring provider: Gary Fitch MD  Dx:   Encounter Diagnosis     ICD-10-CM    1  Chronic right shoulder pain  M25 511     G89 29       2  Weakness of both lower extremities  R29 898                      Subjective:   Pt states she feels more confident with walking around her house that she isn't going to lose her balance,  Pt also states she can reach higher with her R shoulder when reaching [facility] cabinets  Pt states she does cont to feel the weather affects her body more than anything  Objective: See treatment diary below    R shoulder flexion AROM:  138 and nonpainful          Assessment: Tolerated treatment fair  Patient demonstrated fatigue post treatment and would benefit from continued PT  Discussed with pt making nv her last appt as she feels she is doing well at home and feels ind with HEP  Pt states she feels more fatigued post tx today due to the weather  Plan: Continue per plan of care  D/C after nv       Precautions: GERD, ulcerative colitis, bell's palsy, FM      Manuals 5/1 5/3 5/8 5/10 5/15 5/17 5/22      PROM R shoulder BG BG BG                                                 Neuro Re-Ed                          Stand marching   1 5# x2laps 1 5# x2laps 1 5# x3laps 1 5# x4laps 2# x4laps      sidestepping  1 5# x2laps 1 5# x2laps 1 5# x2laps 1 5# x3laps 1 5# x4laps 2# x4laps      Bal FA EO/EC  x20ea foam x20\"ea Foam x20\"ea foam x20\"ea Foam 20\"ea foam 20\"ea      Bal FA EO with perts  20\"x2 20\"x2 20\"xea 20\"ea 20\"x1  NP                                Ther Ex 5/1 5/3 5/8 5/10 5/15 5/17 5/22      AAROM wand flex/abd HEP x10ea x10ea x10ea x10ea x10ea x10ea      scap retract  5\"x20 5\"x20 5\"x20 5\"x20 5\"x20 5\"x20      pullies  x5' x5' x5' x5' x5'       LAQ  1 5# 5\"x10ea 1 5# 5\"x10ea 1 5# 5\"x10ea 1 5# 5\"x10ea 1 5# 5\"x12ea 2# 5\"x12ea      HR  x20 x20 x20 x20 x20 x20      Mini squats  nv  x15 x15 " "x20 x20      Hip add iso  5\"x10 5\"x20 5\"x20 5\"x20 5\"x20 5\"x20      Standing gastroc stretch     20\"x4ea 20'x4ea 20\"x4ea      Ther Activity             Sit-stand  x5 x7' x5' x5' x5 x5                   Gait Training                                       Modalities                                            "

## 2023-05-24 ENCOUNTER — APPOINTMENT (OUTPATIENT)
Dept: PHYSICAL THERAPY | Facility: MEDICAL CENTER | Age: 88
End: 2023-05-24
Payer: MEDICARE

## 2023-06-02 ENCOUNTER — OFFICE VISIT (OUTPATIENT)
Dept: GASTROENTEROLOGY | Facility: CLINIC | Age: 88
End: 2023-06-02

## 2023-06-02 VITALS
HEIGHT: 62 IN | HEART RATE: 76 BPM | BODY MASS INDEX: 23 KG/M2 | DIASTOLIC BLOOD PRESSURE: 65 MMHG | WEIGHT: 125 LBS | SYSTOLIC BLOOD PRESSURE: 128 MMHG

## 2023-06-02 DIAGNOSIS — K51.919 ULCERATIVE COLITIS WITH COMPLICATION, UNSPECIFIED LOCATION (HCC): Primary | ICD-10-CM

## 2023-06-02 DIAGNOSIS — K21.9 GASTROESOPHAGEAL REFLUX DISEASE, UNSPECIFIED WHETHER ESOPHAGITIS PRESENT: ICD-10-CM

## 2023-06-02 RX ORDER — MESALAMINE 400 MG/1
800 CAPSULE, DELAYED RELEASE ORAL 2 TIMES DAILY
Qty: 120 CAPSULE | Refills: 5 | Status: SHIPPED | OUTPATIENT
Start: 2023-06-02

## 2023-06-02 NOTE — PROGRESS NOTES
[facility] - Outpatient Follow-up Note  Kalpesh Beckham 80 y o  female MRN: [id number]  Encounter: [id number]          ASSESSMENT AND PLAN:      1  Ulcerative colitis with complication, unspecified location [facility])  Patient with history of ulcerative colitis and she has control of symptoms primarily with dietary modifications as well as continue on mesalamine  Recent blood work did reveal normal CMP and would recommend to get blood work in 6 months so advised her to get blood work at the end of the year  CBC CMP and CRP was ordered to be performed around December and we will see her in 1 year for a follow-up  2  Gastroesophageal reflux disease, unspecified whether esophagitis present  Patient with history of GERD controlled on omeprazole and will continue the same    Patient does not want any further GI work-up due to advanced age  Will call if any problems or questions occur in the interim  ______________________________________________________________________    SUBJECTIVE:   This is an 80year-old lady longstanding ulcerative colitis which has very well controlled on 800 mg of mesalamine twice a day    She likewise has gastroesophageal reflux which is controlled on omeprazole 20 mg a day   She has no particular gastrointestinal complaints at this time  Bobby Samuels does not want any more colonoscopies   Most recent C-reactive protein was mildly elevated at 6, CMP was normal, as well as mildly elevated WBCs on CBC but no evidence of anemia in labs from Jan 2023  Patient had colonoscopy last in 2014 which had shown internal hemorrhoids as well as prior partial left colectomy but random biopsies were negative at that time   She currently states she has been doing well from a GI standpoint and she did cut butter out of her diet which seems to help with her symptoms    Her son is her primary caretaker and does her cooking which has helped with her symptoms   She otherwise reports that she takes four mesalamine a day    She currently states that overall her major issue is urinary frequency but bowels are otherwise controlled and she only notices when she has dietary indiscretion  Has been following with cardiology regarding hypertension  Reflux symptoms as above are controlled with the omeprazole  REVIEW OF SYSTEMS IS OTHERWISE NEGATIVE        Historical Information   Past Medical History:   Diagnosis Date   • Arthritis    • Bell's palsy 1940   • Colitis    • GERD (gastroesophageal reflux disease)    • Hemorrhoids    • History of transfusion    • Hx of colonoscopy 1996, 1998, 2000, 2002, 2003, 2007, 2012   • Hypertension    • Ulcerative colitis ([address] )      Past Surgical History:   Procedure Laterality Date   • BACK SURGERY  1971    ruptured disk   • BACK SURGERY  1972    bone fusion lumbar spine   • BLADDER REPAIR  2002   • BLADDER REPAIR  2012   • BREAST SURGERY  2011   • CARDIAC SURGERY  2014    stent   • CARPAL TUNNEL RELEASE  08/14/2014   • CATARACT EXTRACTION Right 2015   • CHOLECYSTECTOMY     • COLONOSCOPY     • EYE SURGERY Left    • GALLBLADDER SURGERY  1995   • GANGLION CYST EXCISION  2004    left wrist   • HERNIA REPAIR  2001   • HERNIA REPAIR  2003   • MASTECTOMY  2012   • OVARY SURGERY  1984   • PARTIAL HYSTERECTOMY  1964   • FL EXC B9 LESION MRGN XCP SK TG T/A/L 0 5 CM/< Left 4/27/2021    Procedure: Excision pyogenic granuloma left ring finger;  Surgeon: Salud Lancaster MD;  [facility];  Service: Orthopedics   • FL EXC LESION TDN SHTH/JT CAPSL HAND/FNGR Right 10/9/2018    Procedure: EXCISION GANGLION CYST RIGHT SMALL FINGER;  Surgeon: Salud Lancaster MD;  Location[facility];  Service: Orthopedics   •  [address] W/RLS TRANSVRS CARPL LIGM Left 8/10/2021    Procedure: Left endoscopic carpal tunnel release;  Surgeon: Salud Lanacster MD;  [facility];  Service: Orthopedics   • FL TENDON SHEATH INCISION Right 4/5/2016    Procedure: INDEX TRIGGER FINGER AND RING TRIGGER FINGER RELEASE ;  Surgeon: Gemma Hollins MD;  [facility];  Service: Orthopedics   • UT TENDON SHEATH INCISION Left 10/16/2018    Procedure: RELEASE TRIGGER FINGER - LEFT INDEX;  Surgeon: Gemma Hollins MD;  [facility];  Service: Orthopedics   • UT TENDON SHEATH INCISION Left 8/10/2021    Procedure: Left long and ring finger trigger finger release;  Surgeon: Gemma Hollins MD;  [facility];  Service: Orthopedics   • SPLENECTOMY, PARTIAL     • TONSILLECTOMY  193   • WRIST SURGERY Right      Social History   Social History     Substance and Sexual Activity   Alcohol Use Yes    Comment: seldom     Social History     Substance and Sexual Activity   Drug Use No     Social History     Tobacco Use   Smoking Status Former   • Types: Cigarettes   • Quit date:    • Years since quittin 4   Smokeless Tobacco Never     Family History   Problem Relation Age of Onset   • Cancer Mother    • Breast cancer Sister        Meds/Allergies       Current Outpatient Medications:   •  acetaminophen (Tylenol 8 Hour) 650 mg CR tablet  •  aspirin (ECOTRIN LOW STRENGTH) 81 mg EC tablet  •  busPIRone (BUSPAR) 10 mg tablet  •  cholecalciferol (VITAMIN D3) 1,000 units tablet  •  cloNIDine (CATAPRES) 0 1 mg tablet  •  diphenoxylate-atropine (LOMOTIL) 2 5-0 025 mg per tablet  •  LUMIGAN 0 01 % ophthalmic drops  •  mesalamine (DELZICOL) 400 mg  •  metoprolol succinate (TOPROL-XL) 100 mg 24 hr tablet  •  montelukast (SINGULAIR) 10 mg tablet  •  Multiple Vitamins-Minerals (PRESERVISION AREDS PO)  •  NITROGLYCERIN PO  •  omeprazole (PriLOSEC) 20 mg delayed release capsule  •  traMADol (ULTRAM) 50 mg tablet  •  triamcinolone (KENALOG) 0 025 % cream  •  triamterene-hydrochlorothiazide (DYAZIDE) 37 5-25 mg per capsule  •  triamterene-hydrochlorothiazide (MAXZIDE-25) 37 5-25 mg per tablet  •  trospium chloride (SANCTURA) 20 mg tablet  •  White Petrolatum-Mineral Oil (artificial "tears)    Allergies   Allergen Reactions   • Zithromax [Azithromycin] Swelling     tongue   • Aspirin Other (See Comments)     Bleeding  Tolerates baby asapirin   • Corn-Containing Products - Food Allergy Other (See Comments)     headache   • Duloxetine Other (See Comments)     Upsets colitis    • Effient [Prasugrel]    • Keflex [Cephalexin] Other (See Comments)     unknow   • Lasix [Furosemide] GI Intolerance   • Lipitor [Atorvastatin]    • Other      liptol and lipol cause GI upset  Adhesive tape  USE PAPER TAPE   • Prednisone Other (See Comments)     \"I go berzerk! \"   • Sulfamethizole Other (See Comments)     unkown   • Latex Rash     Pt cannot remember           Objective     There were no vitals taken for this visit  There is no height or weight on file to calculate BMI  PHYSICAL EXAM:      General Appearance:   Alert, cooperative, no distress   HEENT:   Normocephalic, atraumatic, anicteric      Neck:  Supple, symmetrical, trachea midline   Lungs:   Clear to auscultation bilaterally; no rales, rhonchi or wheezing; respirations unlabored    Heart[de-identified]   Regular rate and rhythm; no murmur, rub, or gallop  Abdomen:   Soft, non-tender, non-distended; normal bowel sounds; no masses, no organomegaly    Genitalia:   Deferred    Rectal:   Deferred    Extremities:  No cyanosis, clubbing or edema    Pulses:  2+ and symmetric    Skin:  No jaundice, rashes, or lesions    Lymph nodes:  No palpable cervical lymphadenopathy        Lab Results:   No visits with results within 1 Day(s) from this visit  Latest known visit with results is:   Appointment on 05/17/2023   Component Date Value   • Urine Culture 05/17/2023 10,000-19,000 cfu/ml          Radiology Results:   No results found    "

## 2023-06-10 DIAGNOSIS — N32.81 OAB (OVERACTIVE BLADDER): ICD-10-CM

## 2023-06-12 RX ORDER — TROSPIUM CHLORIDE 20 MG/1
TABLET, FILM COATED ORAL
Qty: 180 TABLET | Refills: 3 | Status: SHIPPED | OUTPATIENT
Start: 2023-06-12 | End: 2023-06-16 | Stop reason: SINTOL

## 2023-08-04 ENCOUNTER — TELEPHONE (OUTPATIENT)
Dept: UROLOGY | Facility: AMBULATORY SURGERY CENTER | Age: 88
End: 2023-08-04

## 2023-08-04 NOTE — TELEPHONE ENCOUNTER
Pt under care of: Kendirck     Pt calling due to:    Patient calling in stating she received a yearly follow up card in the mail. She wanted a message sent to the office stating she would not like to schedule any follow ups at this time. She will follow up as needed. If anything is needed from the patient her contact information is below.      Pt can be reached at: [phone]

## 2023-12-14 ENCOUNTER — TELEPHONE (OUTPATIENT)
Dept: GASTROENTEROLOGY | Facility: CLINIC | Age: 88
End: 2023-12-14

## 2023-12-14 NOTE — TELEPHONE ENCOUNTER
Left message on machine to call office back to reschedule appointment due to Allie's change in schedule

## 2024-01-24 ENCOUNTER — APPOINTMENT (OUTPATIENT)
Dept: LAB | Facility: MEDICAL CENTER | Age: 89
End: 2024-01-24
Payer: MEDICARE

## 2024-01-24 DIAGNOSIS — K51.919 ULCERATIVE COLITIS WITH COMPLICATION, UNSPECIFIED LOCATION (HCC): ICD-10-CM

## 2024-01-24 LAB
ALBUMIN SERPL BCP-MCNC: 4.3 G/DL (ref 3.5–5)
ALP SERPL-CCNC: 116 U/L (ref 34–104)
ALT SERPL W P-5'-P-CCNC: 17 U/L (ref 7–52)
ANION GAP SERPL CALCULATED.3IONS-SCNC: 8 MMOL/L
AST SERPL W P-5'-P-CCNC: 20 U/L (ref 13–39)
BASOPHILS # BLD AUTO: 0.03 THOUSANDS/ÂΜL (ref 0–0.1)
BASOPHILS NFR BLD AUTO: 0 % (ref 0–1)
BILIRUB SERPL-MCNC: 0.51 MG/DL (ref 0.2–1)
BUN SERPL-MCNC: 16 MG/DL (ref 5–25)
CALCIUM SERPL-MCNC: 10.1 MG/DL (ref 8.4–10.2)
CHLORIDE SERPL-SCNC: 102 MMOL/L (ref 96–108)
CO2 SERPL-SCNC: 31 MMOL/L (ref 21–32)
CREAT SERPL-MCNC: 0.49 MG/DL (ref 0.6–1.3)
CRP SERPL QL: 6.6 MG/L
EOSINOPHIL # BLD AUTO: 0.23 THOUSAND/ÂΜL (ref 0–0.61)
EOSINOPHIL NFR BLD AUTO: 2 % (ref 0–6)
ERYTHROCYTE [DISTWIDTH] IN BLOOD BY AUTOMATED COUNT: 15.1 % (ref 11.6–15.1)
GFR SERPL CREATININE-BSD FRML MDRD: 85 ML/MIN/1.73SQ M
GLUCOSE P FAST SERPL-MCNC: 83 MG/DL (ref 65–99)
HCT VFR BLD AUTO: 41.4 % (ref 34.8–46.1)
HGB BLD-MCNC: 13.9 G/DL (ref 11.5–15.4)
IMM GRANULOCYTES # BLD AUTO: 0.02 THOUSAND/UL (ref 0–0.2)
IMM GRANULOCYTES NFR BLD AUTO: 0 % (ref 0–2)
LYMPHOCYTES # BLD AUTO: 4.33 THOUSANDS/ÂΜL (ref 0.6–4.47)
LYMPHOCYTES NFR BLD AUTO: 42 % (ref 14–44)
MCH RBC QN AUTO: 33 PG (ref 26.8–34.3)
MCHC RBC AUTO-ENTMCNC: 33.6 G/DL (ref 31.4–37.4)
MCV RBC AUTO: 98 FL (ref 82–98)
MONOCYTES # BLD AUTO: 0.93 THOUSAND/ÂΜL (ref 0.17–1.22)
MONOCYTES NFR BLD AUTO: 9 % (ref 4–12)
NEUTROPHILS # BLD AUTO: 4.68 THOUSANDS/ÂΜL (ref 1.85–7.62)
NEUTS SEG NFR BLD AUTO: 47 % (ref 43–75)
NRBC BLD AUTO-RTO: 0 /100 WBCS
PLATELET # BLD AUTO: 490 THOUSANDS/UL (ref 149–390)
PMV BLD AUTO: 11 FL (ref 8.9–12.7)
POTASSIUM SERPL-SCNC: 3.5 MMOL/L (ref 3.5–5.3)
PROT SERPL-MCNC: 7.6 G/DL (ref 6.4–8.4)
RBC # BLD AUTO: 4.21 MILLION/UL (ref 3.81–5.12)
SODIUM SERPL-SCNC: 141 MMOL/L (ref 135–147)
WBC # BLD AUTO: 10.22 THOUSAND/UL (ref 4.31–10.16)

## 2024-01-24 PROCEDURE — 80053 COMPREHEN METABOLIC PANEL: CPT

## 2024-01-24 PROCEDURE — 85025 COMPLETE CBC W/AUTO DIFF WBC: CPT

## 2024-01-24 PROCEDURE — 86140 C-REACTIVE PROTEIN: CPT

## 2024-01-24 PROCEDURE — 36415 COLL VENOUS BLD VENIPUNCTURE: CPT

## 2024-01-25 DIAGNOSIS — R74.8 ELEVATED ALKALINE PHOSPHATASE LEVEL: Primary | ICD-10-CM

## 2024-02-07 ENCOUNTER — OFFICE VISIT (OUTPATIENT)
Dept: GASTROENTEROLOGY | Facility: CLINIC | Age: 89
End: 2024-02-07
Payer: MEDICARE

## 2024-02-07 VITALS
WEIGHT: 123 LBS | HEART RATE: 63 BPM | DIASTOLIC BLOOD PRESSURE: 72 MMHG | HEIGHT: 62 IN | SYSTOLIC BLOOD PRESSURE: 139 MMHG | BODY MASS INDEX: 22.63 KG/M2

## 2024-02-07 DIAGNOSIS — K21.9 GASTROESOPHAGEAL REFLUX DISEASE, UNSPECIFIED WHETHER ESOPHAGITIS PRESENT: ICD-10-CM

## 2024-02-07 DIAGNOSIS — R74.8 ELEVATED ALKALINE PHOSPHATASE LEVEL: ICD-10-CM

## 2024-02-07 DIAGNOSIS — K51.919 ULCERATIVE COLITIS WITH COMPLICATION, UNSPECIFIED LOCATION (HCC): Primary | ICD-10-CM

## 2024-02-07 PROCEDURE — 99213 OFFICE O/P EST LOW 20 MIN: CPT | Performed by: PHYSICIAN ASSISTANT

## 2024-02-07 RX ORDER — AMLODIPINE BESYLATE 5 MG/1
5 TABLET ORAL
COMMUNITY
Start: 2023-11-20

## 2024-02-07 NOTE — PROGRESS NOTES
[facility] - Outpatient Follow-up Note  Helen M Schwab 89 y.o. female MRN: [id number]  Encounter: [id number]          ASSESSMENT AND PLAN:      1. Ulcerative colitis with complication, unspecified location (HCC)  Patient with history of ulcerative colitis and symptoms have been controlled on mesalamine and she takes anywhere from 2 to 4 tablets a day    2. Gastroesophageal reflux disease, unspecified whether esophagitis present  History of GERD and symptoms are controlled with omeprazole 20 mg daily    3. Elevated alkaline phosphatase level  Patient with elevated alkaline phosphatase of 116 and will repeat with next blood work with isoenzymes    We will see in 1 year but will call with recommendations and if persistent alk phos elevation may need further imaging of the liver as hepatic component is elevated    ______________________________________________________________________    SUBJECTIVE:    This is an 89-year-old female with longstanding ulcerative colitis which has very well controlled on 800 mg of mesalamine twice a day.   She likewise has gastroesophageal reflux which is controlled on omeprazole 20 mg a day.  She has no particular gastrointestinal complaints at this time.  She does not want any more colonoscopies.  Most recent C-reactive protein was mildly elevated at 6.6, CMP showed mildly elevated ALPl, as well as mildly elevated WBCs on CBC but no evidence of anemia in labs from Jan 2024. Patient had colonoscopy last in 2014 which had shown internal hemorrhoids as well as prior partial left colectomy but random biopsies were negative at that time.  She currently states she has been doing well from a GI standpoint and she did cut butter out of her diet which seems to help with her symptoms.  Her son is her primary caretaker and does her cooking which has helped with her symptoms.      She currently states she is feeling well from a colitis standpoint she reports that she is  taking anywhere from 2-4. She reports that she was having urinary frequency which has improved mesalamine a day.  She also reports the blood pressure is becoming more controlled.  Weight has been stable        REVIEW OF SYSTEMS IS OTHERWISE NEGATIVE.      Historical Information   Past Medical History:   Diagnosis Date    Arthritis     Bell's palsy 1940    Colitis     GERD (gastroesophageal reflux disease)     Hemorrhoids     History of transfusion     Hx of colonoscopy 1996, 1998, 2000, 2002, 2003, 2007, 2012    Hypertension     Ulcerative colitis (HCC)      Past Surgical History:   Procedure Laterality Date    BACK SURGERY  1971    ruptured disk    BACK SURGERY  1972    bone fusion lumbar spine    BLADDER REPAIR  2002    BLADDER REPAIR  2012    BREAST SURGERY  2011    CARDIAC SURGERY  2014    stent    CARPAL TUNNEL RELEASE  08/14/2014    CATARACT EXTRACTION Right 2015    CHOLECYSTECTOMY      COLONOSCOPY      EYE SURGERY Left     GALLBLADDER SURGERY  1995    GANGLION CYST EXCISION  2004    left wrist    HERNIA REPAIR  2001    HERNIA REPAIR  2003    MASTECTOMY  2012    OVARY SURGERY  1984    PARTIAL HYSTERECTOMY  1964    OR EXC B9 LESION MRGN XCP SK TG T/A/L 0.5 CM/< Left 4/27/2021    Procedure: Excision pyogenic granuloma left ring finger;  Surgeon: Shane Lincoln MD;  Location: [facility];  Service: Orthopedics    OR EXC LESION TDN SHTH/JT CAPSL HAND/FNGR Right 10/9/2018    Procedure: EXCISION GANGLION CYST RIGHT SMALL FINGER;  Surgeon: Shane Lincoln MD;  Location: [facility];  Service: Orthopedics    OR NDSC WRST SURG W/RLS TRANSVRS CARPL LIGM Left 8/10/2021    Procedure: Left endoscopic carpal tunnel release;  Surgeon: Shane Lincoln MD;  Location: [facility];  Service: Orthopedics    OR TENDON SHEATH INCISION Right 4/5/2016    Procedure: INDEX TRIGGER FINGER AND RING TRIGGER FINGER RELEASE ;  Surgeon: Shane Lincoln MD;  Location: [facility];  Service: Orthopedics    OR TENDON SHEATH INCISION  Left 10/16/2018    Procedure: RELEASE TRIGGER FINGER - LEFT INDEX;  Surgeon: Shane Lincoln MD;  Location: [facility];  Service: Orthopedics    [place] TENDON SHEATH INCISION Left 8/10/2021    Procedure: Left long and ring finger trigger finger release;  Surgeon: Shane Lincoln MD;  Location: [facility];  Service: Orthopedics    SPLENECTOMY, PARTIAL  1995    TONSILLECTOMY  1939    WRIST SURGERY Right      Social History   Social History     Substance and Sexual Activity   Alcohol Use Yes    Comment: seldom     Social History     Substance and Sexual Activity   Drug Use No     Social History     Tobacco Use   Smoking Status Former    Current packs/day: 0.00    Types: Cigarettes    Quit date:     Years since quittin.1   Smokeless Tobacco Never     Family History   Problem Relation Age of Onset    Cancer Mother     Breast cancer Sister        Meds/Allergies       Current Outpatient Medications:     acetaminophen (Tylenol 8 Hour) 650 mg CR tablet    aspirin (ECOTRIN LOW STRENGTH) 81 mg EC tablet    busPIRone (BUSPAR) 10 mg tablet    cholecalciferol (VITAMIN D3) 1,000 units tablet    cloNIDine (CATAPRES) 0.1 mg tablet    diphenoxylate-atropine (LOMOTIL) 2.5-0.025 mg per tablet    LUMIGAN 0.01 % ophthalmic drops    mesalamine (DELZICOL) 400 mg    metoprolol succinate (TOPROL-XL) 100 mg 24 hr tablet    montelukast (SINGULAIR) 10 mg tablet    Multiple Vitamins-Minerals (PRESERVISION AREDS PO)    NITROGLYCERIN PO    omeprazole (PriLOSEC) 20 mg delayed release capsule    traMADol (ULTRAM) 50 mg tablet    triamcinolone (KENALOG) 0.025 % cream    triamterene-hydrochlorothiazide (DYAZIDE) 37.5-25 mg per capsule    triamterene-hydrochlorothiazide (MAXZIDE-25) 37.5-25 mg per tablet    White Petrolatum-Mineral Oil (artificial tears)    Allergies   Allergen Reactions    Zithromax [Azithromycin] Swelling     tongue    Aspirin Other (See Comments)     Bleeding  Tolerates baby asapirin    Corn-Containing Products - Food  "Allergy Other (See Comments)     headache    Duloxetine Other (See Comments)     Upsets colitis     Effient [Prasugrel]     Keflex [Cephalexin] Other (See Comments)     unknow    Lasix [Furosemide] GI Intolerance    Lipitor [Atorvastatin]     Other      liptol and lipol cause GI upset  Adhesive tape  USE PAPER TAPE    Prednisone Other (See Comments)     \"I go berzerk!\"    Sulfamethizole Other (See Comments)     unkown    Latex Rash     Pt cannot remember           Objective     There were no vitals taken for this visit. There is no height or weight on file to calculate BMI.      PHYSICAL EXAM:      General Appearance:   Alert, cooperative, no distress   HEENT:   Normocephalic, atraumatic, anicteric.     Neck:  Supple, symmetrical, trachea midline   Lungs:   Clear to auscultation bilaterally; no rales, rhonchi or wheezing; respirations unlabored    Heart::   Regular rate and rhythm; no murmur, rub, or gallop.   Abdomen:   Soft, non-tender, non-distended; normal bowel sounds; no masses, no organomegaly    Genitalia:   Deferred    Rectal:   Deferred    Extremities:  No cyanosis, clubbing or edema    Pulses:  2+ and symmetric    Skin:  No jaundice, rashes, or lesions    Lymph nodes:  No palpable cervical lymphadenopathy        Lab Results:   No visits with results within 1 Day(s) from this visit.   Latest known visit with results is:   Appointment on 01/24/2024   Component Date Value    WBC 01/24/2024 10.22 (H)     RBC 01/24/2024 4.21     Hemoglobin 01/24/2024 13.9     Hematocrit 01/24/2024 41.4     MCV 01/24/2024 98     MCH 01/24/2024 33.0     MCHC 01/24/2024 33.6     RDW 01/24/2024 15.1     MPV 01/24/2024 11.0     Platelets 01/24/2024 490 (H)     nRBC 01/24/2024 0     Neutrophils Relative 01/24/2024 47     Immat GRANS % 01/24/2024 0     Lymphocytes Relative 01/24/2024 42     Monocytes Relative 01/24/2024 9     Eosinophils Relative 01/24/2024 2     Basophils Relative 01/24/2024 0     Neutrophils Absolute 01/24/2024 " 4.68     Immature Grans Absolute 01/24/2024 0.02     Lymphocytes Absolute 01/24/2024 4.33     Monocytes Absolute 01/24/2024 0.93     Eosinophils Absolute 01/24/2024 0.23     Basophils Absolute 01/24/2024 0.03     Sodium 01/24/2024 141     Potassium 01/24/2024 3.5     Chloride 01/24/2024 102     CO2 01/24/2024 31     ANION GAP 01/24/2024 8     BUN 01/24/2024 16     Creatinine 01/24/2024 0.49 (L)     Glucose, Fasting 01/24/2024 83     Calcium 01/24/2024 10.1     AST 01/24/2024 20     ALT 01/24/2024 17     Alkaline Phosphatase 01/24/2024 116 (H)     Total Protein 01/24/2024 7.6     Albumin 01/24/2024 4.3     Total Bilirubin 01/24/2024 0.51     eGFR 01/24/2024 85     CRP 01/24/2024 6.6 (H)          Radiology Results:   No results found.

## 2024-02-16 ENCOUNTER — APPOINTMENT (OUTPATIENT)
Dept: LAB | Facility: MEDICAL CENTER | Age: 89
End: 2024-02-16
Payer: MEDICARE

## 2024-02-16 DIAGNOSIS — R74.8 ELEVATED ALKALINE PHOSPHATASE LEVEL: ICD-10-CM

## 2024-02-16 PROCEDURE — 36415 COLL VENOUS BLD VENIPUNCTURE: CPT

## 2024-02-16 PROCEDURE — 84075 ASSAY ALKALINE PHOSPHATASE: CPT

## 2024-02-16 PROCEDURE — 84080 ASSAY ALKALINE PHOSPHATASES: CPT

## 2024-02-21 PROBLEM — N39.0 RECURRENT UTI: Status: RESOLVED | Noted: 2021-11-09 | Resolved: 2024-02-21

## 2024-02-22 LAB
ALP BONE CFR SERPL: 40 % (ref 14–68)
ALP INTEST CFR SERPL: 7 % (ref 0–18)
ALP LIVER CFR SERPL: 53 % (ref 18–85)
ALP SERPL-CCNC: 107 IU/L (ref 44–121)

## 2024-03-13 ENCOUNTER — HOSPITAL ENCOUNTER (OUTPATIENT)
Dept: ULTRASOUND IMAGING | Facility: HOSPITAL | Age: 89
Discharge: HOME | End: 2024-03-13
Payer: MEDICARE

## 2024-03-13 DIAGNOSIS — R74.01 ELEVATION OF LEVELS OF LIVER TRANSAMINASE LEVELS: ICD-10-CM

## 2024-03-13 PROCEDURE — 76705 ECHO EXAM OF ABDOMEN: CPT

## 2024-04-10 DIAGNOSIS — K51.919 ULCERATIVE COLITIS WITH COMPLICATION, UNSPECIFIED LOCATION (HCC): ICD-10-CM

## 2024-04-10 RX ORDER — MESALAMINE 400 MG/1
800 CAPSULE, DELAYED RELEASE ORAL 2 TIMES DAILY
Qty: 120 CAPSULE | Refills: 5 | Status: SHIPPED | OUTPATIENT
Start: 2024-04-10

## 2024-08-08 ENCOUNTER — APPOINTMENT (OUTPATIENT)
Dept: LAB | Facility: MEDICAL CENTER | Age: 89
End: 2024-08-08
Payer: MEDICARE

## 2024-08-08 DIAGNOSIS — M25.50 PAIN IN JOINT, MULTIPLE SITES: ICD-10-CM

## 2024-08-08 DIAGNOSIS — Z79.899 ENCOUNTER FOR LONG-TERM (CURRENT) USE OF OTHER MEDICATIONS: ICD-10-CM

## 2024-08-08 DIAGNOSIS — E78.2 MIXED HYPERLIPIDEMIA: ICD-10-CM

## 2024-08-08 LAB
CRP SERPL QL: 5.2 MG/L
ERYTHROCYTE [SEDIMENTATION RATE] IN BLOOD: 29 MM/HOUR (ref 0–29)
MAGNESIUM SERPL-MCNC: 1.9 MG/DL (ref 1.9–2.7)

## 2024-08-08 PROCEDURE — 85652 RBC SED RATE AUTOMATED: CPT

## 2024-08-08 PROCEDURE — 36415 COLL VENOUS BLD VENIPUNCTURE: CPT

## 2024-08-08 PROCEDURE — 86140 C-REACTIVE PROTEIN: CPT

## 2024-08-08 PROCEDURE — 83735 ASSAY OF MAGNESIUM: CPT

## 2024-12-22 ENCOUNTER — APPOINTMENT (EMERGENCY)
Dept: CT IMAGING | Facility: HOSPITAL | Age: 89
DRG: 155 | End: 2024-12-22
Payer: MEDICARE

## 2024-12-22 ENCOUNTER — HOSPITAL ENCOUNTER (INPATIENT)
Facility: HOSPITAL | Age: 89
LOS: 9 days | Discharge: HOME HEALTH | DRG: 155 | End: 2024-12-31
Attending: EMERGENCY MEDICINE | Admitting: INTERNAL MEDICINE
Payer: MEDICARE

## 2024-12-22 DIAGNOSIS — I33.0 VEGETATION OF HEART VALVE: ICD-10-CM

## 2024-12-22 DIAGNOSIS — L03.90 CELLULITIS: Primary | ICD-10-CM

## 2024-12-22 DIAGNOSIS — I10 HYPERTENSION: ICD-10-CM

## 2024-12-22 DIAGNOSIS — M60.9 MYOSITIS: ICD-10-CM

## 2024-12-22 DIAGNOSIS — R26.2 AMBULATORY DYSFUNCTION: ICD-10-CM

## 2024-12-22 DIAGNOSIS — K11.20 PAROTITIS: ICD-10-CM

## 2024-12-22 PROBLEM — M79.7 FIBROMYALGIA: Status: ACTIVE | Noted: 2024-12-22

## 2024-12-22 LAB
ALBUMIN SERPL BCG-MCNC: 4.2 G/DL (ref 3.5–5)
ALP SERPL-CCNC: 70 U/L (ref 34–104)
ALT SERPL W P-5'-P-CCNC: 9 U/L (ref 7–52)
ANION GAP SERPL CALCULATED.3IONS-SCNC: 10 MMOL/L (ref 4–13)
APTT PPP: 33 SECONDS (ref 23–34)
AST SERPL W P-5'-P-CCNC: 14 U/L (ref 13–39)
ATRIAL RATE: 70 BPM
BACTERIA UR QL AUTO: ABNORMAL /HPF
BASOPHILS # BLD MANUAL: 0 THOUSAND/UL (ref 0–0.1)
BASOPHILS NFR MAR MANUAL: 0 % (ref 0–1)
BILIRUB SERPL-MCNC: 1.02 MG/DL (ref 0.2–1)
BILIRUB UR QL STRIP: NEGATIVE
BUN SERPL-MCNC: 14 MG/DL (ref 5–25)
CALCIUM SERPL-MCNC: 9.6 MG/DL (ref 8.4–10.2)
CHLORIDE SERPL-SCNC: 100 MMOL/L (ref 96–108)
CLARITY UR: CLEAR
CO2 SERPL-SCNC: 27 MMOL/L (ref 21–32)
COLOR UR: ABNORMAL
CREAT SERPL-MCNC: 0.44 MG/DL (ref 0.6–1.3)
EOSINOPHIL # BLD MANUAL: 0 THOUSAND/UL (ref 0–0.4)
EOSINOPHIL NFR BLD MANUAL: 0 % (ref 0–6)
ERYTHROCYTE [DISTWIDTH] IN BLOOD BY AUTOMATED COUNT: 14.5 % (ref 11.6–15.1)
GFR SERPL CREATININE-BSD FRML MDRD: 87 ML/MIN/1.73SQ M
GLUCOSE SERPL-MCNC: 115 MG/DL (ref 65–140)
GLUCOSE UR STRIP-MCNC: NEGATIVE MG/DL
HCT VFR BLD AUTO: 39.9 % (ref 34.8–46.1)
HGB BLD-MCNC: 13.6 G/DL (ref 11.5–15.4)
HGB UR QL STRIP.AUTO: NEGATIVE
INR PPP: 0.97 (ref 0.85–1.19)
KETONES UR STRIP-MCNC: ABNORMAL MG/DL
LACTATE SERPL-SCNC: 1.6 MMOL/L (ref 0.5–2)
LEUKOCYTE ESTERASE UR QL STRIP: NEGATIVE
LYMPHOCYTES # BLD AUTO: 14 % (ref 14–44)
LYMPHOCYTES # BLD AUTO: 3.42 THOUSAND/UL (ref 0.6–4.47)
MCH RBC QN AUTO: 32.4 PG (ref 26.8–34.3)
MCHC RBC AUTO-ENTMCNC: 34.1 G/DL (ref 31.4–37.4)
MCV RBC AUTO: 95 FL (ref 82–98)
MONOCYTES # BLD AUTO: 2.68 THOUSAND/UL (ref 0–1.22)
MONOCYTES NFR BLD: 11 % (ref 4–12)
NEUTROPHILS # BLD MANUAL: 18.3 THOUSAND/UL (ref 1.85–7.62)
NEUTS SEG NFR BLD AUTO: 75 % (ref 43–75)
NITRITE UR QL STRIP: POSITIVE
NON-SQ EPI CELLS URNS QL MICRO: ABNORMAL /HPF
P AXIS: 69 DEGREES
PH UR STRIP.AUTO: 7 [PH]
PLATELET # BLD AUTO: 375 THOUSANDS/UL (ref 149–390)
PLATELET BLD QL SMEAR: ADEQUATE
PMV BLD AUTO: 10.5 FL (ref 8.9–12.7)
POTASSIUM SERPL-SCNC: 3.3 MMOL/L (ref 3.5–5.3)
PR INTERVAL: 160 MS
PROCALCITONIN SERPL-MCNC: <0.05 NG/ML
PROT SERPL-MCNC: 7.2 G/DL (ref 6.4–8.4)
PROT UR STRIP-MCNC: ABNORMAL MG/DL
PROTHROMBIN TIME: 13.6 SECONDS (ref 12.3–15)
QRS AXIS: 77 DEGREES
QRSD INTERVAL: 84 MS
QT INTERVAL: 384 MS
QTC INTERVAL: 414 MS
RBC # BLD AUTO: 4.2 MILLION/UL (ref 3.81–5.12)
RBC #/AREA URNS AUTO: ABNORMAL /HPF
RBC MORPH BLD: NORMAL
SODIUM SERPL-SCNC: 137 MMOL/L (ref 135–147)
SP GR UR STRIP.AUTO: 1.04 (ref 1–1.03)
T WAVE AXIS: 57 DEGREES
TOXIC GRANULES BLD QL SMEAR: PRESENT
UROBILINOGEN UR STRIP-ACNC: <2 MG/DL
VENTRICULAR RATE: 70 BPM
WBC # BLD AUTO: 24.4 THOUSAND/UL (ref 4.31–10.16)
WBC #/AREA URNS AUTO: ABNORMAL /HPF

## 2024-12-22 PROCEDURE — 93005 ELECTROCARDIOGRAM TRACING: CPT

## 2024-12-22 PROCEDURE — 96361 HYDRATE IV INFUSION ADD-ON: CPT

## 2024-12-22 PROCEDURE — 85027 COMPLETE CBC AUTOMATED: CPT | Performed by: EMERGENCY MEDICINE

## 2024-12-22 PROCEDURE — 80053 COMPREHEN METABOLIC PANEL: CPT | Performed by: EMERGENCY MEDICINE

## 2024-12-22 PROCEDURE — 96375 TX/PRO/DX INJ NEW DRUG ADDON: CPT

## 2024-12-22 PROCEDURE — 87186 SC STD MICRODIL/AGAR DIL: CPT | Performed by: EMERGENCY MEDICINE

## 2024-12-22 PROCEDURE — 85730 THROMBOPLASTIN TIME PARTIAL: CPT | Performed by: EMERGENCY MEDICINE

## 2024-12-22 PROCEDURE — 99221 1ST HOSP IP/OBS SF/LOW 40: CPT | Performed by: OTOLARYNGOLOGY

## 2024-12-22 PROCEDURE — 87154 CUL TYP ID BLD PTHGN 6+ TRGT: CPT | Performed by: EMERGENCY MEDICINE

## 2024-12-22 PROCEDURE — 70491 CT SOFT TISSUE NECK W/DYE: CPT

## 2024-12-22 PROCEDURE — 99284 EMERGENCY DEPT VISIT MOD MDM: CPT

## 2024-12-22 PROCEDURE — 96365 THER/PROPH/DIAG IV INF INIT: CPT

## 2024-12-22 PROCEDURE — 83605 ASSAY OF LACTIC ACID: CPT | Performed by: EMERGENCY MEDICINE

## 2024-12-22 PROCEDURE — 99285 EMERGENCY DEPT VISIT HI MDM: CPT | Performed by: EMERGENCY MEDICINE

## 2024-12-22 PROCEDURE — 81001 URINALYSIS AUTO W/SCOPE: CPT | Performed by: EMERGENCY MEDICINE

## 2024-12-22 PROCEDURE — 87040 BLOOD CULTURE FOR BACTERIA: CPT | Performed by: EMERGENCY MEDICINE

## 2024-12-22 PROCEDURE — 99223 1ST HOSP IP/OBS HIGH 75: CPT | Performed by: INTERNAL MEDICINE

## 2024-12-22 PROCEDURE — 85610 PROTHROMBIN TIME: CPT | Performed by: EMERGENCY MEDICINE

## 2024-12-22 PROCEDURE — 36415 COLL VENOUS BLD VENIPUNCTURE: CPT | Performed by: EMERGENCY MEDICINE

## 2024-12-22 PROCEDURE — 85007 BL SMEAR W/DIFF WBC COUNT: CPT | Performed by: EMERGENCY MEDICINE

## 2024-12-22 PROCEDURE — 84145 PROCALCITONIN (PCT): CPT | Performed by: EMERGENCY MEDICINE

## 2024-12-22 RX ORDER — METRONIDAZOLE 500 MG/100ML
500 INJECTION, SOLUTION INTRAVENOUS EVERY 8 HOURS
Status: DISCONTINUED | OUTPATIENT
Start: 2024-12-22 | End: 2024-12-22

## 2024-12-22 RX ORDER — ENOXAPARIN SODIUM 100 MG/ML
40 INJECTION SUBCUTANEOUS
Status: DISCONTINUED | OUTPATIENT
Start: 2024-12-22 | End: 2024-12-31 | Stop reason: HOSPADM

## 2024-12-22 RX ORDER — METHYLPREDNISOLONE 4 MG/1
2 TABLET ORAL
Status: DISCONTINUED | OUTPATIENT
Start: 2024-12-23 | End: 2024-12-31 | Stop reason: HOSPADM

## 2024-12-22 RX ORDER — ACETAMINOPHEN 10 MG/ML
1000 INJECTION, SOLUTION INTRAVENOUS ONCE
Status: COMPLETED | OUTPATIENT
Start: 2024-12-22 | End: 2024-12-22

## 2024-12-22 RX ORDER — MESALAMINE 400 MG/1
800 CAPSULE, DELAYED RELEASE ORAL 2 TIMES DAILY
Status: DISCONTINUED | OUTPATIENT
Start: 2024-12-22 | End: 2024-12-31 | Stop reason: HOSPADM

## 2024-12-22 RX ORDER — BIMATOPROST 0.3 MG/ML
1 SOLUTION/ DROPS OPHTHALMIC DAILY
Status: DISCONTINUED | OUTPATIENT
Start: 2024-12-22 | End: 2024-12-31 | Stop reason: HOSPADM

## 2024-12-22 RX ORDER — PANTOPRAZOLE SODIUM 40 MG/1
40 TABLET, DELAYED RELEASE ORAL
Status: DISCONTINUED | OUTPATIENT
Start: 2024-12-23 | End: 2024-12-31 | Stop reason: HOSPADM

## 2024-12-22 RX ORDER — ACETAMINOPHEN 325 MG/1
975 TABLET ORAL EVERY 8 HOURS SCHEDULED
Status: DISCONTINUED | OUTPATIENT
Start: 2024-12-22 | End: 2024-12-22

## 2024-12-22 RX ORDER — POTASSIUM CHLORIDE 20MEQ/15ML
20 LIQUID (ML) ORAL ONCE
Status: DISCONTINUED | OUTPATIENT
Start: 2024-12-22 | End: 2024-12-22

## 2024-12-22 RX ORDER — POTASSIUM CHLORIDE 1500 MG/1
20 TABLET, EXTENDED RELEASE ORAL ONCE
Status: COMPLETED | OUTPATIENT
Start: 2024-12-22 | End: 2024-12-22

## 2024-12-22 RX ORDER — IBUPROFEN 400 MG/1
400 TABLET, FILM COATED ORAL EVERY 6 HOURS PRN
Status: DISCONTINUED | OUTPATIENT
Start: 2024-12-22 | End: 2024-12-31 | Stop reason: HOSPADM

## 2024-12-22 RX ORDER — ACETAMINOPHEN 325 MG/1
975 TABLET ORAL EVERY 8 HOURS SCHEDULED
Status: DISCONTINUED | OUTPATIENT
Start: 2024-12-22 | End: 2024-12-31 | Stop reason: HOSPADM

## 2024-12-22 RX ORDER — ASPIRIN 81 MG/1
81 TABLET ORAL DAILY
Status: DISCONTINUED | OUTPATIENT
Start: 2024-12-22 | End: 2024-12-31 | Stop reason: HOSPADM

## 2024-12-22 RX ORDER — METOPROLOL SUCCINATE 50 MG/1
50 TABLET, EXTENDED RELEASE ORAL DAILY
Status: DISCONTINUED | OUTPATIENT
Start: 2024-12-23 | End: 2024-12-31 | Stop reason: HOSPADM

## 2024-12-22 RX ADMIN — Medication 5000 UNITS: at 14:47

## 2024-12-22 RX ADMIN — SODIUM CHLORIDE 250 ML: 0.9 INJECTION, SOLUTION INTRAVENOUS at 11:08

## 2024-12-22 RX ADMIN — SODIUM CHLORIDE 250 ML: 0.9 INJECTION, SOLUTION INTRAVENOUS at 12:42

## 2024-12-22 RX ADMIN — PIPERACILLIN SODIUM AND TAZOBACTAM SODIUM 4.5 G: 36; 4.5 INJECTION, POWDER, LYOPHILIZED, FOR SOLUTION INTRAVENOUS at 11:06

## 2024-12-22 RX ADMIN — VANCOMYCIN HYDROCHLORIDE 1500 MG: 5 INJECTION, POWDER, LYOPHILIZED, FOR SOLUTION INTRAVENOUS at 13:39

## 2024-12-22 RX ADMIN — IBUPROFEN 400 MG: 400 TABLET, FILM COATED ORAL at 18:12

## 2024-12-22 RX ADMIN — IOHEXOL 85 ML: 350 INJECTION, SOLUTION INTRAVENOUS at 11:32

## 2024-12-22 RX ADMIN — ASPIRIN 81 MG: 81 TABLET, COATED ORAL at 14:38

## 2024-12-22 RX ADMIN — BIMATOPROST 1 DROP: 0.3 SOLUTION/ DROPS OPHTHALMIC at 14:39

## 2024-12-22 RX ADMIN — AMPICILLIN SODIUM AND SULBACTAM SODIUM 3 G: 100; 50 INJECTION, POWDER, FOR SOLUTION INTRAVENOUS at 18:03

## 2024-12-22 RX ADMIN — POTASSIUM CHLORIDE 20 MEQ: 1500 TABLET, EXTENDED RELEASE ORAL at 14:48

## 2024-12-22 RX ADMIN — ENOXAPARIN SODIUM 40 MG: 40 INJECTION SUBCUTANEOUS at 14:39

## 2024-12-22 RX ADMIN — ACETAMINOPHEN 975 MG: 325 TABLET, FILM COATED ORAL at 20:58

## 2024-12-22 RX ADMIN — ACETAMINOPHEN 1000 MG: 1000 INJECTION, SOLUTION INTRAVENOUS at 12:42

## 2024-12-22 NOTE — ASSESSMENT & PLAN NOTE
Will continue methylprednisolone 2 mg daily in the morning as patient reported improvement in symptoms with no worsening colitis flare  Follow-up with PCP outpatient

## 2024-12-22 NOTE — ASSESSMENT & PLAN NOTE
Stable, sees GI in the outpatient setting.  No acute symptoms or worsening flares  Continue home mesalamine 800 mg twice daily

## 2024-12-22 NOTE — ASSESSMENT & PLAN NOTE
Confirmed with patient that she takes 50 mg Toprol-XL daily not 100 mg daily as reported in the chart  Initially was hypertensive when she arrived, no associated symptoms such as flash pulm edema, headache, or visual disturbances  Continue to trend pressures closely

## 2024-12-22 NOTE — H&P
H&P - Hospitalist   Name: Helen M Schwab 89 y.o. female I MRN: [id number]  Unit/Bed#: ED-43 I Date of Admission: 12/22/2024   Date of Service: 12/22/2024 I Hospital Day: 0     Assessment & Plan  Parotiditis  Complicated by facial cellulitis and left masseter myositis. Afebrile, no signs of systemic illness. Lactic acid and procal within normal range   Presenting with 3 days history worsening facial swelling, erythema, and firmness and associated trismus  CT soft tissue neck showing acute left parotitis secondary to a 2 mm obstructing parotid duct sialolith. Reactive secondary left side facial cellulitis and left masseter myositis noted. No discrete collection. Normal subglottic airway  Patient protecting and maintaining her airway at this time.  She is saturating in the upper 90s on room air.  She reports improvement in trismus.  She denies any secretions at this time as well  ENT based on the imaging recommended: (formal evaluation pending)  Sialadenitis protocol: Warm compresses to the affected area. Sialogogues (sour candies, lemon, citrus, vinegar). NSAID such as Ibuprofen for pain relief and to reduce inflammation. Aggressive hydration; encourage PO intake and IVF as tolerated. Continue IV antibiotic with adequate staph aureus coverage   Parotitis: Massage the gland from angle of mandible working anteriorly along the cheek towards the oral commissure to facilitate drainage through the ductal opening   Status post 4.5 g Zosyn and vancomycin in ER, received 500 mL fluid bolus and 1 g acetaminophen IV  Transition to Unasyn 3 g every 6 hour for community-acquired infection and transition to oral regimen with clinical improvement  Continue diet and oral hydration as tolerated in addition to anti-inflammatory pain medication with Motrin as needed keeping in mind she also takes aspirin to avoid GI upset  Ulcerative colitis with complication (HCC)  Stable, sees GI in the outpatient setting.  No acute symptoms or  worsening flares  Continue home mesalamine 800 mg twice daily  Fibromyalgia  Will continue methylprednisolone 2 mg daily in the morning as patient reported improvement in symptoms with no worsening colitis flare  Follow-up with PCP outpatient  Hypertension  Confirmed with patient that she takes 50 mg Toprol-XL daily not 100 mg daily as reported in the chart  Initially was hypertensive when she arrived, no associated symptoms such as flash pulm edema, headache, or visual disturbances  Continue to trend pressures closely  Gastroesophageal reflux  Continue PPI therapy      VTE Pharmacologic Prophylaxis:   High Risk (Score >/= 5) - Pharmacological DVT Prophylaxis Ordered: enoxaparin (Lovenox). Sequential Compression Devices Ordered.  Code Status: Level 2 - DNAR: but accepts endotracheal intubation   Discussion with family: Updated  (son) via phone.    Anticipated Length of Stay: Patient will be admitted on an inpatient basis with an anticipated length of stay of greater than 2 midnights secondary to IV treatment of acute facial cellulitis secondary to parotiditis.    History of Present Illness   Chief Complaint: Acute left parotiditis     Helen M Schwab is a 89 y.o. female with a PMH of longstanding ulcerative colitis and GERD who presents with facial swelling over the past 3 days (since Thursday) associated with trismus. She denies drooling or decreased ability to tolerate secretions. She denies fever, chills, nausea, vomiting, choking episodes, SOB, chest pain. She endorses a sore throat and dry mouth. Her last meal was yesterday evening (rice chex and pudding). She has not been restricting her diet, has still been eating her normal foods per herself and son. She reports that opening jaw seems a little better today. She gave me a sheet with all of her medications that she takes on a daily basis since her PCP (Dr. Lucas in [place]) is private practice. She takes toprol xl 50 mg daily not 100 mg as  written in chart. She was also started on 2 mg methylprednisolone every morning for fibromyalgia.  She takes aspirin 81 mg for primary prevention that she was prescribed years ago.  She endorses slightly less PO intake. She has around one glass of water and a cup of tea daily. She denies decreased urination or dysuria.     In the ED, her BP was 219/94. She thinks that she took her toprol xl medication this morning. She was afebrile pulse 69. CBC notable for WBC 24.4 k. ANC 18.3. CMP notable for K 3.3. Kidney function at baseline. Bilirubin elevated 1.06. Lactic acid within normal range. Procal negative. Blood cultures drawn. UA showing positive nitrites, neg leukocytes. Microscopically, no bacteria seen. Patient denies dysuria. In the ER, CT imaging showed acute left parotitis secondary to a 2 mm obstructing parotid duct sialolith. She was started on zosyn 4.5 g and given 500 ml fluid bolus in addition to 1 g IV acetaminophen. She was protecting and maintaining her airway and saturating in the upper 90's on room air. In the ER, ENT was consulted who recommended: Warm compresses to the affected area. Sialogogues (sour candies, lemon, citrus, vinegar). NSAID such as Ibuprofen for pain relief and to reduce inflammation. Aggressive hydration; encourage PO intake and IVF as tolerated. Continue IV antibiotic with adequate staph aureus coverage. Parotitis: Massage the gland from angle of mandible working anteriorly along the cheek towards the oral commissure to facilitate drainage through the ductal opening     Review of Systems   Constitutional:  Negative for appetite change, chills, fatigue and fever.   HENT:  Positive for sore throat and trouble swallowing. Negative for congestion and drooling.    Eyes:  Negative for photophobia, pain and visual disturbance.   Respiratory:  Negative for choking and shortness of breath.    Cardiovascular:  Negative for chest pain, palpitations and leg swelling.   Gastrointestinal:   Negative for abdominal pain, constipation, diarrhea, nausea and vomiting.   Genitourinary:  Negative for difficulty urinating and dysuria.   Musculoskeletal:  Negative for arthralgias and myalgias.   Neurological:  Negative for dizziness, weakness, light-headedness, numbness and headaches.   Psychiatric/Behavioral:  Negative for confusion.        Historical Information   Past Medical History:   Diagnosis Date    Arthritis     Bell's palsy 1940    Colitis     GERD (gastroesophageal reflux disease)     Hemorrhoids     History of transfusion     Hx of colonoscopy 1996, 1998, 2000, 2002, 2003, 2007, 2012    Hypertension     Ulcerative colitis (HCC)      Past Surgical History:   Procedure Laterality Date    BACK SURGERY  1971    ruptured disk    BACK SURGERY  1972    bone fusion lumbar spine    BLADDER REPAIR  2002    BLADDER REPAIR  2012    BREAST SURGERY  2011    CARDIAC SURGERY  2014    stent    CARPAL TUNNEL RELEASE  08/14/2014    CATARACT EXTRACTION Right 2015    CHOLECYSTECTOMY      COLONOSCOPY      EYE SURGERY Left     GALLBLADDER SURGERY  1995    GANGLION CYST EXCISION  2004    left wrist    HERNIA REPAIR  2001    HERNIA REPAIR  2003    MASTECTOMY  2012    OVARY SURGERY  1984    PARTIAL HYSTERECTOMY  1964    ME EXC B9 LESION MRGN XCP SK TG T/A/L 0.5 CM/< Left 4/27/2021    Procedure: Excision pyogenic granuloma left ring finger;  Surgeon: Shane Lincoln MD;  Location: [facility];  Service: Orthopedics    ME EXC LESION TDN SHTH/JT CAPSL HAND/FNGR Right 10/9/2018    Procedure: EXCISION GANGLION CYST RIGHT SMALL FINGER;  Surgeon: Shane Lincoln MD;  Location: [facility];  Service: Orthopedics    ME NDSC WRST SURG W/RLS TRANSVRS CARPL LIGM Left 8/10/2021    Procedure: Left endoscopic carpal tunnel release;  Surgeon: Shane Lincoln MD;  Location: [facility];  Service: Orthopedics    ME TENDON SHEATH INCISION Right 4/5/2016    Procedure: INDEX TRIGGER FINGER AND RING TRIGGER FINGER RELEASE ;  Surgeon:  Shane Lincoln MD;  Location: [facility];  Service: Orthopedics    NY TENDON SHEATH INCISION Left 10/16/2018    Procedure: RELEASE TRIGGER FINGER - LEFT INDEX;  Surgeon: Shane Lincoln MD;  Location: [facility];  Service: Orthopedics    NY TENDON SHEATH INCISION Left 8/10/2021    Procedure: Left long and ring finger trigger finger release;  Surgeon: Shane Lincoln MD;  Location: [facility];  Service: Orthopedics    SPLENECTOMY, PARTIAL  1995    TONSILLECTOMY      WRIST SURGERY Right      Social History     Tobacco Use    Smoking status: Former     Current packs/day: 0.00     Types: Cigarettes     Quit date: 1950     Years since quittin.0    Smokeless tobacco: Never   Vaping Use    Vaping status: Never Used   Substance and Sexual Activity    Alcohol use: Yes     Comment: seldom    Drug use: No    Sexual activity: Never     E-Cigarette/Vaping    E-Cigarette Use Never User      E-Cigarette/Vaping Substances       Social History:  Marital Status: /Civil Union   Occupation: retired  Patient Pre-hospital Living Situation: home, lives with son  Patient Pre-hospital Level of Mobility: walks with cane sometimes rollator   Patient Pre-hospital Diet Restrictions: none    Meds/Allergies   I have reviewed home medications with patient personally.  Prior to Admission medications    Medication Sig Start Date End Date Taking? Authorizing Provider   acetaminophen (Tylenol 8 Hour) 650 mg CR tablet Take 1 tablet (650 mg total) by mouth every 8 (eight) hours 8/10/21   Shane Lincoln MD   amLODIPine (NORVASC) 5 mg tablet Take 5 mg by mouth daily at bedtime  Patient not taking: Reported on 23   Historical Provider, MD   aspirin (ECOTRIN LOW STRENGTH) 81 mg EC tablet Take 81 mg by mouth daily    Historical Provider, MD   busPIRone (BUSPAR) 10 mg tablet Take 10 mg by mouth as needed    Historical Provider, MD   cholecalciferol (VITAMIN D3) 1,000 units tablet Take 5,000 Units by mouth daily     Historical Provider, MD   cloNIDine (CATAPRES) 0.1 mg tablet Take 0.1 mg by mouth every 12 (twelve) hours    Historical Provider, MD   diphenoxylate-atropine (LOMOTIL) 2.5-0.025 mg per tablet TAKE 1 TABLET BY ORAL ROUTE 4 TIMES EVERY DAY AS NEEDED 8/31/21   Historical Provider, MD TUCKER 0.01 % ophthalmic drops  7/24/18   Historical Provider, MD   mesalamine (DELZICOL) 400 mg TAKE 2 CAPSULES (800 MG TOTAL) BY MOUTH 2 (TWO) TIMES A DAY 4/10/24   Jaziel Cooper PA-C   metoprolol succinate (TOPROL-XL) 100 mg 24 hr tablet Take 100 mg by mouth daily 11/9/22   Historical Provider, MD   montelukast (SINGULAIR) 10 mg tablet Take 10 mg by mouth daily at bedtime  Patient not taking: Reported on 6/2/2023    Historical Provider, MD   Multiple Vitamins-Minerals (PRESERVISION AREDS PO) Take 1 tablet by mouth 2 (two) times a day  Patient not taking: Reported on 6/2/2023    Historical Provider, MD   NITROGLYCERIN PO Take by mouth    Historical Provider, MD   omeprazole (PriLOSEC) 20 mg delayed release capsule Take 20 mg by mouth daily 5/13/21   Historical Provider, MD   traMADol (ULTRAM) 50 mg tablet Take 50 mg by mouth every 6 (six) hours as needed for moderate pain  Patient not taking: Reported on 6/2/2023    Historical Provider, MD   triamcinolone (KENALOG) 0.025 % cream Apply topically 2 (two) times a day  Patient not taking: Reported on 9/20/2021 7/6/21   Ashley Bishop PA-C   triamterene-hydrochlorothiazide (DYAZIDE) 37.5-25 mg per capsule Take 1 capsule by mouth 2 (two) times a week   Patient not taking: Reported on 11/30/2021 7/30/21   Historical Provider, MD   triamterene-hydrochlorothiazide (MAXZIDE-25) 37.5-25 mg per tablet Take 1 tablet by mouth 2 (two) times a week Monday & Thursday    Patient not taking: Reported on 11/30/2021    Historical Provider, MD   White Petrolatum-Mineral Oil (artificial tears) Administer 1 application to both eyes  Patient not taking: Reported on 5/25/2022    Historical  "Provider, MD     Allergies   Allergen Reactions    Zithromax [Azithromycin] Swelling     tongue    Aspirin Other (See Comments)     Bleeding  Tolerates baby asapirin    Corn-Containing Products - Food Allergy Other (See Comments)     headache    Duloxetine Other (See Comments)     Upsets colitis     Effient [Prasugrel]     Keflex [Cephalexin] Other (See Comments)     unknow    Lasix [Furosemide] GI Intolerance    Lipitor [Atorvastatin]     Other      liptol and lipol cause GI upset  Adhesive tape  USE PAPER TAPE    Prednisone Other (See Comments)     \"I go berzerk!\"    Sulfamethizole Other (See Comments)     unkown    Latex Rash     Pt cannot remember       Objective :  Temp:  [98.2 °F (36.8 °C)] 98.2 °F (36.8 °C)  HR:  [69] 69  BP: (219)/(94) 219/94  Resp:  [20] 20  SpO2:  [96 %] 96 %  O2 Device: None (Room air)    Physical Exam  Constitutional:       Appearance: She is not diaphoretic.   HENT:      Head: Normocephalic and atraumatic.      Nose: Congestion present. No rhinorrhea.      Mouth/Throat:      Mouth: Mucous membranes are dry.      Comments: Uvula midline  Firmness and erythema in the left aspect of face with edema in the submandibular region    Able to converse without difficulty   No secretions or drooling    Eyes:      Conjunctiva/sclera: Conjunctivae normal.      Pupils: Pupils are equal, round, and reactive to light.   Cardiovascular:      Rate and Rhythm: Normal rate and regular rhythm.      Comments: No crepitus  Pulmonary:      Effort: Pulmonary effort is normal. No respiratory distress.      Comments: Diminished breath sounds   Protecting airway  Saturating in the upper 90s on room air    Abdominal:      Palpations: Abdomen is soft.      Tenderness: There is no abdominal tenderness. There is no guarding.   Musculoskeletal:         General: No tenderness.      Right lower leg: No edema.      Left lower leg: No edema.   Skin:     General: Skin is warm and dry.   Neurological:      Mental Status: She " is alert and oriented to person, place, and time. Mental status is at baseline.   Psychiatric:         Mood and Affect: Mood normal.         Behavior: Behavior normal.         Thought Content: Thought content normal.             Media Information            Lab Results: I have reviewed the following results:  Results from last 7 days   Lab Units 12/22/24  1102   WBC Thousand/uL 24.40*   HEMOGLOBIN g/dL 13.6   HEMATOCRIT % 39.9   PLATELETS Thousands/uL 375   LYMPHO PCT % 14   MONO PCT % 11   EOS PCT % 0     Results from last 7 days   Lab Units 12/22/24  1102   SODIUM mmol/L 137   POTASSIUM mmol/L 3.3*   CHLORIDE mmol/L 100   CO2 mmol/L 27   BUN mg/dL 14   CREATININE mg/dL 0.44*   ANION GAP mmol/L 10   CALCIUM mg/dL 9.6   ALBUMIN g/dL 4.2   TOTAL BILIRUBIN mg/dL 1.02*   ALK PHOS U/L 70   ALT U/L 9   AST U/L 14   GLUCOSE RANDOM mg/dL 115     Results from last 7 days   Lab Units 12/22/24  1102   INR  0.97         Lab Results   Component Value Date    HGBA1C 5.6 12/05/2022     Results from last 7 days   Lab Units 12/22/24  1102   LACTIC ACID mmol/L 1.6   PROCALCITONIN ng/ml <0.05       Imaging Results Review: I reviewed radiology reports from this admission including: CT soft tissue neck.  Other Study Results Review: EKG was reviewed.     Administrative Statements       ** Please Note: This note has been constructed using a voice recognition system. **

## 2024-12-22 NOTE — ED PROVIDER NOTES
Time reflects when diagnosis was documented in both MDM as applicable and the Disposition within this note       Time User Action Codes Description Comment    12/22/2024 10:53 AM LegDylan calderonidris PAVITHRA Add [K12.2] Dequan's angina     12/22/2024 12:27 PM Legkvng, Christkelviner A Add [L03.90] Cellulitis     12/22/2024 12:27 PM Legkvng, Eusebioer A Add [K11.20] Parotitis     12/22/2024 12:27 PM Legkvng, Eusebioer A Add [M60.9] Myositis     12/22/2024 12:42 PM Legkvng Eusebioer A Modify [L03.90] Cellulitis     12/22/2024 12:42 PM Nimesh Jimbo A Remove [K12.2] Dequan's angina     12/22/2024 12:43 PM Legkvng Eusebioer A Add [I10] Hypertension           ED Disposition       ED Disposition   Admit    Condition   Stable    Date/Time   Sun Dec 22, 2024 12:43 PM    Comment   Case was discussed with PENG and the patient's admission status was agreed to be Admission Status: inpatient status to the service of Dr. Higginbotham .               Assessment & Plan       Medical Decision Making  Patient is a 89-year-old female, with a history significant for ulcerative colitis managed with methylprednisolone and mesalamine per my review the medical record, who presents to the ED today, via EMS, for evaluation of facial swelling over the last 3 days.  It began gradually has been constant and progressively worsening.  Patient reports associated dysphagia, trismus.  No dyspnea.  Patient denies fever, chest pain, trauma, abdominal pain, urinary symptoms, weakness, numbness.  Patient took methylprednisolone to try and remit her symptoms.  No clear exacerbating factors.  Patient is currently afebrile and hemodynamically stable.    Amount and/or Complexity of Data Reviewed  Labs: ordered. Decision-making details documented in ED Course.  Radiology: ordered.    Risk  Prescription drug management.  Decision regarding hospitalization.        ED Course as of 12/22/24 1443   Sun Dec 22, 2024   1106 ECG per my independent interpretation:  Normal rate, regular rhythm, no ectopy, normal axis, no ST elevations or depressions     1114 I discussed case with Dr. Collier from ENT. CT recommended now    1114 On reevaluation, swelling appears worse.  SpO2 93%.  Extensive conversation with patient regarding CODE STATUS and patient states she has level 2.  Discussed with ENT   1122 I discussed case with Dr. Awadallah from [facility]; [facility] will consult after CT    1134 WBC(!): 24.40  High - Patient does not meet SIRS        Medications   piperacillin-tazobactam (ZOSYN) IVPB 4.5 g (0 g Intravenous Hold 12/22/24 1122)   vancomycin (VANCOCIN) 1500 mg in sodium chloride 0.9% 250 mL IVPB (1,500 mg Intravenous New Bag 12/22/24 1339)   pantoprazole (PROTONIX) EC tablet 40 mg (has no administration in time range)   aspirin (ECOTRIN LOW STRENGTH) EC tablet 81 mg (81 mg Oral Given 12/22/24 1438)   Cholecalciferol (VITAMIN D3) tablet 5,000 Units (has no administration in time range)   bimatoprost (LUMIGAN) 0.03 % ophthalmic drops 1 drop (1 drop Ophthalmic Given 12/22/24 1439)   mesalamine (DELZICOL) delayed release capsule 800 mg (has no administration in time range)   metoprolol succinate (TOPROL-XL) 24 hr tablet 50 mg (has no administration in time range)   acetaminophen (TYLENOL) tablet 975 mg (975 mg Oral Not Given 12/22/24 1439)   ibuprofen (MOTRIN) tablet 400 mg (has no administration in time range)   ampicillin-sulbactam (UNASYN) 3 g in sodium chloride 0.9 % 100 mL IVPB (has no administration in time range)   methylprednisolone (MEDROL) tablet 2 mg (has no administration in time range)   enoxaparin (LOVENOX) subcutaneous injection 40 mg (40 mg Subcutaneous Given 12/22/24 1439)   potassium chloride (Klor-Con M20) CR tablet 20 mEq (has no administration in time range)   sodium chloride 0.9 % bolus 250 mL (0 mL Intravenous Stopped 12/22/24 1245)   iohexol (OMNIPAQUE) 350 MG/ML injection (MULTI-DOSE) 85 mL (85 mL Intravenous Given 12/22/24 1132)   sodium chloride 0.9 % bolus  250 mL (250 mL Intravenous New Bag 12/22/24 1242)   acetaminophen (Ofirmev) injection 1,000 mg (1,000 mg Intravenous New Bag 12/22/24 1242)       ED Risk Strat Scores                                              History of Present Illness       Chief Complaint   Patient presents with    Oral Swelling     Pt arrives by EMS from home with left cheek redness and swelling. Redness and swelling extend to inferior jaw. Pt maintain secretions. Pt denies fevers. Pt states pain started 3 days ago.        Past Medical History:   Diagnosis Date    Arthritis     Bell's palsy 1940    Colitis     GERD (gastroesophageal reflux disease)     Hemorrhoids     History of transfusion     Hx of colonoscopy 1996, 1998, 2000, 2002, 2003, 2007, 2012    Hypertension     Ulcerative colitis (HCC)       Past Surgical History:   Procedure Laterality Date    BACK SURGERY  1971    ruptured disk    BACK SURGERY  1972    bone fusion lumbar spine    BLADDER REPAIR  2002    BLADDER REPAIR  2012    BREAST SURGERY  2011    CARDIAC SURGERY  2014    stent    CARPAL TUNNEL RELEASE  08/14/2014    CATARACT EXTRACTION Right 2015    CHOLECYSTECTOMY      COLONOSCOPY      EYE SURGERY Left     GALLBLADDER SURGERY  1995    GANGLION CYST EXCISION  2004    left wrist    HERNIA REPAIR  2001    HERNIA REPAIR  2003    MASTECTOMY  2012    OVARY SURGERY  1984    PARTIAL HYSTERECTOMY  1964    WY EXC B9 LESION MRGN XCP SK TG T/A/L 0.5 CM/< Left 4/27/2021    Procedure: Excision pyogenic granuloma left ring finger;  Surgeon: Shane Lincoln MD;  Location: [facility];  Service: Orthopedics    WY EXC LESION TDN SHTH/JT CAPSL HAND/FNGR Right 10/9/2018    Procedure: EXCISION GANGLION CYST RIGHT SMALL FINGER;  Surgeon: Shane Lincoln MD;  Location: [facility];  Service: Orthopedics    WY NDSC WRST SURG W/RLS TRANSVRS CARPL LIGM Left 8/10/2021    Procedure: Left endoscopic carpal tunnel release;  Surgeon: Shane Lincoln MD;  Location: [facility];  Service: Orthopedics     NH TENDON SHEATH INCISION Right 2016    Procedure: INDEX TRIGGER FINGER AND RING TRIGGER FINGER RELEASE ;  Surgeon: Shane Lincoln MD;  Location: [facility];  Service: Orthopedics    NH TENDON SHEATH INCISION Left 10/16/2018    Procedure: RELEASE TRIGGER FINGER - LEFT INDEX;  Surgeon: Shane Lincoln MD;  [facility]: [facility];  Service: Orthopedics    NH TENDON SHEATH INCISION Left 8/10/2021    Procedure: Left long and ring finger trigger finger release;  Surgeon: Shane Lincoln MD;  [facility];  Service: Orthopedics    SPLENECTOMY, PARTIAL      TONSILLECTOMY      WRIST SURGERY Right       Family History   Problem Relation Age of Onset    Cancer Mother     Breast cancer Sister       Social History     Tobacco Use    Smoking status: Former     Current packs/day: 0.00     Types: Cigarettes     Quit date:      Years since quittin.0    Smokeless tobacco: Never   Vaping Use    Vaping status: Never Used   Substance Use Topics    Alcohol use: Yes     Comment: seldom    Drug use: No      E-Cigarette/Vaping    E-Cigarette Use Never User       E-Cigarette/Vaping Substances      I have reviewed and agree with the history as documented.     Patient is a 89-year-old female, with a history significant for ulcerative colitis managed with methylprednisolone and mesalamine per my review the medical record, who presents to the ED today, via EMS, for evaluation of facial swelling over the last 3 days.  It began gradually has been constant and progressively worsening.  Patient reports associated dysphagia, trismus.  No dyspnea.  Patient denies fever, chest pain, trauma, abdominal pain, urinary symptoms, weakness, numbness.  Patient took methylprednisolone to try and remit her symptoms.  No clear exacerbating factors.  Patient is without other concerns at this time.        Review of Systems   Constitutional:  Negative for fever.   HENT:  Positive for trouble swallowing.    Eyes:  Negative  for visual disturbance.   Respiratory:  Negative for shortness of breath.    Cardiovascular:  Negative for chest pain.   Gastrointestinal:  Negative for abdominal pain.   Endocrine: Negative for polyuria.   Genitourinary:  Negative for dysuria.   Musculoskeletal:  Negative for gait problem.   Skin:  Negative for rash.   Allergic/Immunologic: Positive for environmental allergies.   Neurological:  Negative for weakness and numbness.   Hematological:  Negative for adenopathy.   Psychiatric/Behavioral:  Negative for confusion.    All other systems reviewed and are negative.          Objective       ED Triage Vitals [12/22/24 1030]   Temperature Pulse Blood Pressure Respirations SpO2 Patient Position - Orthostatic VS   98.2 °F (36.8 °C) 69 (!) 219/94 20 96 % Sitting      Temp Source Heart Rate Source BP Location FiO2 (%) Pain Score    Oral Monitor Right arm -- --      Vitals      Date and Time Temp Pulse SpO2 Resp BP Pain Score FACES Pain Rating User   12/22/24 1442 -- 72 96 % 20 112/71 -- -- RL   12/22/24 1030 98.2 °F (36.8 °C) 69 96 % 20 219/94 -- -- RL            Physical Exam  Vitals and nursing note reviewed.   Constitutional:       General: She is not in acute distress.     Appearance: She is ill-appearing. She is not toxic-appearing.   HENT:      Head: Normocephalic and atraumatic.      Right Ear: External ear normal.      Left Ear: External ear normal.      Nose: Nose normal. No rhinorrhea.      Mouth/Throat:      Mouth: Mucous membranes are dry.      Pharynx: Oropharynx is clear. No oropharyngeal exudate or posterior oropharyngeal erythema.      Comments: Uvula midline. No oropharyngeal swelling    Submandibular swelling.  Slight trismus  Eyes:      General: No scleral icterus.        Right eye: No discharge.         Left eye: No discharge.      Conjunctiva/sclera: Conjunctivae normal.      Pupils: Pupils are equal, round, and reactive to light.   Neck:      Comments: Patient is spontaneously rotating their neck  to the left and right during the history and physical exam interaction without difficulty or apparent discomfort    Cardiovascular:      Rate and Rhythm: Normal rate and regular rhythm.      Pulses: Normal pulses.      Heart sounds: Normal heart sounds. No murmur heard.     No friction rub. No gallop.      Comments: 2+ Radial  Pulmonary:      Effort: Pulmonary effort is normal. No respiratory distress.      Breath sounds: Normal breath sounds. No stridor. No wheezing, rhonchi or rales.   Abdominal:      General: Abdomen is flat. There is no distension.      Palpations: Abdomen is soft.      Tenderness: There is no abdominal tenderness. There is no right CVA tenderness, left CVA tenderness, guarding or rebound.   Musculoskeletal:         General: No deformity.      Cervical back: Neck supple. No tenderness. No muscular tenderness.   Lymphadenopathy:      Cervical: No cervical adenopathy.   Skin:     General: Skin is warm and dry.      Capillary Refill: Capillary refill takes less than 2 seconds.   Neurological:      Mental Status: She is alert.      Comments: Patient is speaking clearly in complete sentences.  Patient is answering appropriately and able follow commands.  Patient is moving all four extremities spontaneously.  No facial droop.  Tongue midline.      Psychiatric:         Mood and Affect: Mood normal.         Behavior: Behavior normal.         Results Reviewed       Procedure Component Value Units Date/Time    RBC Morphology Reflex Test [[id number]] Collected: 12/22/24 1102    Lab Status: Final result Specimen: Blood from Arm, Left Updated: 12/22/24 1301    Urine Microscopic [[id number]]  (Abnormal) Collected: 12/22/24 1209    Lab Status: Final result Specimen: Urine, Clean Catch Updated: 12/22/24 1251     RBC, UA 1-2 /hpf      WBC, UA 2-4 /hpf      Epithelial Cells Occasional /hpf      Bacteria, UA None Seen /hpf     UA w Reflex to Microscopic w Reflex to Culture [[id number]]  (Abnormal) Collected: 12/22/24  1209    Lab Status: Final result Specimen: Urine, Clean Catch Updated: 12/22/24 1245     Color, UA Light Yellow     Clarity, UA Clear     Specific Gravity, UA 1.043     pH, UA 7.0     Leukocytes, UA Negative     Nitrite, UA Positive     Protein, UA Trace mg/dl      Glucose, UA Negative mg/dl      Ketones, UA Trace mg/dl      Urobilinogen, UA <2.0 mg/dl      Bilirubin, UA Negative     Occult Blood, UA Negative    CBC and differential [[id number]]  (Abnormal) Collected: 12/22/24 1102    Lab Status: Final result Specimen: Blood from Arm, Left Updated: 12/22/24 1214     WBC 24.40 Thousand/uL      RBC 4.20 Million/uL      Hemoglobin 13.6 g/dL      Hematocrit 39.9 %      MCV 95 fL      MCH 32.4 pg      MCHC 34.1 g/dL      RDW 14.5 %      MPV 10.5 fL      Platelets 375 Thousands/uL     Narrative:      This is an appended report.  These results have been appended to a previously verified report.    Manual Differential(PHLEBS Do Not Order) [[id number]]  (Abnormal) Collected: 12/22/24 1102    Lab Status: Final result Specimen: Blood from Arm, Left Updated: 12/22/24 1214     Segmented % 75 %      Lymphocytes % 14 %      Monocytes % 11 %      Eosinophils % 0 %      Basophils % 0 %      Absolute Neutrophils 18.30 Thousand/uL      Absolute Lymphocytes 3.42 Thousand/uL      Absolute Monocytes 2.68 Thousand/uL      Absolute Eosinophils 0.00 Thousand/uL      Absolute Basophils 0.00 Thousand/uL      Total Counted --     Toxic Granulation Present     RBC Morphology Normal     Platelet Estimate Adequate    Procalcitonin [[id number]]  (Normal) Collected: 12/22/24 1102    Lab Status: Final result Specimen: Blood from Arm, Left Updated: 12/22/24 1159     Procalcitonin <0.05 ng/ml     Lactic acid [[id number]]  (Normal) Collected: 12/22/24 1102    Lab Status: Final result Specimen: Blood from Arm, Left Updated: 12/22/24 1153     LACTIC ACID 1.6 mmol/L     Narrative:      Result may be elevated if tourniquet was used during collection.     Comprehensive metabolic panel [[id number]]  (Abnormal) Collected: 12/22/24 1102    Lab Status: Final result Specimen: Blood from Arm, Left Updated: 12/22/24 1150     Sodium 137 mmol/L      Potassium 3.3 mmol/L      Chloride 100 mmol/L      CO2 27 mmol/L      ANION GAP 10 mmol/L      BUN 14 mg/dL      Creatinine 0.44 mg/dL      Glucose 115 mg/dL      Calcium 9.6 mg/dL      AST 14 U/L      ALT 9 U/L      Alkaline Phosphatase 70 U/L      Total Protein 7.2 g/dL      Albumin 4.2 g/dL      Total Bilirubin 1.02 mg/dL      eGFR 87 ml/min/1.73sq m     Narrative:      [employer] guidelines for Chronic Kidney Disease (CKD):     Stage 1 with normal or high GFR (GFR > 90 mL/min/1.73 square meters)    Stage 2 Mild CKD (GFR = 60-89 mL/min/1.73 square meters)    Stage 3A Moderate CKD (GFR = 45-59 mL/min/1.73 square meters)    Stage 3B Moderate CKD (GFR = 30-44 mL/min/1.73 square meters)    Stage 4 Severe CKD (GFR = 15-29 mL/min/1.73 square meters)    Stage 5 End Stage CKD (GFR <15 mL/min/1.73 square meters)  Note: GFR calculation is accurate only with a steady state creatinine    Protime-INR [[id number]]  (Normal) Collected: 12/22/24 1102    Lab Status: Final result Specimen: Blood from Arm, Left Updated: 12/22/24 1140     Protime 13.6 seconds      INR 0.97    Narrative:      INR Therapeutic Range    Indication                                             INR Range      Atrial Fibrillation                                               2.0-3.0  Hypercoagulable State                                    2.0.2.3  Left Ventricular Asist Device                            2.0-3.0  Mechanical Heart Valve                                  -    Aortic(with afib, MI, embolism, HF, LA enlargement,    and/or coagulopathy)                                     2.0-3.0 (2.5-3.5)     Mitral                                                             2.5-3.5  Prosthetic/Bioprosthetic Heart Valve               2.0-3.0  Venous  thromboembolism (VTE: VT, PE        2.0-3.0    APTT [[id number]]  (Normal) Collected: 12/22/24 1102    Lab Status: Final result Specimen: Blood from Arm, Left Updated: 12/22/24 1140     PTT 33 seconds     Blood culture #2 [[id number]] Collected: 12/22/24 1102    Lab Status: In process Specimen: Blood from Arm, Right Updated: 12/22/24 1115    Blood culture #1 [[id number]] Collected: 12/22/24 1102    Lab Status: In process Specimen: Blood from Arm, Left Updated: 12/22/24 1115            CT soft tissue neck with contrast   Final Interpretation by Gurpreet Hurley MD (12/22 1223)         1. Acute left parotitis secondary to a 2 mm obstructing parotid duct sialolith (series 301, image 28). Reactive secondary left side facial cellulitis and left masseter myositis noted. No discrete collection. Further clinical assessment advised.            Workstation performed: [id number]             Procedures    ED Medication and Procedure Management   Prior to Admission Medications   Prescriptions Last Dose Informant Patient Reported? Taking?   LUMIGAN 0.01 % ophthalmic drops  Self Yes No   Multiple Vitamins-Minerals (PRESERVISION AREDS PO)  Self Yes No   Sig: Take 1 tablet by mouth 2 (two) times a day   Patient not taking: Reported on 6/2/2023   NITROGLYCERIN PO  Self Yes No   Sig: Take by mouth   White Petrolatum-Mineral Oil (artificial tears)  Self Yes No   Sig: Administer 1 application to both eyes   Patient not taking: Reported on 5/25/2022   acetaminophen (Tylenol 8 Hour) 650 mg CR tablet  Self No No   Sig: Take 1 tablet (650 mg total) by mouth every 8 (eight) hours   amLODIPine (NORVASC) 5 mg tablet  Self Yes No   Sig: Take 5 mg by mouth daily at bedtime   Patient not taking: Reported on 2/7/2024   aspirin (ECOTRIN LOW STRENGTH) 81 mg EC tablet  Self Yes No   Sig: Take 81 mg by mouth daily   busPIRone (BUSPAR) 10 mg tablet  Self Yes No   Sig: Take 10 mg by mouth as needed   cholecalciferol (VITAMIN D3) 1,000 units tablet   Self Yes No   Sig: Take 5,000 Units by mouth daily   cloNIDine (CATAPRES) 0.1 mg tablet  Self Yes No   Sig: Take 0.1 mg by mouth every 12 (twelve) hours   diphenoxylate-atropine (LOMOTIL) 2.5-0.025 mg per tablet  Self Yes No   Sig: TAKE 1 TABLET BY ORAL ROUTE 4 TIMES EVERY DAY AS NEEDED   mesalamine (DELZICOL) 400 mg   No No   Sig: TAKE 2 CAPSULES (800 MG TOTAL) BY MOUTH 2 (TWO) TIMES A DAY   metoprolol succinate (TOPROL-XL) 100 mg 24 hr tablet  Self Yes No   Sig: Take 100 mg by mouth daily   montelukast (SINGULAIR) 10 mg tablet  Self Yes No   Sig: Take 10 mg by mouth daily at bedtime   Patient not taking: Reported on 6/2/2023   omeprazole (PriLOSEC) 20 mg delayed release capsule  Self Yes No   Sig: Take 20 mg by mouth daily   traMADol (ULTRAM) 50 mg tablet  Self Yes No   Sig: Take 50 mg by mouth every 6 (six) hours as needed for moderate pain   Patient not taking: Reported on 6/2/2023   triamcinolone (KENALOG) 0.025 % cream  Self No No   Sig: Apply topically 2 (two) times a day   Patient not taking: Reported on 9/20/2021   triamterene-hydrochlorothiazide (DYAZIDE) 37.5-25 mg per capsule  Self Yes No   Sig: Take 1 capsule by mouth 2 (two) times a week    Patient not taking: Reported on 11/30/2021   triamterene-hydrochlorothiazide (MAXZIDE-25) 37.5-25 mg per tablet  Self Yes No   Sig: Take 1 tablet by mouth 2 (two) times a week Monday & Thursday     Patient not taking: Reported on 11/30/2021      Facility-Administered Medications: None     Current Discharge Medication List        CONTINUE these medications which have NOT CHANGED    Details   acetaminophen (Tylenol 8 Hour) 650 mg CR tablet Take 1 tablet (650 mg total) by mouth every 8 (eight) hours  Qty: 15 tablet, Refills: 0    Associated Diagnoses: Carpal tunnel syndrome on left      amLODIPine (NORVASC) 5 mg tablet Take 5 mg by mouth daily at bedtime      aspirin (ECOTRIN LOW STRENGTH) 81 mg EC tablet Take 81 mg by mouth daily      busPIRone (BUSPAR) 10 mg tablet  Take 10 mg by mouth as needed      cholecalciferol (VITAMIN D3) 1,000 units tablet Take 5,000 Units by mouth daily      cloNIDine (CATAPRES) 0.1 mg tablet Take 0.1 mg by mouth every 12 (twelve) hours      diphenoxylate-atropine (LOMOTIL) 2.5-0.025 mg per tablet TAKE 1 TABLET BY ORAL ROUTE 4 TIMES EVERY DAY AS NEEDED      LUMIGAN 0.01 % ophthalmic drops Refills: 3      mesalamine (DELZICOL) 400 mg TAKE 2 CAPSULES (800 MG TOTAL) BY MOUTH 2 (TWO) TIMES A DAY  Qty: 120 capsule, Refills: 5    Associated Diagnoses: Ulcerative colitis with complication, unspecified location (HCC)      metoprolol succinate (TOPROL-XL) 100 mg 24 hr tablet Take 100 mg by mouth daily      montelukast (SINGULAIR) 10 mg tablet Take 10 mg by mouth daily at bedtime      Multiple Vitamins-Minerals (PRESERVISION AREDS PO) Take 1 tablet by mouth 2 (two) times a day      NITROGLYCERIN PO Take by mouth      omeprazole (PriLOSEC) 20 mg delayed release capsule Take 20 mg by mouth daily      traMADol (ULTRAM) 50 mg tablet Take 50 mg by mouth every 6 (six) hours as needed for moderate pain      triamcinolone (KENALOG) 0.025 % cream Apply topically 2 (two) times a day  Qty: 30 g, Refills: 0    Associated Diagnoses: Rash      triamterene-hydrochlorothiazide (DYAZIDE) 37.5-25 mg per capsule Take 1 capsule by mouth 2 (two) times a week       triamterene-hydrochlorothiazide (MAXZIDE-25) 37.5-25 mg per tablet Take 1 tablet by mouth 2 (two) times a week Monday & Thursday        White Petrolatum-Mineral Oil (artificial tears) Administer 1 application to both eyes           No discharge procedures on file.  ED SEPSIS DOCUMENTATION   Time reflects when diagnosis was documented in both MDM as applicable and the Disposition within this note       Time User Action Codes Description Comment    12/22/2024 10:53 AM Jimbo Beavers Add [K12.2] Dequan's angina     12/22/2024 12:27 PM Jimbo Beavers Add [L03.90] Cellulitis     12/22/2024 12:27 PM Nimesh  Jimbo ARSHAD Add [K11.20] Parotitis     12/22/2024 12:27 PM Jimbo Beavers Add [M60.9] Myositis     12/22/2024 12:42 PM Jimbo Beavers Modify [L03.90] Cellulitis     12/22/2024 12:42 PM Jimbo Beavers Remove [K12.2] Dequan's angina     12/22/2024 12:43 PM Jimbo Beavers Add [I10] Hypertension                  Jimbo Beavers MD  12/22/24 9923

## 2024-12-22 NOTE — CONSULTS
Consultation - [facility]/ENT   Helen M Schwab 89 y.o. female MRN: [id number]  Unit/Bed#: ED-43 Encounter: [id number]        Assessment:  Patient with facial pain and swelling with CT evidence of left parotitis. Recommendations as below.     Plan:    Sialadenitis protocol:  1. Warm compresses to the affected area.   2. Sialogogues (sour candies, lemon, citrus, vinegar).   3. NSAID such as Ibuprofen for pain relief and to reduce inflammation.   4. Aggressive hydration; encourage PO intake and IVF as tolerated.   5. Continue IV antibiotic with adequate staph aureus coverage   6. Parotitis: Massage the gland from angle of mandible working anteriorly along the cheek towards the oral commissure to facilitate drainage through the ductal opening  7. ENT outpt fu in 2-4 weeks    Vanna Collier MD PGY-2  [facility] Otolaryngology - Head and Neck Surgery  Available on Epic Secure Chat.  Please contact ENT Resident [place] Role for any questions or concerns.        History of Present Illness   Physician Requesting Consult: Gustavo Higginbotham MD  Reason for Consult / Principal Problem: parotitis   HPI: Helen M Schwab is a 89 y.o. year old female who presented to [facility] ER with 3 days of left facial swelling. CT showing evidence of left parotitis and 2mm salivary stone. Admitted to medicine for IV hydration and antibiotics. Patient seen and examined. She reports feeling much better. She says she has trouble drinking water because of her overactive bladder.     Review of systems:  10 Point ROS was performed and negative except as above or otherwise noted in the medical record.    Historical Information   Past Medical History:   Diagnosis Date    Arthritis     Bell's palsy 1940    Colitis     GERD (gastroesophageal reflux disease)     Hemorrhoids     History of transfusion     Hx of colonoscopy 1996, 1998, 2000, 2002, 2003, 2007, 2012    Hypertension     Ulcerative colitis (HCC)      Past Surgical History:   Procedure Laterality  Date    BACK SURGERY  1971    ruptured disk    BACK SURGERY  1972    bone fusion lumbar spine    BLADDER REPAIR  2002    BLADDER REPAIR  2012    BREAST SURGERY  2011    CARDIAC SURGERY  2014    stent    CARPAL TUNNEL RELEASE  08/14/2014    CATARACT EXTRACTION Right 2015    CHOLECYSTECTOMY      COLONOSCOPY      EYE SURGERY Left     GALLBLADDER SURGERY  1995    GANGLION CYST EXCISION  2004    left wrist    HERNIA REPAIR  2001    HERNIA REPAIR  2003    MASTECTOMY  2012    OVARY SURGERY  1984    PARTIAL HYSTERECTOMY  1964    CO EXC B9 LESION MRGN XCP SK TG T/A/L 0.5 CM/< Left 4/27/2021    Procedure: Excision pyogenic granuloma left ring finger;  Surgeon: Shane Lincoln MD;  Location: [facility];  Service: Orthopedics    CO EXC LESION TDN SHTH/JT CAPSL HAND/FNGR Right 10/9/2018    Procedure: EXCISION GANGLION CYST RIGHT SMALL FINGER;  Surgeon: Shane Lincoln MD;  Location: [facility];  Service: Orthopedics    CO NDSC WRST SURG W/RLS TRANSVRS CARPL LIGM Left 8/10/2021    Procedure: Left endoscopic carpal tunnel release;  Surgeon: Shane Lincoln MD;  [facility];  Service: Orthopedics    CO TENDON SHEATH INCISION Right 4/5/2016    Procedure: INDEX TRIGGER FINGER AND RING TRIGGER FINGER RELEASE ;  Surgeon: Shane Lincoln MD;  [facility];  Service: Orthopedics    CO TENDON SHEATH INCISION Left 10/16/2018    Procedure: RELEASE TRIGGER FINGER - LEFT INDEX;  Surgeon: Shane Lincoln MD;  [facility];  Service: Orthopedics    CO TENDON SHEATH INCISION Left 8/10/2021    Procedure: Left long and ring finger trigger finger release;  Surgeon: Shane Lincoln MD;  [facility];  Service: Orthopedics    SPLENECTOMY, PARTIAL  1995    TONSILLECTOMY  1939    WRIST SURGERY Right 1952     Social History   Social History     Substance and Sexual Activity   Alcohol Use Yes    Comment: seldom     Social History     Substance and Sexual Activity   Drug Use No     Social History  "    Tobacco Use   Smoking Status Former    Current packs/day: 0.00    Types: Cigarettes    Quit date: 1950    Years since quittin.0   Smokeless Tobacco Never     Family History: Family history non-contributory    Meds/Allergies   all current active meds have been reviewed    Allergies   Allergen Reactions    Zithromax [Azithromycin] Swelling     tongue    Aspirin Other (See Comments)     Bleeding  Tolerates baby asapirin    Corn-Containing Products - Food Allergy Other (See Comments)     headache    Duloxetine Other (See Comments)     Upsets colitis     Effient [Prasugrel]     Keflex [Cephalexin] Other (See Comments)     unknow    Lasix [Furosemide] GI Intolerance    Lipitor [Atorvastatin]     Other      liptol and lipol cause GI upset  Adhesive tape  USE PAPER TAPE    Prednisone Other (See Comments)     \"I go berzerk!\"    Sulfamethizole Other (See Comments)     unkown    Latex Rash     Pt cannot remember       Objective     Vitals:    24 1515   BP: 112/85   Pulse: 72   Resp: 18   Temp: 98.7 °F (37.1 °C)   SpO2: 95%         Physical Exam   Constitutional: no apparent distress, non-toxic appearance.   Head: Normocephalic, atraumatic.    Face: Left sided facial edema and erythema (see pictures below).  Ears: External ears normal.  No post-auricular erythema or tenderness.  Nose: Nares clear.    Oral cavity: Dentition intact.  Mucosa very dry, lips without lesions or masses.  Tongue mobile, floor of mouth soft and flat.  Hard palate intact.  No masses or lesions. No pus expressed from left parotid. Small red area over left buccal mucosa, likely red candy patient just ate  Oropharynx: Uvula is midline, soft palate intact without lesion or mass.  Oropharyngeal inlet without obstruction.  Tonsils unremarkable.  Posterior pharyngeal wall clear. No masses or lesions.  Salivary glands:  L parotid enlargement and tender to palpation.  Neck:  No palpable adenopathy.  Pulmonary/Chest: Normal effort and rate. No " respiratory distress. No stertor or stridor  Skin: Skin is warm and dry.   Psychiatric: Normal mood and affect.                Intake/Output Summary (Last 24 hours) at 12/22/2024 1538  Last data filed at 12/22/2024 1501  Gross per 24 hour   Intake 700 ml   Output --   Net 700 ml       Invasive Devices       Peripheral Intravenous Line  Duration             Peripheral IV 12/22/24 Left;Proximal;Ventral (anterior) Forearm <1 day                    Lab Results: I have personally reviewed pertinent lab results.    Imaging Studies: Results of CT neck reviewed with evidence of left parotitis.   EKG, Pathology, and Other Studies: Results Review Statement: No pertinent imaging studies reviewed.    Code Status: Level 2 - DNAR: but accepts endotracheal intubation  Advance Directive and Living Will:      Power of :    POLST:      None

## 2024-12-22 NOTE — ASSESSMENT & PLAN NOTE
Complicated by facial cellulitis and left masseter myositis. Afebrile, no signs of systemic illness. Lactic acid and procal within normal range   Presenting with 3 days history worsening facial swelling, erythema, and firmness and associated trismus  CT soft tissue neck showing acute left parotitis secondary to a 2 mm obstructing parotid duct sialolith. Reactive secondary left side facial cellulitis and left masseter myositis noted. No discrete collection. Normal subglottic airway  Patient protecting and maintaining her airway at this time.  She is saturating in the upper 90s on room air.  She reports improvement in trismus.  She denies any secretions at this time as well  ENT based on the imaging recommended: (formal evaluation pending)  Sialadenitis protocol: Warm compresses to the affected area. Sialogogues (sour candies, lemon, citrus, vinegar). NSAID such as Ibuprofen for pain relief and to reduce inflammation. Aggressive hydration; encourage PO intake and IVF as tolerated. Continue IV antibiotic with adequate staph aureus coverage   Parotitis: Massage the gland from angle of mandible working anteriorly along the cheek towards the oral commissure to facilitate drainage through the ductal opening   Status post 4.5 g Zosyn and vancomycin in ER, received 500 mL fluid bolus and 1 g acetaminophen IV  Transition to Unasyn 3 g every 6 hour for community-acquired infection and transition to oral regimen with clinical improvement  Continue diet and oral hydration as tolerated in addition to anti-inflammatory pain medication with Motrin as needed keeping in mind she also takes aspirin to avoid GI upset

## 2024-12-22 NOTE — PLAN OF CARE
Problem: Potential for Falls  Goal: Patient will remain free of falls  Description: INTERVENTIONS:  - Educate patient/family on patient safety including physical limitations  - Instruct patient to call for assistance with activity   - Consult OT/PT to assist with strengthening/mobility   - Keep Call bell within reach  - Keep bed low and locked with side rails adjusted as appropriate  - Keep care items and personal belongings within reach  - Initiate and maintain comfort rounds  - Make Fall Risk Sign visible to staff  - Apply yellow socks and bracelet for high fall risk patients  - Consider moving patient to room near nurses station  Outcome: Progressing     Problem: PAIN - ADULT  Goal: Verbalizes/displays adequate comfort level or baseline comfort level  Description: Interventions:  - Encourage patient to monitor pain and request assistance  - Assess pain using appropriate pain scale  - Administer analgesics based on type and severity of pain and evaluate response  - Implement non-pharmacological measures as appropriate and evaluate response  - Consider cultural and social influences on pain and pain management  - Notify physician/advanced practitioner if interventions unsuccessful or patient reports new pain  Outcome: Progressing     Problem: INFECTION - ADULT  Goal: Absence or prevention of progression during hospitalization  Description: INTERVENTIONS:  - Assess and monitor for signs and symptoms of infection  - Monitor lab/diagnostic results  - Monitor all insertion sites, i.e. indwelling lines, tubes, and drains  - Monitor endotracheal if appropriate and nasal secretions for changes in amount and color  - [place] appropriate cooling/warming therapies per order  - Administer medications as ordered  - Instruct and encourage patient and family to use good hand hygiene technique  - Identify and instruct in appropriate isolation precautions for identified infection/condition  Outcome: Progressing  Goal: Absence  of fever/infection during neutropenic period  Description: INTERVENTIONS:  - Monitor WBC    Outcome: Progressing     Problem: SAFETY ADULT  Goal: Patient will remain free of falls  Description: INTERVENTIONS:  - Educate patient/family on patient safety including physical limitations  - Instruct patient to call for assistance with activity   - Consult OT/PT to assist with strengthening/mobility   - Keep Call bell within reach  - Keep bed low and locked with side rails adjusted as appropriate  - Keep care items and personal belongings within reach  - Initiate and maintain comfort rounds  - Make Fall Risk Sign visible to staff  - Apply yellow socks and bracelet for high fall risk patients  - Consider moving patient to room near nurses station  Outcome: Progressing  Goal: Maintain or return to baseline ADL function  Description: INTERVENTIONS:  -  Assess patient's ability to carry out ADLs; assess patient's baseline for ADL function and identify physical deficits which impact ability to perform ADLs (bathing, care of mouth/teeth, toileting, grooming, dressing, etc.)  - Assess/evaluate cause of self-care deficits   - Assess range of motion  - Assess patient's mobility; develop plan if impaired  - Assess patient's need for assistive devices and provide as appropriate  - Encourage maximum independence but intervene and supervise when necessary  - Involve family in performance of ADLs  - Assess for home care needs following discharge   - Consider OT consult to assist with ADL evaluation and planning for discharge  - Provide patient education as appropriate  Outcome: Progressing  Goal: Maintains/Returns to pre admission functional level  Description: INTERVENTIONS:  - Perform AM-PAC 6 Click Basic Mobility/ Daily Activity assessment daily.  - Set and communicate daily mobility goal to care team and patient/family/caregiver.   - Collaborate with rehabilitation services on mobility goals if consulted  - Out of bed for  toileting  - Record patient progress and toleration of activity level   Outcome: Progressing

## 2024-12-23 LAB
ANION GAP SERPL CALCULATED.3IONS-SCNC: 8 MMOL/L (ref 4–13)
BASOPHILS # BLD AUTO: 0.05 THOUSANDS/ÂΜL (ref 0–0.1)
BASOPHILS NFR BLD AUTO: 0 % (ref 0–1)
BUN SERPL-MCNC: 11 MG/DL (ref 5–25)
CALCIUM SERPL-MCNC: 8.8 MG/DL (ref 8.4–10.2)
CHLORIDE SERPL-SCNC: 106 MMOL/L (ref 96–108)
CO2 SERPL-SCNC: 25 MMOL/L (ref 21–32)
CREAT SERPL-MCNC: 0.48 MG/DL (ref 0.6–1.3)
EOSINOPHIL # BLD AUTO: 0.05 THOUSAND/ÂΜL (ref 0–0.61)
EOSINOPHIL NFR BLD AUTO: 0 % (ref 0–6)
ERYTHROCYTE [DISTWIDTH] IN BLOOD BY AUTOMATED COUNT: 14.8 % (ref 11.6–15.1)
GFR SERPL CREATININE-BSD FRML MDRD: 85 ML/MIN/1.73SQ M
GLUCOSE SERPL-MCNC: 89 MG/DL (ref 65–140)
HCT VFR BLD AUTO: 33.8 % (ref 34.8–46.1)
HGB BLD-MCNC: 11.8 G/DL (ref 11.5–15.4)
IMM GRANULOCYTES # BLD AUTO: 0.23 THOUSAND/UL (ref 0–0.2)
IMM GRANULOCYTES NFR BLD AUTO: 1 % (ref 0–2)
LYMPHOCYTES # BLD AUTO: 4.34 THOUSANDS/ÂΜL (ref 0.6–4.47)
LYMPHOCYTES NFR BLD AUTO: 17 % (ref 14–44)
MCH RBC QN AUTO: 33.3 PG (ref 26.8–34.3)
MCHC RBC AUTO-ENTMCNC: 34.9 G/DL (ref 31.4–37.4)
MCV RBC AUTO: 96 FL (ref 82–98)
MONOCYTES # BLD AUTO: 2.14 THOUSAND/ÂΜL (ref 0.17–1.22)
MONOCYTES NFR BLD AUTO: 8 % (ref 4–12)
NEUTROPHILS # BLD AUTO: 18.68 THOUSANDS/ÂΜL (ref 1.85–7.62)
NEUTS SEG NFR BLD AUTO: 74 % (ref 43–75)
NRBC BLD AUTO-RTO: 0 /100 WBCS
PLATELET # BLD AUTO: 336 THOUSANDS/UL (ref 149–390)
PMV BLD AUTO: 11.1 FL (ref 8.9–12.7)
POTASSIUM SERPL-SCNC: 3.5 MMOL/L (ref 3.5–5.3)
PROCALCITONIN SERPL-MCNC: 0.19 NG/ML
RBC # BLD AUTO: 3.54 MILLION/UL (ref 3.81–5.12)
SODIUM SERPL-SCNC: 139 MMOL/L (ref 135–147)
WBC # BLD AUTO: 25.49 THOUSAND/UL (ref 4.31–10.16)

## 2024-12-23 PROCEDURE — 80048 BASIC METABOLIC PNL TOTAL CA: CPT

## 2024-12-23 PROCEDURE — 84145 PROCALCITONIN (PCT): CPT

## 2024-12-23 PROCEDURE — 99232 SBSQ HOSP IP/OBS MODERATE 35: CPT | Performed by: INTERNAL MEDICINE

## 2024-12-23 PROCEDURE — 99231 SBSQ HOSP IP/OBS SF/LOW 25: CPT | Performed by: OTOLARYNGOLOGY

## 2024-12-23 PROCEDURE — 87040 BLOOD CULTURE FOR BACTERIA: CPT

## 2024-12-23 PROCEDURE — 92610 EVALUATE SWALLOWING FUNCTION: CPT

## 2024-12-23 PROCEDURE — 85025 COMPLETE CBC W/AUTO DIFF WBC: CPT

## 2024-12-23 RX ORDER — HYDRALAZINE HYDROCHLORIDE 20 MG/ML
10 INJECTION INTRAMUSCULAR; INTRAVENOUS EVERY 6 HOURS PRN
Status: DISCONTINUED | OUTPATIENT
Start: 2024-12-23 | End: 2024-12-31 | Stop reason: HOSPADM

## 2024-12-23 RX ORDER — HYDRALAZINE HYDROCHLORIDE 20 MG/ML
5 INJECTION INTRAMUSCULAR; INTRAVENOUS ONCE
Status: DISCONTINUED | OUTPATIENT
Start: 2024-12-23 | End: 2024-12-23

## 2024-12-23 RX ADMIN — METHYLPREDNISOLONE 2 MG: 4 TABLET ORAL at 10:05

## 2024-12-23 RX ADMIN — ACETAMINOPHEN 975 MG: 325 TABLET, FILM COATED ORAL at 22:18

## 2024-12-23 RX ADMIN — METOPROLOL SUCCINATE 50 MG: 50 TABLET, EXTENDED RELEASE ORAL at 10:00

## 2024-12-23 RX ADMIN — Medication 5000 UNITS: at 09:55

## 2024-12-23 RX ADMIN — AMPICILLIN SODIUM AND SULBACTAM SODIUM 3 G: 100; 50 INJECTION, POWDER, FOR SOLUTION INTRAVENOUS at 17:52

## 2024-12-23 RX ADMIN — ENOXAPARIN SODIUM 40 MG: 40 INJECTION SUBCUTANEOUS at 09:55

## 2024-12-23 RX ADMIN — AMPICILLIN SODIUM AND SULBACTAM SODIUM 3 G: 100; 50 INJECTION, POWDER, FOR SOLUTION INTRAVENOUS at 00:19

## 2024-12-23 RX ADMIN — ACETAMINOPHEN 975 MG: 325 TABLET, FILM COATED ORAL at 05:11

## 2024-12-23 RX ADMIN — BIMATOPROST 1 DROP: 0.3 SOLUTION/ DROPS OPHTHALMIC at 10:13

## 2024-12-23 RX ADMIN — ACETAMINOPHEN 975 MG: 325 TABLET, FILM COATED ORAL at 13:29

## 2024-12-23 RX ADMIN — MESALAMINE 800 MG: 400 CAPSULE, DELAYED RELEASE ORAL at 09:56

## 2024-12-23 RX ADMIN — AMPICILLIN SODIUM AND SULBACTAM SODIUM 3 G: 100; 50 INJECTION, POWDER, FOR SOLUTION INTRAVENOUS at 05:10

## 2024-12-23 RX ADMIN — ASPIRIN 81 MG: 81 TABLET, COATED ORAL at 10:05

## 2024-12-23 RX ADMIN — PANTOPRAZOLE SODIUM 40 MG: 40 TABLET, DELAYED RELEASE ORAL at 05:11

## 2024-12-23 RX ADMIN — AMPICILLIN SODIUM AND SULBACTAM SODIUM 3 G: 100; 50 INJECTION, POWDER, FOR SOLUTION INTRAVENOUS at 13:27

## 2024-12-23 NOTE — ASSESSMENT & PLAN NOTE
Confirmed with patient that she takes 50 mg Toprol-XL daily not 100 mg daily as reported in the chart  Initially was hypertensive when she arrived, no associated symptoms such as flash pulm edema, headache, or visual disturbances    Plan:  Continue home metoprolol succinate 50 mg daily  Continue to trend pressures closely

## 2024-12-23 NOTE — PLAN OF CARE
Problem: PAIN - ADULT  Goal: Verbalizes/displays adequate comfort level or baseline comfort level  Description: Interventions:  - Encourage patient to monitor pain and request assistance  - Assess pain using appropriate pain scale  - Administer analgesics based on type and severity of pain and evaluate response  - Implement non-pharmacological measures as appropriate and evaluate response  - Consider cultural and social influences on pain and pain management  - Notify physician/advanced practitioner if interventions unsuccessful or patient reports new pain  Outcome: Progressing     Problem: INFECTION - ADULT  Goal: Absence or prevention of progression during hospitalization  Description: INTERVENTIONS:  - Assess and monitor for signs and symptoms of infection  - Monitor lab/diagnostic results  - Monitor all insertion sites, i.e. indwelling lines, tubes, and drains  - Monitor endotracheal if appropriate and nasal secretions for changes in amount and color  - [place] appropriate cooling/warming therapies per order  - Administer medications as ordered  - Instruct and encourage patient and family to use good hand hygiene technique  - Identify and instruct in appropriate isolation precautions for identified infection/condition  Outcome: Progressing

## 2024-12-23 NOTE — ASSESSMENT & PLAN NOTE
Will continue methylprednisolone 2 mg daily in the morning as patient reported improvement in symptoms with no worsening colitis flare  Follow-up with PCP outpatient    Plan:  Continue methylprednisolone 2 mg daily

## 2024-12-23 NOTE — ASSESSMENT & PLAN NOTE
Complicated by facial cellulitis and left masseter myositis. Afebrile, no signs of systemic illness. Lactic acid and procal within normal range   Presenting with 3 days history worsening facial swelling, erythema, and firmness and associated trismus  CT soft tissue neck showing acute left parotitis secondary to a 2 mm obstructing parotid duct sialolith. Reactive secondary left side facial cellulitis and left masseter myositis noted. No discrete collection. Normal subglottic airway  Patient protecting and maintaining her airway at this time.  She is saturating in the upper 90s on room air.  She reports improvement in trismus.  She denies any secretions at this time as well  ENT based on the imaging recommended: (formal evaluation pending)  Sialadenitis protocol: Warm compresses to the affected area. Sialogogues (sour candies, lemon, citrus, vinegar). NSAID such as Ibuprofen for pain relief and to reduce inflammation. Aggressive hydration; encourage PO intake and IVF as tolerated. Continue IV antibiotic with adequate staph aureus coverage   Parotitis: Massage the gland from angle of mandible working anteriorly along the cheek towards the oral commissure to facilitate drainage through the ductal opening   Status post 4.5 g Zosyn and vancomycin in ER, received 500 mL fluid bolus and 1 g acetaminophen IV  Transition to Unasyn 3 g every 6 hour for community-acquired infection and transition to oral regimen with clinical improvement  Blood culture 1 out of 2 showing Gram + cocci. ID consulted and recommended to repeat BC.     Plan:  Unasyn 3 g Q6H (day 1)  Ibuprofen 400 mg Q6H PRN  Follow up on Echo  Continue diet and oral hydration as tolerated   Per ENT - warm compress, sialogogus (sour candies, lemon, citrus, vinegar)   ENT OP follow up in 2-4 weeks

## 2024-12-23 NOTE — PROGRESS NOTES
"OTOLARYNGOLOGY PROGRESS NOTE    Date of Service: 12/23/2024 6:02 AM    HPI  Patient is a 89 y.o. female presenting with left parotitis currently on Zosyn.     Overnight Events:     NAEO  Patient feels symptoms are 50% better but her mouth is still dry    PHYSICAL EXAM:  Vitals:    12/22/24 2215   BP: 148/68   Pulse: 76   Resp: 16   Temp: 98.1 °F (36.7 °C)   SpO2: 97%        General: No acute distress, AOx4  HEENT: dry oral mucosa, tender left parotid region, no purulence expressed  Neurology: No focal deficits  Lungs: Breathing easy, unlabored and even on room air  Cardio: RRR, well perfused  Abd: soft, NT, ND         Intake/Output Summary (Last 24 hours) at 12/23/2024 0602  Last data filed at 12/23/2024 0201  Gross per 24 hour   Intake 1190 ml   Output --   Net 1190 ml         LABORATORY    Recent Labs     12/22/24  1102   WBC 24.40*   HGB 13.6   HCT 39.9          Recent Labs     12/22/24  1102   K 3.3*      BUN 14       Invalid input(s): \"PTPAT\", \"PTINR\", \"APTTMNNM\", \"APTTPAT\"      Patient Active Problem List   Diagnosis    Mass of right finger    Trigger finger, left index finger    Back pain    Ambulatory dysfunction    Ulcerative colitis with complication (HCC)    History of fusion of lumbar spine    Carpal tunnel syndrome on left    Gastroesophageal reflux    Urinary incontinence    Parotiditis    Fibromyalgia    Hypertension         ASSESSMENT  Patient is a 89 y.o. female w/ acute and chronic problems as above, who presents with left parotitis. Re-demonstrated massage techniques and stressed importance of lemon wedges, warm compress and hydration.     PLAN    Sialadenitis protocol:  1. Warm compresses to the affected area.   2. Sialogogues (sour candies, lemon, citrus, vinegar).   3. NSAID such as Ibuprofen for pain relief and to reduce inflammation.   4. Aggressive hydration; encourage PO intake and IVF as tolerated.   5. Continue IV antibiotic with adequate staph aureus coverage   6. Parotitis: " Massage the gland from angle of mandible working anteriorly along the cheek towards the oral commissure to facilitate drainage through the ductal opening  7. ENT outpt fu in 2-4 weeks        Madhavi Cortez, PGY3  Otolaryngology- Head and Neck Surgery  Please contact Secure Chat ENT resident role

## 2024-12-23 NOTE — ASSESSMENT & PLAN NOTE
Stable, sees GI in the outpatient setting.  No acute symptoms or worsening flares    Plan:  Continue home mesalamine 800 mg twice daily

## 2024-12-23 NOTE — PLAN OF CARE
Problem: INFECTION - ADULT  Goal: Absence or prevention of progression during hospitalization  Description: INTERVENTIONS:  - Assess and monitor for signs and symptoms of infection  - Monitor lab/diagnostic results  - Monitor all insertion sites, i.e. indwelling lines, tubes, and drains  - Monitor endotracheal if appropriate and nasal secretions for changes in amount and color  - [place] appropriate cooling/warming therapies per order  - Administer medications as ordered  - Instruct and encourage patient and family to use good hand hygiene technique  - Identify and instruct in appropriate isolation precautions for identified infection/condition  Outcome: Progressing  Goal: Absence of fever/infection during neutropenic period  Description: INTERVENTIONS:  - Monitor WBC    Outcome: Progressing

## 2024-12-23 NOTE — SPEECH THERAPY NOTE
Speech Language/Pathology  Speech/Language Pathology  Assessment    Patient Name: Helen M Schwab  Today's Date: 12/23/2024     Problem List  Active Problems:    Ulcerative colitis with complication (HCC)    Gastroesophageal reflux    Parotiditis    Fibromyalgia    Hypertension    Past Medical History  Past Medical History:   Diagnosis Date    Arthritis     Bell's palsy 1940    Colitis     GERD (gastroesophageal reflux disease)     Hemorrhoids     History of transfusion     Hx of colonoscopy 1996, 1998, 2000, 2002, 2003, 2007, 2012    Hypertension     Ulcerative colitis (HCC)      Past Surgical History  Past Surgical History:   Procedure Laterality Date    BACK SURGERY  1971    ruptured disk    BACK SURGERY  1972    bone fusion lumbar spine    BLADDER REPAIR  2002    BLADDER REPAIR  2012    BREAST SURGERY  2011    CARDIAC SURGERY  2014    stent    CARPAL TUNNEL RELEASE  08/14/2014    CATARACT EXTRACTION Right 2015    CHOLECYSTECTOMY      COLONOSCOPY      EYE SURGERY Left     GALLBLADDER SURGERY  1995    GANGLION CYST EXCISION  2004    left wrist    HERNIA REPAIR  2001    HERNIA REPAIR  2003    MASTECTOMY  2012    OVARY SURGERY  1984    PARTIAL HYSTERECTOMY  1964    OH EXC B9 LESION MRGN XCP SK TG T/A/L 0.5 CM/< Left 4/27/2021    Procedure: Excision pyogenic granuloma left ring finger;  Surgeon: Shane Lincoln MD;  [facility]: [facility];  Service: Orthopedics    OH EXC LESION TDN SHTH/JT CAPSL HAND/FNGR Right 10/9/2018    Procedure: EXCISION GANGLION CYST RIGHT SMALL FINGER;  Surgeon: Shane Lincoln MD;  [facility];  Service: Orthopedics    OH NDSC WRST SURG W/RLS TRANSVRS CARPL LIGM Left 8/10/2021    Procedure: Left endoscopic carpal tunnel release;  Surgeon: Shane Lincoln MD;  [facility];  Service: Orthopedics    OH TENDON SHEATH INCISION Right 4/5/2016    Procedure: INDEX TRIGGER FINGER AND RING TRIGGER FINGER RELEASE ;  Surgeon: Shane Lincoln MD;  [facility];   Service: Orthopedics    NH TENDON SHEATH INCISION Left 10/16/2018    Procedure: RELEASE TRIGGER FINGER - LEFT INDEX;  Surgeon: Shane Lincoln MD;  [facility];  Service: Orthopedics    NH TENDON SHEATH INCISION Left 8/10/2021    Procedure: Left long and ring finger trigger finger release;  Surgeon: Shane Lincoln MD;  [facility];  Service: Orthopedics    SPLENECTOMY, PARTIAL  1995    TONSILLECTOMY  1939    WRIST SURGERY Right 1952   Speech-Language Pathology Bedside Swallow Evaluation        Patient Name: Helen M Schwab    Today's Date: 12/23/2024     Problem List  Active Problems:    Ulcerative colitis with complication (HCC)    Gastroesophageal reflux    Parotiditis    Fibromyalgia    Hypertension         Past Medical History  Past Medical History:   Diagnosis Date    Arthritis     Bell's palsy 1940    Colitis     GERD (gastroesophageal reflux disease)     Hemorrhoids     History of transfusion     Hx of colonoscopy 1996, 1998, 2000, 2002, 2003, 2007, 2012    Hypertension     Ulcerative colitis (HCC)        Past Surgical History  Past Surgical History:   Procedure Laterality Date    BACK SURGERY  1971    ruptured disk    BACK SURGERY  1972    bone fusion lumbar spine    BLADDER REPAIR  2002    BLADDER REPAIR  2012    BREAST SURGERY  2011    CARDIAC SURGERY  2014    stent    CARPAL TUNNEL RELEASE  08/14/2014    CATARACT EXTRACTION Right 2015    CHOLECYSTECTOMY      COLONOSCOPY      EYE SURGERY Left     GALLBLADDER SURGERY  1995    GANGLION CYST EXCISION  2004    left wrist    HERNIA REPAIR  2001    HERNIA REPAIR  2003    MASTECTOMY  2012    OVARY SURGERY  1984    PARTIAL HYSTERECTOMY  1964    NH EXC B9 LESION MRGN XCP SK TG T/A/L 0.5 CM/< Left 4/27/2021    Procedure: Excision pyogenic granuloma left ring finger;  Surgeon: Shane Lincoln MD;  [facility];  Service: Orthopedics    NH EXC LESION TDN SHTH/JT CAPSL HAND/FNGR Right 10/9/2018    Procedure: EXCISION GANGLION CYST  RIGHT SMALL FINGER;  Surgeon: Shane Lincoln MD;  [facility];  Service: Orthopedics    UT NDSC WRST SURG W/RLS TRANSVRS CARPL LIGM Left 8/10/2021    Procedure: Left endoscopic carpal tunnel release;  Surgeon: Shane Lincoln MD;  [facility];  Service: Orthopedics    UT TENDON SHEATH INCISION Right 4/5/2016    Procedure: INDEX TRIGGER FINGER AND RING TRIGGER FINGER RELEASE ;  Surgeon: Shane Lincoln MD;  [facility];  Service: Orthopedics    UT TENDON SHEATH INCISION Left 10/16/2018    Procedure: RELEASE TRIGGER FINGER - LEFT INDEX;  Surgeon: Shane Lincoln MD;  [facility];  Service: Orthopedics    UT TENDON SHEATH INCISION Left 8/10/2021    Procedure: Left long and ring finger trigger finger release;  Surgeon: Shane Lincoln MD;  [facility];  Service: Orthopedics    SPLENECTOMY, PARTIAL  1995    TONSILLECTOMY  1939    WRIST SURGERY Right 1952       Summary   Pt presents with mild oral dysphagia. Pt reporting pain while masticating, however, chooses softer foods/cuts food up small as needed. Right sided facial/neck swelling. No hx of dysphagia prior. F/u x1 for tolerance- pt stating she does not want dysphagia diet at this time and overall limited evaluation.      Recommendations:   Diet: regular diet and thin liquids Pt choosing softer foods as needed from menu  Meds:  As best tolerated     Frequent Oral care: 4x/day  Aspiration precautions and compensatory swallowing strategies: upright posture, slow rate of feeding, and small bites/sips  Other Recommendations/ considerations: Addition of sauces/gravies for moisture to dry dense foods        Current Medical Status  Patient is a 89-year-old female, with a history significant for ulcerative colitis managed with methylprednisolone and mesalamine per my review the medical record, who presents to the ED today, via EMS, for evaluation of facial swelling over the last 3 days. It began gradually has been  "constant and progressively worsening. Patient reports associated dysphagia, trismus. No dyspnea. Patient denies fever, chest pain, trauma, abdominal pain, urinary symptoms, weakness, numbness. Patient took methylprednisolone to try and remit her symptoms. No clear exacerbating factors. Patient is currently afebrile and hemodynamically stable.     Special Studies:  Neck CT 12/22/24:  IMPRESSION:        1. Acute left parotitis secondary to a 2 mm obstructing parotid duct sialolith (series 301, image 28). Reactive secondary left side facial cellulitis and left masseter myositis noted. No discrete collection. Further clinical assessment advise    Social/Education/Vocational Hx:  Pt lives with family    Swallow Information   Prior speech/swallowing tx:   Current Risks for Dysphagia & Aspiration: recent surgery  Current Symptoms/Concerns:  Chewing difficulties due to oral pain  Current Diet: regular diet and thin liquids   Baseline Diet: regular diet and thin liquids  Takes pills- as tolerated     Baseline Assessment   Behavior/Cognition: alert  Speech/Language Status: able to participate in conversation  Patient Positioning: upright in bed     Swallow Mechanism Exam   Facial: symmetrical  Labial: decreased ROM right side  Lingual: decreased ROM  Velum: symmetrical  Mandible: decreased ROM right side  Dentition: adequate  Vocal quality:clear/adequate   Volitional Cough: strong/productive   Respiratory: RA    Consistencies Assessed and Performance   Consistencies Administered: thin liquids, puree, and soft solids    Oral Stage:   Adequate seal and retrieval. Pt spitting out \"soft solid\" because it will be \"too hard to chew\". Oral stage WNL with puree and thins.Pt declining regular solids and recommendation for dysphagia diet at this time.     Pharyngeal Stage: WNL  Timely pharyngeal swallow. Adequate excursion felt with palpation. No overt s/s of aspiration.     Esophageal Concerns: globus sensation with certain " medications       Results Reviewed with: patient, RN, and MD   Dysphagia Goals:     Dysphagia LTG  -Patient will demonstrate safe and effective oral intake (without overt s/s significant oral/pharyngeal dysphagia including s/s penetration or aspiration) for the highest appropriate diet level.     Short Term Goals:  -Pt will tolerate regular diet and  thin liquid with no significant s/s oral or pharyngeal dysphagia across 1-3 diagnostic session/s    -Patient will comply with a Video/Modified Barium Swallow study if indicated for more complete assessment of swallowing anatomy/physiology/aspiration risk and to assess efficacy of treatment techniques      Speech Therapy Prognosis   Prognosis: good    Prognosis Considerations: age

## 2024-12-23 NOTE — PROGRESS NOTES
Progress Note - Hospitalist   Name: Helen M Schwab 89 y.o. female I MRN: [id number]  Unit/Bed#: -01 I Date of Admission: 12/22/2024   Date of Service: 12/23/2024 I Hospital Day: 1    Assessment & Plan  Parotiditis  Complicated by facial cellulitis and left masseter myositis. Afebrile, no signs of systemic illness. Lactic acid and procal within normal range   Presenting with 3 days history worsening facial swelling, erythema, and firmness and associated trismus  CT soft tissue neck showing acute left parotitis secondary to a 2 mm obstructing parotid duct sialolith. Reactive secondary left side facial cellulitis and left masseter myositis noted. No discrete collection. Normal subglottic airway  Patient protecting and maintaining her airway at this time.  She is saturating in the upper 90s on room air.  She reports improvement in trismus.  She denies any secretions at this time as well  ENT based on the imaging recommended: (formal evaluation pending)  Sialadenitis protocol: Warm compresses to the affected area. Sialogogues (sour candies, lemon, citrus, vinegar). NSAID such as Ibuprofen for pain relief and to reduce inflammation. Aggressive hydration; encourage PO intake and IVF as tolerated. Continue IV antibiotic with adequate staph aureus coverage   Parotitis: Massage the gland from angle of mandible working anteriorly along the cheek towards the oral commissure to facilitate drainage through the ductal opening   Status post 4.5 g Zosyn and vancomycin in ER, received 500 mL fluid bolus and 1 g acetaminophen IV  Transition to Unasyn 3 g every 6 hour for community-acquired infection and transition to oral regimen with clinical improvement  Blood culture 1 out of 2 showing Gram + cocci. ID consulted and recommended to repeat BC.     Plan:  Unasyn 3 g Q6H (day 1)  Ibuprofen 400 mg Q6H PRN  Follow up on Echo  Continue diet and oral hydration as tolerated   Per ENT - warm compress, sialogogus (sour candies, lemon,  citrus, vinegar)   ENT OP follow up in 2-4 weeks  Ulcerative colitis with complication (HCC)  Stable, sees GI in the outpatient setting.  No acute symptoms or worsening flares    Plan:  Continue home mesalamine 800 mg twice daily  Fibromyalgia  Will continue methylprednisolone 2 mg daily in the morning as patient reported improvement in symptoms with no worsening colitis flare  Follow-up with PCP outpatient    Plan:  Continue methylprednisolone 2 mg daily   Hypertension  Confirmed with patient that she takes 50 mg Toprol-XL daily not 100 mg daily as reported in the chart  Initially was hypertensive when she arrived, no associated symptoms such as flash pulm edema, headache, or visual disturbances    Plan:  Continue home metoprolol succinate 50 mg daily  Continue to trend pressures closely  Gastroesophageal reflux  Continue pantoprazole 40 mg early morning     VTE Pharmacologic Prophylaxis: VTE Score: 5 High Risk (Score >/= 5) - Pharmacological DVT Prophylaxis Ordered: enoxaparin (Lovenox). Sequential Compression Devices Ordered.    Mobility:   Basic Mobility Inpatient Raw Score: 22  JH-HLM Goal: 7: Walk 25 feet or more  JH-HLM Achieved: 7: Walk 25 feet or more  JH-HLM Goal NOT achieved. Continue with multidisciplinary rounding and encourage appropriate mobility to improve upon JH-HLM goals.    Patient Centered Rounds: I performed bedside rounds with nursing staff today.   Discussions with Specialists or Other Care Team Provider: ENT    Education and Discussions with Family / Patient: Updated  (son) via phone.    Current Length of Stay: 1 day(s)  Current Patient Status: Inpatient   Certification Statement: The patient will continue to require additional inpatient hospital stay due to IV antibiotic  Discharge Plan: Anticipate discharge in 24-48 hrs to home.    Code Status: Level 2 - DNAR: but accepts endotracheal intubation    Subjective   Patient was seen and examined by the bedside.  Patient has no  acute complaint or significant event overnight.  Patient's report that pain and swelling in her face has reduced significantly.  Patient's reports that she put on warm compression this morning and planning to get sour candies.  Patient is denying fever, chills, headaches, sore throat, chest pain, changes with urination or bowel movement.        Objective :  Temp:  [97.6 °F (36.4 °C)-98.7 °F (37.1 °C)] 97.6 °F (36.4 °C)  HR:  [63-76] 63  BP: (112-161)/(63-85) 161/71  Resp:  [16-20] 18  SpO2:  [95 %-98 %] 96 %  O2 Device: None (Room air)    Body mass index is 23.39 kg/m².     Input and Output Summary (last 24 hours):     Intake/Output Summary (Last 24 hours) at 12/23/2024 1238  Last data filed at 12/23/2024 0900  Gross per 24 hour   Intake 1450 ml   Output --   Net 1450 ml       Physical Exam  Constitutional:       Appearance: Normal appearance.   HENT:      Head:      Comments: Erythema in the left aspect of face the submandibular region          Mouth/Throat:      Mouth: Mucous membranes are dry.      Pharynx: Oropharynx is clear.   Eyes:      General:         Right eye: No discharge.         Left eye: No discharge.      Conjunctiva/sclera: Conjunctivae normal.   Cardiovascular:      Rate and Rhythm: Normal rate and regular rhythm.      Pulses: Normal pulses.      Heart sounds: Normal heart sounds.   Pulmonary:      Effort: Pulmonary effort is normal. No respiratory distress.      Breath sounds: Normal breath sounds. No wheezing.   Abdominal:      General: Abdomen is flat. Bowel sounds are normal. There is no distension.      Tenderness: There is no abdominal tenderness. There is no right CVA tenderness or left CVA tenderness.   Musculoskeletal:         General: No swelling or tenderness. Normal range of motion.      Cervical back: Normal range of motion.      Right lower leg: No edema.      Left lower leg: No edema.   Skin:     General: Skin is warm.      Findings: Erythema present.   Neurological:      Mental  Status: She is alert.         Lines/Drains:              Lab Results: I have reviewed the following results:   Results from last 7 days   Lab Units 12/23/24  0507   WBC Thousand/uL 25.49*   HEMOGLOBIN g/dL 11.8   HEMATOCRIT % 33.8*   PLATELETS Thousands/uL 336   SEGS PCT % 74   LYMPHO PCT % 17   MONO PCT % 8   EOS PCT % 0     Results from last 7 days   Lab Units 12/23/24  0507 12/22/24  1102   SODIUM mmol/L 139 137   POTASSIUM mmol/L 3.5 3.3*   CHLORIDE mmol/L 106 100   CO2 mmol/L 25 27   BUN mg/dL 11 14   CREATININE mg/dL 0.48* 0.44*   ANION GAP mmol/L 8 10   CALCIUM mg/dL 8.8 9.6   ALBUMIN g/dL  --  4.2   TOTAL BILIRUBIN mg/dL  --  1.02*   ALK PHOS U/L  --  70   ALT U/L  --  9   AST U/L  --  14   GLUCOSE RANDOM mg/dL 89 115     Results from last 7 days   Lab Units 12/22/24  1102   INR  0.97             Results from last 7 days   Lab Units 12/23/24  0507 12/22/24  1102   LACTIC ACID mmol/L  --  1.6   PROCALCITONIN ng/ml 0.19 <0.05       Recent Cultures (last 7 days):   Results from last 7 days   Lab Units 12/22/24  1102   BLOOD CULTURE  Received in Microbiology Lab. Culture in Progress.  Received in Microbiology Lab. Culture in Progress.       Imaging Results Review: I reviewed radiology reports from this admission including: CT Soft tissues neck with contrast.  Other Study Results Review: No additional pertinent studies reviewed.    Last 24 Hours Medication List:     Current Facility-Administered Medications:     acetaminophen (TYLENOL) tablet 975 mg, Q8H WILLA    ampicillin-sulbactam (UNASYN) 3 g in sodium chloride 0.9 % 100 mL IVPB, Q6H, Last Rate: 3 g (12/23/24 0510)    aspirin (ECOTRIN LOW STRENGTH) EC tablet 81 mg, Daily    bimatoprost (LUMIGAN) 0.03 % ophthalmic drops 1 drop, Daily    Cholecalciferol (VITAMIN D3) tablet 5,000 Units, Daily    enoxaparin (LOVENOX) subcutaneous injection 40 mg, Q24H WILLA    ibuprofen (MOTRIN) tablet 400 mg, Q6H PRN    mesalamine (DELZICOL) delayed release capsule 800 mg, BID     methylprednisolone (MEDROL) tablet 2 mg, Daily With Breakfast    metoprolol succinate (TOPROL-XL) 24 hr tablet 50 mg, Daily    pantoprazole (PROTONIX) EC tablet 40 mg, Early Morning    piperacillin-tazobactam (ZOSYN) IVPB 4.5 g, Once, Last Rate: Stopped (12/22/24 1122)    Administrative Statements   Today, Patient Was Seen By: Chantell Card MD      **Please Note: This note may have been constructed using a voice recognition system.**

## 2024-12-24 LAB
ANION GAP SERPL CALCULATED.3IONS-SCNC: 11 MMOL/L (ref 4–13)
BASOPHILS # BLD AUTO: 0.02 THOUSANDS/ÂΜL (ref 0–0.1)
BASOPHILS NFR BLD AUTO: 0 % (ref 0–1)
BUN SERPL-MCNC: 8 MG/DL (ref 5–25)
CALCIUM SERPL-MCNC: 8.7 MG/DL (ref 8.4–10.2)
CHLORIDE SERPL-SCNC: 106 MMOL/L (ref 96–108)
CO2 SERPL-SCNC: 23 MMOL/L (ref 21–32)
CREAT SERPL-MCNC: 0.44 MG/DL (ref 0.6–1.3)
EOSINOPHIL # BLD AUTO: 0.1 THOUSAND/ÂΜL (ref 0–0.61)
EOSINOPHIL NFR BLD AUTO: 1 % (ref 0–6)
ERYTHROCYTE [DISTWIDTH] IN BLOOD BY AUTOMATED COUNT: 14.8 % (ref 11.6–15.1)
GFR SERPL CREATININE-BSD FRML MDRD: 87 ML/MIN/1.73SQ M
GLUCOSE SERPL-MCNC: 97 MG/DL (ref 65–140)
HCT VFR BLD AUTO: 35.4 % (ref 34.8–46.1)
HGB BLD-MCNC: 12.2 G/DL (ref 11.5–15.4)
IMM GRANULOCYTES # BLD AUTO: 0.08 THOUSAND/UL (ref 0–0.2)
IMM GRANULOCYTES NFR BLD AUTO: 1 % (ref 0–2)
LYMPHOCYTES # BLD AUTO: 2.66 THOUSANDS/ÂΜL (ref 0.6–4.47)
LYMPHOCYTES NFR BLD AUTO: 16 % (ref 14–44)
MCH RBC QN AUTO: 32.8 PG (ref 26.8–34.3)
MCHC RBC AUTO-ENTMCNC: 34.5 G/DL (ref 31.4–37.4)
MCV RBC AUTO: 95 FL (ref 82–98)
MONOCYTES # BLD AUTO: 1.13 THOUSAND/ÂΜL (ref 0.17–1.22)
MONOCYTES NFR BLD AUTO: 7 % (ref 4–12)
NEUTROPHILS # BLD AUTO: 12.5 THOUSANDS/ÂΜL (ref 1.85–7.62)
NEUTS SEG NFR BLD AUTO: 75 % (ref 43–75)
NRBC BLD AUTO-RTO: 0 /100 WBCS
PLATELET # BLD AUTO: 353 THOUSANDS/UL (ref 149–390)
PMV BLD AUTO: 10.9 FL (ref 8.9–12.7)
POTASSIUM SERPL-SCNC: 3 MMOL/L (ref 3.5–5.3)
RBC # BLD AUTO: 3.72 MILLION/UL (ref 3.81–5.12)
SODIUM SERPL-SCNC: 140 MMOL/L (ref 135–147)
WBC # BLD AUTO: 16.49 THOUSAND/UL (ref 4.31–10.16)

## 2024-12-24 PROCEDURE — 80048 BASIC METABOLIC PNL TOTAL CA: CPT

## 2024-12-24 PROCEDURE — 99232 SBSQ HOSP IP/OBS MODERATE 35: CPT | Performed by: HOSPITALIST

## 2024-12-24 PROCEDURE — 85025 COMPLETE CBC W/AUTO DIFF WBC: CPT

## 2024-12-24 RX ORDER — ONDANSETRON 2 MG/ML
4 INJECTION INTRAMUSCULAR; INTRAVENOUS EVERY 6 HOURS PRN
Status: DISCONTINUED | OUTPATIENT
Start: 2024-12-24 | End: 2024-12-31 | Stop reason: HOSPADM

## 2024-12-24 RX ORDER — POTASSIUM CHLORIDE 1500 MG/1
20 TABLET, EXTENDED RELEASE ORAL ONCE
Status: DISCONTINUED | OUTPATIENT
Start: 2024-12-24 | End: 2024-12-25

## 2024-12-24 RX ORDER — SACCHAROMYCES BOULARDII 250 MG
250 CAPSULE ORAL 2 TIMES DAILY
Status: DISCONTINUED | OUTPATIENT
Start: 2024-12-24 | End: 2024-12-31 | Stop reason: HOSPADM

## 2024-12-24 RX ORDER — POTASSIUM CHLORIDE 14.9 MG/ML
20 INJECTION INTRAVENOUS
Status: DISPENSED | OUTPATIENT
Start: 2024-12-24 | End: 2024-12-24

## 2024-12-24 RX ORDER — POTASSIUM CHLORIDE 14.9 MG/ML
20 INJECTION INTRAVENOUS
Status: COMPLETED | OUTPATIENT
Start: 2024-12-24 | End: 2024-12-24

## 2024-12-24 RX ADMIN — POTASSIUM CHLORIDE 20 MEQ: 14.9 INJECTION, SOLUTION INTRAVENOUS at 09:57

## 2024-12-24 RX ADMIN — MESALAMINE 800 MG: 400 CAPSULE, DELAYED RELEASE ORAL at 20:06

## 2024-12-24 RX ADMIN — ONDANSETRON 4 MG: 2 INJECTION, SOLUTION INTRAMUSCULAR; INTRAVENOUS at 06:07

## 2024-12-24 RX ADMIN — ENOXAPARIN SODIUM 40 MG: 40 INJECTION SUBCUTANEOUS at 10:06

## 2024-12-24 RX ADMIN — ACETAMINOPHEN 975 MG: 325 TABLET, FILM COATED ORAL at 21:10

## 2024-12-24 RX ADMIN — AMPICILLIN SODIUM AND SULBACTAM SODIUM 3 G: 100; 50 INJECTION, POWDER, FOR SOLUTION INTRAVENOUS at 20:03

## 2024-12-24 RX ADMIN — POTASSIUM CHLORIDE 20 MEQ: 14.9 INJECTION, SOLUTION INTRAVENOUS at 12:11

## 2024-12-24 RX ADMIN — AMPICILLIN SODIUM AND SULBACTAM SODIUM 3 G: 100; 50 INJECTION, POWDER, FOR SOLUTION INTRAVENOUS at 00:28

## 2024-12-24 RX ADMIN — METOPROLOL SUCCINATE 50 MG: 50 TABLET, EXTENDED RELEASE ORAL at 10:04

## 2024-12-24 RX ADMIN — AMPICILLIN SODIUM AND SULBACTAM SODIUM 3 G: 100; 50 INJECTION, POWDER, FOR SOLUTION INTRAVENOUS at 14:07

## 2024-12-24 RX ADMIN — ACETAMINOPHEN 975 MG: 325 TABLET, FILM COATED ORAL at 14:15

## 2024-12-24 RX ADMIN — Medication 524 MG: at 11:08

## 2024-12-24 NOTE — PROGRESS NOTES
Progress Note - Internal Medicine   Name: Helen M Schwab 89 y.o. female I MRN: [id number]  Unit/Bed#: S -01 I Date of Admission: 12/22/2024   Date of Service: 12/24/2024 I Hospital Day: 2    Assessment & Plan  Parotiditis  Complicated by facial cellulitis and left masseter myositis. Afebrile, no signs of systemic illness. Lactic acid and procal within normal range   Presenting with 3 days history worsening facial swelling, erythema, and firmness and associated trismus  CT soft tissue neck showing acute left parotitis secondary to a 2 mm obstructing parotid duct sialolith. Reactive secondary left side facial cellulitis and left masseter myositis noted. No discrete collection. Normal subglottic airway  Patient protecting and maintaining her airway at this time.  She is saturating in the upper 90s on room air.  She reports improvement in trismus.  She denies any secretions at this time as well  ENT based on the imaging recommended: (formal evaluation pending)  Sialadenitis protocol: Warm compresses to the affected area. Sialogogues (sour candies, lemon, citrus, vinegar). NSAID such as Ibuprofen for pain relief and to reduce inflammation. Aggressive hydration; encourage PO intake and IVF as tolerated. Continue IV antibiotic with adequate staph aureus coverage   Parotitis: Massage the gland from angle of mandible working anteriorly along the cheek towards the oral commissure to facilitate drainage through the ductal opening   Status post 4.5 g Zosyn and vancomycin in ER, received 500 mL fluid bolus and 1 g acetaminophen IV  Transition to Unasyn 3 g every 6 hour for community-acquired infection and transition to oral regimen with clinical improvement  Blood culture 1 out of 2 showing Gram + cocci. ID vale hale consulted and recommended to repeat BC.     Plan:  Continue Unasyn 3 g Q6H (day 2)  Ibuprofen 400 mg Q6H PRN  Follow up on Echo  Follow up repeat blood culture from 12/24  Continue diet and oral hydration as  tolerated   Per ENT - warm compress, sialogogus (sour candies, lemon, citrus, vinegar)   ENT OP follow up in 2-4 weeks  Ulcerative colitis with complication (HCC)  Patient reports having mild abdominal pain with 1 episode of stool incontinence and nauseous  Patient reports taking Pepto-Bismol at home when similar episodes occurred and it helps with abdominal pain    Plan:  Continue home mesalamine 800 mg twice daily  Start Pepto-Bismol as needed  Fibromyalgia  Will continue methylprednisolone 2 mg daily in the morning as patient reported improvement in symptoms with no worsening colitis flare  Follow-up with PCP outpatient    Plan:  Continue methylprednisolone 2 mg daily   Hypertension  Confirmed with patient that she takes 50 mg Toprol-XL daily not 100 mg daily as reported in the chart  Initially was hypertensive when she arrived, no associated symptoms such as flash pulm edema, headache, or visual disturbances    Plan:  Continue home metoprolol succinate 50 mg daily  Continue to trend pressures closely  Gastroesophageal reflux  Continue pantoprazole 40 mg early morning     VTE Pharmacologic Prophylaxis: VTE Score: 5 High Risk (Score >/= 5) - Pharmacological DVT Prophylaxis Ordered: enoxaparin (Lovenox). Sequential Compression Devices Ordered.    Mobility:   Basic Mobility Inpatient Raw Score: 22  JH-HLM Goal: 7: Walk 25 feet or more  JH-HLM Achieved: 7: Walk 25 feet or more  JH-HLM Goal NOT achieved. Continue with multidisciplinary rounding and encourage appropriate mobility to improve upon JH-HLM goals.    Patient Centered Rounds: I performed bedside rounds with nursing staff today.   Discussions with Specialists or Other Care Team Provider: ENT    Education and Discussions with Family / Patient: Updated  (son) via phone.    Current Length of Stay: 2 day(s)  Current Patient Status: Inpatient   Certification Statement: The patient will continue to require additional inpatient hospital stay due to IV  antibiotic  Discharge Plan: Anticipate discharge in 24-48 hrs to home.    Code Status: Level 2 - DNAR: but accepts endotracheal intubation    Subjective   Patient was seen and examined by the bedside.  Patient has 1 episode of emesis and one stool incontinence overnight.  Patient reports that she usually take Pepto-Bismol to help with similar episode at home.  Patient also reports that pain, redness and swelling in her face has significantly reduced. Patient is recommended to place warm compression this morning and more sour candies.  Patient is denying fever, chills, headaches, sore throat, chest pain, changes with urination.      Objective :  Temp:  [98 °F (36.7 °C)-98.4 °F (36.9 °C)] 98.3 °F (36.8 °C)  HR:  [72-83] 83  BP: (112-180)/(68-90) 156/68  Resp:  [18] 18  SpO2:  [94 %-96 %] 94 %  O2 Device: None (Room air)    Body mass index is 23.39 kg/m².     Input and Output Summary (last 24 hours):     Intake/Output Summary (Last 24 hours) at 12/24/2024 1354  Last data filed at 12/24/2024 0201  Gross per 24 hour   Intake 120 ml   Output 300 ml   Net -180 ml       Physical Exam  Constitutional:       Appearance: Normal appearance.   HENT:      Head:      Comments: Erythema in the left aspect of face the submandibular region          Mouth/Throat:      Mouth: Mucous membranes are dry.      Pharynx: Oropharynx is clear.   Eyes:      General:         Right eye: No discharge.         Left eye: No discharge.      Conjunctiva/sclera: Conjunctivae normal.   Cardiovascular:      Rate and Rhythm: Normal rate and regular rhythm.      Pulses: Normal pulses.      Heart sounds: Normal heart sounds.   Pulmonary:      Effort: Pulmonary effort is normal. No respiratory distress.      Breath sounds: Normal breath sounds. No wheezing.   Abdominal:      General: Abdomen is flat. Bowel sounds are normal. There is no distension.      Tenderness: There is no abdominal tenderness. There is no right CVA tenderness or left CVA tenderness.    Musculoskeletal:         General: No swelling or tenderness. Normal range of motion.      Cervical back: Normal range of motion.      Right lower leg: No edema.      Left lower leg: No edema.   Skin:     General: Skin is warm.      Findings: Erythema present.   Neurological:      Mental Status: She is alert.         Lines/Drains:              Lab Results: I have reviewed the following results:   Results from last 7 days   Lab Units 12/24/24  0438   WBC Thousand/uL 16.49*   HEMOGLOBIN g/dL 12.2   HEMATOCRIT % 35.4   PLATELETS Thousands/uL 353   SEGS PCT % 75   LYMPHO PCT % 16   MONO PCT % 7   EOS PCT % 1     Results from last 7 days   Lab Units 12/24/24  0438 12/23/24  0507 12/22/24  1102   SODIUM mmol/L 140   < > 137   POTASSIUM mmol/L 3.0*   < > 3.3*   CHLORIDE mmol/L 106   < > 100   CO2 mmol/L 23   < > 27   BUN mg/dL 8   < > 14   CREATININE mg/dL 0.44*   < > 0.44*   ANION GAP mmol/L 11   < > 10   CALCIUM mg/dL 8.7   < > 9.6   ALBUMIN g/dL  --   --  4.2   TOTAL BILIRUBIN mg/dL  --   --  1.02*   ALK PHOS U/L  --   --  70   ALT U/L  --   --  9   AST U/L  --   --  14   GLUCOSE RANDOM mg/dL 97   < > 115    < > = values in this interval not displayed.     Results from last 7 days   Lab Units 12/22/24  1102   INR  0.97             Results from last 7 days   Lab Units 12/23/24  0507 12/22/24  1102   LACTIC ACID mmol/L  --  1.6   PROCALCITONIN ng/ml 0.19 <0.05       Recent Cultures (last 7 days):   Results from last 7 days   Lab Units 12/23/24  1616 12/22/24  1102   BLOOD CULTURE  Received in Microbiology Lab. Culture in Progress.  Received in Microbiology Lab. Culture in Progress. Staphylococcus aureus*  No Growth at 24 hrs.   GRAM STAIN RESULT   --  Gram positive cocci in clusters*       Imaging Results Review: I reviewed radiology reports from this admission including: CT Soft tissues neck with contrast.  Other Study Results Review: No additional pertinent studies reviewed.    Last 24 Hours Medication List:      Current Facility-Administered Medications:     acetaminophen (TYLENOL) tablet 975 mg, Q8H WILLA    ampicillin-sulbactam (UNASYN) 3 g in sodium chloride 0.9 % 100 mL IVPB, Q6H, Last Rate: 3 g (12/24/24 0028)    aspirin (ECOTRIN LOW STRENGTH) EC tablet 81 mg, Daily    bimatoprost (LUMIGAN) 0.03 % ophthalmic drops 1 drop, Daily    bismuth subsalicylate (PEPTO BISMOL) oral suspension 524 mg, Q6H PRN    Cholecalciferol (VITAMIN D3) tablet 5,000 Units, Daily    enoxaparin (LOVENOX) subcutaneous injection 40 mg, Q24H WILLA    hydrALAZINE (APRESOLINE) injection 10 mg, Q6H PRN    ibuprofen (MOTRIN) tablet 400 mg, Q6H PRN    mesalamine (DELZICOL) delayed release capsule 800 mg, BID    methylprednisolone (MEDROL) tablet 2 mg, Daily With Breakfast    metoprolol succinate (TOPROL-XL) 24 hr tablet 50 mg, Daily    ondansetron (ZOFRAN) injection 4 mg, Q6H PRN    pantoprazole (PROTONIX) EC tablet 40 mg, Early Morning    potassium chloride (Klor-Con M20) CR tablet 20 mEq, Once    potassium chloride 20 mEq IVPB (premix), Q2H, Last Rate: 20 mEq (12/24/24 1211)    saccharomyces boulardii (FLORASTOR) capsule 250 mg, BID    Administrative Statements   Today, Patient Was Seen By: Chantell Card MD      **Please Note: This note may have been constructed using a voice recognition system.**

## 2024-12-24 NOTE — PLAN OF CARE
Problem: Potential for Falls  Goal: Patient will remain free of falls  Description: INTERVENTIONS:  - Educate patient/family on patient safety including physical limitations  - Instruct patient to call for assistance with activity   - Consult OT/PT to assist with strengthening/mobility   - Keep Call bell within reach  - Keep bed low and locked with side rails adjusted as appropriate  - Keep care items and personal belongings within reach  - Initiate and maintain comfort rounds  - Make Fall Risk Sign visible to staff  - Offer Toileting every 2 Hours, in advance of need  - Initiate/Maintain Bed alarm  - Apply yellow socks and bracelet for high fall risk patients  - Consider moving patient to room near nurses station  Outcome: Progressing     Problem: PAIN - ADULT  Goal: Verbalizes/displays adequate comfort level or baseline comfort level  Description: Interventions:  - Encourage patient to monitor pain and request assistance  - Assess pain using appropriate pain scale  - Administer analgesics based on type and severity of pain and evaluate response  - Implement non-pharmacological measures as appropriate and evaluate response  - Consider cultural and social influences on pain and pain management  - Notify physician/advanced practitioner if interventions unsuccessful or patient reports new pain  Outcome: Progressing     Problem: INFECTION - ADULT  Goal: Absence or prevention of progression during hospitalization  Description: INTERVENTIONS:  - Assess and monitor for signs and symptoms of infection  - Monitor lab/diagnostic results  - Monitor all insertion sites, i.e. indwelling lines, tubes, and drains  - Monitor endotracheal if appropriate and nasal secretions for changes in amount and color  - [place] appropriate cooling/warming therapies per order  - Administer medications as ordered  - Instruct and encourage patient and family to use good hand hygiene technique  - Identify and instruct in appropriate isolation  precautions for identified infection/condition  Outcome: Progressing  Goal: Absence of fever/infection during neutropenic period  Description: INTERVENTIONS:  - Monitor WBC    Outcome: Progressing     Problem: SAFETY ADULT  Goal: Patient will remain free of falls  Description: INTERVENTIONS:  - Educate patient/family on patient safety including physical limitations  - Instruct patient to call for assistance with activity   - Consult OT/PT to assist with strengthening/mobility   - Keep Call bell within reach  - Keep bed low and locked with side rails adjusted as appropriate  - Keep care items and personal belongings within reach  - Initiate and maintain comfort rounds  - Make Fall Risk Sign visible to staff  - Offer Toileting every 2 Hours, in advance of need  - Initiate/Maintain Bed alarm  - Apply yellow socks and bracelet for high fall risk patients  - Consider moving patient to room near nurses station  Outcome: Progressing  Goal: Maintain or return to baseline ADL function  Description: INTERVENTIONS:  -  Assess patient's ability to carry out ADLs; assess patient's baseline for ADL function and identify physical deficits which impact ability to perform ADLs (bathing, care of mouth/teeth, toileting, grooming, dressing, etc.)  - Assess/evaluate cause of self-care deficits   - Assess range of motion  - Assess patient's mobility; develop plan if impaired  - Assess patient's need for assistive devices and provide as appropriate  - Encourage maximum independence but intervene and supervise when necessary  - Involve family in performance of ADLs  - Assess for home care needs following discharge   - Consider OT consult to assist with ADL evaluation and planning for discharge  - Provide patient education as appropriate  Outcome: Progressing  Goal: Maintains/Returns to pre admission functional level  Description: INTERVENTIONS:  - Perform AM-PAC 6 Click Basic Mobility/ Daily Activity assessment daily.  - Set and  communicate daily mobility goal to care team and patient/family/caregiver.   - Collaborate with rehabilitation services on mobility goals if consulted  - Perform Range of Motion 3 times a day.  - Reposition patient every 2 hours.  - Dangle patient 3 times a day  - Stand patient 3 times a day  - Ambulate patient 3 times a day  - Out of bed to chair 3 times a day   - Out of bed for meals 3 times a day  - Out of bed for toileting  - Record patient progress and toleration of activity level   Outcome: Progressing

## 2024-12-24 NOTE — ASSESSMENT & PLAN NOTE
Patient reports having mild abdominal pain with 1 episode of stool incontinence and nauseous  Patient reports taking Pepto-Bismol at home when similar episodes occurred and it helps with abdominal pain    Plan:  Continue home mesalamine 800 mg twice daily  Start Pepto-Bismol as needed

## 2024-12-24 NOTE — PLAN OF CARE
Problem: Potential for Falls  Goal: Patient will remain free of falls  Description: INTERVENTIONS:  - Educate patient/family on patient safety including physical limitations  - Instruct patient to call for assistance with activity   - Consult OT/PT to assist with strengthening/mobility   - Keep Call bell within reach  - Keep bed low and locked with side rails adjusted as appropriate  - Keep care items and personal belongings within reach  - Initiate and maintain comfort rounds  - Make Fall Risk Sign visible to staff  - Offer Toileting every  Hours, in advance of need  - Initiate/Maintain alarm  - Obtain necessary fall risk management equipment:   - Apply yellow socks and bracelet for high fall risk patients  - Consider moving patient to room near nurses station  Outcome: Progressing     Problem: PAIN - ADULT  Goal: Verbalizes/displays adequate comfort level or baseline comfort level  Description: Interventions:  - Encourage patient to monitor pain and request assistance  - Assess pain using appropriate pain scale  - Administer analgesics based on type and severity of pain and evaluate response  - Implement non-pharmacological measures as appropriate and evaluate response  - Consider cultural and social influences on pain and pain management  - Notify physician/advanced practitioner if interventions unsuccessful or patient reports new pain  Outcome: Progressing     Problem: INFECTION - ADULT  Goal: Absence or prevention of progression during hospitalization  Description: INTERVENTIONS:  - Assess and monitor for signs and symptoms of infection  - Monitor lab/diagnostic results  - Monitor all insertion sites, i.e. indwelling lines, tubes, and drains  - Monitor endotracheal if appropriate and nasal secretions for changes in amount and color  - [place] appropriate cooling/warming therapies per order  - Administer medications as ordered  - Instruct and encourage patient and family to use good hand hygiene technique  -  Identify and instruct in appropriate isolation precautions for identified infection/condition  Outcome: Progressing  Goal: Absence of fever/infection during neutropenic period  Description: INTERVENTIONS:  - Monitor WBC    Outcome: Progressing     Problem: SAFETY ADULT  Goal: Patient will remain free of falls  Description: INTERVENTIONS:  - Educate patient/family on patient safety including physical limitations  - Instruct patient to call for assistance with activity   - Consult OT/PT to assist with strengthening/mobility   - Keep Call bell within reach  - Keep bed low and locked with side rails adjusted as appropriate  - Keep care items and personal belongings within reach  - Initiate and maintain comfort rounds  - Make Fall Risk Sign visible to staff  - Offer Toileting every  Hours, in advance of need  - Initiate/Maintain alarm  - Obtain necessary fall risk management equipment:   - Apply yellow socks and bracelet for high fall risk patients  - Consider moving patient to room near nurses station  Outcome: Progressing  Goal: Maintain or return to baseline ADL function  Description: INTERVENTIONS:  -  Assess patient's ability to carry out ADLs; assess patient's baseline for ADL function and identify physical deficits which impact ability to perform ADLs (bathing, care of mouth/teeth, toileting, grooming, dressing, etc.)  - Assess/evaluate cause of self-care deficits   - Assess range of motion  - Assess patient's mobility; develop plan if impaired  - Assess patient's need for assistive devices and provide as appropriate  - Encourage maximum independence but intervene and supervise when necessary  - Involve family in performance of ADLs  - Assess for home care needs following discharge   - Consider OT consult to assist with ADL evaluation and planning for discharge  - Provide patient education as appropriate  Outcome: Progressing  Goal: Maintains/Returns to pre admission functional level  Description:  INTERVENTIONS:  - Perform AM-PAC 6 Click Basic Mobility/ Daily Activity assessment daily.  - Set and communicate daily mobility goal to care team and patient/family/caregiver.   - Collaborate with rehabilitation services on mobility goals if consulted  - Perform Range of Motion  times a day.  - Reposition patient every  hours.  - Dangle patient  times a day  - Stand patient  times a day  - Ambulate patient  times a day  - Out of bed to chair  times a day   - Out of bed for meals times a day  - Out of bed for toileting  - Record patient progress and toleration of activity level   Outcome: Progressing

## 2024-12-24 NOTE — CASE MANAGEMENT
Case Management Assessment & Discharge Planning Note    Patient name Helen M Schwab Location S /S -01 MRN [id number]  : 1932 Date 2024       Current Admission Date: 2024  Current Admission Diagnosis:Parotiditis   Patient Active Problem List    Diagnosis Date Noted Date Diagnosed    Parotiditis 2024     Fibromyalgia 2024     Hypertension 2024     Urinary incontinence 2021     Gastroesophageal reflux 2021     Carpal tunnel syndrome on left 08/10/2021     Back pain 2021     Ambulatory dysfunction 2021     Ulcerative colitis with complication (HCC) 2021     History of fusion of lumbar spine 2021     Mass of right finger 08/15/2018     Trigger finger, left index finger 08/15/2018       LOS (days): 2  Geometric Mean LOS (GMLOS) (days): 2.8  Days to GMLOS:0.7     OBJECTIVE:    Risk of Unplanned Readmission Score: 7.91         Current admission status: Inpatient       Preferred Pharmacy:   [facility]/pharmacy #[id number] - [place], [place] - [address] [address]  Phone: [phone] Fax: [phone]    Primary Care Provider: Shant Lucas MD    Primary Insurance: MEDICARE  Secondary Insurance: [employer]    ASSESSMENT:  Active Health Care Proxies    There are no active Health Care Proxies on file.                      Patient Information  Admitted from:: Home  Mental Status: Alert  During Assessment patient was accompanied by: Not accompanied during assessment  Assessment information provided by:: Patient  Primary Caregiver: Self  Support Systems: Son  County of Residence: [place]  What city do you live in?: [place]  Home entry access options. Select all that apply.: Stairs  Number of steps to enter home.: 1 (through garage entry)  Type of Current Residence: [facility]  Living Arrangements: Lives w/ Son    Activities of Daily Living Prior to Admission  Functional Status: Independent  Completes ADLs  independently?: Yes  Ambulates independently?: Yes  Does patient use assisted devices?: Yes  Assisted Devices (DME) used: Bedside Commode, Straight Cane, Rollator  Does patient currently own DME?: Yes  What DME does the patient currently own?: Bedside Commode, Rollator, Straight Cane, Walker, Shower Chair  Does patient have a history of Outpatient Therapy (PT/OT)?: Yes  Does the patient have a history of Short-Term Rehab?: No  Does patient have a history of HHC?: No  Does patient currently have HHC?: No         Patient Information Continued  Does patient have prescription coverage?: Yes  Does patient receive dialysis treatments?: No         Means of Transportation  Means of Transport to Appts:: Family transport    CM s/w patient re: assessment. Patient relayed she lives w/ son in ranch home, one ELIEL through garage. Patient relayed she is independent with ADLS, utilizes cane in home and rollator when out in community. Pt relayed she sponge bathes at baseline as has tub shower set up and won't be able to get walk-in shower set up till the spring. Pt relayed she has grab bars throughout her home. Son assists pt with IADLS. Pt relayed son provides transport. Pt reported hx of OPPT. Pt confirmed PCP Lance and preferred pharmacy [facility]. No CM d/c needs identified at this time, CM to continue to follow for any pending CM d/c needs identified.     Social Determinants of Health (SDOH)      Flowsheet Row Most Recent Value   Housing Stability    In the last 12 months, was there a time when you were not able to pay the mortgage or rent on time? N   At any time in the past 12 months, were you homeless or living in a shelter (including now)? N   Transportation Needs    In the past 12 months, has lack of transportation kept you from medical appointments or from getting medications? no   In the past 12 months, has lack of transportation kept you from meetings, work, or from getting things needed for daily living? No   Food Insecurity     Within the past 12 months, you worried that your food would run out before you got the money to buy more. Never true   Within the past 12 months, the food you bought just didn't last and you didn't have money to get more. Never true   Utilities    In the past 12 months has the electric, gas, oil, or water company threatened to shut off services in your home? No            DISCHARGE DETAILS:                                                                                     IMM reviewed with patient, patient agrees with discharge determination.  IMM Given (Date):: 12/24/24  IMM Given to:: Patient

## 2024-12-24 NOTE — ASSESSMENT & PLAN NOTE
Complicated by facial cellulitis and left masseter myositis. Afebrile, no signs of systemic illness. Lactic acid and procal within normal range   Presenting with 3 days history worsening facial swelling, erythema, and firmness and associated trismus  CT soft tissue neck showing acute left parotitis secondary to a 2 mm obstructing parotid duct sialolith. Reactive secondary left side facial cellulitis and left masseter myositis noted. No discrete collection. Normal subglottic airway  Patient protecting and maintaining her airway at this time.  She is saturating in the upper 90s on room air.  She reports improvement in trismus.  She denies any secretions at this time as well  ENT based on the imaging recommended: (formal evaluation pending)  Sialadenitis protocol: Warm compresses to the affected area. Sialogogues (sour candies, lemon, citrus, vinegar). NSAID such as Ibuprofen for pain relief and to reduce inflammation. Aggressive hydration; encourage PO intake and IVF as tolerated. Continue IV antibiotic with adequate staph aureus coverage   Parotitis: Massage the gland from angle of mandible working anteriorly along the cheek towards the oral commissure to facilitate drainage through the ductal opening   Status post 4.5 g Zosyn and vancomycin in ER, received 500 mL fluid bolus and 1 g acetaminophen IV  Transition to Unasyn 3 g every 6 hour for community-acquired infection and transition to oral regimen with clinical improvement  Blood culture 1 out of 2 showing Gram + cocci. ID vale hale consulted and recommended to repeat BC.     Plan:  Continue Unasyn 3 g Q6H (day 2)  Ibuprofen 400 mg Q6H PRN  Follow up on Echo  Follow up repeat blood culture from 12/24  Continue diet and oral hydration as tolerated   Per ENT - warm compress, sialogogus (sour candies, lemon, citrus, vinegar)   ENT OP follow up in 2-4 weeks

## 2024-12-25 ENCOUNTER — APPOINTMENT (INPATIENT)
Dept: NON INVASIVE DIAGNOSTICS | Facility: HOSPITAL | Age: 89
DRG: 155 | End: 2024-12-25
Payer: MEDICARE

## 2024-12-25 LAB
ANION GAP SERPL CALCULATED.3IONS-SCNC: 14 MMOL/L (ref 4–13)
BACTERIA BLD CULT: ABNORMAL
BASOPHILS # BLD AUTO: 0.05 THOUSANDS/ÂΜL (ref 0–0.1)
BASOPHILS NFR BLD AUTO: 1 % (ref 0–1)
BUN SERPL-MCNC: 13 MG/DL (ref 5–25)
CALCIUM SERPL-MCNC: 8 MG/DL (ref 8.4–10.2)
CHLORIDE SERPL-SCNC: 106 MMOL/L (ref 96–108)
CO2 SERPL-SCNC: 21 MMOL/L (ref 21–32)
CREAT SERPL-MCNC: 0.57 MG/DL (ref 0.6–1.3)
EOSINOPHIL # BLD AUTO: 0.02 THOUSAND/ÂΜL (ref 0–0.61)
EOSINOPHIL NFR BLD AUTO: 0 % (ref 0–6)
ERYTHROCYTE [DISTWIDTH] IN BLOOD BY AUTOMATED COUNT: 15.7 % (ref 11.6–15.1)
GFR SERPL CREATININE-BSD FRML MDRD: 80 ML/MIN/1.73SQ M
GLUCOSE SERPL-MCNC: 78 MG/DL (ref 65–140)
GRAM STN SPEC: ABNORMAL
HCT VFR BLD AUTO: 37.8 % (ref 34.8–46.1)
HGB BLD-MCNC: 12.1 G/DL (ref 11.5–15.4)
IMM GRANULOCYTES # BLD AUTO: 0.04 THOUSAND/UL (ref 0–0.2)
IMM GRANULOCYTES NFR BLD AUTO: 1 % (ref 0–2)
LYMPHOCYTES # BLD AUTO: 1.43 THOUSANDS/ÂΜL (ref 0.6–4.47)
LYMPHOCYTES NFR BLD AUTO: 17 % (ref 14–44)
MCH RBC QN AUTO: 32.4 PG (ref 26.8–34.3)
MCHC RBC AUTO-ENTMCNC: 32 G/DL (ref 31.4–37.4)
MCV RBC AUTO: 101 FL (ref 82–98)
MECA+MECC+MREJ ISLT/SPM QL: DETECTED
MONOCYTES # BLD AUTO: 1.08 THOUSAND/ÂΜL (ref 0.17–1.22)
MONOCYTES NFR BLD AUTO: 13 % (ref 4–12)
NEUTROPHILS # BLD AUTO: 5.84 THOUSANDS/ÂΜL (ref 1.85–7.62)
NEUTS SEG NFR BLD AUTO: 68 % (ref 43–75)
NRBC BLD AUTO-RTO: 0 /100 WBCS
PLATELET # BLD AUTO: 335 THOUSANDS/UL (ref 149–390)
PMV BLD AUTO: 10.5 FL (ref 8.9–12.7)
POTASSIUM SERPL-SCNC: 3.3 MMOL/L (ref 3.5–5.3)
RBC # BLD AUTO: 3.74 MILLION/UL (ref 3.81–5.12)
S AUREUS DNA BLD POS QL NAA+NON-PROBE: DETECTED
SODIUM SERPL-SCNC: 141 MMOL/L (ref 135–147)
WBC # BLD AUTO: 8.46 THOUSAND/UL (ref 4.31–10.16)

## 2024-12-25 PROCEDURE — 93306 TTE W/DOPPLER COMPLETE: CPT

## 2024-12-25 PROCEDURE — 85025 COMPLETE CBC W/AUTO DIFF WBC: CPT

## 2024-12-25 PROCEDURE — 80048 BASIC METABOLIC PNL TOTAL CA: CPT

## 2024-12-25 PROCEDURE — 99232 SBSQ HOSP IP/OBS MODERATE 35: CPT | Performed by: HOSPITALIST

## 2024-12-25 RX ORDER — POTASSIUM CHLORIDE 1500 MG/1
40 TABLET, EXTENDED RELEASE ORAL ONCE
Status: COMPLETED | OUTPATIENT
Start: 2024-12-25 | End: 2024-12-25

## 2024-12-25 RX ORDER — POTASSIUM CHLORIDE 14.9 MG/ML
20 INJECTION INTRAVENOUS
Status: DISCONTINUED | OUTPATIENT
Start: 2024-12-25 | End: 2024-12-25

## 2024-12-25 RX ADMIN — Medication 5000 UNITS: at 08:04

## 2024-12-25 RX ADMIN — METHYLPREDNISOLONE 2 MG: 4 TABLET ORAL at 08:04

## 2024-12-25 RX ADMIN — POTASSIUM CHLORIDE 20 MEQ: 14.9 INJECTION, SOLUTION INTRAVENOUS at 06:49

## 2024-12-25 RX ADMIN — ACETAMINOPHEN 975 MG: 325 TABLET, FILM COATED ORAL at 04:53

## 2024-12-25 RX ADMIN — BIMATOPROST 1 DROP: 0.3 SOLUTION/ DROPS OPHTHALMIC at 08:13

## 2024-12-25 RX ADMIN — POTASSIUM CHLORIDE 40 MEQ: 1500 TABLET, EXTENDED RELEASE ORAL at 08:08

## 2024-12-25 RX ADMIN — Medication 250 MG: at 08:04

## 2024-12-25 RX ADMIN — PANTOPRAZOLE SODIUM 40 MG: 40 TABLET, DELAYED RELEASE ORAL at 04:53

## 2024-12-25 RX ADMIN — AMPICILLIN SODIUM AND SULBACTAM SODIUM 3 G: 100; 50 INJECTION, POWDER, FOR SOLUTION INTRAVENOUS at 03:24

## 2024-12-25 RX ADMIN — AMPICILLIN SODIUM AND SULBACTAM SODIUM 3 G: 100; 50 INJECTION, POWDER, FOR SOLUTION INTRAVENOUS at 08:00

## 2024-12-25 RX ADMIN — HYDRALAZINE HYDROCHLORIDE 10 MG: 20 INJECTION INTRAMUSCULAR; INTRAVENOUS at 22:33

## 2024-12-25 RX ADMIN — AMOXICILLIN AND CLAVULANATE POTASSIUM 1 TABLET: 875; 125 TABLET, FILM COATED ORAL at 14:52

## 2024-12-25 RX ADMIN — Medication 524 MG: at 10:08

## 2024-12-25 RX ADMIN — ACETAMINOPHEN 975 MG: 325 TABLET, FILM COATED ORAL at 14:52

## 2024-12-25 RX ADMIN — MESALAMINE 800 MG: 400 CAPSULE, DELAYED RELEASE ORAL at 08:04

## 2024-12-25 RX ADMIN — METOPROLOL SUCCINATE 50 MG: 50 TABLET, EXTENDED RELEASE ORAL at 08:04

## 2024-12-25 RX ADMIN — ASPIRIN 81 MG: 81 TABLET, COATED ORAL at 08:04

## 2024-12-25 RX ADMIN — Medication 250 MG: at 17:24

## 2024-12-25 RX ADMIN — ENOXAPARIN SODIUM 40 MG: 40 INJECTION SUBCUTANEOUS at 08:04

## 2024-12-25 RX ADMIN — ACETAMINOPHEN 975 MG: 325 TABLET, FILM COATED ORAL at 22:21

## 2024-12-25 RX ADMIN — MESALAMINE 800 MG: 400 CAPSULE, DELAYED RELEASE ORAL at 22:21

## 2024-12-25 NOTE — ASSESSMENT & PLAN NOTE
Confirmed with patient that she takes 50 mg Toprol-XL daily not 100 mg daily as reported in the chart  Initially was hypertensive when she arrived, no associated symptoms such as flash pulm edema, headache, or visual disturbance    Plan:  Continue home metoprolol succinate 50 mg daily  Continue to trend pressures closely

## 2024-12-25 NOTE — ASSESSMENT & PLAN NOTE
Patient reports that mild abdominal pain, nausea and stool incontinence has resolved.    Patient reports Pepto-Bismol helps with her symptoms    Plan:  Continue home mesalamine 800 mg twice daily  Continue Pepto-Bismol as needed  Continue saccharomyces boulardii (Florastor) BID

## 2024-12-25 NOTE — PROGRESS NOTES
Progress Note - Internal Medicine   Name: Helen M Schwab 89 y.o. female I MRN: [id number]  Unit/Bed#: S -01 I Date of Admission: 12/22/2024   Date of Service: 12/25/2024 I Hospital Day: 3    Assessment & Plan  Parotiditis  Complicated by facial cellulitis and left masseter myositis. Afebrile, no signs of systemic illness. Lactic acid and procal within normal range   Presenting with 3 days history worsening facial swelling, erythema, and firmness and associated trismus  CT soft tissue neck showing acute left parotitis secondary to a 2 mm obstructing parotid duct sialolith. Reactive secondary left side facial cellulitis and left masseter myositis noted. No discrete collection. Normal subglottic airway  Patient protecting and maintaining her airway at this time.  She is saturating in the upper 90s on room air.  She reports improvement in trismus.  She denies any secretions at this time as well  ENT based on the imaging recommended: (formal evaluation pending)  Sialadenitis protocol: Warm compresses to the affected area. Sialogogues (sour candies, lemon, citrus, vinegar). NSAID such as Ibuprofen for pain relief and to reduce inflammation. Aggressive hydration; encourage PO intake and IVF as tolerated. Continue IV antibiotic with adequate staph aureus coverage   Parotitis: Massage the gland from angle of mandible working anteriorly along the cheek towards the oral commissure to facilitate drainage through the ductal opening   Status post 4.5 g Zosyn and vancomycin in ER, received 500 mL fluid bolus and 1 g acetaminophen IV  Transition to Unasyn 3 g every 6 hour for community-acquired infection and transition to oral regimen with clinical improvement  Blood culture 1 out of 2 showing Gram + cocci. Repeat blood cultures show no growth at 24 hours.     Plans  Switch from Unasyn to Augmentin Q12H   Ibuprofen 400 mg Q6H PRN  Follow up on Echo  Continue diet and oral hydration as tolerated   Per ENT - warm compress,  sialogogus (sour candies, lemon, citrus, vinegar)   ENT OP follow up in 2-4 weeks  Ulcerative colitis with complication (HCC)  Patient reports that mild abdominal pain, nausea and stool incontinence has resolved.    Patient reports Pepto-Bismol helps with her symptoms    Plan:  Continue home mesalamine 800 mg twice daily  Continue Pepto-Bismol as needed  Continue saccharomyces boulardii (Florastor) BID  Fibromyalgia  Will continue methylprednisolone 2 mg daily in the morning as patient reported improvement in symptoms with no worsening colitis flare  Follow-up with PCP outpatient    Plan:  Continue methylprednisolone 2 mg daily   Hypertension  Confirmed with patient that she takes 50 mg Toprol-XL daily not 100 mg daily as reported in the chart  Initially was hypertensive when she arrived, no associated symptoms such as flash pulm edema, headache, or visual disturbance    Plan:  Continue home metoprolol succinate 50 mg daily  Continue to trend pressures closely  Gastroesophageal reflux  Continue pantoprazole 40 mg early morning     VTE Pharmacologic Prophylaxis: VTE Score: 5 High Risk (Score >/= 5) - Pharmacological DVT Prophylaxis Ordered: enoxaparin (Lovenox). Sequential Compression Devices Ordered.    Mobility:   Basic Mobility Inpatient Raw Score: 22  JH-HLM Goal: 7: Walk 25 feet or more  JH-HLM Achieved: 7: Walk 25 feet or more  JH-HLM Goal NOT achieved. Continue with multidisciplinary rounding and encourage appropriate mobility to improve upon JH-HLM goals.    Patient Centered Rounds: I performed bedside rounds with nursing staff today.   Discussions with Specialists or Other Care Team Provider: ENT    Education and Discussions with Family / Patient: Updated  (son) via phone.    Current Length of Stay: 3 day(s)  Current Patient Status: Inpatient   Certification Statement: The patient will continue to require additional inpatient hospital stay due to IV antibiotic  Discharge Plan: Anticipate  discharge in 24-48 hrs to home.    Code Status: Level 2 - DNAR: but accepts endotracheal intubation    Subjective   Patient is seen and examined by the bedside.  Patient has no acute complaint no significant event overnight.  Patient reports that her nausea and 1 episode of stool incontinent has resolved.  Even though patient is feeling better she said that she is not back 100% at baseline yet.  Patient expresses concern of not being able to walk to the bathroom and/or take care of herself at home.  Patient is independent with ADLs at home.  Patient is notified that she will be seen by PT.    Patient also reports that pain, redness and swelling in her face has significantly reduced. Patient is doing massage per ENT instruction and putting warm compression. Patient is denying fever, chills, headaches, sore throat, chest pain, changes with urination.      Objective :  Temp:  [97.9 °F (36.6 °C)-98.4 °F (36.9 °C)] 97.9 °F (36.6 °C)  HR:  [64-84] 64  BP: (121-146)/(53-70) 141/65  Resp:  [18] 18  SpO2:  [93 %-95 %] 95 %  O2 Device: None (Room air)    Body mass index is 23.39 kg/m².     Input and Output Summary (last 24 hours):     Intake/Output Summary (Last 24 hours) at 12/25/2024 1051  Last data filed at 12/25/2024 0642  Gross per 24 hour   Intake 390 ml   Output 0 ml   Net 390 ml       Physical Exam  Constitutional:       Appearance: Normal appearance.   HENT:      Head:      Comments: Improving erythema in the left aspect of face the submandibular region          Mouth/Throat:      Mouth: Mucous membranes are dry.      Pharynx: Oropharynx is clear.   Eyes:      General:         Right eye: No discharge.         Left eye: No discharge.      Conjunctiva/sclera: Conjunctivae normal.   Cardiovascular:      Rate and Rhythm: Normal rate and regular rhythm.      Pulses: Normal pulses.      Heart sounds: Normal heart sounds.   Pulmonary:      Effort: Pulmonary effort is normal. No respiratory distress.      Breath sounds:  Normal breath sounds. No wheezing.   Abdominal:      General: Abdomen is flat. Bowel sounds are normal. There is no distension.      Tenderness: There is no abdominal tenderness. There is no right CVA tenderness or left CVA tenderness.   Musculoskeletal:         General: No swelling or tenderness. Normal range of motion.      Cervical back: Normal range of motion.      Right lower leg: No edema.      Left lower leg: No edema.   Skin:     General: Skin is warm.      Findings: Erythema (improving) present.   Neurological:      Mental Status: She is alert.         Lines/Drains:              Lab Results: I have reviewed the following results:   Results from last 7 days   Lab Units 12/25/24  0511   WBC Thousand/uL 8.46   HEMOGLOBIN g/dL 12.1   HEMATOCRIT % 37.8   PLATELETS Thousands/uL 335   SEGS PCT % 68   LYMPHO PCT % 17   MONO PCT % 13*   EOS PCT % 0     Results from last 7 days   Lab Units 12/25/24  0511 12/23/24  0507 12/22/24  1102   SODIUM mmol/L 141   < > 137   POTASSIUM mmol/L 3.3*   < > 3.3*   CHLORIDE mmol/L 106   < > 100   CO2 mmol/L 21   < > 27   BUN mg/dL 13   < > 14   CREATININE mg/dL 0.57*   < > 0.44*   ANION GAP mmol/L 14*   < > 10   CALCIUM mg/dL 8.0*   < > 9.6   ALBUMIN g/dL  --   --  4.2   TOTAL BILIRUBIN mg/dL  --   --  1.02*   ALK PHOS U/L  --   --  70   ALT U/L  --   --  9   AST U/L  --   --  14   GLUCOSE RANDOM mg/dL 78   < > 115    < > = values in this interval not displayed.     Results from last 7 days   Lab Units 12/22/24  1102   INR  0.97             Results from last 7 days   Lab Units 12/23/24  0507 12/22/24  1102   LACTIC ACID mmol/L  --  1.6   PROCALCITONIN ng/ml 0.19 <0.05       Recent Cultures (last 7 days):   Results from last 7 days   Lab Units 12/23/24  1616 12/22/24  1102   BLOOD CULTURE  No Growth at 24 hrs.  No Growth at 24 hrs. Methicillin Resistant Staphylococcus aureus*  No Growth at 48 hrs.   GRAM STAIN RESULT   --  Gram positive cocci in clusters*       Imaging Results  Review: I reviewed radiology reports from this admission including: CT Soft tissues neck with contrast.  Other Study Results Review: No additional pertinent studies reviewed.    Last 24 Hours Medication List:     Current Facility-Administered Medications:     acetaminophen (TYLENOL) tablet 975 mg, Q8H WILLA    amoxicillin-clavulanate (AUGMENTIN) 875-125 mg per tablet 1 tablet, Q12H WILLA    aspirin (ECOTRIN LOW STRENGTH) EC tablet 81 mg, Daily    bimatoprost (LUMIGAN) 0.03 % ophthalmic drops 1 drop, Daily    bismuth subsalicylate (PEPTO BISMOL) oral suspension 524 mg, Q6H PRN    Cholecalciferol (VITAMIN D3) tablet 5,000 Units, Daily    enoxaparin (LOVENOX) subcutaneous injection 40 mg, Q24H WILLA    hydrALAZINE (APRESOLINE) injection 10 mg, Q6H PRN    ibuprofen (MOTRIN) tablet 400 mg, Q6H PRN    mesalamine (DELZICOL) delayed release capsule 800 mg, BID    methylprednisolone (MEDROL) tablet 2 mg, Daily With Breakfast    metoprolol succinate (TOPROL-XL) 24 hr tablet 50 mg, Daily    ondansetron (ZOFRAN) injection 4 mg, Q6H PRN    pantoprazole (PROTONIX) EC tablet 40 mg, Early Morning    saccharomyces boulardii (FLORASTOR) capsule 250 mg, BID    Administrative Statements   Today, Patient Was Seen By: Chantell Card MD      **Please Note: This note may have been constructed using a voice recognition system.**

## 2024-12-25 NOTE — PLAN OF CARE
Problem: Potential for Falls  Goal: Patient will remain free of falls  Description: INTERVENTIONS:  - Educate patient/family on patient safety including physical limitations  - Instruct patient to call for assistance with activity   - Consult OT/PT to assist with strengthening/mobility   - Keep Call bell within reach  - Keep bed low and locked with side rails adjusted as appropriate  - Keep care items and personal belongings within reach  - Initiate and maintain comfort rounds  - Make Fall Risk Sign visible to staff  - Apply yellow socks and bracelet for high fall risk patients  - Consider moving patient to room near nurses station  Outcome: Progressing     Problem: PAIN - ADULT  Goal: Verbalizes/displays adequate comfort level or baseline comfort level  Description: Interventions:  - Encourage patient to monitor pain and request assistance  - Assess pain using appropriate pain scale  - Administer analgesics based on type and severity of pain and evaluate response  - Implement non-pharmacological measures as appropriate and evaluate response  - Consider cultural and social influences on pain and pain management  - Notify physician/advanced practitioner if interventions unsuccessful or patient reports new pain  Outcome: Progressing     Problem: INFECTION - ADULT  Goal: Absence or prevention of progression during hospitalization  Description: INTERVENTIONS:  - Assess and monitor for signs and symptoms of infection  - Monitor lab/diagnostic results  - Monitor all insertion sites, i.e. indwelling lines, tubes, and drains  - Monitor endotracheal if appropriate and nasal secretions for changes in amount and color  - [place] appropriate cooling/warming therapies per order  - Administer medications as ordered  - Instruct and encourage patient and family to use good hand hygiene technique  - Identify and instruct in appropriate isolation precautions for identified infection/condition  Outcome: Progressing  Goal: Absence  of fever/infection during neutropenic period  Description: INTERVENTIONS:  - Monitor WBC    Outcome: Progressing     Problem: SAFETY ADULT  Goal: Patient will remain free of falls  Description: INTERVENTIONS:  - Educate patient/family on patient safety including physical limitations  - Instruct patient to call for assistance with activity   - Consult OT/PT to assist with strengthening/mobility   - Keep Call bell within reach  - Keep bed low and locked with side rails adjusted as appropriate  - Keep care items and personal belongings within reach  - Initiate and maintain comfort rounds  - Make Fall Risk Sign visible to staff  -- Apply yellow socks and bracelet for high fall risk patients  - Consider moving patient to room near nurses station  Outcome: Progressing  Goal: Maintain or return to baseline ADL function  Description: INTERVENTIONS:  -  Assess patient's ability to carry out ADLs; assess patient's baseline for ADL function and identify physical deficits which impact ability to perform ADLs (bathing, care of mouth/teeth, toileting, grooming, dressing, etc.)  - Assess/evaluate cause of self-care deficits   - Assess range of motion  - Assess patient's mobility; develop plan if impaired  - Assess patient's need for assistive devices and provide as appropriate  - Encourage maximum independence but intervene and supervise when necessary  - Involve family in performance of ADLs  - Assess for home care needs following discharge   - Consider OT consult to assist with ADL evaluation and planning for discharge  - Provide patient education as appropriate  Outcome: Progressing  Goal: Maintains/Returns to pre admission functional level  Description: INTERVENTIONS:  - Perform AM-PAC 6 Click Basic Mobility/ Daily Activity assessment daily.  - Set and communicate daily mobility goal to care team and patient/family/caregiver.   - Collaborate with rehabilitation services on mobility goals if consulted  - Out of bed for  toileting  - Record patient progress and toleration of activity level   Outcome: Progressing     Problem: Nutrition/Hydration-ADULT  Goal: Nutrient/Hydration intake appropriate for improving, restoring or maintaining nutritional needs  Description: Monitor and assess patient's nutrition/hydration status for malnutrition. Collaborate with interdisciplinary team and initiate plan and interventions as ordered.  Monitor patient's weight and dietary intake as ordered or per policy. Utilize nutrition screening tool and intervene as necessary. Determine patient's food preferences and provide high-protein, high-caloric foods as appropriate.     INTERVENTIONS:  - Monitor oral intake, urinary output, labs, and treatment plans  - Assess nutrition and hydration status and recommend course of action  - Evaluate amount of meals eaten  - Assist patient with eating if necessary   - Allow adequate time for meals  - Recommend/ encourage appropriate diets, oral nutritional supplements, and vitamin/mineral supplements  - Order, calculate, and assess calorie counts as needed  - Recommend, monitor, and adjust tube feedings and TPN/PPN based on assessed needs  - Assess need for intravenous fluids  - Provide specific nutrition/hydration education as appropriate  - Include patient/family/caregiver in decisions related to nutrition  Outcome: Progressing     Problem: Prexisting or High Potential for Compromised Skin Integrity  Goal: Skin integrity is maintained or improved  Description: INTERVENTIONS:  - Identify patients at risk for skin breakdown  - Assess and monitor skin integrity  - Assess and monitor nutrition and hydration status  - Monitor labs   - Assess for incontinence   - Turn and reposition patient  - Assist with mobility/ambulation  - Relieve pressure over bony prominences  - Avoid friction and shearing  - Provide appropriate hygiene as needed including keeping skin clean and dry  - Evaluate need for skin moisturizer/barrier  cream  - Collaborate with interdisciplinary team   - Patient/family teaching  - Consider wound care consult   Outcome: Progressing

## 2024-12-25 NOTE — PLAN OF CARE
Problem: Potential for Falls  Goal: Patient will remain free of falls  Description: INTERVENTIONS:  - Educate patient/family on patient safety including physical limitations  - Instruct patient to call for assistance with activity   - Consult OT/PT to assist with strengthening/mobility   - Keep Call bell within reach  - Keep bed low and locked with side rails adjusted as appropriate  - Keep care items and personal belongings within reach  - Initiate and maintain comfort rounds  - Make Fall Risk Sign visible to staff  - Apply yellow socks and bracelet for high fall risk patients  - Consider moving patient to room near nurses station  Outcome: Progressing     Problem: PAIN - ADULT  Goal: Verbalizes/displays adequate comfort level or baseline comfort level  Description: Interventions:  - Encourage patient to monitor pain and request assistance  - Assess pain using appropriate pain scale  - Administer analgesics based on type and severity of pain and evaluate response  - Implement non-pharmacological measures as appropriate and evaluate response  - Consider cultural and social influences on pain and pain management  - Notify physician/advanced practitioner if interventions unsuccessful or patient reports new pain  Outcome: Progressing     Problem: INFECTION - ADULT  Goal: Absence or prevention of progression during hospitalization  Description: INTERVENTIONS:  - Assess and monitor for signs and symptoms of infection  - Monitor lab/diagnostic results  - Monitor all insertion sites, i.e. indwelling lines, tubes, and drains  - Monitor endotracheal if appropriate and nasal secretions for changes in amount and color  - [place] appropriate cooling/warming therapies per order  - Administer medications as ordered  - Instruct and encourage patient and family to use good hand hygiene technique  - Identify and instruct in appropriate isolation precautions for identified infection/condition  Outcome: Progressing  Goal: Absence  of fever/infection during neutropenic period  Description: INTERVENTIONS:  - Monitor WBC    Outcome: Progressing     Problem: SAFETY ADULT  Goal: Patient will remain free of falls  Description: INTERVENTIONS:  - Educate patient/family on patient safety including physical limitations  - Instruct patient to call for assistance with activity   - Consult OT/PT to assist with strengthening/mobility   - Keep Call bell within reach  - Keep bed low and locked with side rails adjusted as appropriate  - Keep care items and personal belongings within reach  - Initiate and maintain comfort rounds  - Make Fall Risk Sign visible to staff  - Apply yellow socks and bracelet for high fall risk patients  - Consider moving patient to room near nurses station  Outcome: Progressing  Goal: Maintain or return to baseline ADL function  Description: INTERVENTIONS:  -  Assess patient's ability to carry out ADLs; assess patient's baseline for ADL function and identify physical deficits which impact ability to perform ADLs (bathing, care of mouth/teeth, toileting, grooming, dressing, etc.)  - Assess/evaluate cause of self-care deficits   - Assess range of motion  - Assess patient's mobility; develop plan if impaired  - Assess patient's need for assistive devices and provide as appropriate  - Encourage maximum independence but intervene and supervise when necessary  - Involve family in performance of ADLs  - Assess for home care needs following discharge   - Consider OT consult to assist with ADL evaluation and planning for discharge  - Provide patient education as appropriate  Outcome: Progressing  Goal: Maintains/Returns to pre admission functional level  Description: INTERVENTIONS:  - Perform AM-PAC 6 Click Basic Mobility/ Daily Activity assessment daily.  - Set and communicate daily mobility goal to care team and patient/family/caregiver.   - Collaborate with rehabilitation services on mobility goals if consulted  - Out of bed for  toileting  - Record patient progress and toleration of activity level   Outcome: Progressing     Problem: Nutrition/Hydration-ADULT  Goal: Nutrient/Hydration intake appropriate for improving, restoring or maintaining nutritional needs  Description: Monitor and assess patient's nutrition/hydration status for malnutrition. Collaborate with interdisciplinary team and initiate plan and interventions as ordered.  Monitor patient's weight and dietary intake as ordered or per policy. Utilize nutrition screening tool and intervene as necessary. Determine patient's food preferences and provide high-protein, high-caloric foods as appropriate.     INTERVENTIONS:  - Monitor oral intake, urinary output, labs, and treatment plans  - Assess nutrition and hydration status and recommend course of action  - Evaluate amount of meals eaten  - Assist patient with eating if necessary   - Allow adequate time for meals  - Recommend/ encourage appropriate diets, oral nutritional supplements, and vitamin/mineral supplements  - Order, calculate, and assess calorie counts as needed  - Recommend, monitor, and adjust tube feedings and TPN/PPN based on assessed needs  - Assess need for intravenous fluids  - Provide specific nutrition/hydration education as appropriate  - Include patient/family/caregiver in decisions related to nutrition  Outcome: Progressing     Problem: Prexisting or High Potential for Compromised Skin Integrity  Goal: Skin integrity is maintained or improved  Description: INTERVENTIONS:  - Identify patients at risk for skin breakdown  - Assess and monitor skin integrity  - Assess and monitor nutrition and hydration status  - Monitor labs   - Assess for incontinence   - Turn and reposition patient  - Assist with mobility/ambulation  - Relieve pressure over bony prominences  - Avoid friction and shearing  - Provide appropriate hygiene as needed including keeping skin clean and dry  - Evaluate need for skin moisturizer/barrier  cream  - Collaborate with interdisciplinary team   - Patient/family teaching  - Consider wound care consult   Outcome: Progressing

## 2024-12-25 NOTE — ASSESSMENT & PLAN NOTE
Complicated by facial cellulitis and left masseter myositis. Afebrile, no signs of systemic illness. Lactic acid and procal within normal range   Presenting with 3 days history worsening facial swelling, erythema, and firmness and associated trismus  CT soft tissue neck showing acute left parotitis secondary to a 2 mm obstructing parotid duct sialolith. Reactive secondary left side facial cellulitis and left masseter myositis noted. No discrete collection. Normal subglottic airway  Patient protecting and maintaining her airway at this time.  She is saturating in the upper 90s on room air.  She reports improvement in trismus.  She denies any secretions at this time as well  ENT based on the imaging recommended: (formal evaluation pending)  Sialadenitis protocol: Warm compresses to the affected area. Sialogogues (sour candies, lemon, citrus, vinegar). NSAID such as Ibuprofen for pain relief and to reduce inflammation. Aggressive hydration; encourage PO intake and IVF as tolerated. Continue IV antibiotic with adequate staph aureus coverage   Parotitis: Massage the gland from angle of mandible working anteriorly along the cheek towards the oral commissure to facilitate drainage through the ductal opening   Status post 4.5 g Zosyn and vancomycin in ER, received 500 mL fluid bolus and 1 g acetaminophen IV  Transition to Unasyn 3 g every 6 hour for community-acquired infection and transition to oral regimen with clinical improvement  Blood culture 1 out of 2 showing Gram + cocci. Repeat blood cultures show no growth at 24 hours.     Plans  Switch from Unasyn to Augmentin Q12H   Ibuprofen 400 mg Q6H PRN  Follow up on Echo  Continue diet and oral hydration as tolerated   Per ENT - warm compress, sialogogus (sour candies, lemon, citrus, vinegar)   ENT OP follow up in 2-4 weeks

## 2024-12-26 PROBLEM — R93.1 ABNORMAL ECHOCARDIOGRAM: Status: ACTIVE | Noted: 2024-12-26

## 2024-12-26 PROBLEM — R78.81 POSITIVE BLOOD CULTURE: Status: ACTIVE | Noted: 2024-12-26

## 2024-12-26 LAB
ANION GAP SERPL CALCULATED.3IONS-SCNC: 9 MMOL/L (ref 4–13)
AORTIC ROOT: 3 CM
APICAL FOUR CHAMBER EJECTION FRACTION: 74 %
ASCENDING AORTA: 3 CM
BASOPHILS # BLD AUTO: 0.01 THOUSANDS/ÂΜL (ref 0–0.1)
BASOPHILS NFR BLD AUTO: 0 % (ref 0–1)
BSA FOR ECHO PROCEDURE: 1.58 M2
BUN SERPL-MCNC: 14 MG/DL (ref 5–25)
CALCIUM SERPL-MCNC: 8.3 MG/DL (ref 8.4–10.2)
CHLORIDE SERPL-SCNC: 110 MMOL/L (ref 96–108)
CO2 SERPL-SCNC: 18 MMOL/L (ref 21–32)
CREAT SERPL-MCNC: 0.4 MG/DL (ref 0.6–1.3)
E WAVE DECELERATION TIME: 233 MS
E/A RATIO: 0.88
EOSINOPHIL # BLD AUTO: 0.04 THOUSAND/ÂΜL (ref 0–0.61)
EOSINOPHIL NFR BLD AUTO: 1 % (ref 0–6)
ERYTHROCYTE [DISTWIDTH] IN BLOOD BY AUTOMATED COUNT: 15.5 % (ref 11.6–15.1)
FRACTIONAL SHORTENING: 40 (ref 28–44)
GFR SERPL CREATININE-BSD FRML MDRD: 90 ML/MIN/1.73SQ M
GLUCOSE SERPL-MCNC: 84 MG/DL (ref 65–140)
HCT VFR BLD AUTO: 34.1 % (ref 34.8–46.1)
HGB BLD-MCNC: 11.8 G/DL (ref 11.5–15.4)
IMM GRANULOCYTES # BLD AUTO: 0.02 THOUSAND/UL (ref 0–0.2)
IMM GRANULOCYTES NFR BLD AUTO: 0 % (ref 0–2)
INTERVENTRICULAR SEPTUM IN DIASTOLE (PARASTERNAL SHORT AXIS VIEW): 1.6 CM
INTERVENTRICULAR SEPTUM: 1.6 CM (ref 0.6–1.1)
LAAS-AP2: 19.8 CM2
LAAS-AP4: 23.7 CM2
LEFT ATRIUM SIZE: 3.6 CM
LEFT ATRIUM VOLUME (MOD BIPLANE): 69 ML
LEFT ATRIUM VOLUME INDEX (MOD BIPLANE): 43.7 ML/M2
LEFT INTERNAL DIMENSION IN SYSTOLE: 1.8 CM (ref 2.1–4)
LEFT VENTRICULAR INTERNAL DIMENSION IN DIASTOLE: 3 CM (ref 3.5–6)
LEFT VENTRICULAR POSTERIOR WALL IN END DIASTOLE: 1 CM
LEFT VENTRICULAR STROKE VOLUME: 25 ML
LVSV (TEICH): 25 ML
LYMPHOCYTES # BLD AUTO: 3.34 THOUSANDS/ÂΜL (ref 0.6–4.47)
LYMPHOCYTES NFR BLD AUTO: 39 % (ref 14–44)
Lab: 0.6 CM
MCH RBC QN AUTO: 33.3 PG (ref 26.8–34.3)
MCHC RBC AUTO-ENTMCNC: 34.6 G/DL (ref 31.4–37.4)
MCV RBC AUTO: 96 FL (ref 82–98)
MONOCYTES # BLD AUTO: 1.08 THOUSAND/ÂΜL (ref 0.17–1.22)
MONOCYTES NFR BLD AUTO: 12 % (ref 4–12)
MV E'TISSUE VEL-SEP: 5 CM/S
MV PEAK A VEL: 1.03 M/S
MV PEAK E VEL: 91 CM/S
MV STENOSIS PRESSURE HALF TIME: 67 MS
MV VALVE AREA P 1/2 METHOD: 3.28
NEUTROPHILS # BLD AUTO: 4.19 THOUSANDS/ÂΜL (ref 1.85–7.62)
NEUTS SEG NFR BLD AUTO: 48 % (ref 43–75)
NRBC BLD AUTO-RTO: 0 /100 WBCS
PLATELET # BLD AUTO: 340 THOUSANDS/UL (ref 149–390)
PMV BLD AUTO: 10.3 FL (ref 8.9–12.7)
POTASSIUM SERPL-SCNC: 3.3 MMOL/L (ref 3.5–5.3)
RA PRESSURE ESTIMATED: 3 MMHG
RBC # BLD AUTO: 3.54 MILLION/UL (ref 3.81–5.12)
RIGHT ATRIUM AREA SYSTOLE A4C: 14.2 CM2
RIGHT VENTRICLE ID DIMENSION: 4.3 CM
RV PSP: 36 MMHG
SL CV ECHO AV MASS SIZE 1: 0.7 CM
SL CV LEFT ATRIUM LENGTH A2C: 5.4 CM
SL CV LV EF: 65
SL CV PED ECHO LEFT VENTRICLE DIASTOLIC VOLUME (MOD BIPLANE) 2D: 35 ML
SL CV PED ECHO LEFT VENTRICLE SYSTOLIC VOLUME (MOD BIPLANE) 2D: 10 ML
SODIUM SERPL-SCNC: 137 MMOL/L (ref 135–147)
TR MAX PG: 33 MMHG
TR PEAK VELOCITY: 2.9 M/S
TRICUSPID ANNULAR PLANE SYSTOLIC EXCURSION: 2.1 CM
TRICUSPID VALVE PEAK REGURGITATION VELOCITY: 2.86 M/S
WBC # BLD AUTO: 8.68 THOUSAND/UL (ref 4.31–10.16)

## 2024-12-26 PROCEDURE — 97163 PT EVAL HIGH COMPLEX 45 MIN: CPT

## 2024-12-26 PROCEDURE — 97116 GAIT TRAINING THERAPY: CPT

## 2024-12-26 PROCEDURE — 99232 SBSQ HOSP IP/OBS MODERATE 35: CPT | Performed by: HOSPITALIST

## 2024-12-26 PROCEDURE — 80048 BASIC METABOLIC PNL TOTAL CA: CPT

## 2024-12-26 PROCEDURE — 93306 TTE W/DOPPLER COMPLETE: CPT | Performed by: INTERNAL MEDICINE

## 2024-12-26 PROCEDURE — 85025 COMPLETE CBC W/AUTO DIFF WBC: CPT

## 2024-12-26 PROCEDURE — 99222 1ST HOSP IP/OBS MODERATE 55: CPT | Performed by: INTERNAL MEDICINE

## 2024-12-26 RX ORDER — POTASSIUM CHLORIDE 14.9 MG/ML
20 INJECTION INTRAVENOUS
Status: DISPENSED | OUTPATIENT
Start: 2024-12-26 | End: 2024-12-26

## 2024-12-26 RX ADMIN — METOPROLOL SUCCINATE 50 MG: 50 TABLET, EXTENDED RELEASE ORAL at 09:17

## 2024-12-26 RX ADMIN — Medication 5000 UNITS: at 09:17

## 2024-12-26 RX ADMIN — BIMATOPROST 1 DROP: 0.3 SOLUTION/ DROPS OPHTHALMIC at 09:18

## 2024-12-26 RX ADMIN — MESALAMINE 800 MG: 400 CAPSULE, DELAYED RELEASE ORAL at 23:04

## 2024-12-26 RX ADMIN — AMOXICILLIN AND CLAVULANATE POTASSIUM 1 TABLET: 875; 125 TABLET, FILM COATED ORAL at 02:53

## 2024-12-26 RX ADMIN — ENOXAPARIN SODIUM 40 MG: 40 INJECTION SUBCUTANEOUS at 09:17

## 2024-12-26 RX ADMIN — ACETAMINOPHEN 975 MG: 325 TABLET, FILM COATED ORAL at 06:37

## 2024-12-26 RX ADMIN — POTASSIUM CHLORIDE 20 MEQ: 14.9 INJECTION, SOLUTION INTRAVENOUS at 17:34

## 2024-12-26 RX ADMIN — MESALAMINE 800 MG: 400 CAPSULE, DELAYED RELEASE ORAL at 09:17

## 2024-12-26 RX ADMIN — AMOXICILLIN AND CLAVULANATE POTASSIUM 1 TABLET: 875; 125 TABLET, FILM COATED ORAL at 17:35

## 2024-12-26 RX ADMIN — PANTOPRAZOLE SODIUM 40 MG: 40 TABLET, DELAYED RELEASE ORAL at 06:37

## 2024-12-26 RX ADMIN — ACETAMINOPHEN 975 MG: 325 TABLET, FILM COATED ORAL at 23:04

## 2024-12-26 RX ADMIN — Medication 250 MG: at 09:17

## 2024-12-26 RX ADMIN — ASPIRIN 81 MG: 81 TABLET, COATED ORAL at 09:17

## 2024-12-26 RX ADMIN — ACETAMINOPHEN 975 MG: 325 TABLET, FILM COATED ORAL at 17:35

## 2024-12-26 NOTE — ASSESSMENT & PLAN NOTE
Complicated by facial cellulitis and left masseter myositis. Afebrile, no signs of systemic illness. Lactic acid and procal within normal range   Presenting with 3 days history worsening facial swelling, erythema, and firmness and associated trismus  CT soft tissue neck showing acute left parotitis secondary to a 2 mm obstructing parotid duct sialolith. Reactive secondary left side facial cellulitis and left masseter myositis noted. No discrete collection. Normal subglottic airway  Patient protecting and maintaining her airway at this time.  She is saturating in the upper 90s on room air.  She reports improvement in trismus.  She denies any secretions at this time as well  ENT based on the imaging recommended: (formal evaluation pending)  Sialadenitis protocol: Warm compresses to the affected area. Sialogogues (sour candies, lemon, citrus, vinegar). NSAID such as Ibuprofen for pain relief and to reduce inflammation. Aggressive hydration; encourage PO intake and IVF as tolerated. Continue IV antibiotic with adequate staph aureus coverage   Parotitis: Massage the gland from angle of mandible working anteriorly along the cheek towards the oral commissure to facilitate drainage through the ductal opening   Status post 4.5 g Zosyn and vancomycin in ER, received 500 mL fluid bolus and 1 g acetaminophen IV  Transition to Unasyn 3 g every 6 hour for community-acquired infection and transition to oral regimen with clinical improvement  Blood culture 1 out of 2 showing Gram + cocci. Repeat blood cultures show no growth at 24 hours.   Echo shows LVEF 65%, diastolic function Grade II relaxation. 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 Pending cardiology recommendation.     Plans  Switch from Unasyn (day3) to Augmentin Q12H (day2)  Ibuprofen 400 mg Q6H PRN  Continue diet and oral hydration as tolerated   Per ENT - warm compress, sialogogus (sour candies, lemon, citrus, vinegar)   ENT  OP follow up in 2-4 weeks

## 2024-12-26 NOTE — PHYSICAL THERAPY NOTE
PHYSICAL THERAPY EVALUATION NOTE    Patient Name: Helen M Schwab  Today's Date: 12/26/2024  AGE:   89 y.o.  Mrn:   [id number]  ADMIT DX:  Cellulitis [L03.90]  Myositis [M60.9]  Parotitis [K11.20]  Mouth swelling [R22.0]  Hypertension [I10]    Past Medical History:   Diagnosis Date    Arthritis     Bell's palsy 1940    Colitis     GERD (gastroesophageal reflux disease)     Hemorrhoids     History of transfusion     Hx of colonoscopy 1996, 1998, 2000, 2002, 2003, 2007, 2012    Hypertension     Ulcerative colitis (HCC)      Length Of Stay: 4  PHYSICAL THERAPY EVALUATION :    12/26/24 1520   PT Last Visit   PT Visit Date 12/26/24   Pain Assessment   Pain Assessment Tool 0-10   Pain Score No Pain   Restrictions/Precautions   Other Precautions Contact/isolation;Chair Alarm;Bed Alarm;Fall Risk  ([place])   Home Living   Type of Home House   Home Layout One level;Other (Comment)  (1 ELIEL)   Additional Comments lives w/ son. ambulates w/ cane in home and uses rollator in the community. independent w/ ADLs (sponge bathes). receives assist w/ IADLs. no falls in last 6 months.   General   Additional Pertinent History 12/25/24 at 22:25 blood pressure was 187/81.   Family/Caregiver Present No   Cognition   Arousal/Participation Alert   Orientation Level Oriented to person;Other (Comment)  (pt was identified w/ full name, birth date)   Following Commands Follows one step commands with increased time or repetition   Comments room air resting pulse ox 95%, active 94%. blood pressure seated 175/84 and 78 BPM.   Subjective   Subjective pt seen supine in bed. agreed to PT eval. denied pain or dizziness. occasional input was needed for task focus.   RUE Assessment   RUE Assessment WFL   LUE Assessment   LUE Assessment WFL   RLE Assessment   RLE Assessment WFL  (3+ to 4-/5)   LLE Assessment   LLE Assessment WFL  (3+ to 4-/5)   Vision-Basic Assessment    Current Vision Wears glasses all the time   Light Touch   RLE Light Touch Grossly intact   LLE Light Touch Grossly intact   Bed Mobility   Supine to Sit 3  Moderate assistance  (+ lightheaded)   Additional items Assist x 1;HOB elevated;Bedrails;Increased time required;Verbal cues;LE management  (for bedrail use, trunk/LE positioning)   Transfers   Sit to Stand 3  Moderate assistance   Additional items Assist x 1;Increased time required;Verbal cues  (for hand placement)   Stand to Sit 3  Moderate assistance   Additional items Assist x 1;Increased time required;Verbal cues  (for body positioning, hand placement)   Ambulation/Elevation   Gait pattern Forward Flexion;Short stride;Inconsistent herberth;Excessively slow   Gait Assistance 3  Moderate assist   Additional items Assist x 1;Tactile cues;Verbal cues  (for posture, full step length)   Assistive Device SPC   Distance 8 feet  (additional ambulation not possible due to fatigue)   Stair Management Assistance Not tested  (due to limited ambulation tolerance, safety concern)   Balance   Static Sitting Fair +   Dynamic Sitting Poor +   Static Standing Poor  (w/ cane)   Ambulatory Poor  (w/ cane)   Activity Tolerance   Activity Tolerance Patient limited by fatigue   Nurse Made Aware spoke to Shawnee Miranda CM   Assessment   Problem List Decreased strength;Decreased endurance;Impaired balance;Decreased mobility;Decreased safety awareness   Assessment Pt presents with facial swelling over the past 3 days (since Thursday) associated with trismus. Dx: parotiditis, ulcerative colitis, fibromyalgia, and HTN. order placed for PT eval and tx. pt presents w/ comorbidities of arthritis, Bell's palsy, HTN, cataract, and back surgery and personal factors of advanced age, mobilizing w/ assistive device, stair(s) to enter home, and inability to perform IADLs. pt presents w/ weakness, decreased endurance, impaired balance, gait deviations, decreased safety awareness, and fall risk.  these impairments are evident in findings from physical examination (weakness), mobility assessment (need for mod assist w/ all phases of mobility when usually mobilizing independently, tolerance to only 8 feet of ambulation, and need for cueing for mobility technique), and Barthel Index: 45/100. pt needed input for task focus and mobility technique/safety. pt is at risk for falls due to physical and safety awareness deficits. pt's clinical presentation is unstable/unpredictable (evident in poor blood pressure control, need for assist w/ all phases of mobility when usually mobilizing independently, tolerance to only 8 feet of ambulation, and need for input for mobility technique/safety). pt needs inpatient PT tx to improve mobility deficits and progress mobility training as appropriate.   Goals   Patient Goals I want to go home   STG Expiration Date 01/09/24   Short Term Goal #1 pt will: Increase bilateral LE strength 1/2 grade to facilitate independent mobility, Perform bed mobility w/ supervision to increase level of independence, Perform all transfers w/ supervision to improve independence, Ambulate 150 ft. with least restrictive assistive device w/ supervision w/o LOB to improve functional independence, Navigate 1 stair(s) w/ minx1 with unilateral handrail to facilitate return to previous living environment, Increase all balance 1 grade to decrease risk for falls, Complete exercise program independently to increase strength and endurance, Tolerate 3 hr OOB to faciliate upright tolerance, Tolerate standing 2 minutes w/ supervision to facilitate functional task performance, Improve Barthel Index score to 65 or greater to facilitate independence, and Complete Timed Up and Go or Comfortable Gait Speed to further assess mobility and monitor progress   PT Treatment Day 1   Plan   Treatment/Interventions Functional transfer training;LE strengthening/ROM;Elevations;Therapeutic exercise;Endurance training;Patient/family  training;Equipment eval/education;Bed mobility;Gait training;Compensatory technique education   PT Frequency 3-5x/wk   Discharge Recommendation   Rehab Resource Intensity Level, PT II (Moderate Resource Intensity)   Additional Comments recommend ambulation w/ roller walker/rollator   AM-PAC Basic Mobility Inpatient   Turning in Flat Bed Without Bedrails 3   Lying on Back to Sitting on Edge of Flat Bed Without Bedrails 2   Moving Bed to Chair 2   Standing Up From Chair Using Arms 2   Walk in Room 3   Climb 3-5 Stairs With Railing 1   Basic Mobility Inpatient Raw Score 13   Basic Mobility Standardized Score 33.99   [facility] Highest Level Of Mobility   [facility] Goal 4: Move to chair/commode   [facility] Achieved 6: Walk 10 steps or more   Barthel Index   Feeding 10   Bathing 0   Grooming Score 5   Dressing Score 5   Bladder Score 5   Bowels Score 5   Toilet Use Score 5   Transfers (Bed/Chair) Score 10   Mobility (Level Surface) Score 0   Stairs Score 0   Barthel Index Score 45   Additional Treatment Session   Start Time 1520   End Time 1530   Treatment Assessment Pt agreed to participate in PT intervention. Therapist incorporated roller walker use w/ ambulation to improve gait stability.  Sit <---> stand transfer w/ min to modx1. ambulated 15 feet w/ roller walker w/ minx1 and chair follow. Additional ambulation was not possible due to fatigue. Pt was noted to have improvement w/ use of roller walker w/ increased ambulation distance and decreased level of assist to maintain safety. continued inpatient PT tx is indicated to reduce fall risk.   Equipment Use roller walker   Additional Treatment Day 1   End of Consult   Patient Position at End of Consult Bedside chair;Bed/Chair alarm activated;All needs within reach     The patient's AM-PAC Basic Mobility Inpatient Short Form Raw Score is 13. A Raw score of less than or equal to 16 suggests the patient may benefit from discharge to post-acute rehabilitation services. Please  also refer to the recommendation of the Physical Therapist for safe discharge planning.    Skilled PT recommended while in hospital and upon DC to progress pt toward treatment goals.     Zaki Nelson, PT

## 2024-12-26 NOTE — PLAN OF CARE
Problem: Potential for Falls  Goal: Patient will remain free of falls  Description: INTERVENTIONS:  - Educate patient/family on patient safety including physical limitations  - Instruct patient to call for assistance with activity   - Consult OT/PT to assist with strengthening/mobility   - Keep Call bell within reach  - Keep bed low and locked with side rails adjusted as appropriate  - Keep care items and personal belongings within reach  - Initiate and maintain comfort rounds  - Make Fall Risk Sign visible to staff  - Apply yellow socks and bracelet for high fall risk patients  - Consider moving patient to room near nurses station  Outcome: Progressing     Problem: PAIN - ADULT  Goal: Verbalizes/displays adequate comfort level or baseline comfort level  Description: Interventions:  - Encourage patient to monitor pain and request assistance  - Assess pain using appropriate pain scale  - Administer analgesics based on type and severity of pain and evaluate response  - Implement non-pharmacological measures as appropriate and evaluate response  - Consider cultural and social influences on pain and pain management  - Notify physician/advanced practitioner if interventions unsuccessful or patient reports new pain  Outcome: Progressing     Problem: INFECTION - ADULT  Goal: Absence or prevention of progression during hospitalization  Description: INTERVENTIONS:  - Assess and monitor for signs and symptoms of infection  - Monitor lab/diagnostic results  - Monitor all insertion sites, i.e. indwelling lines, tubes, and drains  - Monitor endotracheal if appropriate and nasal secretions for changes in amount and color  - [place] appropriate cooling/warming therapies per order  - Administer medications as ordered  - Instruct and encourage patient and family to use good hand hygiene technique  - Identify and instruct in appropriate isolation precautions for identified infection/condition  Outcome: Progressing  Goal: Absence  of fever/infection during neutropenic period  Description: INTERVENTIONS:  - Monitor WBC    Outcome: Progressing     Problem: SAFETY ADULT  Goal: Patient will remain free of falls  Description: INTERVENTIONS:  - Educate patient/family on patient safety including physical limitations  - Instruct patient to call for assistance with activity   - Consult OT/PT to assist with strengthening/mobility   - Keep Call bell within reach  - Keep bed low and locked with side rails adjusted as appropriate  - Keep care items and personal belongings within reach  - Apply yellow socks and bracelet for high fall risk patients  - Consider moving patient to room near nurses station  Outcome: Progressing  Goal: Maintain or return to baseline ADL function  Description: INTERVENTIONS:  -  Assess patient's ability to carry out ADLs; assess patient's baseline for ADL function and identify physical deficits which impact ability to perform ADLs (bathing, care of mouth/teeth, toileting, grooming, dressing, etc.)  - Assess/evaluate cause of self-care deficits   - Assess range of motion  - Assess patient's mobility; develop plan if impaired  - Assess patient's need for assistive devices and provide as appropriate  - Encourage maximum independence but intervene and supervise when necessary  - Involve family in performance of ADLs  - Assess for home care needs following discharge   - Consider OT consult to assist with ADL evaluation and planning for discharge  - Provide patient education as appropriate  Outcome: Progressing  Goal: Maintains/Returns to pre admission functional level  Description: INTERVENTIONS:  - Perform AM-PAC 6 Click Basic Mobility/ Daily Activity assessment daily.  - Set and communicate daily mobility goal to care team and patient/family/caregiver.   - Collaborate with rehabilitation services on mobility goals if consulted  - Perform Range of Motion   - Out of bed for toileting  - Record patient progress and toleration of  activity level   Outcome: Progressing     Problem: Nutrition/Hydration-ADULT  Goal: Nutrient/Hydration intake appropriate for improving, restoring or maintaining nutritional needs  Description: Monitor and assess patient's nutrition/hydration status for malnutrition. Collaborate with interdisciplinary team and initiate plan and interventions as ordered.  Monitor patient's weight and dietary intake as ordered or per policy. Utilize nutrition screening tool and intervene as necessary. Determine patient's food preferences and provide high-protein, high-caloric foods as appropriate.     INTERVENTIONS:  - Monitor oral intake, urinary output, labs, and treatment plans  - Assess nutrition and hydration status and recommend course of action  - Evaluate amount of meals eaten  - Assist patient with eating if necessary   - Allow adequate time for meals  - Recommend/ encourage appropriate diets, oral nutritional supplements, and vitamin/mineral supplements  - Order, calculate, and assess calorie counts as needed  - Recommend, monitor, and adjust tube feedings and TPN/PPN based on assessed needs  - Assess need for intravenous fluids  - Provide specific nutrition/hydration education as appropriate  - Include patient/family/caregiver in decisions related to nutrition  Outcome: Progressing     Problem: Prexisting or High Potential for Compromised Skin Integrity  Goal: Skin integrity is maintained or improved  Description: INTERVENTIONS:  - Identify patients at risk for skin breakdown  - Assess and monitor skin integrity  - Assess and monitor nutrition and hydration status  - Monitor labs   - Assess for incontinence   - Turn and reposition patient  - Assist with mobility/ambulation  - Relieve pressure over bony prominences  - Avoid friction and shearing  - Provide appropriate hygiene as needed including keeping skin clean and dry  - Evaluate need for skin moisturizer/barrier cream  - Collaborate with interdisciplinary team   -  Patient/family teaching  - Consider wound care consult   Outcome: Progressing

## 2024-12-26 NOTE — PLAN OF CARE
Problem: PHYSICAL THERAPY ADULT  Goal: Performs mobility at highest level of function for planned discharge setting.  See evaluation for individualized goals.  Description: Treatment/Interventions: Functional transfer training, LE strengthening/ROM, Elevations, Therapeutic exercise, Endurance training, Patient/family training, Equipment eval/education, Bed mobility, Gait training, Compensatory technique education          See flowsheet documentation for full assessment, interventions and recommendations.  Note:    Problem List: Decreased strength, Decreased endurance, Impaired balance, Decreased mobility, Decreased safety awareness  Assessment: Pt presents with facial swelling over the past 3 days (since Thursday) associated with trismus. Dx: parotiditis, ulcerative colitis, fibromyalgia, and HTN. order placed for PT eval and tx. pt presents w/ comorbidities of arthritis, Bell's palsy, HTN, cataract, and back surgery and personal factors of advanced age, mobilizing w/ assistive device, stair(s) to enter home, and inability to perform IADLs. pt presents w/ weakness, decreased endurance, impaired balance, gait deviations, decreased safety awareness, and fall risk. these impairments are evident in findings from physical examination (weakness), mobility assessment (need for mod assist w/ all phases of mobility when usually mobilizing independently, tolerance to only 8 feet of ambulation, and need for cueing for mobility technique), and Barthel Index: 45/100. pt needed input for task focus and mobility technique/safety. pt is at risk for falls due to physical and safety awareness deficits. pt's clinical presentation is unstable/unpredictable (evident in poor blood pressure control, need for assist w/ all phases of mobility when usually mobilizing independently, tolerance to only 8 feet of ambulation, and need for input for mobility technique/safety). pt needs inpatient PT tx to improve mobility deficits and progress  mobility training as appropriate.        Rehab Resource Intensity Level, PT: II (Moderate Resource Intensity)    See flowsheet documentation for full assessment.

## 2024-12-26 NOTE — ASSESSMENT & PLAN NOTE
Patient presented with acute onset of worsening facial swelling and CT imaging confirmed findings involving the left parotid gland with surrounding inflammation.  She has overall clinically improved on Unasyn which would cover the majority of organisms present within the mouth and that typically lead to this issue.  Course now complicated by positive blood culture and abnormal echo as below  Continue on Augmentin for now  Continue to trend fever curve/vitals  Repeat CBC/chemistry tomorrow to monitor treatment response/dosing  Repeat blood cultures ordered for tomorrow  Follow-up pending blood cultures  Recommend contacting patient's cardiologist to review case as per her request  If agreeable plan would be to obtain transesophageal echo given issues below  If patient would not pursue treatment regardless then would not plan for MICHAEL  Anticipate 7 days of antibiotic for this issue through 12/28  Continue supportive care otherwise as per ENT  Additional supportive cares per primary  Additional interventions pending clinical course

## 2024-12-26 NOTE — PROGRESS NOTES
Progress Note - Hospitalist   Name: Helen M Schwab 89 y.o. female I MRN: [id number]  Unit/Bed#: S -01 I Date of Admission: 12/22/2024   Date of Service: 12/26/2024 I Hospital Day: 4    Assessment & Plan  Parotiditis  Complicated by facial cellulitis and left masseter myositis. Afebrile, no signs of systemic illness. Lactic acid and procal within normal range   Presenting with 3 days history worsening facial swelling, erythema, and firmness and associated trismus  CT soft tissue neck showing acute left parotitis secondary to a 2 mm obstructing parotid duct sialolith. Reactive secondary left side facial cellulitis and left masseter myositis noted. No discrete collection. Normal subglottic airway  Patient protecting and maintaining her airway at this time.  She is saturating in the upper 90s on room air.  She reports improvement in trismus.  She denies any secretions at this time as well  ENT based on the imaging recommended: (formal evaluation pending)  Sialadenitis protocol: Warm compresses to the affected area. Sialogogues (sour candies, lemon, citrus, vinegar). NSAID such as Ibuprofen for pain relief and to reduce inflammation. Aggressive hydration; encourage PO intake and IVF as tolerated. Continue IV antibiotic with adequate staph aureus coverage   Parotitis: Massage the gland from angle of mandible working anteriorly along the cheek towards the oral commissure to facilitate drainage through the ductal opening   Status post 4.5 g Zosyn and vancomycin in ER, received 500 mL fluid bolus and 1 g acetaminophen IV  Transition to Unasyn 3 g every 6 hour for community-acquired infection and transition to oral regimen with clinical improvement  Blood culture 1 out of 2 showing Gram + cocci. Repeat blood cultures show no growth at 24 hours.   Echo shows LVEF 65%, diastolic function Grade II relaxation. 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 Pending cardiology  recommendation.     Plans  Switch from Unasyn (day3) to Augmentin Q12H (day2)  Ibuprofen 400 mg Q6H PRN  Continue diet and oral hydration as tolerated   Per ENT - warm compress, sialogogus (sour candies, lemon, citrus, vinegar)   ENT OP follow up in 2-4 weeks  Ulcerative colitis with complication (HCC)  Patient reports that mild abdominal pain, nausea and stool incontinence has resolved.    Patient reports Pepto-Bismol helps with her symptoms    Plan:  Continue home mesalamine 800 mg twice daily  Continue Pepto-Bismol as needed  Continue saccharomyces boulardii (Florastor) BID  Fibromyalgia  Will continue methylprednisolone 2 mg daily in the morning as patient reported improvement in symptoms with no worsening colitis flare  Follow-up with PCP outpatient    Plan:  Continue methylprednisolone 2 mg daily   Hypertension  Confirmed with patient that she takes 50 mg Toprol-XL daily not 100 mg daily as reported in the chart  Initially was hypertensive when she arrived, no associated symptoms such as flash pulm edema, headache, or visual disturbance    Plan:  Continue home metoprolol succinate 50 mg daily  Continue to trend pressures closely  Gastroesophageal reflux  Continue pantoprazole 40 mg early morning     VTE Pharmacologic Prophylaxis: VTE Score: 5 High Risk (Score >/= 5) - Pharmacological DVT Prophylaxis Ordered: enoxaparin (Lovenox). Sequential Compression Devices Ordered.    Mobility:   Basic Mobility Inpatient Raw Score: 22  JH-HLM Goal: 7: Walk 25 feet or more  JH-HLM Achieved: 1: Laying in bed  JH-HLM Goal NOT achieved. Continue with multidisciplinary rounding and encourage appropriate mobility to improve upon JH-HLM goals.    Patient Centered Rounds: I performed bedside rounds with nursing staff today.   Discussions with Specialists or Other Care Team Provider: ENT    Education and Discussions with Family / Patient: Updated  (son) via phone.    Current Length of Stay: 4 day(s)  Current Patient  Status: Inpatient   Certification Statement: The patient will continue to require additional inpatient hospital stay due to IV antibiotic  Discharge Plan: Anticipate discharge in 24-48 hrs to home.    Code Status: Level 2 - DNAR: but accepts endotracheal intubation    Subjective   Patient is seen and examined by the bedside.  Patient has no acute complaint no significant event overnight. Patient reports that face and jaw redness and edema has improved. She is able to tolerate p.o diet well.  Patient continues to expresses concern of not being able to walk to the bathroom and/or take care of herself at home.  Patient is independent with ADLs at home.  Patient is notified that she will be seen by PT. Patient express desires to go to rehab if possible. Pending PT/OT recommendation.  Patient is doing massage per ENT instruction and putting warm compression. Patient is denying fever, chills, headaches, sore throat, chest pain, changes with urination.      Objective :  Temp:  [98 °F (36.7 °C)-98.5 °F (36.9 °C)] 98 °F (36.7 °C)  HR:  [63-80] 63  BP: (143-187)/(61-87) 143/61  Resp:  [18] 18  SpO2:  [95 %-97 %] 97 %    Body mass index is 23.23 kg/m².     Input and Output Summary (last 24 hours):     Intake/Output Summary (Last 24 hours) at 12/26/2024 1255  Last data filed at 12/26/2024 0630  Gross per 24 hour   Intake 560 ml   Output --   Net 560 ml       Physical Exam  Constitutional:       Appearance: Normal appearance.   HENT:      Head:      Comments: Improving erythema in the left aspect of face the submandibular region          Mouth/Throat:      Mouth: Mucous membranes are dry.      Pharynx: Oropharynx is clear.   Eyes:      General:         Right eye: No discharge.         Left eye: No discharge.      Conjunctiva/sclera: Conjunctivae normal.   Cardiovascular:      Rate and Rhythm: Normal rate and regular rhythm.      Pulses: Normal pulses.      Heart sounds: Normal heart sounds.   Pulmonary:      Effort: Pulmonary  effort is normal. No respiratory distress.      Breath sounds: Normal breath sounds. No wheezing.   Abdominal:      General: Abdomen is flat. Bowel sounds are normal. There is no distension.      Tenderness: There is no abdominal tenderness. There is no right CVA tenderness or left CVA tenderness.   Musculoskeletal:         General: No swelling or tenderness. Normal range of motion.      Cervical back: Normal range of motion.      Right lower leg: No edema.      Left lower leg: No edema.   Skin:     General: Skin is warm.      Findings: Erythema (improving) present.   Neurological:      Mental Status: She is alert.         Lines/Drains:              Lab Results: I have reviewed the following results:   Results from last 7 days   Lab Units 12/26/24  0637   WBC Thousand/uL 8.68   HEMOGLOBIN g/dL 11.8   HEMATOCRIT % 34.1*   PLATELETS Thousands/uL 340   SEGS PCT % 48   LYMPHO PCT % 39   MONO PCT % 12   EOS PCT % 1     Results from last 7 days   Lab Units 12/26/24  0637 12/23/24  0507 12/22/24  1102   SODIUM mmol/L 137   < > 137   POTASSIUM mmol/L 3.3*   < > 3.3*   CHLORIDE mmol/L 110*   < > 100   CO2 mmol/L 18*   < > 27   BUN mg/dL 14   < > 14   CREATININE mg/dL 0.40*   < > 0.44*   ANION GAP mmol/L 9   < > 10   CALCIUM mg/dL 8.3*   < > 9.6   ALBUMIN g/dL  --   --  4.2   TOTAL BILIRUBIN mg/dL  --   --  1.02*   ALK PHOS U/L  --   --  70   ALT U/L  --   --  9   AST U/L  --   --  14   GLUCOSE RANDOM mg/dL 84   < > 115    < > = values in this interval not displayed.     Results from last 7 days   Lab Units 12/22/24  1102   INR  0.97             Results from last 7 days   Lab Units 12/23/24  0507 12/22/24  1102   LACTIC ACID mmol/L  --  1.6   PROCALCITONIN ng/ml 0.19 <0.05       Recent Cultures (last 7 days):   Results from last 7 days   Lab Units 12/23/24  1616 12/22/24  1102   BLOOD CULTURE  No Growth at 48 hrs.  No Growth at 48 hrs. No Growth at 72 hrs.  Methicillin Resistant Staphylococcus aureus*   GRAM STAIN RESULT    --  Gram positive cocci in clusters*       Imaging Results Review: I reviewed radiology reports from this admission including: CT Soft tissues neck with contrast.  Other Study Results Review: No additional pertinent studies reviewed.    Last 24 Hours Medication List:     Current Facility-Administered Medications:     acetaminophen (TYLENOL) tablet 975 mg, Q8H WILLA    amoxicillin-clavulanate (AUGMENTIN) 875-125 mg per tablet 1 tablet, Q12H WILLA    aspirin (ECOTRIN LOW STRENGTH) EC tablet 81 mg, Daily    bimatoprost (LUMIGAN) 0.03 % ophthalmic drops 1 drop, Daily    bismuth subsalicylate (PEPTO BISMOL) oral suspension 524 mg, Q6H PRN    Cholecalciferol (VITAMIN D3) tablet 5,000 Units, Daily    enoxaparin (LOVENOX) subcutaneous injection 40 mg, Q24H WILLA    hydrALAZINE (APRESOLINE) injection 10 mg, Q6H PRN    ibuprofen (MOTRIN) tablet 400 mg, Q6H PRN    mesalamine (DELZICOL) delayed release capsule 800 mg, BID    methylprednisolone (MEDROL) tablet 2 mg, Daily With Breakfast    metoprolol succinate (TOPROL-XL) 24 hr tablet 50 mg, Daily    ondansetron (ZOFRAN) injection 4 mg, Q6H PRN    pantoprazole (PROTONIX) EC tablet 40 mg, Early Morning    saccharomyces boulardii (FLORASTOR) capsule 250 mg, BID    Administrative Statements   Today, Patient Was Seen By: Chantell Card MD      **Please Note: This note may have been constructed using a voice recognition system.**

## 2024-12-26 NOTE — CONSULTS
Consultation - Infectious Disease   Name: Helen M Schwab 89 y.o. female I MRN: [id number]  Unit/Bed#: S -01 I Date of Admission: 12/22/2024   Date of Service: 12/26/2024 I Hospital Day: 4   Inpatient consult to Infectious Diseases  Consult performed by: Gayatri Whyte MD  Consult ordered by: Chantell Card MD        Physician Requesting Evaluation: Hamzah Yan MD   Reason for Evaluation / Principal Problem: Positive blood culture    Assessment & Plan  Parotiditis  Patient presented with acute onset of worsening facial swelling and CT imaging confirmed findings involving the left parotid gland with surrounding inflammation.  She has overall clinically improved on Unasyn which would cover the majority of organisms present within the mouth and that typically lead to this issue.  Course now complicated by positive blood culture and abnormal echo as below  Continue on Augmentin for now  Continue to trend fever curve/vitals  Repeat CBC/chemistry tomorrow to monitor treatment response/dosing  Repeat blood cultures ordered for tomorrow  Follow-up pending blood cultures  Recommend contacting patient's cardiologist to review case as per her request  If agreeable plan would be to obtain transesophageal echo given issues below  If patient would not pursue treatment regardless then would not plan for MICHAEL  Anticipate 7 days of antibiotic for this issue through 12/28  Continue supportive care otherwise as per ENT  Additional supportive cares per primary  Additional interventions pending clinical course  Positive blood culture  1 out of 2 blood cultures from admission isolated MRSA.  Patient without any lingering or significant symptoms leading up to admission and abrupt onset of left-sided facial swelling as above was reason for presentation.  Repeat blood cultures remain negative and patient has not been on appropriate therapy but yet her other clinical parameters have improved.  My overall suspicion is that this culture  represents a contaminant.  She is without any intravascular devices or localizing symptoms otherwise.  Unfortunately echo was obtained and is abnormal as below.  Would not start on any antibiotics for this issue  Continue Augmentin otherwise as above  Trend fever curve/vitals  Repeat CBC/chemistry tomorrow  Patient has requested that her cardiologist be contacted to review  If agreeable would plan for transesophageal echo given abnormal 2D echo below  If patient would not pursue treatment regardless of imaging they would not plan for MICHAEL  May need to consider surveillance blood cultures as outpatient  Tentative plan for antibiotic course as above  Will order additional blood cultures for tomorrow  Follow-up pending blood cultures otherwise  Additional supportive care per primary  Abnormal echocardiogram  Patient had 2D echo ordered in the setting of 1 out of 2 blood cultures being positive.  Echo notable for changes at the aortic valve which could represent focal calcification, fibroblastoma or vegetation.  Clinically my suspicion is lower for true endocarditis.  Unfortunately no other images available for comparison.  Patient given her age is uncertain if she wishes to pursue additional imaging and treatment.  Continue Augmentin as above  Will follow-up pending cultures  Additional blood cultures ordered  Will need to reach out to her cardiologist to discuss as her request  If agreeable would recommend MICHAEL  If not agreeable and will likely consider surveillance cultures outpatient    Above plan discussed in detail with the patient at bedside  Above plan discussed earlier today with primary service resident and after meeting with the patient sent updated message about her hesitancy for imaging, canceled order for now and request for reaching out to her cardiologist    ID consult service will continue to follow.    HISTORY OF PRESENT ILLNESS:  HPI: Helen M Schwab is a 89 y.o. year old female with longstanding  ulcerative colitis, GERD, arthritis, Bell's palsy, hypertension, previous back surgery and extensive surgical history below reviewed.  Patient presented to the hospital with progressive facial swelling on the left.  She reported interacting with her dog and then at 1 point touched her face and noted some discomfort associated at the site.  She thought that it was going to get better but unfortunately the pain progressed and so she contacted her son so that she could come into the hospital.  She was initially plan to see her PCP on Monday.  She reports just prior to her symptoms developing she had actually just seen her cardiologist and had imaging and testing done and told that she was otherwise doing well.  In the ER she was hypertensive and she was noted to have a leukocytosis.  Lactic acid was unremarkable.  Blood cultures were obtained.  UA was also obtained.  Patient had no urinary symptoms.  CT imaging of the face was done which showed an acute left parotitis due to an obstructing stone.  She was given antibiotics and ENT was consulted.  She was recommended for local care and warm compress and antibiotic.  She was also recommended to massage the gland well.  Overall since admission she has been improving.  She currently denies having any nausea, vomiting, chest pain or shortness of breath.  1 out of 2 blood cultures the next day returned positive with gram-positive cocci in clusters.  PCR testing was pending.  Patient was being maintained on Unasyn and repeat blood cultures were obtained.  Since that time the patient's white count has normalized.  She has not had fevers during this admission.  She denies having any chronic issues with night sweats or fevers leading up to admission either.  She is otherwise fairly independent at home.  1 out of the 2 blood culture sets ultimately isolated MRSA.  Her repeat cultures collected within 24 hours remain without growth.  No other new imaging.  Vitals are otherwise  stable.  Echocardiogram was ordered given the single set positive blood culture and there is an abnormality at the aortic valve which could either be a papillary fibroblastoma, vegetation or focal calcification.  Our service was consulted given these imaging findings.  On review with the patient we discussed lack of antibiotic coverage for the current organism and yet cultures now repeat remain negative.  We discussed the possibility of performing more detailed imaging with transesophageal echo to define this lesion.  Patient overall takes pause with this as she does not right away wish to pursue extensive imaging.  She has requested that we reach out to her cardiologist to discuss first (Dr. Deborah Vasquez [phone]).  She would consider doing this study if he was in agreement.  We discussed if imaging findings are truly abnormal what we would be looking at in terms of duration of therapy with PICC line and home IV antibiotics.  The patient is also questioning though if she even wants the imaging or to know the results as she may not be accepting of IV antibiotics regardless.  She plans to consider these questions and would appreciate our service reaching out along with primary service reaching out to her cardiologist to discuss.  Once with that opinion she will consider and potentially discussed with her son to determine next steps.  Patient seems to be overall very rational and practical about her age and comorbid conditions.  Additional questions were answered.      REVIEW OF SYSTEMS:  A complete 12 point system-based review of systems is negative other than that noted in the HPI.    PAST MEDICAL HISTORY:  Past Medical History:   Diagnosis Date    Arthritis     Bell's palsy 1940    Colitis     GERD (gastroesophageal reflux disease)     Hemorrhoids     History of transfusion     Hx of colonoscopy 1996, 1998, 2000, 2002, 2003, 2007, 2012    Hypertension     Ulcerative colitis (HCC)      Past Surgical History:    Procedure Laterality Date    BACK SURGERY  1971    ruptured disk    BACK SURGERY  1972    bone fusion lumbar spine    BLADDER REPAIR  2002    BLADDER REPAIR  2012    BREAST SURGERY  2011    CARDIAC SURGERY  2014    stent    CARPAL TUNNEL RELEASE  08/14/2014    CATARACT EXTRACTION Right 2015    CHOLECYSTECTOMY      COLONOSCOPY      EYE SURGERY Left     GALLBLADDER SURGERY  1995    GANGLION CYST EXCISION  2004    left wrist    HERNIA REPAIR  2001    HERNIA REPAIR  2003    MASTECTOMY  2012    OVARY SURGERY  1984    PARTIAL HYSTERECTOMY  1964    AK EXC B9 LESION MRGN XCP SK TG T/A/L 0.5 CM/< Left 4/27/2021    Procedure: Excision pyogenic granuloma left ring finger;  Surgeon: Shane Lincoln MD;  Location: [facility];  Service: Orthopedics    AK EXC LESION TDN SHTH/JT CAPSL HAND/FNGR Right 10/9/2018    Procedure: EXCISION GANGLION CYST RIGHT SMALL FINGER;  Surgeon: Shane Lincoln MD;  Location: [facility];  Service: Orthopedics    AK NDSC WRST SURG W/RLS TRANSVRS CARPL LIGM Left 8/10/2021    Procedure: Left endoscopic carpal tunnel release;  Surgeon: Shane Lincoln MD;  Location: [facility];  Service: Orthopedics    AK TENDON SHEATH INCISION Right 4/5/2016    Procedure: INDEX TRIGGER FINGER AND RING TRIGGER FINGER RELEASE ;  Surgeon: Shane Lincoln MD;  Location: [facility];  Service: Orthopedics    AK TENDON SHEATH INCISION Left 10/16/2018    Procedure: RELEASE TRIGGER FINGER - LEFT INDEX;  Surgeon: Shane Lincoln MD;  Location: [facility];  Service: Orthopedics    AK TENDON SHEATH INCISION Left 8/10/2021    Procedure: Left long and ring finger trigger finger release;  Surgeon: Shane Lincoln MD;  Location: [facility];  Service: Orthopedics    SPLENECTOMY, PARTIAL  1995    TONSILLECTOMY  1939    WRIST SURGERY Right 1952       FAMILY HISTORY:  Non-contributory    SOCIAL HISTORY:  Social History   Social History     Substance and Sexual Activity   Alcohol Use Yes    Comment: seldom     Social  "History     Substance and Sexual Activity   Drug Use No     Social History     Tobacco Use   Smoking Status Former    Current packs/day: 0.00    Types: Cigarettes    Quit date: 1950    Years since quittin.0   Smokeless Tobacco Never       ALLERGIES:  Allergies   Allergen Reactions    Zithromax [Azithromycin] Swelling     tongue    Aspirin Other (See Comments)     Bleeding  Tolerates baby asapirin    Corn-Containing Products - Food Allergy Other (See Comments)     headache    Duloxetine Other (See Comments)     Upsets colitis     Effient [Prasugrel]     Keflex [Cephalexin] Other (See Comments)     unknow    Lasix [Furosemide] GI Intolerance    Lipitor [Atorvastatin]     Other      liptol and lipol cause GI upset  Adhesive tape  USE PAPER TAPE    Prednisone Other (See Comments)     \"I go berzerk!\"    Sulfamethizole Other (See Comments)     unkown    Latex Rash     Pt cannot remember       MEDICATIONS:  All current active medications have been reviewed.    PHYSICAL EXAM:  Temp:  [98 °F (36.7 °C)-98.5 °F (36.9 °C)] 98.1 °F (36.7 °C)  HR:  [63-80] 78  Resp:  [18] 18  BP: (143-187)/(61-87) 175/84  SpO2:  [95 %-97 %] 95 %  Temp (24hrs), Av.2 °F (36.8 °C), Min:98 °F (36.7 °C), Max:98.5 °F (36.9 °C)  Current: Temperature: 98.1 °F (36.7 °C)    Intake/Output Summary (Last 24 hours) at 2024 1819  Last data filed at 2024 1255  Gross per 24 hour   Intake 420 ml   Output 437 ml   Net -17 ml       General Appearance:  Appearing well, nontoxic, and in no distress; patient provides very detailed history.  She also has a very clear record book of her office appointments and providers names.   Head:  Normocephalic, without obvious abnormality, atraumatic   Eyes:  Conjunctiva pink and sclera anicteric, both eyes   Nose: Nares normal, mucosa normal, no drainage   Throat: Oropharynx moist without lesions   Neck: Supple, symmetrical, no adenopathy, no tenderness/mass/nodules; overall facial swelling noted on the left " side of the face again slightly asymmetric to the right.  But no surrounding erythema or warmth.   Back:   Symmetric, no curvature, ROM normal, no CVA tenderness   Lungs:   Clear to auscultation bilaterally, respirations unlabored on room air   Chest Wall:  No tenderness or deformity   Heart:  RRR; no murmur, rub or gallop noted   Abdomen:   Soft, non-tender, non-distended, positive bowel sounds    Extremities: No cyanosis, clubbing or edema   Skin: No rashes or lesions. No draining wounds noted.  Multiple areas of bruising's from IV sites and access.   Lymph nodes: Cervical, supraclavicular nodes normal   Neurologic: Alert and oriented times 3, extremity strength 5/5 and symmetric       LABS, IMAGING, & OTHER STUDIES:  In completing this consult I have performed an extensive review of the medical records in epic including review of the notes, radiographs, and laboratory results as detailed below.     Lab Results:  I have personally reviewed pertinent labs.  Comments/Interpretations: White blood cell count has normalized.  Chemistry unremarkable.    Results from last 7 days   Lab Units 12/26/24  0637 12/25/24  0511 12/24/24  0438   WBC Thousand/uL 8.68 8.46 16.49*   HEMOGLOBIN g/dL 11.8 12.1 12.2   PLATELETS Thousands/uL 340 335 353     Results from last 7 days   Lab Units 12/26/24  0637 12/23/24  0507 12/22/24  1102   POTASSIUM mmol/L 3.3*   < > 3.3*   CHLORIDE mmol/L 110*   < > 100   CO2 mmol/L 18*   < > 27   BUN mg/dL 14   < > 14   CREATININE mg/dL 0.40*   < > 0.44*   EGFR ml/min/1.73sq m 90   < > 87   CALCIUM mg/dL 8.3*   < > 9.6   AST U/L  --   --  14   ALT U/L  --   --  9   ALK PHOS U/L  --   --  70    < > = values in this interval not displayed.     Results from last 7 days   Lab Units 12/23/24  1616 12/22/24  1102   BLOOD CULTURE  No Growth at 48 hrs.  No Growth at 48 hrs. No Growth After 4 Days.  Methicillin Resistant Staphylococcus aureus*   GRAM STAIN RESULT   --  Gram positive cocci in clusters*        Imaging Studies:   I have personally reviewed pertinent imaging study reports and images in PACS.  Comments/Interpretations:  CT imaging of the face reviewed with acute left sided parotid gland swelling and facial cellulitis/myositis with obstructing stone    Other Studies:   I have personally reviewed other pertinent reports as below.  Records in CareEverywhere: No recent visits or imaging available in Care Everywhere    Nursing home/EMS Records: None    Current/Prior Cultures: Repeat blood cultures remain without growth.

## 2024-12-26 NOTE — ASSESSMENT & PLAN NOTE
1 out of 2 blood cultures from admission isolated MRSA.  Patient without any lingering or significant symptoms leading up to admission and abrupt onset of left-sided facial swelling as above was reason for presentation.  Repeat blood cultures remain negative and patient has not been on appropriate therapy but yet her other clinical parameters have improved.  My overall suspicion is that this culture represents a contaminant.  She is without any intravascular devices or localizing symptoms otherwise.  Unfortunately echo was obtained and is abnormal as below.  Would not start on any antibiotics for this issue  Continue Augmentin otherwise as above  Trend fever curve/vitals  Repeat CBC/chemistry tomorrow  Patient has requested that her cardiologist be contacted to review  If agreeable would plan for transesophageal echo given abnormal 2D echo below  If patient would not pursue treatment regardless of imaging they would not plan for MICHAEL  May need to consider surveillance blood cultures as outpatient  Tentative plan for antibiotic course as above  Will order additional blood cultures for tomorrow  Follow-up pending blood cultures otherwise  Additional supportive care per primary

## 2024-12-26 NOTE — ASSESSMENT & PLAN NOTE
Patient had 2D echo ordered in the setting of 1 out of 2 blood cultures being positive.  Echo notable for changes at the aortic valve which could represent focal calcification, fibroblastoma or vegetation.  Clinically my suspicion is lower for true endocarditis.  Unfortunately no other images available for comparison.  Patient given her age is uncertain if she wishes to pursue additional imaging and treatment.  Continue Augmentin as above  Will follow-up pending cultures  Additional blood cultures ordered  Will need to reach out to her cardiologist to discuss as her request  If agreeable would recommend MICHAEL  If not agreeable and will likely consider surveillance cultures outpatient

## 2024-12-27 LAB
ANION GAP SERPL CALCULATED.3IONS-SCNC: 8 MMOL/L (ref 4–13)
BACTERIA BLD CULT: NORMAL
BASOPHILS # BLD AUTO: 0.02 THOUSANDS/ÂΜL (ref 0–0.1)
BASOPHILS NFR BLD AUTO: 0 % (ref 0–1)
BUN SERPL-MCNC: 10 MG/DL (ref 5–25)
CALCIUM SERPL-MCNC: 8.3 MG/DL (ref 8.4–10.2)
CHLORIDE SERPL-SCNC: 111 MMOL/L (ref 96–108)
CO2 SERPL-SCNC: 20 MMOL/L (ref 21–32)
CREAT SERPL-MCNC: 0.41 MG/DL (ref 0.6–1.3)
EOSINOPHIL # BLD AUTO: 0.1 THOUSAND/ÂΜL (ref 0–0.61)
EOSINOPHIL NFR BLD AUTO: 1 % (ref 0–6)
ERYTHROCYTE [DISTWIDTH] IN BLOOD BY AUTOMATED COUNT: 15.3 % (ref 11.6–15.1)
GFR SERPL CREATININE-BSD FRML MDRD: 89 ML/MIN/1.73SQ M
GLUCOSE SERPL-MCNC: 94 MG/DL (ref 65–140)
HCT VFR BLD AUTO: 35 % (ref 34.8–46.1)
HGB BLD-MCNC: 11.8 G/DL (ref 11.5–15.4)
IMM GRANULOCYTES # BLD AUTO: 0.03 THOUSAND/UL (ref 0–0.2)
IMM GRANULOCYTES NFR BLD AUTO: 0 % (ref 0–2)
LYMPHOCYTES # BLD AUTO: 4.14 THOUSANDS/ÂΜL (ref 0.6–4.47)
LYMPHOCYTES NFR BLD AUTO: 43 % (ref 14–44)
MCH RBC QN AUTO: 32.1 PG (ref 26.8–34.3)
MCHC RBC AUTO-ENTMCNC: 33.7 G/DL (ref 31.4–37.4)
MCV RBC AUTO: 95 FL (ref 82–98)
MONOCYTES # BLD AUTO: 0.91 THOUSAND/ÂΜL (ref 0.17–1.22)
MONOCYTES NFR BLD AUTO: 9 % (ref 4–12)
NEUTROPHILS # BLD AUTO: 4.49 THOUSANDS/ÂΜL (ref 1.85–7.62)
NEUTS SEG NFR BLD AUTO: 47 % (ref 43–75)
NRBC BLD AUTO-RTO: 0 /100 WBCS
PLATELET # BLD AUTO: 372 THOUSANDS/UL (ref 149–390)
PMV BLD AUTO: 10.3 FL (ref 8.9–12.7)
POTASSIUM SERPL-SCNC: 3.4 MMOL/L (ref 3.5–5.3)
RBC # BLD AUTO: 3.68 MILLION/UL (ref 3.81–5.12)
SODIUM SERPL-SCNC: 139 MMOL/L (ref 135–147)
WBC # BLD AUTO: 9.69 THOUSAND/UL (ref 4.31–10.16)

## 2024-12-27 PROCEDURE — 99232 SBSQ HOSP IP/OBS MODERATE 35: CPT | Performed by: HOSPITALIST

## 2024-12-27 PROCEDURE — 85025 COMPLETE CBC W/AUTO DIFF WBC: CPT | Performed by: HOSPITALIST

## 2024-12-27 PROCEDURE — 80048 BASIC METABOLIC PNL TOTAL CA: CPT | Performed by: HOSPITALIST

## 2024-12-27 PROCEDURE — 87040 BLOOD CULTURE FOR BACTERIA: CPT | Performed by: HOSPITALIST

## 2024-12-27 PROCEDURE — 99232 SBSQ HOSP IP/OBS MODERATE 35: CPT | Performed by: INTERNAL MEDICINE

## 2024-12-27 RX ORDER — POTASSIUM CHLORIDE 14.9 MG/ML
20 INJECTION INTRAVENOUS ONCE
Status: DISCONTINUED | OUTPATIENT
Start: 2024-12-27 | End: 2024-12-28

## 2024-12-27 RX ADMIN — MESALAMINE 800 MG: 400 CAPSULE, DELAYED RELEASE ORAL at 08:19

## 2024-12-27 RX ADMIN — Medication 5000 UNITS: at 08:19

## 2024-12-27 RX ADMIN — AMOXICILLIN AND CLAVULANATE POTASSIUM 1 TABLET: 875; 125 TABLET, FILM COATED ORAL at 04:13

## 2024-12-27 RX ADMIN — AMOXICILLIN AND CLAVULANATE POTASSIUM 1 TABLET: 875; 125 TABLET, FILM COATED ORAL at 13:23

## 2024-12-27 RX ADMIN — ACETAMINOPHEN 975 MG: 325 TABLET, FILM COATED ORAL at 21:33

## 2024-12-27 RX ADMIN — Medication 250 MG: at 08:19

## 2024-12-27 RX ADMIN — METOPROLOL SUCCINATE 50 MG: 50 TABLET, EXTENDED RELEASE ORAL at 08:19

## 2024-12-27 RX ADMIN — Medication 250 MG: at 17:17

## 2024-12-27 RX ADMIN — MESALAMINE 800 MG: 400 CAPSULE, DELAYED RELEASE ORAL at 21:36

## 2024-12-27 RX ADMIN — ACETAMINOPHEN 975 MG: 325 TABLET, FILM COATED ORAL at 13:23

## 2024-12-27 RX ADMIN — BIMATOPROST 1 DROP: 0.3 SOLUTION/ DROPS OPHTHALMIC at 08:20

## 2024-12-27 RX ADMIN — ENOXAPARIN SODIUM 40 MG: 40 INJECTION SUBCUTANEOUS at 08:18

## 2024-12-27 RX ADMIN — ASPIRIN 81 MG: 81 TABLET, COATED ORAL at 08:19

## 2024-12-27 RX ADMIN — METHYLPREDNISOLONE 2 MG: 4 TABLET ORAL at 08:19

## 2024-12-27 RX ADMIN — ACETAMINOPHEN 975 MG: 325 TABLET, FILM COATED ORAL at 04:15

## 2024-12-27 NOTE — ASSESSMENT & PLAN NOTE
Patient reports abdominal pain, nausea and stool incontinence, colitis flare up in the setting of antibiotic use.   Patient reports Pepto-Bismol helps with her symptoms    Plan:  Continue home mesalamine 800 mg twice daily  Continue Pepto-Bismol as needed  Continue saccharomyces boulardii (Florastor) BID

## 2024-12-27 NOTE — ASSESSMENT & PLAN NOTE
Complicated by facial cellulitis and left masseter myositis. Afebrile, no signs of systemic illness. Lactic acid and procal within normal range   Presenting with 3 days history worsening facial swelling, erythema, and firmness and associated trismus  CT soft tissue neck showing acute left parotitis secondary to a 2 mm obstructing parotid duct sialolith. Reactive secondary left side facial cellulitis and left masseter myositis noted. No discrete collection. Normal subglottic airway  Patient protecting and maintaining her airway at this time.  She is saturating in the upper 90s on room air.  She reports improvement in trismus.  She denies any secretions at this time as well  ENT based on the imaging recommended: (formal evaluation pending)  Sialadenitis protocol: Warm compresses to the affected area. Sialogogues (sour candies, lemon, citrus, vinegar). NSAID such as Ibuprofen for pain relief and to reduce inflammation. Aggressive hydration; encourage PO intake and IVF as tolerated. Continue IV antibiotic with adequate staph aureus coverage   Parotitis: Massage the gland from angle of mandible working anteriorly along the cheek towards the oral commissure to facilitate drainage through the ductal opening   Status post 4.5 g Zosyn and vancomycin in ER, received 500 mL fluid bolus and 1 g acetaminophen IV  Transition to Unasyn 3 g every 6 hour for community-acquired infection and transition to oral regimen with clinical improvement  Blood culture 1 out of 2 showing Gram + cocci. Repeat blood cultures show no growth at 24 hours.   Echo shows LVEF 65%, diastolic function Grade II relaxation. 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 MICHAEL pending.     Plans  Continue Augmentin Q12H through 12/28  Ibuprofen 400 mg Q6H PRN  Continue diet and oral hydration as tolerated   Follow up MICHAEL  Follow up 3rd set of blood culture from 12/27/24  ID consulted and rec appreciated  Per ENT - warm  compress, sialogogus (sour candies, lemon, citrus, vinegar)   ENT OP follow up in 2-4 weeks

## 2024-12-27 NOTE — ASSESSMENT & PLAN NOTE
Blood culture 1 out of 2 showing Gram + cocci. Repeat blood cultures show no growth at 24 hours.     Plan:  Continue Augmentin for parotiditis   Follow up 3rd set of blood culture from 12/27/24

## 2024-12-27 NOTE — WOUND OSTOMY CARE
Consult Note - Wound   Helen M Schwab 89 y.o. female MRN: [id number]  Unit/Bed#: S -01 Encounter: [id number]        History and Present Illness:  Patient is a 89 yo female that was admitted to   for treatment of parotiditis. Patient has a PMH of ulcerative colitis, GERD. Patient is alert and oriented times three and agreeable to assessment. Patient is assist of one for turning and repositioning. Patient is occasionally incontinent of bowel and bladder. On assessment, patient is OOB to recliner with waffle cushion.    Wound Care was consulted for buttocks wound.     Assessment Findings:   B/L heels are dry intact and vesna with no skin loss or wounds present. Recommend preventative Hydraguard Cream and proper offloading/ repositioning.      B/L sacro-buttocks is dry, intact, pink/hyperpigmented in color and blanches. Small area of scar tissue noted. No skin loss or wounds present. Recommend calazime.       No induration, fluctuance, odor, warmth/temperature differences, redness, or purulence noted to the above noted wounds and skin areas assessed. New dressings applied per orders listed below. Patient tolerated well- no s/s of non-verbal pain or discomfort observed during the encounter. Bedside nurse aware of plan of care. See flow sheets for more detailed assessment findings.      Orders listed below and wound care will continue to follow, call or Secure Chat with questions.     Skin care plans:  1-Hydraguard to bilateral heels BID and PRN  2-Elevate heels to offload pressure.  3-Ehob cushion in chair when out of bed.  4-Moisturize skin daily with skin nourishing cream.  5-Turn/reposition q2h for pressure re-distribution on skin.  6-Wash Sacro Buttocks area with soap and water. Pat Dry. Apply thin layer of Calazime/Protective Zinc Oxide paste to sacrum and buttocks TID and PRN with incontinence care.         Christianne Gan RN, BSN, CCRN

## 2024-12-27 NOTE — PROGRESS NOTES
Progress Note - Infectious Disease   Name: Helen M Schwab 89 y.o. female I MRN: [id number]  Unit/Bed#: S -01 I Date of Admission: 12/22/2024   Date of Service: 12/27/2024 I Hospital Day: 5     Assessment & Plan  Parotiditis  Patient presented with acute onset of worsening facial swelling and CT imaging confirmed findings involving the left parotid gland with surrounding inflammation.  She has overall clinically improved on Unasyn which would cover the majority of organisms present within the mouth and that typically lead to this issue.  Course now complicated by positive blood culture and abnormal echo as below  Continue on Augmentin for total 7 days through 12/28  Last doses of antibiotic ordered  Continue to trend fever curve/vitals  Repeat CBC/chemistry tomorrow to monitor treatment response/dosing  Follow-up pending blood cultures  Would plan to obtain formal transesophageal echo when possible  Continue supportive care otherwise as per ENT  Additional supportive cares per primary  Additional interventions pending clinical course and imaging  Positive blood culture  1 out of 2 blood cultures from admission isolated MRSA.  Patient without any lingering or significant symptoms leading up to admission and abrupt onset of left-sided facial swelling as above was reason for presentation.  Repeat blood cultures remain negative and patient has not been on appropriate therapy but yet her other clinical parameters have improved.  My overall suspicion is that this culture represents a contaminant.  She is without any intravascular devices or localizing symptoms otherwise.  Unfortunately echo was obtained and is abnormal as below.  After discussion with patient's cardiologist and patient again today she is agreeable to further cardiac imaging.  Would not start on any antibiotics for this issue  Continue Augmentin otherwise as above  Trend fever curve/vitals  Repeat CBC/chemistry tomorrow  Obtain transesophageal echo  when possible  Follow-up pending blood cultures otherwise  Additional supportive care per primary  Abnormal echocardiogram  Patient had 2D echo ordered in the setting of 1 out of 2 blood cultures being positive.  Echo notable for changes at the aortic valve which could represent focal calcification, fibroblastoma or vegetation.  Clinically my suspicion is lower for true endocarditis.  Unfortunately no other images available for comparison.  Patient given her age is uncertain if she wishes to pursue additional imaging and treatment.  Discussed with her cardiologist at her request and given their agreement she is agreeable to imaging.  Continue Augmentin as above  Will follow-up pending cultures  Would obtain transesophageal echo when possible  We will determine need for further antibiotics based on results    Above plan discussed in detail with the patient at bedside  Above plan discussed in detail with patient's cardiologist over the phone this morning  Above plan discussed in detail with primary service resident who is aware of plans for completion of Augmentin tomorrow, following up cultures and plans for MICHAEL    ID consult service will reevaluate this patient again on Monday.  Please contact ID attending on call if questions in the interim.    Antibiotics:  Augmentin 3  Total antibiotic 6    24 Hour events:  Yesterday and overnight notes reviewed and no acute events noted.  Was able to speak to the patient's cardiologist this morning to review current plans and they report agreement with pursuing transesophageal echo given relatively benign procedure.  Suspicion again overall low for endocarditis.    Subjective:  Patient seen at bedside and we reviewed conversation with her cardiologist.  Again overall risk of procedure is fairly low and given this abnormality and the positive blood culture and question they agreed that it is reasonable to pursue further cardiac imaging.  Given this information patient is also  agreeable then to go forward.  We discussed completion of oral antibiotic tomorrow which she is in agreement with.  We also reviewed if abnormal and concerning then we would be looking at prolonged antibiotic course likely 6 weeks which patient is aware of.  Additional questions were answered.    Objective:  Vitals:  Temp:  [98 °F (36.7 °C)-98.3 °F (36.8 °C)] 98 °F (36.7 °C)  HR:  [73-74] 73  Resp:  [17-18] 18  BP: (151-165)/(66-79) 165/79  SpO2:  [96 %-97 %] 96 %  Temp (24hrs), Av.2 °F (36.8 °C), Min:98 °F (36.7 °C), Max:98.3 °F (36.8 °C)  Current: Temperature: 98 °F (36.7 °C)    Physical Exam:   General Appearance:  Alert, interactive, nontoxic, no acute distress.   Throat: Oropharynx moist without lesions.    Lungs:   Clear to auscultation bilaterally; no wheezes, rhonchi or rales; respirations unlabored on room air   Heart:  RRR; no murmur, rub or gallop noted   Abdomen:   Soft, non-tender, non-distended, positive bowel sounds.     Extremities: No clubbing, cyanosis or edema   Skin: No new rashes or lesions. No new draining wounds noted.       Labs, Imaging, & Other studies:   All pertinent labs and imaging studies in PACS were personally reviewed as below.  Results from last 7 days   Lab Units 24  0356 24  0637 24  0511   WBC Thousand/uL 9.69 8.68 8.46   HEMOGLOBIN g/dL 11.8 11.8 12.1   PLATELETS Thousands/uL 372 340 335     Results from last 7 days   Lab Units 24  0356 24  0507 24  1102   POTASSIUM mmol/L 3.4*   < > 3.3*   CHLORIDE mmol/L 111*   < > 100   CO2 mmol/L 20*   < > 27   BUN mg/dL 10   < > 14   CREATININE mg/dL 0.41*   < > 0.44*   EGFR ml/min/1.73sq m 89   < > 87   CALCIUM mg/dL 8.3*   < > 9.6   AST U/L  --   --  14   ALT U/L  --   --  9   ALK PHOS U/L  --   --  70    < > = values in this interval not displayed.     Results from last 7 days   Lab Units 24  0401 24  1616 24  1102   BLOOD CULTURE  Received in Microbiology Lab. Culture in  Progress.  Received in Microbiology Lab. Culture in Progress. No Growth at 72 hrs.  No Growth at 72 hrs. No Growth After 4 Days.  Methicillin Resistant Staphylococcus aureus*   GRAM STAIN RESULT   --   --  Gram positive cocci in clusters*       Lab interpretation/comments: No leukocytosis    Imaging interpretation/comments: No new imaging    Culture data: Repeat cultures collected this morning.  Prior cultures remain negative    External notes: None

## 2024-12-27 NOTE — PLAN OF CARE
Problem: Potential for Falls  Goal: Patient will remain free of falls  Description: INTERVENTIONS:  - Educate patient/family on patient safety including physical limitations  - Instruct patient to call for assistance with activity   - Consult OT/PT to assist with strengthening/mobility   - Keep Call bell within reach  - Keep bed low and locked with side rails adjusted as appropriate  - Keep care items and personal belongings within reach  - Initiate and maintain comfort rounds  - Make Fall Risk Sign visible to staff  - Offer Toileting every  Hours, in advance of need  - Initiate/Maintain alarm  - Obtain necessary fall risk management equipment:   - Apply yellow socks and bracelet for high fall risk patients  - Consider moving patient to room near nurses station  Outcome: Progressing     Problem: PAIN - ADULT  Goal: Verbalizes/displays adequate comfort level or baseline comfort level  Description: Interventions:  - Encourage patient to monitor pain and request assistance  - Assess pain using appropriate pain scale  - Administer analgesics based on type and severity of pain and evaluate response  - Implement non-pharmacological measures as appropriate and evaluate response  - Consider cultural and social influences on pain and pain management  - Notify physician/advanced practitioner if interventions unsuccessful or patient reports new pain  Outcome: Progressing     Problem: INFECTION - ADULT  Goal: Absence or prevention of progression during hospitalization  Description: INTERVENTIONS:  - Assess and monitor for signs and symptoms of infection  - Monitor lab/diagnostic results  - Monitor all insertion sites, i.e. indwelling lines, tubes, and drains  - Monitor endotracheal if appropriate and nasal secretions for changes in amount and color  - [place] appropriate cooling/warming therapies per order  - Administer medications as ordered  - Instruct and encourage patient and family to use good hand hygiene technique  -  Identify and instruct in appropriate isolation precautions for identified infection/condition  Outcome: Progressing  Goal: Absence of fever/infection during neutropenic period  Description: INTERVENTIONS:  - Monitor WBC    Outcome: Progressing     Problem: SAFETY ADULT  Goal: Patient will remain free of falls  Description: INTERVENTIONS:  - Educate patient/family on patient safety including physical limitations  - Instruct patient to call for assistance with activity   - Consult OT/PT to assist with strengthening/mobility   - Keep Call bell within reach  - Keep bed low and locked with side rails adjusted as appropriate  - Keep care items and personal belongings within reach  - Initiate and maintain comfort rounds  - Make Fall Risk Sign visible to staff  - Offer Toileting every  Hours, in advance of need  - Initiate/Maintain alarm  - Obtain necessary fall risk management equipment:   - Apply yellow socks and bracelet for high fall risk patients  - Consider moving patient to room near nurses station  Outcome: Progressing  Goal: Maintain or return to baseline ADL function  Description: INTERVENTIONS:  -  Assess patient's ability to carry out ADLs; assess patient's baseline for ADL function and identify physical deficits which impact ability to perform ADLs (bathing, care of mouth/teeth, toileting, grooming, dressing, etc.)  - Assess/evaluate cause of self-care deficits   - Assess range of motion  - Assess patient's mobility; develop plan if impaired  - Assess patient's need for assistive devices and provide as appropriate  - Encourage maximum independence but intervene and supervise when necessary  - Involve family in performance of ADLs  - Assess for home care needs following discharge   - Consider OT consult to assist with ADL evaluation and planning for discharge  - Provide patient education as appropriate  Outcome: Progressing  Goal: Maintains/Returns to pre admission functional level  Description:  INTERVENTIONS:  - Perform AM-PAC 6 Click Basic Mobility/ Daily Activity assessment daily.  - Set and communicate daily mobility goal to care team and patient/family/caregiver.   - Collaborate with rehabilitation services on mobility goals if consulted  - Perform Range of Motion  times a day.  - Reposition patient every  hours.  - Dangle patient  times a day  - Stand patient  times a day  - Ambulate patient  times a day  - Out of bed to chair  times a day   - Out of bed for meals times a day  - Out of bed for toileting  - Record patient progress and toleration of activity level   Outcome: Progressing     Problem: Nutrition/Hydration-ADULT  Goal: Nutrient/Hydration intake appropriate for improving, restoring or maintaining nutritional needs  Description: Monitor and assess patient's nutrition/hydration status for malnutrition. Collaborate with interdisciplinary team and initiate plan and interventions as ordered.  Monitor patient's weight and dietary intake as ordered or per policy. Utilize nutrition screening tool and intervene as necessary. Determine patient's food preferences and provide high-protein, high-caloric foods as appropriate.     INTERVENTIONS:  - Monitor oral intake, urinary output, labs, and treatment plans  - Assess nutrition and hydration status and recommend course of action  - Evaluate amount of meals eaten  - Assist patient with eating if necessary   - Allow adequate time for meals  - Recommend/ encourage appropriate diets, oral nutritional supplements, and vitamin/mineral supplements  - Order, calculate, and assess calorie counts as needed  - Recommend, monitor, and adjust tube feedings and TPN/PPN based on assessed needs  - Assess need for intravenous fluids  - Provide specific nutrition/hydration education as appropriate  - Include patient/family/caregiver in decisions related to nutrition  Outcome: Progressing     Problem: Prexisting or High Potential for Compromised Skin Integrity  Goal: Skin  integrity is maintained or improved  Description: INTERVENTIONS:  - Identify patients at risk for skin breakdown  - Assess and monitor skin integrity  - Assess and monitor nutrition and hydration status  - Monitor labs   - Assess for incontinence   - Turn and reposition patient  - Assist with mobility/ambulation  - Relieve pressure over bony prominences  - Avoid friction and shearing  - Provide appropriate hygiene as needed including keeping skin clean and dry  - Evaluate need for skin moisturizer/barrier cream  - Collaborate with interdisciplinary team   - Patient/family teaching  - Consider wound care consult   Outcome: Progressing     Problem: RESPIRATORY - ADULT  Goal: Achieves optimal ventilation and oxygenation  Description: INTERVENTIONS:  - Assess for changes in respiratory status  - Assess for changes in mentation and behavior  - Position to facilitate oxygenation and minimize respiratory effort  - Oxygen administered by appropriate delivery if ordered  - Initiate smoking cessation education as indicated  - Encourage broncho-pulmonary hygiene including cough, deep breathe, Incentive Spirometry  - Assess the need for suctioning and aspirate as needed  - Assess and instruct to report SOB or any respiratory difficulty  - Respiratory Therapy support as indicated  Outcome: Progressing     Problem: GASTROINTESTINAL - ADULT  Goal: Minimal or absence of nausea and/or vomiting  Description: INTERVENTIONS:  - Administer IV fluids if ordered to ensure adequate hydration  - Maintain NPO status until nausea and vomiting are resolved  - Nasogastric tube if ordered  - Administer ordered antiemetic medications as needed  - Provide nonpharmacologic comfort measures as appropriate  - Advance diet as tolerated, if ordered  - Consider nutrition services referral to assist patient with adequate nutrition and appropriate food choices  Outcome: Progressing  Goal: Maintains or returns to baseline bowel function  Description:  INTERVENTIONS:  - Assess bowel function  - Encourage oral fluids to ensure adequate hydration  - Administer IV fluids if ordered to ensure adequate hydration  - Administer ordered medications as needed  - Encourage mobilization and activity  - Consider nutritional services referral to assist patient with adequate nutrition and appropriate food choices  Outcome: Progressing  Goal: Maintains adequate nutritional intake  Description: INTERVENTIONS:  - Monitor percentage of each meal consumed  - Identify factors contributing to decreased intake, treat as appropriate  - Assist with meals as needed  - Monitor I&O, weight, and lab values if indicated  - Obtain nutrition services referral as needed  Outcome: Progressing  Goal: Establish and maintain optimal ostomy function  Description: INTERVENTIONS:  - Assess bowel function  - Encourage oral fluids to ensure adequate hydration  - Administer IV fluids if ordered to ensure adequate hydration   - Administer ordered medications as needed  - Encourage mobilization and activity  - Nutrition services referral to assist patient with appropriate food choices  - Assess stoma site  - Consider wound care consult   Outcome: Progressing  Goal: Oral mucous membranes remain intact  Description: INTERVENTIONS  - Assess oral mucosa and hygiene practices  - Implement preventative oral hygiene regimen  - Implement oral medicated treatments as ordered  - Initiate Nutrition services referral as needed  Outcome: Progressing

## 2024-12-27 NOTE — CASE MANAGEMENT
Case Management Discharge Planning Note    Patient name Helen M Schwab Location S /S -01 MRN [id number]  : 1932 Date 2024       Current Admission Date: 2024  Current Admission Diagnosis:Parotiditis   Patient Active Problem List    Diagnosis Date Noted Date Diagnosed    Abnormal echocardiogram 2024     Positive blood culture 2024     Parotiditis 2024     Fibromyalgia 2024     Hypertension 2024     Urinary incontinence 2021     Gastroesophageal reflux 2021     Carpal tunnel syndrome on left 08/10/2021     Back pain 2021     Ambulatory dysfunction 2021     Ulcerative colitis with complication (HCC) 2021     History of fusion of lumbar spine 2021     Mass of right finger 08/15/2018     Trigger finger, left index finger 08/15/2018       LOS (days): 5  Geometric Mean LOS (GMLOS) (days): 2.8  Days to GMLOS:-2.3     OBJECTIVE:  Risk of Unplanned Readmission Score: 9.54         Current admission status: Inpatient   Preferred Pharmacy:   [id number] - [place], [place] - [address] [facility]  [address] [place] [id number]  Phone: [phone] Fax: [phone]    Primary Care Provider: Shant Lucas MD    Primary Insurance: MEDICARE  Secondary Insurance: [facility] [employer] OPTIONS PROGRAM    DISCHARGE DETAILS:    Discharge planning discussed with:: Patient, son  Freedom of Choice: Yes  Comments - Freedom of Choice: CM reviewed PT rec level 2. Pt declining STR, agreeable to blanket referrals for HHC with preference for [facility]  CM contacted family/caregiver?: Yes (Son via phone)  Were Treatment Team discharge recommendations reviewed with patient/caregiver?: Yes  Did patient/caregiver verbalize understanding of patient care needs?: Yes  Were patient/caregiver advised of the risks associated with not following Treatment Team discharge recommendations?: Yes    Contacts  Patient Contacts: Patient, son  Contact Method: In Person,  Phone  Phone Number: [phone]  Reason/Outcome: Continuity of Care, Referral, Emergency Contact, Discharge Planning    Requested Home Health Care         Is the patient interested in HHC at discharge?: Yes  Home Health Discipline requested:: Home Health Aide, Nursing, Occupational Therapy, Physical Therapy  Home Health Agency Name:: Other (TBD)  Home Health Follow-Up Provider:: PCP  Home Health Services Needed:: Evaluate Functional Status and Safety, Gait/ADL Training, Strengthening/Theraputic Exercises to Improve Function  Homebound Criteria Met:: Requires the Assistance of Another Person for Safe Ambulation or to Leave the Home, Uses an Assist Device (i.e. cane, walker, etc)  Supporting Clincal Findings:: Limited Endurance, Fatigues Easliy in Short Distances    DME Referral Provided  Referral made for DME?: No    Other Referral/Resources/Interventions Provided:  Interventions: Other (Specify), HHC  Referral Comments: ADAM spoke with pt at bedside, introduced self and role with dcp. CM reviewed PT rec level 2. Pt declining STR and is agreeble to blanket referrals for HHC with preference for [facility]. Pt inquired about CM ordering a WC. Pt reported she received a RW through her insurance about a year ago. Pt aware that insurance will only cover a RW or WC once every few years and WC will likely be OOP. Pt declined CM offer to order WC, reported she has a cane, rollator and RWs at home. ADAM reviewed IMM with pt, copy provided for her records and placed original in scan bin on unit to be uploaded to pt chart. CM placed call to pt son, dcp update provided. Son agreeable to pt returning home with C. CM will continue to follow.    Would you like to participate in our [facility] service program?  : No - Declined    Treatment Team Recommendation: Short Term Rehab  Discharge Destination Plan:: Home with Home Health Care     IMM Given (Date):: 12/27/24  IMM Given to:: Patient  IMM reviewed with patient, patient agrees  with discharge determination.

## 2024-12-27 NOTE — ASSESSMENT & PLAN NOTE
Confirmed with patient that she takes 50 mg Toprol-XL daily not 100 mg daily as reported in the chart  Initially was hypertensive when she arrived, no associated symptoms such as flash pulm edema, headache, or visual disturbance    Plan:  Continue home metoprolol succinate 50 mg daily  Continue to trend pressures closely   Day 3 Calorie Count  Pt consumed approx 40% of needs  SBO resolving/resolved as per surgery  Pt advanced to regular diet  Pt reports being hungry, but was stressed due to having a difficult  roommate, so she left the room, was visited in the lounge.  Pt is tolerating her diet.   Pt given recommendations for low fiber foods to start with.  Pt expecting to be discharged today.  Will continue to monitor.

## 2024-12-27 NOTE — ASSESSMENT & PLAN NOTE
Patient presented with acute onset of worsening facial swelling and CT imaging confirmed findings involving the left parotid gland with surrounding inflammation.  She has overall clinically improved on Unasyn which would cover the majority of organisms present within the mouth and that typically lead to this issue.  Course now complicated by positive blood culture and abnormal echo as below  Continue on Augmentin for total 7 days through 12/28  Last doses of antibiotic ordered  Continue to trend fever curve/vitals  Repeat CBC/chemistry tomorrow to monitor treatment response/dosing  Follow-up pending blood cultures  Would plan to obtain formal transesophageal echo when possible  Continue supportive care otherwise as per ENT  Additional supportive cares per primary  Additional interventions pending clinical course and imaging

## 2024-12-27 NOTE — ASSESSMENT & PLAN NOTE
1 out of 2 blood cultures from admission isolated MRSA.  Patient without any lingering or significant symptoms leading up to admission and abrupt onset of left-sided facial swelling as above was reason for presentation.  Repeat blood cultures remain negative and patient has not been on appropriate therapy but yet her other clinical parameters have improved.  My overall suspicion is that this culture represents a contaminant.  She is without any intravascular devices or localizing symptoms otherwise.  Unfortunately echo was obtained and is abnormal as below.  After discussion with patient's cardiologist and patient again today she is agreeable to further cardiac imaging.  Would not start on any antibiotics for this issue  Continue Augmentin otherwise as above  Trend fever curve/vitals  Repeat CBC/chemistry tomorrow  Obtain transesophageal echo when possible  Follow-up pending blood cultures otherwise  Additional supportive care per primary

## 2024-12-27 NOTE — ASSESSMENT & PLAN NOTE
Patient had 2D echo ordered in the setting of 1 out of 2 blood cultures being positive.  Echo notable for changes at the aortic valve which could represent focal calcification, fibroblastoma or vegetation.  Clinically my suspicion is lower for true endocarditis.  Unfortunately no other images available for comparison.  Patient given her age is uncertain if she wishes to pursue additional imaging and treatment.  Discussed with her cardiologist at her request and given their agreement she is agreeable to imaging.  Continue Augmentin as above  Will follow-up pending cultures  Would obtain transesophageal echo when possible  We will determine need for further antibiotics based on results

## 2024-12-27 NOTE — ASSESSMENT & PLAN NOTE
Echo shows LVEF 65%, diastolic function Grade II relaxation. 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    Plan:  Follow up MICHAEL  ID consulted and rec appreciated  Follow up 3rd set of blood culture from 12/27/24

## 2024-12-27 NOTE — DISCHARGE INSTR - OTHER ORDERS
Skin care plans:  1-Hydraguard to bilateral heels BID and PRN  2-Elevate heels to offload pressure.  3-Ehob cushion in chair when out of bed.  4-Moisturize skin daily with skin nourishing cream.  5-Turn/reposition q2h for pressure re-distribution on skin.  6-Wash Sacro Buttocks area with soap and water. Pat Dry. Apply thin layer of Calazime/Protective Zinc Oxide paste to sacrum and buttocks TID and PRN with incontinence care.

## 2024-12-27 NOTE — ASSESSMENT & PLAN NOTE
Will continue methylprednisolone 2 mg daily in the morning as patient   Follow-up with PCP outpatient    Plan:  Continue methylprednisolone 2 mg daily

## 2024-12-27 NOTE — PLAN OF CARE
Problem: Potential for Falls  Goal: Patient will remain free of falls  Description: INTERVENTIONS:  - Educate patient/family on patient safety including physical limitations  - Instruct patient to call for assistance with activity   - Consult OT/PT to assist with strengthening/mobility   - Keep Call bell within reach  - Keep bed low and locked with side rails adjusted as appropriate  - Keep care items and personal belongings within reach  - Initiate and maintain comfort rounds  - Make Fall Risk Sign visible to staff  - Offer Toileting every  Hours, in advance of need  - Initiate/Maintain alarm  - Obtain necessary fall risk management equipment:   - Apply yellow socks and bracelet for high fall risk patients  - Consider moving patient to room near nurses station  Outcome: Progressing     Problem: PAIN - ADULT  Goal: Verbalizes/displays adequate comfort level or baseline comfort level  Description: Interventions:  - Encourage patient to monitor pain and request assistance  - Assess pain using appropriate pain scale  - Administer analgesics based on type and severity of pain and evaluate response  - Implement non-pharmacological measures as appropriate and evaluate response  - Consider cultural and social influences on pain and pain management  - Notify physician/advanced practitioner if interventions unsuccessful or patient reports new pain  Outcome: Progressing     Problem: INFECTION - ADULT  Goal: Absence or prevention of progression during hospitalization  Description: INTERVENTIONS:  - Assess and monitor for signs and symptoms of infection  - Monitor lab/diagnostic results  - Monitor all insertion sites, i.e. indwelling lines, tubes, and drains  - Monitor endotracheal if appropriate and nasal secretions for changes in amount and color  - [place] appropriate cooling/warming therapies per order  - Administer medications as ordered  - Instruct and encourage patient and family to use good hand hygiene technique  -  Identify and instruct in appropriate isolation precautions for identified infection/condition  Outcome: Progressing  Goal: Absence of fever/infection during neutropenic period  Description: INTERVENTIONS:  - Monitor WBC    Outcome: Progressing     Problem: SAFETY ADULT  Goal: Patient will remain free of falls  Description: INTERVENTIONS:  - Educate patient/family on patient safety including physical limitations  - Instruct patient to call for assistance with activity   - Consult OT/PT to assist with strengthening/mobility   - Keep Call bell within reach  - Keep bed low and locked with side rails adjusted as appropriate  - Keep care items and personal belongings within reach  - Initiate and maintain comfort rounds  - Make Fall Risk Sign visible to staff  - Offer Toileting every  Hours, in advance of need  - Initiate/Maintain alarm  - Obtain necessary fall risk management equipment:   - Apply yellow socks and bracelet for high fall risk patients  - Consider moving patient to room near nurses station  Outcome: Progressing  Goal: Maintain or return to baseline ADL function  Description: INTERVENTIONS:  -  Assess patient's ability to carry out ADLs; assess patient's baseline for ADL function and identify physical deficits which impact ability to perform ADLs (bathing, care of mouth/teeth, toileting, grooming, dressing, etc.)  - Assess/evaluate cause of self-care deficits   - Assess range of motion  - Assess patient's mobility; develop plan if impaired  - Assess patient's need for assistive devices and provide as appropriate  - Encourage maximum independence but intervene and supervise when necessary  - Involve family in performance of ADLs  - Assess for home care needs following discharge   - Consider OT consult to assist with ADL evaluation and planning for discharge  - Provide patient education as appropriate  Outcome: Progressing  Goal: Maintains/Returns to pre admission functional level  Description:  INTERVENTIONS:  - Perform AM-PAC 6 Click Basic Mobility/ Daily Activity assessment daily.  - Set and communicate daily mobility goal to care team and patient/family/caregiver.   - Collaborate with rehabilitation services on mobility goals if consulted  - Perform Range of Motion  times a day.  - Reposition patient every  hours.  - Dangle patient  times a day  - Stand patient  times a day  - Ambulate patient  times a day  - Out of bed to chair  times a day   - Out of bed for meals  times a day  - Out of bed for toileting  - Record patient progress and toleration of activity level   Outcome: Progressing     Problem: Nutrition/Hydration-ADULT  Goal: Nutrient/Hydration intake appropriate for improving, restoring or maintaining nutritional needs  Description: Monitor and assess patient's nutrition/hydration status for malnutrition. Collaborate with interdisciplinary team and initiate plan and interventions as ordered.  Monitor patient's weight and dietary intake as ordered or per policy. Utilize nutrition screening tool and intervene as necessary. Determine patient's food preferences and provide high-protein, high-caloric foods as appropriate.     INTERVENTIONS:  - Monitor oral intake, urinary output, labs, and treatment plans  - Assess nutrition and hydration status and recommend course of action  - Evaluate amount of meals eaten  - Assist patient with eating if necessary   - Allow adequate time for meals  - Recommend/ encourage appropriate diets, oral nutritional supplements, and vitamin/mineral supplements  - Order, calculate, and assess calorie counts as needed  - Recommend, monitor, and adjust tube feedings and TPN/PPN based on assessed needs  - Assess need for intravenous fluids  - Provide specific nutrition/hydration education as appropriate  - Include patient/family/caregiver in decisions related to nutrition  Outcome: Progressing     Problem: Prexisting or High Potential for Compromised Skin Integrity  Goal: Skin  integrity is maintained or improved  Description: INTERVENTIONS:  - Identify patients at risk for skin breakdown  - Assess and monitor skin integrity  - Assess and monitor nutrition and hydration status  - Monitor labs   - Assess for incontinence   - Turn and reposition patient  - Assist with mobility/ambulation  - Relieve pressure over bony prominences  - Avoid friction and shearing  - Provide appropriate hygiene as needed including keeping skin clean and dry  - Evaluate need for skin moisturizer/barrier cream  - Collaborate with interdisciplinary team   - Patient/family teaching  - Consider wound care consult   Outcome: Progressing     Problem: RESPIRATORY - ADULT  Goal: Achieves optimal ventilation and oxygenation  Description: INTERVENTIONS:  - Assess for changes in respiratory status  - Assess for changes in mentation and behavior  - Position to facilitate oxygenation and minimize respiratory effort  - Oxygen administered by appropriate delivery if ordered  - Initiate smoking cessation education as indicated  - Encourage broncho-pulmonary hygiene including cough, deep breathe, Incentive Spirometry  - Assess the need for suctioning and aspirate as needed  - Assess and instruct to report SOB or any respiratory difficulty  - Respiratory Therapy support as indicated  Outcome: Progressing     Problem: GASTROINTESTINAL - ADULT  Goal: Minimal or absence of nausea and/or vomiting  Description: INTERVENTIONS:  - Administer IV fluids if ordered to ensure adequate hydration  - Maintain NPO status until nausea and vomiting are resolved  - Nasogastric tube if ordered  - Administer ordered antiemetic medications as needed  - Provide nonpharmacologic comfort measures as appropriate  - Advance diet as tolerated, if ordered  - Consider nutrition services referral to assist patient with adequate nutrition and appropriate food choices  Outcome: Progressing  Goal: Maintains or returns to baseline bowel function  Description:  INTERVENTIONS:  - Assess bowel function  - Encourage oral fluids to ensure adequate hydration  - Administer IV fluids if ordered to ensure adequate hydration  - Administer ordered medications as needed  - Encourage mobilization and activity  - Consider nutritional services referral to assist patient with adequate nutrition and appropriate food choices  Outcome: Progressing  Goal: Maintains adequate nutritional intake  Description: INTERVENTIONS:  - Monitor percentage of each meal consumed  - Identify factors contributing to decreased intake, treat as appropriate  - Assist with meals as needed  - Monitor I&O, weight, and lab values if indicated  - Obtain nutrition services referral as needed  Outcome: Progressing  Goal: Establish and maintain optimal ostomy function  Description: INTERVENTIONS:  - Assess bowel function  - Encourage oral fluids to ensure adequate hydration  - Administer IV fluids if ordered to ensure adequate hydration   - Administer ordered medications as needed  - Encourage mobilization and activity  - Nutrition services referral to assist patient with appropriate food choices  - Assess stoma site  - Consider wound care consult   Outcome: Progressing  Goal: Oral mucous membranes remain intact  Description: INTERVENTIONS  - Assess oral mucosa and hygiene practices  - Implement preventative oral hygiene regimen  - Implement oral medicated treatments as ordered  - Initiate Nutrition services referral as needed  Outcome: Progressing

## 2024-12-27 NOTE — PROGRESS NOTES
Progress Note - Hospitalist   Name: Helen M Schwab 89 y.o. female I MRN: [id number]  Unit/Bed#: S -01 I Date of Admission: 12/22/2024   Date of Service: 12/27/2024 I Hospital Day: 5    Assessment & Plan  Parotiditis  Complicated by facial cellulitis and left masseter myositis. Afebrile, no signs of systemic illness. Lactic acid and procal within normal range   Presenting with 3 days history worsening facial swelling, erythema, and firmness and associated trismus  CT soft tissue neck showing acute left parotitis secondary to a 2 mm obstructing parotid duct sialolith. Reactive secondary left side facial cellulitis and left masseter myositis noted. No discrete collection. Normal subglottic airway  Patient protecting and maintaining her airway at this time.  She is saturating in the upper 90s on room air.  She reports improvement in trismus.  She denies any secretions at this time as well  ENT based on the imaging recommended: (formal evaluation pending)  Sialadenitis protocol: Warm compresses to the affected area. Sialogogues (sour candies, lemon, citrus, vinegar). NSAID such as Ibuprofen for pain relief and to reduce inflammation. Aggressive hydration; encourage PO intake and IVF as tolerated. Continue IV antibiotic with adequate staph aureus coverage   Parotitis: Massage the gland from angle of mandible working anteriorly along the cheek towards the oral commissure to facilitate drainage through the ductal opening   Status post 4.5 g Zosyn and vancomycin in ER, received 500 mL fluid bolus and 1 g acetaminophen IV  Transition to Unasyn 3 g every 6 hour for community-acquired infection and transition to oral regimen with clinical improvement  Blood culture 1 out of 2 showing Gram + cocci. Repeat blood cultures show no growth at 24 hours.   Echo shows LVEF 65%, diastolic function Grade II relaxation. 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 MICHAEL pending.      Plans  Continue Augmentin Q12H through 12/28  Ibuprofen 400 mg Q6H PRN  Continue diet and oral hydration as tolerated   Follow up MICHAEL  Follow up 3rd set of blood culture from 12/27/24  ID consulted and rec appreciated  Per ENT - warm compress, sialogogus (sour candies, lemon, citrus, vinegar)   ENT OP follow up in 2-4 weeks  Ulcerative colitis with complication (HCC)  Patient reports abdominal pain, nausea and stool incontinence, colitis flare up in the setting of antibiotic use.   Patient reports Pepto-Bismol helps with her symptoms    Plan:  Continue home mesalamine 800 mg twice daily  Continue Pepto-Bismol as needed  Continue saccharomyces boulardii (Florastor) BID  Fibromyalgia  Will continue methylprednisolone 2 mg daily in the morning as patient   Follow-up with PCP outpatient    Plan:  Continue methylprednisolone 2 mg daily   Hypertension  Confirmed with patient that she takes 50 mg Toprol-XL daily not 100 mg daily as reported in the chart  Initially was hypertensive when she arrived, no associated symptoms such as flash pulm edema, headache, or visual disturbance    Plan:  Continue home metoprolol succinate 50 mg daily  Continue to trend pressures closely  Gastroesophageal reflux  Continue pantoprazole 40 mg early morning   Abnormal echocardiogram  Echo shows LVEF 65%, diastolic function Grade II relaxation. 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    Plan:  Follow up MICHAEL  ID consulted and rec appreciated  Follow up 3rd set of blood culture from 12/27/24    Positive blood culture  Blood culture 1 out of 2 showing Gram + cocci. Repeat blood cultures show no growth at 24 hours.     Plan:  Continue Augmentin for parotiditis   Follow up 3rd set of blood culture from 12/27/24    VTE Pharmacologic Prophylaxis: VTE Score: 5 High Risk (Score >/= 5) - Pharmacological DVT Prophylaxis Ordered: enoxaparin (Lovenox). Sequential Compression Devices Ordered.    Mobility:    Basic Mobility Inpatient Raw Score: 13  JH-HLM Goal: 4: Move to chair/commode  JH-HLM Achieved: 5: Stand (1 or more minutes)  JH-HLM Goal NOT achieved. Continue with multidisciplinary rounding and encourage appropriate mobility to improve upon JH-HLM goals.    Patient Centered Rounds: I performed bedside rounds with nursing staff today.   Discussions with Specialists or Other Care Team Provider: ENT, ID     Education and Discussions with Family / Patient: Updated  (son) via phone.    Current Length of Stay: 5 day(s)  Current Patient Status: Inpatient   Certification Statement: The patient will continue to require additional inpatient hospital stay due to IV antibiotic  Discharge Plan: Anticipate discharge in 24-48 hrs to home.    Code Status: Level 2 - DNAR: but accepts endotracheal intubation    Subjective   Patient is seen and examined by the bedside.  Per nursing, patient has 3 episodes of stool incontinence over night. Patient reports that her abdomin is feeling better this morning. Patient reports that face and jaw redness and edema from parotiditis  has improved. She is able to tolerate p.o diet well.Patient is doing massage per ENT instruction and putting warm compression. For abnormal TTE showing aortic mass, patient agrees to get MICHAEL.     Patient is independent with ADLs at home. Patient reports that she would like to go home if we can discharge her without antibiotic to take at home since she can't help herself  with stool incontinence. Patient is denying fever, chills, headaches, sore throat, chest pain, changes with urination.      Objective :  Temp:  [98.1 °F (36.7 °C)-98.3 °F (36.8 °C)] 98.3 °F (36.8 °C)  HR:  [74-78] 74  BP: (151-175)/(66-84) 151/66  Resp:  [17] 17  SpO2:  [95 %-97 %] 97 %  O2 Device: None (Room air)    Body mass index is 23.23 kg/m².     Input and Output Summary (last 24 hours):     Intake/Output Summary (Last 24 hours) at 12/27/2024 0344  Last data filed at  12/27/2024 0831  Gross per 24 hour   Intake 580 ml   Output 170 ml   Net 410 ml       Physical Exam  Constitutional:       Appearance: Normal appearance.   HENT:      Head:      Comments: Improving erythema in the left aspect of face the submandibular region          Mouth/Throat:      Mouth: Mucous membranes are dry.      Pharynx: Oropharynx is clear.   Eyes:      General:         Right eye: No discharge.         Left eye: No discharge.      Conjunctiva/sclera: Conjunctivae normal.   Cardiovascular:      Rate and Rhythm: Normal rate and regular rhythm.      Pulses: Normal pulses.      Heart sounds: Normal heart sounds.   Pulmonary:      Effort: Pulmonary effort is normal. No respiratory distress.      Breath sounds: Normal breath sounds. No wheezing.   Abdominal:      General: Abdomen is flat. Bowel sounds are normal. There is no distension.      Tenderness: There is no abdominal tenderness. There is no right CVA tenderness or left CVA tenderness.   Musculoskeletal:         General: No swelling or tenderness. Normal range of motion.      Cervical back: Normal range of motion.      Right lower leg: No edema.      Left lower leg: No edema.   Skin:     General: Skin is warm.      Findings: Erythema (improving) present.   Neurological:      Mental Status: She is alert.         Lines/Drains:              Lab Results: I have reviewed the following results:   Results from last 7 days   Lab Units 12/27/24  0356   WBC Thousand/uL 9.69   HEMOGLOBIN g/dL 11.8   HEMATOCRIT % 35.0   PLATELETS Thousands/uL 372   SEGS PCT % 47   LYMPHO PCT % 43   MONO PCT % 9   EOS PCT % 1     Results from last 7 days   Lab Units 12/27/24  0356 12/23/24  0507 12/22/24  1102   SODIUM mmol/L 139   < > 137   POTASSIUM mmol/L 3.4*   < > 3.3*   CHLORIDE mmol/L 111*   < > 100   CO2 mmol/L 20*   < > 27   BUN mg/dL 10   < > 14   CREATININE mg/dL 0.41*   < > 0.44*   ANION GAP mmol/L 8   < > 10   CALCIUM mg/dL 8.3*   < > 9.6   ALBUMIN g/dL  --   --  4.2    TOTAL BILIRUBIN mg/dL  --   --  1.02*   ALK PHOS U/L  --   --  70   ALT U/L  --   --  9   AST U/L  --   --  14   GLUCOSE RANDOM mg/dL 94   < > 115    < > = values in this interval not displayed.     Results from last 7 days   Lab Units 12/22/24  1102   INR  0.97             Results from last 7 days   Lab Units 12/23/24  0507 12/22/24  1102   LACTIC ACID mmol/L  --  1.6   PROCALCITONIN ng/ml 0.19 <0.05       Recent Cultures (last 7 days):   Results from last 7 days   Lab Units 12/27/24  0401 12/23/24  1616 12/22/24  1102   BLOOD CULTURE  Received in Microbiology Lab. Culture in Progress.  Received in Microbiology Lab. Culture in Progress. No Growth at 72 hrs.  No Growth at 72 hrs. No Growth After 4 Days.  Methicillin Resistant Staphylococcus aureus*   GRAM STAIN RESULT   --   --  Gram positive cocci in clusters*       Imaging Results Review: I reviewed radiology reports from this admission including: CT Soft tissues neck with contrast.  Other Study Results Review: No additional pertinent studies reviewed.    Last 24 Hours Medication List:     Current Facility-Administered Medications:     acetaminophen (TYLENOL) tablet 975 mg, Q8H WILLA    amoxicillin-clavulanate (AUGMENTIN) 875-125 mg per tablet 1 tablet, Q12H WILLA    aspirin (ECOTRIN LOW STRENGTH) EC tablet 81 mg, Daily    bimatoprost (LUMIGAN) 0.03 % ophthalmic drops 1 drop, Daily    bismuth subsalicylate (PEPTO BISMOL) oral suspension 524 mg, Q6H PRN    Cholecalciferol (VITAMIN D3) tablet 5,000 Units, Daily    enoxaparin (LOVENOX) subcutaneous injection 40 mg, Q24H WILLA    hydrALAZINE (APRESOLINE) injection 10 mg, Q6H PRN    ibuprofen (MOTRIN) tablet 400 mg, Q6H PRN    mesalamine (DELZICOL) delayed release capsule 800 mg, BID    methylprednisolone (MEDROL) tablet 2 mg, Daily With Breakfast    metoprolol succinate (TOPROL-XL) 24 hr tablet 50 mg, Daily    ondansetron (ZOFRAN) injection 4 mg, Q6H PRN    pantoprazole (PROTONIX) EC tablet 40 mg, Early Morning     potassium chloride 20 mEq IVPB (premix), Once    saccharomyces boulardii (FLORASTOR) capsule 250 mg, BID    Administrative Statements   Today, Patient Was Seen By: Chantell Card MD      **Please Note: This note may have been constructed using a voice recognition system.**

## 2024-12-28 ENCOUNTER — HOME HEALTH ADMISSION (OUTPATIENT)
Dept: HOME HEALTH SERVICES | Facility: HOME HEALTHCARE | Age: 89
End: 2024-12-28
Payer: MEDICARE

## 2024-12-28 LAB
ANION GAP SERPL CALCULATED.3IONS-SCNC: 10 MMOL/L (ref 4–13)
BASOPHILS # BLD AUTO: 0.03 THOUSANDS/ÂΜL (ref 0–0.1)
BASOPHILS NFR BLD AUTO: 0 % (ref 0–1)
BUN SERPL-MCNC: 8 MG/DL (ref 5–25)
CALCIUM SERPL-MCNC: 8.5 MG/DL (ref 8.4–10.2)
CHLORIDE SERPL-SCNC: 109 MMOL/L (ref 96–108)
CO2 SERPL-SCNC: 21 MMOL/L (ref 21–32)
CREAT SERPL-MCNC: 0.37 MG/DL (ref 0.6–1.3)
EOSINOPHIL # BLD AUTO: 0.19 THOUSAND/ÂΜL (ref 0–0.61)
EOSINOPHIL NFR BLD AUTO: 2 % (ref 0–6)
ERYTHROCYTE [DISTWIDTH] IN BLOOD BY AUTOMATED COUNT: 15.1 % (ref 11.6–15.1)
GFR SERPL CREATININE-BSD FRML MDRD: 93 ML/MIN/1.73SQ M
GLUCOSE SERPL-MCNC: 87 MG/DL (ref 65–140)
HCT VFR BLD AUTO: 34.4 % (ref 34.8–46.1)
HGB BLD-MCNC: 11.8 G/DL (ref 11.5–15.4)
IMM GRANULOCYTES # BLD AUTO: 0.06 THOUSAND/UL (ref 0–0.2)
IMM GRANULOCYTES NFR BLD AUTO: 1 % (ref 0–2)
LYMPHOCYTES # BLD AUTO: 3.95 THOUSANDS/ÂΜL (ref 0.6–4.47)
LYMPHOCYTES NFR BLD AUTO: 40 % (ref 14–44)
MCH RBC QN AUTO: 32.1 PG (ref 26.8–34.3)
MCHC RBC AUTO-ENTMCNC: 34.3 G/DL (ref 31.4–37.4)
MCV RBC AUTO: 94 FL (ref 82–98)
MONOCYTES # BLD AUTO: 0.86 THOUSAND/ÂΜL (ref 0.17–1.22)
MONOCYTES NFR BLD AUTO: 9 % (ref 4–12)
NEUTROPHILS # BLD AUTO: 4.74 THOUSANDS/ÂΜL (ref 1.85–7.62)
NEUTS SEG NFR BLD AUTO: 48 % (ref 43–75)
NRBC BLD AUTO-RTO: 0 /100 WBCS
PLATELET # BLD AUTO: 394 THOUSANDS/UL (ref 149–390)
PMV BLD AUTO: 10.2 FL (ref 8.9–12.7)
POTASSIUM SERPL-SCNC: 3.2 MMOL/L (ref 3.5–5.3)
RBC # BLD AUTO: 3.68 MILLION/UL (ref 3.81–5.12)
SODIUM SERPL-SCNC: 140 MMOL/L (ref 135–147)
WBC # BLD AUTO: 9.83 THOUSAND/UL (ref 4.31–10.16)

## 2024-12-28 PROCEDURE — 85025 COMPLETE CBC W/AUTO DIFF WBC: CPT

## 2024-12-28 PROCEDURE — 99232 SBSQ HOSP IP/OBS MODERATE 35: CPT | Performed by: HOSPITALIST

## 2024-12-28 PROCEDURE — 80048 BASIC METABOLIC PNL TOTAL CA: CPT

## 2024-12-28 RX ORDER — POTASSIUM CHLORIDE 29.8 MG/ML
40 INJECTION INTRAVENOUS ONCE
Status: DISCONTINUED | OUTPATIENT
Start: 2024-12-28 | End: 2024-12-28

## 2024-12-28 RX ORDER — POTASSIUM CHLORIDE 1500 MG/1
40 TABLET, EXTENDED RELEASE ORAL ONCE
Status: COMPLETED | OUTPATIENT
Start: 2024-12-28 | End: 2024-12-28

## 2024-12-28 RX ORDER — POTASSIUM CHLORIDE 14.9 MG/ML
20 INJECTION INTRAVENOUS
Status: DISCONTINUED | OUTPATIENT
Start: 2024-12-28 | End: 2024-12-28

## 2024-12-28 RX ADMIN — Medication 5000 UNITS: at 08:43

## 2024-12-28 RX ADMIN — Medication 250 MG: at 08:43

## 2024-12-28 RX ADMIN — POTASSIUM CHLORIDE 40 MEQ: 1500 TABLET, EXTENDED RELEASE ORAL at 16:56

## 2024-12-28 RX ADMIN — METHYLPREDNISOLONE 2 MG: 4 TABLET ORAL at 08:43

## 2024-12-28 RX ADMIN — BIMATOPROST 1 DROP: 0.3 SOLUTION/ DROPS OPHTHALMIC at 08:44

## 2024-12-28 RX ADMIN — ACETAMINOPHEN 975 MG: 325 TABLET, FILM COATED ORAL at 21:05

## 2024-12-28 RX ADMIN — AMOXICILLIN AND CLAVULANATE POTASSIUM 1 TABLET: 875; 125 TABLET, FILM COATED ORAL at 16:56

## 2024-12-28 RX ADMIN — ACETAMINOPHEN 975 MG: 325 TABLET, FILM COATED ORAL at 05:54

## 2024-12-28 RX ADMIN — HYDRALAZINE HYDROCHLORIDE 10 MG: 20 INJECTION INTRAMUSCULAR; INTRAVENOUS at 02:42

## 2024-12-28 RX ADMIN — POTASSIUM CHLORIDE 20 MEQ: 14.9 INJECTION, SOLUTION INTRAVENOUS at 08:43

## 2024-12-28 RX ADMIN — PANTOPRAZOLE SODIUM 40 MG: 40 TABLET, DELAYED RELEASE ORAL at 05:54

## 2024-12-28 RX ADMIN — MESALAMINE 800 MG: 400 CAPSULE, DELAYED RELEASE ORAL at 21:05

## 2024-12-28 RX ADMIN — AMOXICILLIN AND CLAVULANATE POTASSIUM 1 TABLET: 875; 125 TABLET, FILM COATED ORAL at 02:21

## 2024-12-28 RX ADMIN — ACETAMINOPHEN 975 MG: 325 TABLET, FILM COATED ORAL at 16:55

## 2024-12-28 RX ADMIN — ENOXAPARIN SODIUM 40 MG: 40 INJECTION SUBCUTANEOUS at 08:43

## 2024-12-28 RX ADMIN — ASPIRIN 81 MG: 81 TABLET, COATED ORAL at 08:44

## 2024-12-28 RX ADMIN — MESALAMINE 800 MG: 400 CAPSULE, DELAYED RELEASE ORAL at 08:43

## 2024-12-28 RX ADMIN — METOPROLOL SUCCINATE 50 MG: 50 TABLET, EXTENDED RELEASE ORAL at 08:43

## 2024-12-28 NOTE — ASSESSMENT & PLAN NOTE
Complicated by facial cellulitis and left masseter myositis. Afebrile, no signs of systemic illness. Lactic acid and procal within normal range   Presenting with 3 days history worsening facial swelling, erythema, and firmness and associated trismus  CT soft tissue neck showing acute left parotitis secondary to a 2 mm obstructing parotid duct sialolith. Reactive secondary left side facial cellulitis and left masseter myositis noted. No discrete collection. Normal subglottic airway  Patient protecting and maintaining her airway at this time.  She is saturating in the upper 90s on room air.  She reports improvement in trismus and denies secretions.  Transitioned from Unasyn to Augmentin for 7 day antibiotics course  Blood culture 1 out of 2 showing Gram + cocci. Repeat blood cultures show no growth at 4 days.   Echo shows LVEF 65%, diastolic function Grade II relaxation. Aortic valve shows 7.0 mm x 6.0 mm small, sessile, echogenic mass on the aortic valve  Third set of blood cultures show no growth at 24 hours    Plans  Continue Augmentin Q12H through today 12/28. ID is on board  Ibuprofen 400 mg Q6H PRN  Continue diet and oral hydration as tolerated   MICHAEL planned for Monday 12/30  Continue to follow third set of blood cultures from 12/27/24  Per ENT - warm compress, sialogogus (sour candies, lemon, citrus, vinegar)   ENT OP follow up in 2-4 weeks

## 2024-12-28 NOTE — ASSESSMENT & PLAN NOTE
Echo shows LVEF 65%, diastolic function Grade II relaxation. Aortic valve shows 7.0 mm x 6.0 mm small, sessile, echogenic mass on the aortic valve.  TTE notable for changes at the aortic valve which could represent focal calcification, fibroblastoma or vegetation. ID has lower suspicion for true endocarditis    Plan:  Follow up MICHAEL  ID will determine need for further antibiotics based on MICHAEL results.  Patient states that due to her age and colitis flares with antibiotics use, she is unsure if she would be agreeable to additional antibiotic treatment

## 2024-12-28 NOTE — ASSESSMENT & PLAN NOTE
Confirmed with patient that she takes 50 mg Toprol-XL daily not 100 mg daily as reported in the chart  Initially was hypertensive when she arrived, no associated symptoms such as flash pulm edema, headache, or visual disturbance  Pt had episode of hypertension to 192/88 overnight which improved after IV hydralazine    Plan:  Continue home metoprolol succinate 50 mg daily  IV hydralazine prn  Continue to trend pressures closely

## 2024-12-28 NOTE — ASSESSMENT & PLAN NOTE
Blood culture 1 out of 2 showing Gram + cocci. Repeat blood cultures show no growth at 24 hours.   Infectious diseases overall suspicion is that blood culture represents a contaminant    Plan:  Per ID, would not start antibiotics for this issue. Continue Augmentin for parotiditis through today 12/28  Follow up 3rd set of blood cultures from 12/27/24

## 2024-12-28 NOTE — PROGRESS NOTES
Progress Note - Hospitalist   Name: Helen M Schwab 89 y.o. female I MRN: [id number]  Unit/Bed#: S -01 I Date of Admission: 12/22/2024   Date of Service: 12/28/2024 I Hospital Day: 6    Assessment & Plan  Parotiditis  Complicated by facial cellulitis and left masseter myositis. Afebrile, no signs of systemic illness. Lactic acid and procal within normal range   Presenting with 3 days history worsening facial swelling, erythema, and firmness and associated trismus  CT soft tissue neck showing acute left parotitis secondary to a 2 mm obstructing parotid duct sialolith. Reactive secondary left side facial cellulitis and left masseter myositis noted. No discrete collection. Normal subglottic airway  Patient protecting and maintaining her airway at this time.  She is saturating in the upper 90s on room air.  She reports improvement in trismus and denies secretions.  Transitioned from Unasyn to Augmentin for 7 day antibiotics course  Blood culture 1 out of 2 showing Gram + cocci. Repeat blood cultures show no growth at 4 days.   Echo shows LVEF 65%, diastolic function Grade II relaxation. Aortic valve shows 7.0 mm x 6.0 mm small, sessile, echogenic mass on the aortic valve  Third set of blood cultures show no growth at 24 hours    Plans  Continue Augmentin Q12H through today 12/28. ID is on board  Ibuprofen 400 mg Q6H PRN  Continue diet and oral hydration as tolerated   MICHAEL planned for Monday 12/30  Continue to follow third set of blood cultures from 12/27/24  Per ENT - warm compress, sialogogus (sour candies, lemon, citrus, vinegar)   ENT OP follow up in 2-4 weeks  Positive blood culture  Blood culture 1 out of 2 showing Gram + cocci. Repeat blood cultures show no growth at 24 hours.   Infectious diseases overall suspicion is that blood culture represents a contaminant    Plan:  Per ID, would not start antibiotics for this issue. Continue Augmentin for parotiditis through today 12/28  Follow up 3rd set of blood  cultures from 12/27/24  Abnormal echocardiogram  Echo shows LVEF 65%, diastolic function Grade II relaxation. Aortic valve shows 7.0 mm x 6.0 mm small, sessile, echogenic mass on the aortic valve.  TTE notable for changes at the aortic valve which could represent focal calcification, fibroblastoma or vegetation. ID has lower suspicion for true endocarditis    Plan:  Follow up MICHAEL  ID will determine need for further antibiotics based on MICHAEL results.  Patient states that due to her age and colitis flares with antibiotics use, she is unsure if she would be agreeable to additional antibiotic treatment  Ulcerative colitis with complication (HCC)  Patient reports abdominal pain, nausea and stool incontinence, colitis flare up in the setting of antibiotic use.   Patient reports Pepto-Bismol helps with her symptoms    Plan:  Continue home mesalamine 800 mg twice daily  Continue Pepto-Bismol as needed  Continue saccharomyces boulardii (Florastor) BID  Fibromyalgia  Will continue methylprednisolone 2 mg daily in the morning as patient   Follow-up with PCP outpatient    Plan:  Continue methylprednisolone 2 mg daily   Hypertension  Confirmed with patient that she takes 50 mg Toprol-XL daily not 100 mg daily as reported in the chart  Initially was hypertensive when she arrived, no associated symptoms such as flash pulm edema, headache, or visual disturbance  Pt had episode of hypertension to 192/88 overnight which improved after IV hydralazine    Plan:  Continue home metoprolol succinate 50 mg daily  IV hydralazine prn  Continue to trend pressures closely  Gastroesophageal reflux  Continue pantoprazole 40 mg early morning     VTE Pharmacologic Prophylaxis: VTE Score: 5 High Risk (Score >/= 5) - Pharmacological DVT Prophylaxis Ordered: enoxaparin (Lovenox). Sequential Compression Devices Ordered.    Mobility:   Basic Mobility Inpatient Raw Score: 13  -[facility] Goal: 4: Move to chair/commode  -[facility] Achieved: 4: Move to  chair/commode  -[facility] Goal achieved. Continue to encourage appropriate mobility.    Patient Centered Rounds: I performed bedside rounds with nursing staff today.   Discussions with Specialists or Other Care Team Provider: Infectious Disease    Education and Discussions with Family / Patient: Updated  (son) via phone.    Current Length of Stay: 6 day(s)  Current Patient Status: Inpatient   Certification Statement: The patient will continue to require additional inpatient hospital stay due to awaiting MICHAEL anticipated for 12/30. Will determine need for further antibiotics based on results.  Discharge Plan: Anticipate discharge in 24-48 hrs to home.    Code Status: Level 2 - DNAR: but accepts endotracheal intubation    Subjective   No acute overnight events.  Saw and examined patient at bedside.  Patient states that she continues to have loose stools.  Her last episode of diarrhea this was this morning.  Patient denies facial swelling or pain.  She also denies chills, abdominal pain, chest pain or shortness of breath.    Objective :  Temp:  [98 °F (36.7 °C)-98.5 °F (36.9 °C)] 98.5 °F (36.9 °C)  HR:  [72-76] 76  BP: (145-192)/(55-89) 145/55  Resp:  [18] 18  SpO2:  [96 %-97 %] 96 %  O2 Device: None (Room air)    Body mass index is 23.23 kg/m².     Input and Output Summary (last 24 hours):     Intake/Output Summary (Last 24 hours) at 12/28/2024 0901  Last data filed at 12/27/2024 2221  Gross per 24 hour   Intake 660 ml   Output 400 ml   Net 260 ml       Physical Exam  HENT:      Head: Normocephalic.      Comments: No tenderness to palpation of left submandibular region  Improving erythema in the left submandibular region          Mouth/Throat:      Pharynx: Oropharynx is clear.   Eyes:      Conjunctiva/sclera: Conjunctivae normal.   Cardiovascular:      Rate and Rhythm: Normal rate and regular rhythm.      Pulses: Normal pulses.      Heart sounds: Normal heart sounds.   Pulmonary:      Effort: Pulmonary  effort is normal. No respiratory distress.      Breath sounds: Normal breath sounds. No wheezing.   Abdominal:      General: Abdomen is flat. Bowel sounds are normal. There is no distension.      Tenderness: There is no abdominal tenderness. There is no guarding or rebound.   Musculoskeletal:         General: No swelling or tenderness. Normal range of motion.      Cervical back: Normal range of motion.      Right lower leg: No edema.      Left lower leg: No edema.   Skin:     General: Skin is warm.   Neurological:      Mental Status: She is alert.         Lines/Drains:              Lab Results: I have reviewed the following results:   Results from last 7 days   Lab Units 12/28/24  0554   WBC Thousand/uL 9.83   HEMOGLOBIN g/dL 11.8   HEMATOCRIT % 34.4*   PLATELETS Thousands/uL 394*   SEGS PCT % 48   LYMPHO PCT % 40   MONO PCT % 9   EOS PCT % 2     Results from last 7 days   Lab Units 12/28/24  0554 12/23/24  0507 12/22/24  1102   SODIUM mmol/L 140   < > 137   POTASSIUM mmol/L 3.2*   < > 3.3*   CHLORIDE mmol/L 109*   < > 100   CO2 mmol/L 21   < > 27   BUN mg/dL 8   < > 14   CREATININE mg/dL 0.37*   < > 0.44*   ANION GAP mmol/L 10   < > 10   CALCIUM mg/dL 8.5   < > 9.6   ALBUMIN g/dL  --   --  4.2   TOTAL BILIRUBIN mg/dL  --   --  1.02*   ALK PHOS U/L  --   --  70   ALT U/L  --   --  9   AST U/L  --   --  14   GLUCOSE RANDOM mg/dL 87   < > 115    < > = values in this interval not displayed.     Results from last 7 days   Lab Units 12/22/24  1102   INR  0.97             Results from last 7 days   Lab Units 12/23/24  0507 12/22/24  1102   LACTIC ACID mmol/L  --  1.6   PROCALCITONIN ng/ml 0.19 <0.05       Recent Cultures (last 7 days):   Results from last 7 days   Lab Units 12/27/24  0401 12/23/24  1616 12/22/24  1102   BLOOD CULTURE  Received in Microbiology Lab. Culture in Progress.  Received in Microbiology Lab. Culture in Progress. No Growth After 4 Days.  No Growth After 4 Days. No Growth After 5 Days.   Methicillin Resistant Staphylococcus aureus*   GRAM STAIN RESULT   --   --  Gram positive cocci in clusters*       Imaging Results Review: I reviewed radiology reports from this admission including: CT soft tissue neck with contrast.  CT soft tissue neck with contrast   Final Result by Gurpreet Hurley MD (12/22 1223)         1. Acute left parotitis secondary to a 2 mm obstructing parotid duct sialolith (series 301, image 28). Reactive secondary left side facial cellulitis and left masseter myositis noted. No discrete collection. Further clinical assessment advised.            Workstation performed: [id number]            Other Study Results Review: No additional pertinent studies reviewed.    Last 24 Hours Medication List:     Current Facility-Administered Medications:     acetaminophen (TYLENOL) tablet 975 mg, Q8H WILLA    amoxicillin-clavulanate (AUGMENTIN) 875-125 mg per tablet 1 tablet, Q12H WILLA    aspirin (ECOTRIN LOW STRENGTH) EC tablet 81 mg, Daily    bimatoprost (LUMIGAN) 0.03 % ophthalmic drops 1 drop, Daily    bismuth subsalicylate (PEPTO BISMOL) oral suspension 524 mg, Q6H PRN    Cholecalciferol (VITAMIN D3) tablet 5,000 Units, Daily    enoxaparin (LOVENOX) subcutaneous injection 40 mg, Q24H WILLA    hydrALAZINE (APRESOLINE) injection 10 mg, Q6H PRN    ibuprofen (MOTRIN) tablet 400 mg, Q6H PRN    mesalamine (DELZICOL) delayed release capsule 800 mg, BID    methylprednisolone (MEDROL) tablet 2 mg, Daily With Breakfast    metoprolol succinate (TOPROL-XL) 24 hr tablet 50 mg, Daily    ondansetron (ZOFRAN) injection 4 mg, Q6H PRN    pantoprazole (PROTONIX) EC tablet 40 mg, Early Morning    potassium chloride (Klor-Con M20) CR tablet 40 mEq, Once    saccharomyces boulardii (FLORASTOR) capsule 250 mg, BID    Administrative Statements   Today, Patient Was Seen By: Elaine Zhao MD  I have spent a total time of 30 minutes in caring for this patient on the day of the visit/encounter including Impressions,  Counseling / Coordination of care, Documenting in the medical record, Reviewing / ordering tests, medicine, procedures  , Obtaining or reviewing history  , and Communicating with other healthcare professionals .    **Please Note: This note may have been constructed using a voice recognition system.**

## 2024-12-28 NOTE — CASE MANAGEMENT
Case Management Progress Note    Patient name Helen M Schwab  Location S /S -01 MRN [id number]  : 1932 Date 2024       LOS (days): 6  Geometric Mean LOS (GMLOS) (days): 2.8  Days to GMLOS:-3.3        OBJECTIVE:        Current admission status: Inpatient  Preferred Pharmacy:   [facility]/pharmacy #[id number] - [place], [place] - [address]  [address] [address]  Phone: [phone] Fax: [phone]    Primary Care Provider: Shant Lucas MD    Primary Insurance: MEDICARE  Secondary Insurance: [facility] [employer] PROGRAM    PROGRESS NOTE:      Cm confirmed with patient that [facility] is able to work with her. She would like  to reserve. CM reserved in [place].

## 2024-12-29 LAB
ANION GAP SERPL CALCULATED.3IONS-SCNC: 3 MMOL/L (ref 4–13)
BACTERIA BLD CULT: NORMAL
BACTERIA BLD CULT: NORMAL
BASOPHILS # BLD AUTO: 0.02 THOUSANDS/ΜL (ref 0–0.1)
BASOPHILS NFR BLD AUTO: 0 % (ref 0–1)
BUN SERPL-MCNC: 11 MG/DL (ref 5–25)
CALCIUM SERPL-MCNC: 8.6 MG/DL (ref 8.4–10.2)
CHLORIDE SERPL-SCNC: 111 MMOL/L (ref 96–108)
CO2 SERPL-SCNC: 22 MMOL/L (ref 21–32)
CREAT SERPL-MCNC: 0.4 MG/DL (ref 0.6–1.3)
EOSINOPHIL # BLD AUTO: 0.24 THOUSAND/ΜL (ref 0–0.61)
EOSINOPHIL NFR BLD AUTO: 2 % (ref 0–6)
ERYTHROCYTE [DISTWIDTH] IN BLOOD BY AUTOMATED COUNT: 15.4 % (ref 11.6–15.1)
GFR SERPL CREATININE-BSD FRML MDRD: 90 ML/MIN/1.73SQ M
GLUCOSE SERPL-MCNC: 89 MG/DL (ref 65–140)
HCT VFR BLD AUTO: 32.8 % (ref 34.8–46.1)
HGB BLD-MCNC: 11.4 G/DL (ref 11.5–15.4)
IMM GRANULOCYTES # BLD AUTO: 0.09 THOUSAND/UL (ref 0–0.2)
IMM GRANULOCYTES NFR BLD AUTO: 1 % (ref 0–2)
LYMPHOCYTES # BLD AUTO: 4.33 THOUSANDS/ΜL (ref 0.6–4.47)
LYMPHOCYTES NFR BLD AUTO: 44 % (ref 14–44)
MCH RBC QN AUTO: 32.9 PG (ref 26.8–34.3)
MCHC RBC AUTO-ENTMCNC: 34.8 G/DL (ref 31.4–37.4)
MCV RBC AUTO: 95 FL (ref 82–98)
MONOCYTES # BLD AUTO: 1.16 THOUSAND/ΜL (ref 0.17–1.22)
MONOCYTES NFR BLD AUTO: 12 % (ref 4–12)
NEUTROPHILS # BLD AUTO: 3.97 THOUSANDS/ΜL (ref 1.85–7.62)
NEUTS SEG NFR BLD AUTO: 41 % (ref 43–75)
NRBC BLD AUTO-RTO: 0 /100 WBCS
PLATELET # BLD AUTO: 413 THOUSANDS/UL (ref 149–390)
PMV BLD AUTO: 10.6 FL (ref 8.9–12.7)
POTASSIUM SERPL-SCNC: 4.3 MMOL/L (ref 3.5–5.3)
RBC # BLD AUTO: 3.46 MILLION/UL (ref 3.81–5.12)
SODIUM SERPL-SCNC: 136 MMOL/L (ref 135–147)
WBC # BLD AUTO: 9.81 THOUSAND/UL (ref 4.31–10.16)

## 2024-12-29 PROCEDURE — 80048 BASIC METABOLIC PNL TOTAL CA: CPT

## 2024-12-29 PROCEDURE — 99232 SBSQ HOSP IP/OBS MODERATE 35: CPT | Performed by: HOSPITALIST

## 2024-12-29 PROCEDURE — 85025 COMPLETE CBC W/AUTO DIFF WBC: CPT

## 2024-12-29 RX ADMIN — Medication 250 MG: at 17:32

## 2024-12-29 RX ADMIN — Medication 250 MG: at 09:44

## 2024-12-29 RX ADMIN — ACETAMINOPHEN 975 MG: 325 TABLET, FILM COATED ORAL at 17:31

## 2024-12-29 RX ADMIN — METHYLPREDNISOLONE 2 MG: 4 TABLET ORAL at 09:38

## 2024-12-29 RX ADMIN — BIMATOPROST 1 DROP: 0.3 SOLUTION/ DROPS OPHTHALMIC at 09:38

## 2024-12-29 RX ADMIN — ACETAMINOPHEN 975 MG: 325 TABLET, FILM COATED ORAL at 05:37

## 2024-12-29 RX ADMIN — ASPIRIN 81 MG: 81 TABLET, COATED ORAL at 09:38

## 2024-12-29 RX ADMIN — HYDRALAZINE HYDROCHLORIDE 10 MG: 20 INJECTION INTRAMUSCULAR; INTRAVENOUS at 23:08

## 2024-12-29 RX ADMIN — PANTOPRAZOLE SODIUM 40 MG: 40 TABLET, DELAYED RELEASE ORAL at 05:37

## 2024-12-29 RX ADMIN — METOPROLOL SUCCINATE 50 MG: 50 TABLET, EXTENDED RELEASE ORAL at 09:38

## 2024-12-29 NOTE — PROGRESS NOTES
Progress Note - Hospitalist   Name: Helen M Schwab 89 y.o. female I MRN: [id number]  Unit/Bed#: S -01 I Date of Admission: 12/22/2024   Date of Service: 12/29/2024 I Hospital Day: 7    Assessment & Plan  Parotiditis  Complicated by facial cellulitis and left masseter myositis. Afebrile, no signs of systemic illness. Lactic acid and procal within normal range   Presenting with 3 days history worsening facial swelling, erythema, and firmness and associated trismus  CT soft tissue neck showing acute left parotitis secondary to a 2 mm obstructing parotid duct sialolith. Reactive secondary left side facial cellulitis and left masseter myositis noted. No discrete collection. Normal subglottic airway  Patient protecting and maintaining her airway at this time.  She is saturating in the upper 90s on room air.  She reports improvement in trismus and denies secretions.  Transitioned from Unasyn to Augmentin for 7 day antibiotics course  Blood culture 1 out of 2 showing Gram + cocci. Repeat blood cultures show no growth at 4 days.   Echo shows LVEF 65%, diastolic function Grade II relaxation. Aortic valve shows 7.0 mm x 6.0 mm small, sessile, echogenic mass on the aortic valve  Third set of blood cultures show no growth at 48 hours    Plans  Completed 7 day antibiotics course (IV Unasyn -> oral Augmentin Q12H). ID is on board  Ibuprofen 400 mg Q6H PRN  Continue diet and oral hydration as tolerated   MICHAEL planned for Monday 12/30  Continue to follow third set of blood cultures from 12/27/24  Per ENT - warm compress, sialogogus (sour candies, lemon, citrus, vinegar)   ENT OP follow up in 2-4 weeks  Positive blood culture  Blood culture 1 out of 2 showing Gram + cocci. Repeat blood cultures show no growth at 24 hours.   Infectious diseases overall suspicion is that blood culture represents a contaminant    Plan:  Per ID, would not start antibiotics for this issue. Completed last dose of Augmentin for parotiditis  12/28  Follow up 3rd set of blood cultures from 12/27/24  Abnormal echocardiogram  Echo shows LVEF 65%, diastolic function Grade II relaxation. Aortic valve shows 7.0 mm x 6.0 mm small, sessile, echogenic mass on the aortic valve.  TTE notable for changes at the aortic valve which could represent focal calcification, fibroblastoma or vegetation. ID has lower suspicion for true endocarditis    Plan:  Follow up MICHAEL  ID will determine need for further antibiotics based on MICHAEL results.  Patient states that due to her age and colitis flares with antibiotics use, she is unsure if she would be agreeable to additional antibiotic treatment  Ulcerative colitis with complication (HCC)  Patient reports abdominal pain, nausea and stool incontinence, colitis flare up in the setting of antibiotic use.   Patient reports Pepto-Bismol helps with her symptoms    Plan:  Continue home mesalamine 800 mg twice daily  Continue Pepto-Bismol as needed  Continue saccharomyces boulardii (Florastor) BID  Fibromyalgia  Will continue methylprednisolone 2 mg daily in the morning as patient   Follow-up with PCP outpatient    Plan:  Continue methylprednisolone 2 mg daily   Hypertension  Confirmed with patient that she takes 50 mg Toprol-XL daily not 100 mg daily as reported in the chart  Patient also takes home clonidine 0.1 mg daily as needed for high blood pressure.  Patient takes blood pressure nightly at home and states it has been well-controlled for the past month  Initially was hypertensive when she arrived, no associated symptoms such as flash pulm edema, headache, or visual disturbance  Patient's had elevated blood pressure readings to systolic 180's-190's since admission    Plan:  Continue home metoprolol succinate 50 mg daily  IV hydralazine prn  Continue to trend pressures closely  Gastroesophageal reflux  Continue pantoprazole 40 mg early morning     VTE Pharmacologic Prophylaxis: VTE Score: 5 High Risk (Score >/= 5) - Pharmacological  DVT Prophylaxis Ordered: enoxaparin (Lovenox). Sequential Compression Devices Ordered.    Mobility:   Basic Mobility Inpatient Raw Score: 13  JH-HLM Goal: 4: Move to chair/commode  JH-HLM Achieved: 5: Stand (1 or more minutes)  JH-HLM Goal achieved. Continue to encourage appropriate mobility.    Patient Centered Rounds: I performed bedside rounds with nursing staff today.   Discussions with Specialists or Other Care Team Provider:     Education and Discussions with Family / Patient: Patient declined call to .     Current Length of Stay: 7 day(s)  Current Patient Status: Inpatient   Certification Statement: The patient will continue to require additional inpatient hospital stay due to awaiting MICHAEL anticipated for 12/30. Will determine need for further antibiotics based on results  Discharge Plan: Anticipate discharge in 24-48 hrs to home.    Code Status: Level 2 - DNAR: but accepts endotracheal intubation    Subjective   No acute overnight events. Saw and examined pt at bedside this morning. Pt states that she continues to have non-bloody loose stools. Her last episode of diarrhea was last night. Pt has mild left-sided submandibular pain. She denies shortness of breath, trouble swallowing, chills, or chest pain.    Objective :  Temp:  [97.5 °F (36.4 °C)-97.9 °F (36.6 °C)] 97.9 °F (36.6 °C)  HR:  [73-77] 77  BP: (153-184)/(66-81) 184/78  Resp:  [18] 18  SpO2:  [95 %-96 %] 96 %    Body mass index is 23.23 kg/m².     Input and Output Summary (last 24 hours):   No intake or output data in the 24 hours ending 12/29/24 0821    Physical Exam  HENT:      Head: Normocephalic.      Comments: No tenderness to palpation of left submandibular region  Improving erythema in the left submandibular region          Mouth/Throat:      Pharynx: Oropharynx is clear.   Eyes:      Conjunctiva/sclera: Conjunctivae normal.   Cardiovascular:      Rate and Rhythm: Normal rate and regular rhythm.      Pulses: Normal pulses.       Heart sounds: Normal heart sounds.   Pulmonary:      Effort: Pulmonary effort is normal. No respiratory distress.      Breath sounds: Normal breath sounds. No wheezing.   Abdominal:      General: Abdomen is flat. Bowel sounds are normal. There is no distension.      Tenderness: There is no abdominal tenderness. There is no guarding or rebound.   Musculoskeletal:         General: No swelling or tenderness. Normal range of motion.      Cervical back: Normal range of motion.      Right lower leg: No edema.      Left lower leg: No edema.   Skin:     General: Skin is warm.   Neurological:      Mental Status: She is alert.         Lines/Drains:              Lab Results: I have reviewed the following results:   Results from last 7 days   Lab Units 12/29/24  0528   WBC Thousand/uL 9.81   HEMOGLOBIN g/dL 11.4*   HEMATOCRIT % 32.8*   PLATELETS Thousands/uL 413*   SEGS PCT % 41*   LYMPHO PCT % 44   MONO PCT % 12   EOS PCT % 2     Results from last 7 days   Lab Units 12/29/24  0528 12/23/24  0507 12/22/24  1102   SODIUM mmol/L 136   < > 137   POTASSIUM mmol/L 4.3   < > 3.3*   CHLORIDE mmol/L 111*   < > 100   CO2 mmol/L 22   < > 27   BUN mg/dL 11   < > 14   CREATININE mg/dL 0.40*   < > 0.44*   ANION GAP mmol/L 3*   < > 10   CALCIUM mg/dL 8.6   < > 9.6   ALBUMIN g/dL  --   --  4.2   TOTAL BILIRUBIN mg/dL  --   --  1.02*   ALK PHOS U/L  --   --  70   ALT U/L  --   --  9   AST U/L  --   --  14   GLUCOSE RANDOM mg/dL 89   < > 115    < > = values in this interval not displayed.     Results from last 7 days   Lab Units 12/22/24  1102   INR  0.97             Results from last 7 days   Lab Units 12/23/24  0507 12/22/24  1102   LACTIC ACID mmol/L  --  1.6   PROCALCITONIN ng/ml 0.19 <0.05       Recent Cultures (last 7 days):   Results from last 7 days   Lab Units 12/27/24  0401 12/23/24  1616 12/22/24  1102   BLOOD CULTURE  No Growth at 24 hrs.  No Growth at 24 hrs. No Growth After 5 Days.  No Growth After 5 Days. No Growth After 5  Days.  Methicillin Resistant Staphylococcus aureus*   GRAM STAIN RESULT   --   --  Gram positive cocci in clusters*       Imaging Results Review: I reviewed radiology reports from this admission including: CT soft tissue neck with contrast.  CT soft tissue neck with contrast   Final Result by Gurpreet Hurley MD (12/22 0342)         1. Acute left parotitis secondary to a 2 mm obstructing parotid duct sialolith (series 301, image 28). Reactive secondary left side facial cellulitis and left masseter myositis noted. No discrete collection. Further clinical assessment advised.            Workstation performed: [id number]            Other Study Results Review: No additional pertinent studies reviewed.    Last 24 Hours Medication List:     Current Facility-Administered Medications:     acetaminophen (TYLENOL) tablet 975 mg, Q8H WILLA    aspirin (ECOTRIN LOW STRENGTH) EC tablet 81 mg, Daily    bimatoprost (LUMIGAN) 0.03 % ophthalmic drops 1 drop, Daily    bismuth subsalicylate (PEPTO BISMOL) oral suspension 524 mg, Q6H PRN    Cholecalciferol (VITAMIN D3) tablet 5,000 Units, Daily    enoxaparin (LOVENOX) subcutaneous injection 40 mg, Q24H WILLA    hydrALAZINE (APRESOLINE) injection 10 mg, Q6H PRN    ibuprofen (MOTRIN) tablet 400 mg, Q6H PRN    mesalamine (DELZICOL) delayed release capsule 800 mg, BID    methylprednisolone (MEDROL) tablet 2 mg, Daily With Breakfast    metoprolol succinate (TOPROL-XL) 24 hr tablet 50 mg, Daily    ondansetron (ZOFRAN) injection 4 mg, Q6H PRN    pantoprazole (PROTONIX) EC tablet 40 mg, Early Morning    saccharomyces boulardii (FLORASTOR) capsule 250 mg, BID    Administrative Statements   Today, Patient Was Seen By: Elaine Zhao MD  I have spent a total time of 30 minutes in caring for this patient on the day of the visit/encounter including Impressions, Counseling / Coordination of care, Documenting in the medical record, Reviewing / ordering tests, medicine, procedures  , Obtaining or  reviewing history  , and Communicating with other healthcare professionals .    **Please Note: This note may have been constructed using a voice recognition system.**

## 2024-12-29 NOTE — ASSESSMENT & PLAN NOTE
Blood culture 1 out of 2 showing Gram + cocci. Repeat blood cultures show no growth at 24 hours.   Infectious diseases overall suspicion is that blood culture represents a contaminant    Plan:  Per ID, would not start antibiotics for this issue. Completed last dose of Augmentin for parotiditis 12/28  Follow up 3rd set of blood cultures from 12/27/24

## 2024-12-29 NOTE — ASSESSMENT & PLAN NOTE
Confirmed with patient that she takes 50 mg Toprol-XL daily not 100 mg daily as reported in the chart  Patient also takes home clonidine 0.1 mg daily as needed for high blood pressure.  Patient takes blood pressure nightly at home and states it has been well-controlled for the past month  Initially was hypertensive when she arrived, no associated symptoms such as flash pulm edema, headache, or visual disturbance  Patient's had elevated blood pressure readings to systolic 180's-190's since admission    Plan:  Continue home metoprolol succinate 50 mg daily  IV hydralazine prn  Continue to trend pressures closely

## 2024-12-29 NOTE — PLAN OF CARE
Problem: Potential for Falls  Goal: Patient will remain free of falls  Description: INTERVENTIONS:  - Educate patient/family on patient safety including physical limitations  - Instruct patient to call for assistance with activity   - Consult OT/PT to assist with strengthening/mobility   - Keep Call bell within reach  - Keep bed low and locked with side rails adjusted as appropriate  - Keep care items and personal belongings within reach  - Initiate and maintain comfort rounds  - Make Fall Risk Sign visible to staff  - Offer Toileting every 2 Hours, in advance of need  - Initiate/Maintain bed alarm  - Obtain necessary fall risk management equipment: alarm  - Apply yellow socks and bracelet for high fall risk patients  - Consider moving patient to room near nurses station  Outcome: Progressing     Problem: PAIN - ADULT  Goal: Verbalizes/displays adequate comfort level or baseline comfort level  Description: Interventions:  - Encourage patient to monitor pain and request assistance  - Assess pain using appropriate pain scale  - Administer analgesics based on type and severity of pain and evaluate response  - Implement non-pharmacological measures as appropriate and evaluate response  - Consider cultural and social influences on pain and pain management  - Notify physician/advanced practitioner if interventions unsuccessful or patient reports new pain  Outcome: Progressing     Problem: INFECTION - ADULT  Goal: Absence or prevention of progression during hospitalization  Description: INTERVENTIONS:  - Assess and monitor for signs and symptoms of infection  - Monitor lab/diagnostic results  - Monitor all insertion sites, i.e. indwelling lines, tubes, and drains  - Monitor endotracheal if appropriate and nasal secretions for changes in amount and color  - [place] appropriate cooling/warming therapies per order  - Administer medications as ordered  - Instruct and encourage patient and family to use good hand hygiene  technique  - Identify and instruct in appropriate isolation precautions for identified infection/condition  Outcome: Progressing  Goal: Absence of fever/infection during neutropenic period  Description: INTERVENTIONS:  - Monitor WBC    Outcome: Progressing     Problem: SAFETY ADULT  Goal: Patient will remain free of falls  Description: INTERVENTIONS:  - Educate patient/family on patient safety including physical limitations  - Instruct patient to call for assistance with activity   - Consult OT/PT to assist with strengthening/mobility   - Keep Call bell within reach  - Keep bed low and locked with side rails adjusted as appropriate  - Keep care items and personal belongings within reach  - Initiate and maintain comfort rounds  - Make Fall Risk Sign visible to staff  - Offer Toileting every 2 Hours, in advance of need  - Initiate/Maintain bed alarm  - Obtain necessary fall risk management equipment: alarm  - Apply yellow socks and bracelet for high fall risk patients  - Consider moving patient to room near nurses station  Outcome: Progressing  Goal: Maintain or return to baseline ADL function  Description: INTERVENTIONS:  -  Assess patient's ability to carry out ADLs; assess patient's baseline for ADL function and identify physical deficits which impact ability to perform ADLs (bathing, care of mouth/teeth, toileting, grooming, dressing, etc.)  - Assess/evaluate cause of self-care deficits   - Assess range of motion  - Assess patient's mobility; develop plan if impaired  - Assess patient's need for assistive devices and provide as appropriate  - Encourage maximum independence but intervene and supervise when necessary  - Involve family in performance of ADLs  - Assess for home care needs following discharge   - Consider OT consult to assist with ADL evaluation and planning for discharge  - Provide patient education as appropriate  Outcome: Progressing  Goal: Maintains/Returns to pre admission functional  level  Description: INTERVENTIONS:  - Perform AM-PAC 6 Click Basic Mobility/ Daily Activity assessment daily.  - Set and communicate daily mobility goal to care team and patient/family/caregiver.   - Collaborate with rehabilitation services on mobility goals if consulted  - Perform Range of Motion 2 times a day.  - Reposition patient every 2 hours.  - Dangle patient 2 times a day  - Stand patient 2 times a day  - Ambulate patient 2 times a day  - Out of bed to chair 2 times a day   - Out of bed for meals 2 times a day  - Out of bed for toileting  - Record patient progress and toleration of activity level   Outcome: Progressing     Problem: Nutrition/Hydration-ADULT  Goal: Nutrient/Hydration intake appropriate for improving, restoring or maintaining nutritional needs  Description: Monitor and assess patient's nutrition/hydration status for malnutrition. Collaborate with interdisciplinary team and initiate plan and interventions as ordered.  Monitor patient's weight and dietary intake as ordered or per policy. Utilize nutrition screening tool and intervene as necessary. Determine patient's food preferences and provide high-protein, high-caloric foods as appropriate.     INTERVENTIONS:  - Monitor oral intake, urinary output, labs, and treatment plans  - Assess nutrition and hydration status and recommend course of action  - Evaluate amount of meals eaten  - Assist patient with eating if necessary   - Allow adequate time for meals  - Recommend/ encourage appropriate diets, oral nutritional supplements, and vitamin/mineral supplements  - Order, calculate, and assess calorie counts as needed  - Recommend, monitor, and adjust tube feedings and TPN/PPN based on assessed needs  - Assess need for intravenous fluids  - Provide specific nutrition/hydration education as appropriate  - Include patient/family/caregiver in decisions related to nutrition  Outcome: Progressing     Problem: Prexisting or High Potential for Compromised  Skin Integrity  Goal: Skin integrity is maintained or improved  Description: INTERVENTIONS:  - Identify patients at risk for skin breakdown  - Assess and monitor skin integrity  - Assess and monitor nutrition and hydration status  - Monitor labs   - Assess for incontinence   - Turn and reposition patient  - Assist with mobility/ambulation  - Relieve pressure over bony prominences  - Avoid friction and shearing  - Provide appropriate hygiene as needed including keeping skin clean and dry  - Evaluate need for skin moisturizer/barrier cream  - Collaborate with interdisciplinary team   - Patient/family teaching  - Consider wound care consult   Outcome: Progressing     Problem: RESPIRATORY - ADULT  Goal: Achieves optimal ventilation and oxygenation  Description: INTERVENTIONS:  - Assess for changes in respiratory status  - Assess for changes in mentation and behavior  - Position to facilitate oxygenation and minimize respiratory effort  - Oxygen administered by appropriate delivery if ordered  - Initiate smoking cessation education as indicated  - Encourage broncho-pulmonary hygiene including cough, deep breathe, Incentive Spirometry  - Assess the need for suctioning and aspirate as needed  - Assess and instruct to report SOB or any respiratory difficulty  - Respiratory Therapy support as indicated  Outcome: Progressing     Problem: GASTROINTESTINAL - ADULT  Goal: Minimal or absence of nausea and/or vomiting  Description: INTERVENTIONS:  - Administer IV fluids if ordered to ensure adequate hydration  - Maintain NPO status until nausea and vomiting are resolved  - Nasogastric tube if ordered  - Administer ordered antiemetic medications as needed  - Provide nonpharmacologic comfort measures as appropriate  - Advance diet as tolerated, if ordered  - Consider nutrition services referral to assist patient with adequate nutrition and appropriate food choices  Outcome: Progressing  Goal: Maintains or returns to baseline bowel  function  Description: INTERVENTIONS:  - Assess bowel function  - Encourage oral fluids to ensure adequate hydration  - Administer IV fluids if ordered to ensure adequate hydration  - Administer ordered medications as needed  - Encourage mobilization and activity  - Consider nutritional services referral to assist patient with adequate nutrition and appropriate food choices  Outcome: Progressing  Goal: Maintains adequate nutritional intake  Description: INTERVENTIONS:  - Monitor percentage of each meal consumed  - Identify factors contributing to decreased intake, treat as appropriate  - Assist with meals as needed  - Monitor I&O, weight, and lab values if indicated  - Obtain nutrition services referral as needed  Outcome: Progressing  Goal: Establish and maintain optimal ostomy function  Description: INTERVENTIONS:  - Assess bowel function  - Encourage oral fluids to ensure adequate hydration  - Administer IV fluids if ordered to ensure adequate hydration   - Administer ordered medications as needed  - Encourage mobilization and activity  - Nutrition services referral to assist patient with appropriate food choices  - Assess stoma site  - Consider wound care consult   Outcome: Progressing  Goal: Oral mucous membranes remain intact  Description: INTERVENTIONS  - Assess oral mucosa and hygiene practices  - Implement preventative oral hygiene regimen  - Implement oral medicated treatments as ordered  - Initiate Nutrition services referral as needed  Outcome: Progressing

## 2024-12-29 NOTE — ASSESSMENT & PLAN NOTE
Complicated by facial cellulitis and left masseter myositis. Afebrile, no signs of systemic illness. Lactic acid and procal within normal range   Presenting with 3 days history worsening facial swelling, erythema, and firmness and associated trismus  CT soft tissue neck showing acute left parotitis secondary to a 2 mm obstructing parotid duct sialolith. Reactive secondary left side facial cellulitis and left masseter myositis noted. No discrete collection. Normal subglottic airway  Patient protecting and maintaining her airway at this time.  She is saturating in the upper 90s on room air.  She reports improvement in trismus and denies secretions.  Transitioned from Unasyn to Augmentin for 7 day antibiotics course  Blood culture 1 out of 2 showing Gram + cocci. Repeat blood cultures show no growth at 4 days.   Echo shows LVEF 65%, diastolic function Grade II relaxation. Aortic valve shows 7.0 mm x 6.0 mm small, sessile, echogenic mass on the aortic valve  Third set of blood cultures show no growth at 48 hours    Plans  Completed 7 day antibiotics course (IV Unasyn -> oral Augmentin Q12H). ID is on board  Ibuprofen 400 mg Q6H PRN  Continue diet and oral hydration as tolerated   MICHAEL planned for Monday 12/30  Continue to follow third set of blood cultures from 12/27/24  Per ENT - warm compress, sialogogus (sour candies, lemon, citrus, vinegar)   ENT OP follow up in 2-4 weeks

## 2024-12-30 LAB
ANION GAP SERPL CALCULATED.3IONS-SCNC: 7 MMOL/L (ref 4–13)
BUN SERPL-MCNC: 12 MG/DL (ref 5–25)
CALCIUM SERPL-MCNC: 8.8 MG/DL (ref 8.4–10.2)
CHLORIDE SERPL-SCNC: 107 MMOL/L (ref 96–108)
CO2 SERPL-SCNC: 26 MMOL/L (ref 21–32)
CREAT SERPL-MCNC: 0.4 MG/DL (ref 0.6–1.3)
GFR SERPL CREATININE-BSD FRML MDRD: 90 ML/MIN/1.73SQ M
GLUCOSE SERPL-MCNC: 92 MG/DL (ref 65–140)
POTASSIUM SERPL-SCNC: 3.6 MMOL/L (ref 3.5–5.3)
SODIUM SERPL-SCNC: 140 MMOL/L (ref 135–147)

## 2024-12-30 PROCEDURE — 99232 SBSQ HOSP IP/OBS MODERATE 35: CPT | Performed by: HOSPITALIST

## 2024-12-30 PROCEDURE — 97167 OT EVAL HIGH COMPLEX 60 MIN: CPT

## 2024-12-30 PROCEDURE — 99232 SBSQ HOSP IP/OBS MODERATE 35: CPT | Performed by: INTERNAL MEDICINE

## 2024-12-30 PROCEDURE — 92526 ORAL FUNCTION THERAPY: CPT

## 2024-12-30 PROCEDURE — 80048 BASIC METABOLIC PNL TOTAL CA: CPT

## 2024-12-30 RX ORDER — METHYLPREDNISOLONE 4 MG/1
2 TABLET ORAL
Status: DISCONTINUED | OUTPATIENT
Start: 2024-12-31 | End: 2024-12-30

## 2024-12-30 RX ADMIN — Medication 524 MG: at 23:52

## 2024-12-30 RX ADMIN — Medication 250 MG: at 17:59

## 2024-12-30 RX ADMIN — METOPROLOL SUCCINATE 50 MG: 50 TABLET, EXTENDED RELEASE ORAL at 10:32

## 2024-12-30 RX ADMIN — Medication 5000 UNITS: at 10:32

## 2024-12-30 RX ADMIN — ASPIRIN 81 MG: 81 TABLET, COATED ORAL at 10:32

## 2024-12-30 RX ADMIN — METHYLPREDNISOLONE 2 MG: 4 TABLET ORAL at 10:32

## 2024-12-30 RX ADMIN — BIMATOPROST 1 DROP: 0.3 SOLUTION/ DROPS OPHTHALMIC at 10:33

## 2024-12-30 RX ADMIN — MESALAMINE 800 MG: 400 CAPSULE, DELAYED RELEASE ORAL at 21:55

## 2024-12-30 RX ADMIN — Medication 250 MG: at 10:32

## 2024-12-30 RX ADMIN — ACETAMINOPHEN 975 MG: 325 TABLET, FILM COATED ORAL at 21:55

## 2024-12-30 RX ADMIN — ACETAMINOPHEN 975 MG: 325 TABLET, FILM COATED ORAL at 15:14

## 2024-12-30 NOTE — ASSESSMENT & PLAN NOTE
Complicated by facial cellulitis and left masseter myositis. Afebrile, no signs of systemic illness. Lactic acid and procal within normal range   Presenting with 3 days history worsening facial swelling, erythema, and firmness and associated trismus  CT soft tissue neck showing acute left parotitis secondary to a 2 mm obstructing parotid duct sialolith. Reactive secondary left side facial cellulitis and left masseter myositis noted. No discrete collection. Normal subglottic airway  Patient protecting and maintaining her airway at this time.  She is saturating in the upper 90s on room air.  She reports improvement in trismus and denies secretions.  Transitioned from Unasyn to Augmentin for 7 day antibiotics course  Complicated by Blood culture 1 out of 2 showing Gram + cocci. Repeat blood cultures show no growth at 4 days.   Echo shows LVEF 65%, diastolic function Grade II relaxation. Aortic valve shows 7.0 mm x 6.0 mm small, sessile, echogenic mass on the aortic valve  Third set of blood cultures show no growth at 72 hours. Pt afebrile and CBC stable.  Pt had scheduled MICHAEL for 12/31. Cardiology reviewed CT scans with left upper pharynx internal swelling. Pt reported mild difficulty with swallowing to cardiology on evaluation.  Cardiology reviewed with pt that given her advanced age and these issues, she would be high risk for MICHAEL and risk of upper pharyngeal laceration exists. Pt elected to not have procedure after hearing of risks.    Plan:  Completed 7 day antibiotics course (IV Unasyn -> oral Augmentin Q12H).   Surveillance blood cultures in 2 weeks and again in 4 weeks. If blood cultures positive, reconsider MICHAEL in a couple weeks after pt's swelling improves.  Sialadenitis protocol:  Warm compresses to the affected area.   Sialogogues (sour candies, lemon, citrus, vinegar).   NSAID such as Ibuprofen for pain relief and to reduce inflammation.   Continue hydration  Parotitis: Massage the gland from angle of  mandible working anteriorly along the cheek towards the oral commissure to facilitate drainage through the ductal opening  ENT outpatient follow-up in 2-4 weeks  Cardiology outpatient follow-up in 1-2 weeks. Recommend follow-up 2D echo  ID outpatient follow-up in 5 weeks

## 2024-12-30 NOTE — ASSESSMENT & PLAN NOTE
Patient reports abdominal pain, nausea and stool incontinence, colitis flare up in the setting of antibiotic use.   Patient reports Pepto-Bismol helps with her symptoms    Plan:  Continue home mesalamine 800 mg twice daily  Continue Pepto-Bismol as needed  Continue saccharomyces boulardii (Florastor) BID   Cyclosporine Counseling:  I discussed with the patient the risks of cyclosporine including but not limited to hypertension, gingival hyperplasia,myelosuppression, immunosuppression, liver damage, kidney damage, neurotoxicity, lymphoma, and serious infections. The patient understands that monitoring is required including baseline blood pressure, CBC, CMP, lipid panel and uric acid, and then 1-2 times monthly CMP and blood pressure.

## 2024-12-30 NOTE — PLAN OF CARE
Problem: OCCUPATIONAL THERAPY ADULT  Goal: Performs self-care activities at highest level of function for planned discharge setting.  See evaluation for individualized goals.  Description: Treatment Interventions: ADL retraining, Functional transfer training, Endurance training, Equipment evaluation/education, Compensatory technique education, Continued evaluation, Energy conservation          See flowsheet documentation for full assessment, interventions and recommendations.   Note: Limitation: Decreased ADL status, Decreased Safe judgement during ADL, Decreased endurance, Decreased self-care trans, Decreased high-level ADLs (balance, fxnl mobility, act ana, fxnl reach, and standing ana, safety awareness, pacing, and problem solving)  Prognosis: Good  Assessment: Pt is a 89 y.o. female seen for OT evaluation s/p admit to [facility] on 12/22/2024 w/Parotiditis. Prior to admission, pt was living with son in a 1 level house, (I) with ADLs, (A) with IADLs, SPC for mobility, (+) falls, (-) . Personal and environmental factors affecting patient at time of evaluation include advanced age, current habits and behavioral patterns, inaccessible home environment, difficulty completing ADLs, and difficulty completing IADLs. Personal factors supporting patient at time of evaluation include (I) PLOF, supportive son who is a paid caregiver, attitude towards recovery, and FFSU. Based upon this evaluation, pt is functioning below baseline. Pt will benefit from continued skilled inpatient OT to maximize safety, level of independence and overall performance in ADLs, functional transfers, functional mobility to return to functional baseline/highest level of function.     Rehab Resource Intensity Level, OT: III (Minimum Resource Intensity) (increased social support for ADLs/IADLs PTA)     NIMA Carter, OTR/L  [place] License [id number]  [place] License [id number]

## 2024-12-30 NOTE — PROGRESS NOTES
Progress Note - Hospitalist   Name: Helen M Schwab 89 y.o. female I MRN: [id number]  Unit/Bed#: S -01 I Date of Admission: 12/22/2024   Date of Service: 12/30/2024 I Hospital Day: 8    Assessment & Plan  Parotiditis  Complicated by facial cellulitis and left masseter myositis. Afebrile, no signs of systemic illness. Lactic acid and procal within normal range   Presenting with 3 days history worsening facial swelling, erythema, and firmness and associated trismus  CT soft tissue neck showing acute left parotitis secondary to a 2 mm obstructing parotid duct sialolith. Reactive secondary left side facial cellulitis and left masseter myositis noted. No discrete collection. Normal subglottic airway  Patient protecting and maintaining her airway at this time.  She is saturating in the upper 90s on room air.  She reports improvement in trismus and denies secretions.  Transitioned from Unasyn to Augmentin for 7 day antibiotics course  Blood culture 1 out of 2 showing Gram + cocci. Repeat blood cultures show no growth at 4 days.   Echo shows LVEF 65%, diastolic function Grade II relaxation. Aortic valve shows 7.0 mm x 6.0 mm small, sessile, echogenic mass on the aortic valve  Third set of blood cultures show no growth at 72 hours    Plans  Completed 7 day antibiotics course (IV Unasyn -> oral Augmentin Q12H). ID is on board  Ibuprofen 400 mg Q6H PRN  Continue diet and oral hydration as tolerated   MICHAEL anticipated 12/30 moved to Tuesday 12/31  Continue to follow third set of blood cultures from 12/27/24  Per ENT - warm compress, sialogogus (sour candies, lemon, citrus, vinegar)   ENT OP follow up in 2-4 weeks  Positive blood culture  Blood culture 1 out of 2 showing Gram + cocci. Repeat blood cultures show no growth at 24 hours.   Infectious diseases overall suspicion is that blood culture represents a contaminant    Plan:  Per ID, would not start antibiotics for this issue. Completed last dose of Augmentin for  parotiditis 12/28  Follow up 3rd set of blood cultures from 12/27/24  Abnormal echocardiogram  Echo shows LVEF 65%, diastolic function Grade II relaxation. Aortic valve shows 7.0 mm x 6.0 mm small, sessile, echogenic mass on the aortic valve.  TTE notable for changes at the aortic valve which could represent focal calcification, fibroblastoma or vegetation. ID has lower suspicion for true endocarditis    Plan:  Follow up MICHAEL  ID will determine need for further antibiotics based on MICHAEL results.  Patient states that due to her age and colitis flares with antibiotics use, she is unsure if she would be agreeable to additional antibiotic treatment  Ulcerative colitis with complication (HCC)  Patient reports abdominal pain, nausea and stool incontinence, colitis flare up in the setting of antibiotic use.   Patient reports Pepto-Bismol helps with her symptoms    Plan:  Continue home mesalamine 800 mg twice daily  Continue Pepto-Bismol as needed  Continue saccharomyces boulardii (Florastor) BID  Fibromyalgia  Will continue methylprednisolone 2 mg daily in the morning as patient   Follow-up with PCP outpatient    Plan:  Continue methylprednisolone 2 mg daily   Hypertension  Confirmed with patient that she takes 50 mg Toprol-XL daily not 100 mg daily as reported in the chart  Patient also takes home clonidine 0.1 mg daily as needed for high blood pressure.  Patient takes blood pressure nightly at home and states it has been well-controlled for the past month  Initially was hypertensive when she arrived, no associated symptoms such as flash pulm edema, headache, or visual disturbance  Patient's had elevated blood pressure readings to systolic 180's-190's since admission    Plan:  Continue home metoprolol succinate 50 mg daily  IV hydralazine prn  Continue to trend pressures closely  Gastroesophageal reflux  Continue pantoprazole 40 mg early morning     VTE Pharmacologic Prophylaxis: VTE Score: 5 High Risk (Score >/= 5) -  Pharmacological DVT Prophylaxis Ordered: enoxaparin (Lovenox). Sequential Compression Devices Ordered.    Mobility:   Basic Mobility Inpatient Raw Score: 13  JH-HLM Goal: 4: Move to chair/commode  JH-HLM Achieved: 6: Walk 10 steps or more  JH-HLM Goal achieved. Continue to encourage appropriate mobility.    Patient Centered Rounds: I performed bedside rounds with nursing staff today.   Discussions with Specialists or Other Care Team Provider: Infectious Disease    Education and Discussions with Family / Patient: Updated  (son) via phone.    Current Length of Stay: 8 day(s)  Current Patient Status: Inpatient   Certification Statement: The patient will continue to require additional inpatient hospital stay due to awaiting MICHAEL. Clearance from infectious disease standpoint pending MICHAEL results.  Discharge Plan: Anticipate discharge in 24-48 hrs to home with [facility]    Code Status: Level 2 - DNAR: but accepts endotracheal intubation    Subjective   Overnight patient had elevated blood pressure 182/70 and received IV hydralazine. Blood pressure decreased to 127/54. Saw and examined patient at bedside this morning. Patient no acute distress. Patient states that she is not experiencing left-sided jaw tenderness and is not having difficulty breathing or swallowing. Patient has not had episodes of loose stools as of this morning. She denies chest pain and shortness of breath. Informed patient that her MICHAEL is now scheduled for tomorrow. Pt expressed frustration in regards to her MICHAEL being moved.    Objective :  Temp:  [97.9 °F (36.6 °C)-99.2 °F (37.3 °C)] 98.1 °F (36.7 °C)  HR:  [80-83] 81  BP: (117-182)/(54-80) 117/75  Resp:  [16-18] 18  SpO2:  [95 %-96 %] 95 %  O2 Device: None (Room air)    Body mass index is 23.23 kg/m².     Input and Output Summary (last 24 hours):     Intake/Output Summary (Last 24 hours) at 12/30/2024 1216  Last data filed at 12/30/2024 0826  Gross per 24 hour   Intake 240 ml   Output  575 ml   Net -335 ml       Physical Exam  HENT:      Head: Normocephalic.      Salivary Glands: Left salivary gland is not diffusely enlarged or tender.      Comments: No tenderness to palpation of left submandibular region  Resolved erythema in the left submandibular region          Mouth/Throat:      Pharynx: Oropharynx is clear.   Eyes:      Extraocular Movements: Extraocular movements intact.      Conjunctiva/sclera: Conjunctivae normal.   Cardiovascular:      Rate and Rhythm: Normal rate and regular rhythm.      Pulses: Normal pulses.      Heart sounds: Normal heart sounds.   Pulmonary:      Effort: Pulmonary effort is normal. No respiratory distress.      Breath sounds: Normal breath sounds. No wheezing.   Abdominal:      General: Abdomen is flat. Bowel sounds are normal. There is no distension.      Tenderness: There is no abdominal tenderness. There is no guarding or rebound.   Musculoskeletal:         General: No swelling or tenderness. Normal range of motion.      Cervical back: Normal range of motion.      Right lower leg: No edema.      Left lower leg: No edema.   Skin:     General: Skin is warm.   Neurological:      Mental Status: She is alert.         Lines/Drains:              Lab Results: I have reviewed the following results:   Results from last 7 days   Lab Units 12/29/24  0528   WBC Thousand/uL 9.81   HEMOGLOBIN g/dL 11.4*   HEMATOCRIT % 32.8*   PLATELETS Thousands/uL 413*   SEGS PCT % 41*   LYMPHO PCT % 44   MONO PCT % 12   EOS PCT % 2     Results from last 7 days   Lab Units 12/30/24  0518   SODIUM mmol/L 140   POTASSIUM mmol/L 3.6   CHLORIDE mmol/L 107   CO2 mmol/L 26   BUN mg/dL 12   CREATININE mg/dL 0.40*   ANION GAP mmol/L 7   CALCIUM mg/dL 8.8   GLUCOSE RANDOM mg/dL 92                       Recent Cultures (last 7 days):   Results from last 7 days   Lab Units 12/27/24  0401 12/23/24  1616   BLOOD CULTURE  No Growth at 72 hrs.  No Growth at 72 hrs. No Growth After 5 Days.  No Growth After  5 Days.       Imaging Results Review: I reviewed radiology reports from this admission including: CT soft tissue neck.  CT soft tissue neck with contrast   Final Result by Gurpreet Hurley MD (12/22 1223)         1. Acute left parotitis secondary to a 2 mm obstructing parotid duct sialolith (series 301, image 28). Reactive secondary left side facial cellulitis and left masseter myositis noted. No discrete collection. Further clinical assessment advised.            Workstation performed: [id number]            Other Study Results Review: No additional pertinent studies reviewed.    Last 24 Hours Medication List:     Current Facility-Administered Medications:     acetaminophen (TYLENOL) tablet 975 mg, Q8H WILLA    Artificial Tears Op Soln 1 drop, Q6H PRN    aspirin (ECOTRIN LOW STRENGTH) EC tablet 81 mg, Daily    bimatoprost (LUMIGAN) 0.03 % ophthalmic drops 1 drop, Daily    bismuth subsalicylate (PEPTO BISMOL) oral suspension 524 mg, Q6H PRN    Cholecalciferol (VITAMIN D3) tablet 5,000 Units, Daily    enoxaparin (LOVENOX) subcutaneous injection 40 mg, Q24H WILLA    hydrALAZINE (APRESOLINE) injection 10 mg, Q6H PRN    ibuprofen (MOTRIN) tablet 400 mg, Q6H PRN    mesalamine (DELZICOL) delayed release capsule 800 mg, BID    methylprednisolone (MEDROL) tablet 2 mg, Daily With Breakfast    metoprolol succinate (TOPROL-XL) 24 hr tablet 50 mg, Daily    ondansetron (ZOFRAN) injection 4 mg, Q6H PRN    pantoprazole (PROTONIX) EC tablet 40 mg, Early Morning    saccharomyces boulardii (FLORASTOR) capsule 250 mg, BID    Administrative Statements   Today, Patient Was Seen By: Elaine Zhao MD  I have spent a total time of 30 minutes in caring for this patient on the day of the visit/encounter including Impressions, Counseling / Coordination of care, Documenting in the medical record, Reviewing / ordering tests, medicine, procedures  , Obtaining or reviewing history  , and Communicating with other healthcare professionals  .    **Please Note: This note may have been constructed using a voice recognition system.**

## 2024-12-30 NOTE — PLAN OF CARE
Problem: Potential for Falls  Goal: Patient will remain free of falls  Description: INTERVENTIONS:  - Educate patient/family on patient safety including physical limitations  - Instruct patient to call for assistance with activity   - Consult OT/PT to assist with strengthening/mobility   - Keep Call bell within reach  - Keep bed low and locked with side rails adjusted as appropriate  - Keep care items and personal belongings within reach  - Initiate and maintain comfort rounds  - Make Fall Risk Sign visible to staff  - Offer Toileting every 2 Hours, in advance of need  - Initiate/Maintain bed/chair alarm    - Apply yellow socks and bracelet for high fall risk patients  - Consider moving patient to room near nurses station  Outcome: Progressing     Problem: PAIN - ADULT  Goal: Verbalizes/displays adequate comfort level or baseline comfort level  Description: Interventions:  - Encourage patient to monitor pain and request assistance  - Assess pain using appropriate pain scale  - Administer analgesics based on type and severity of pain and evaluate response  - Implement non-pharmacological measures as appropriate and evaluate response  - Consider cultural and social influences on pain and pain management  - Notify physician/advanced practitioner if interventions unsuccessful or patient reports new pain  Outcome: Progressing     Problem: INFECTION - ADULT  Goal: Absence or prevention of progression during hospitalization  Description: INTERVENTIONS:  - Assess and monitor for signs and symptoms of infection  - Monitor lab/diagnostic results  - Monitor all insertion sites, i.e. indwelling lines, tubes, and drains  - Monitor endotracheal if appropriate and nasal secretions for changes in amount and color  - Elmo appropriate cooling/warming therapies per order  - Administer medications as ordered  - Instruct and encourage patient and family to use good hand hygiene technique  - Identify and instruct in appropriate  isolation precautions for identified infection/condition  Outcome: Progressing  Goal: Absence of fever/infection during neutropenic period  Description: INTERVENTIONS:  - Monitor WBC    Outcome: Progressing     Problem: SAFETY ADULT  Goal: Patient will remain free of falls  Description: INTERVENTIONS:  - Educate patient/family on patient safety including physical limitations  - Instruct patient to call for assistance with activity   - Consult OT/PT to assist with strengthening/mobility   - Keep Call bell within reach  - Keep bed low and locked with side rails adjusted as appropriate  - Keep care items and personal belongings within reach  - Initiate and maintain comfort rounds  - Make Fall Risk Sign visible to staff  - Offer Toileting every 2 Hours, in advance of need  - Initiate/Maintain bed/chair alarm    - Apply yellow socks and bracelet for high fall risk patients  - Consider moving patient to room near nurses station  Outcome: Progressing  Goal: Maintain or return to baseline ADL function  Description: INTERVENTIONS:  -  Assess patient's ability to carry out ADLs; assess patient's baseline for ADL function and identify physical deficits which impact ability to perform ADLs (bathing, care of mouth/teeth, toileting, grooming, dressing, etc.)  - Assess/evaluate cause of self-care deficits   - Assess range of motion  - Assess patient's mobility; develop plan if impaired  - Assess patient's need for assistive devices and provide as appropriate  - Encourage maximum independence but intervene and supervise when necessary  - Involve family in performance of ADLs  - Assess for home care needs following discharge   - Consider OT consult to assist with ADL evaluation and planning for discharge  - Provide patient education as appropriate  Outcome: Progressing  Goal: Maintains/Returns to pre admission functional level  Description: INTERVENTIONS:  - Perform AM-PAC 6 Click Basic Mobility/ Daily Activity assessment daily.  -  Set and communicate daily mobility goal to care team and patient/family/caregiver.   - Collaborate with rehabilitation services on mobility goals if consulted  - Perform Range of Motion 3 times a day.  - Reposition patient every 2 hours.  - Dangle patient 3 times a day  - Stand patient 3 times a day  - Ambulate patient 3 times a day  - Out of bed to chair 3 times a day   - Out of bed for meals 3 times a day  - Out of bed for toileting  - Record patient progress and toleration of activity level   Outcome: Progressing     Problem: Nutrition/Hydration-ADULT  Goal: Nutrient/Hydration intake appropriate for improving, restoring or maintaining nutritional needs  Description: Monitor and assess patient's nutrition/hydration status for malnutrition. Collaborate with interdisciplinary team and initiate plan and interventions as ordered.  Monitor patient's weight and dietary intake as ordered or per policy. Utilize nutrition screening tool and intervene as necessary. Determine patient's food preferences and provide high-protein, high-caloric foods as appropriate.     INTERVENTIONS:  - Monitor oral intake, urinary output, labs, and treatment plans  - Assess nutrition and hydration status and recommend course of action  - Evaluate amount of meals eaten  - Assist patient with eating if necessary   - Allow adequate time for meals  - Recommend/ encourage appropriate diets, oral nutritional supplements, and vitamin/mineral supplements  - Order, calculate, and assess calorie counts as needed  - Recommend, monitor, and adjust tube feedings and TPN/PPN based on assessed needs  - Assess need for intravenous fluids  - Provide specific nutrition/hydration education as appropriate  - Include patient/family/caregiver in decisions related to nutrition  Outcome: Progressing     Problem: Prexisting or High Potential for Compromised Skin Integrity  Goal: Skin integrity is maintained or improved  Description: INTERVENTIONS:  - Identify patients  at risk for skin breakdown  - Assess and monitor skin integrity  - Assess and monitor nutrition and hydration status  - Monitor labs   - Assess for incontinence   - Turn and reposition patient  - Assist with mobility/ambulation  - Relieve pressure over bony prominences  - Avoid friction and shearing  - Provide appropriate hygiene as needed including keeping skin clean and dry  - Evaluate need for skin moisturizer/barrier cream  - Collaborate with interdisciplinary team   - Patient/family teaching  - Consider wound care consult   Outcome: Progressing     Problem: RESPIRATORY - ADULT  Goal: Achieves optimal ventilation and oxygenation  Description: INTERVENTIONS:  - Assess for changes in respiratory status  - Assess for changes in mentation and behavior  - Position to facilitate oxygenation and minimize respiratory effort  - Oxygen administered by appropriate delivery if ordered  - Initiate smoking cessation education as indicated  - Encourage broncho-pulmonary hygiene including cough, deep breathe, Incentive Spirometry  - Assess the need for suctioning and aspirate as needed  - Assess and instruct to report SOB or any respiratory difficulty  - Respiratory Therapy support as indicated  Outcome: Progressing     Problem: GASTROINTESTINAL - ADULT  Goal: Minimal or absence of nausea and/or vomiting  Description: INTERVENTIONS:  - Administer IV fluids if ordered to ensure adequate hydration  - Maintain NPO status until nausea and vomiting are resolved  - Nasogastric tube if ordered  - Administer ordered antiemetic medications as needed  - Provide nonpharmacologic comfort measures as appropriate  - Advance diet as tolerated, if ordered  - Consider nutrition services referral to assist patient with adequate nutrition and appropriate food choices  Outcome: Progressing  Goal: Maintains or returns to baseline bowel function  Description: INTERVENTIONS:  - Assess bowel function  - Encourage oral fluids to ensure adequate  hydration  - Administer IV fluids if ordered to ensure adequate hydration  - Administer ordered medications as needed  - Encourage mobilization and activity  - Consider nutritional services referral to assist patient with adequate nutrition and appropriate food choices  Outcome: Progressing  Goal: Maintains adequate nutritional intake  Description: INTERVENTIONS:  - Monitor percentage of each meal consumed  - Identify factors contributing to decreased intake, treat as appropriate  - Assist with meals as needed  - Monitor I&O, weight, and lab values if indicated  - Obtain nutrition services referral as needed  Outcome: Progressing  Goal: Establish and maintain optimal ostomy function  Description: INTERVENTIONS:  - Assess bowel function  - Encourage oral fluids to ensure adequate hydration  - Administer IV fluids if ordered to ensure adequate hydration   - Administer ordered medications as needed  - Encourage mobilization and activity  - Nutrition services referral to assist patient with appropriate food choices  - Assess stoma site  - Consider wound care consult   Outcome: Progressing  Goal: Oral mucous membranes remain intact  Description: INTERVENTIONS  - Assess oral mucosa and hygiene practices  - Implement preventative oral hygiene regimen  - Implement oral medicated treatments as ordered  - Initiate Nutrition services referral as needed  Outcome: Progressing

## 2024-12-30 NOTE — OCCUPATIONAL THERAPY NOTE
Occupational Therapy Evaluation     Patient Name: Helen M Schwab  Today's Date: 12/30/2024  Problem List  Principal Problem:    Parotiditis  Active Problems:    Ulcerative colitis with complication (HCC)    Gastroesophageal reflux    Fibromyalgia    Hypertension    Abnormal echocardiogram    Positive blood culture    Past Medical History  Past Medical History:   Diagnosis Date    Arthritis     Bell's palsy 1940    Colitis     GERD (gastroesophageal reflux disease)     Hemorrhoids     History of transfusion     Hx of colonoscopy 1996, 1998, 2000, 2002, 2003, 2007, 2012    Hypertension     Ulcerative colitis (HCC)      Past Surgical History  Past Surgical History:   Procedure Laterality Date    BACK SURGERY  1971    ruptured disk    BACK SURGERY  1972    bone fusion lumbar spine    BLADDER REPAIR  2002    BLADDER REPAIR  2012    BREAST SURGERY  2011    CARDIAC SURGERY  2014    stent    CARPAL TUNNEL RELEASE  08/14/2014    CATARACT EXTRACTION Right 2015    CHOLECYSTECTOMY      COLONOSCOPY      EYE SURGERY Left     GALLBLADDER SURGERY  1995    GANGLION CYST EXCISION  2004    left wrist    HERNIA REPAIR  2001    HERNIA REPAIR  2003    MASTECTOMY  2012    OVARY SURGERY  1984    PARTIAL HYSTERECTOMY  1964    HI EXC B9 LESION MRGN XCP SK TG T/A/L 0.5 CM/< Left 4/27/2021    Procedure: Excision pyogenic granuloma left ring finger;  Surgeon: Shane Lincoln MD;  [facility];  Service: Orthopedics    HI EXC LESION TDN SHTH/JT CAPSL HAND/FNGR Right 10/9/2018    Procedure: EXCISION GANGLION CYST RIGHT SMALL FINGER;  Surgeon: Shane Lincoln MD;  [facility];  Service: Orthopedics    HI NDSC WRST SURG W/RLS TRANSVRS CARPL LIGM Left 8/10/2021    Procedure: Left endoscopic carpal tunnel release;  Surgeon: Shane Lincoln MD;  [facility];  Service: Orthopedics    HI TENDON SHEATH INCISION Right 4/5/2016    Procedure: INDEX TRIGGER FINGER AND RING TRIGGER FINGER RELEASE ;  Surgeon: Shane  MD [place];  [facility]: [facility];  Service: Orthopedics    CT TENDON SHEATH INCISION Left 10/16/2018    Procedure: RELEASE TRIGGER FINGER - LEFT INDEX;  Surgeon: Shane Lincoln MD;  [facility];  Service: Orthopedics    CT TENDON SHEATH INCISION Left 8/10/2021    Procedure: Left long and ring finger trigger finger release;  Surgeon: Shane Lincoln MD;  [facility];  Service: Orthopedics    SPLENECTOMY, PARTIAL      TONSILLECTOMY      WRIST SURGERY Right         Patient's identity confirmed via 2 patient identifiers (full name and ) at start of session        [id number]   OT Last Visit   OT Visit Date 24   Note Type   Note type Evaluation   Pain Assessment   Pain Assessment Tool 0-10   Pain Score No Pain   Restrictions/Precautions   Weight Bearing Precautions Per Order No   Other Precautions Contact/isolation;Chair Alarm;Bed Alarm;Fall Risk   Home Living   Type of Home House   Home Layout One level;Performs ADLs on one level;Able to live on main level with bedroom/bathroom;Stairs to enter with rails  (1 ELIEL)   Bathroom Shower/Tub Tub/shower unit  (pt does not access, spinge bathes; (Medicare is supposed to be putting in a walk-in shower))   Bathroom Toilet Standard   Bathroom Equipment Grab bars in shower;Grab bars around toilet   Bathroom Accessibility Accessible   Home Equipment Walker;Cane  (rollator)   Prior Function   Level of [place] Independent with ADLs;Independent with functional mobility;Needs assistance with IADLS   Lives With Son  (is paid caregiver)   Receives Help From Family   IADLs Family/Friend/Other provides transportation;Family/Friend/Other provides meals;Independent with medication management   Falls in the last 6 months 1 to 4  (pt reprost maybe 1 fall per pt)   Vocational Retired  ([employer])   Comments Pt reports being (I) w/ ADLs PTA. Son is paid caregiver. Has support for IADLs from son. Reprots having grab bars that span the hallways.  "Uses SPC in home. Son occasionally assists w/ STS and mobility PRN.   Lifestyle   Autonomy Pt lives w/ son in a 1 level house and is (I) w/ ADLs PTA, SPC, (+) falls? (-)    Reciprocal Relationships Supportive son who is paid caregiver   Service to Others Retired [employer]   Intrinsic Gratification Pt enjoys knitting, coloring   General   Additional Pertinent History Pt admitted w/ facial swelling, found to have parotiditis complicated by facial cellulitis and left masseter myositis. PMH includes: fibromyalgia, HTN, hx of lumbar spine fusion   Family/Caregiver Present No   Subjective   Subjective \"I'm ready to go home\"   ADL   Where Assessed Edge of bed   Eating Assistance Unable to assess   Eating Deficit NPO   Grooming Assistance 5  Supervision/Setup   UB Bathing Assistance 5  Supervision/Setup   LB Bathing Assistance 4  Minimal Assistance   UB Dressing Assistance 5  Supervision/Setup   LB Dressing Assistance 4  Minimal Assistance   LB Dressing Deficit Setup;Steadying;Supervision/safety;Increased time to complete;Thread RLE into underwear;Thread LLE into underwear;Pull up over hips  (sitting EOB, min A steadying in stance to pull over hips)   Toileting Assistance  5  Supervision/Setup   Bed Mobility   Supine to Sit 4  Minimal assistance   Additional items Assist x 1;HOB elevated;Bedrails;Increased time required;Verbal cues   Additional Comments Able to sit EOB w/ S; OOB to recliner at end of session   Transfers   Sit to Stand 4  Minimal assistance   Additional items Assist x 1;Increased time required;Verbal cues  (pt prefers unilateral HHA to stand, reports \"my son does this for me at home\")   Stand to Sit 4  Minimal assistance   Additional items Assist x 1;Increased time required;Verbal cues   Additional Comments w/ SPC   Functional Mobility   Functional Mobility 4  Minimal assistance   Additional Comments Pt declined use of RW. Household distance w/ SPC and min A x 1 HHA. Progressing to S w/ support on " furniture/walls/railings. Pt prefers HHA. Limited by fatigue   Additional items SPC   Balance   Static Sitting Fair +   Dynamic Sitting Fair   Static Standing Fair -   Dynamic Standing Poor +   Activity Tolerance   Activity Tolerance Patient limited by fatigue   Nurse Made Aware yes, RN Xiao rodríguez to see pt   RUE Assessment   RUE Assessment WFL   LUE Assessment   LUE Assessment WFL   Hand Function   Gross Motor Coordination Functional   Fine Motor Coordination Functional   Sensation   Light Touch No apparent deficits   Vision-Basic Assessment   Current Vision Wears glasses all the time   Cognition   Overall Cognitive Status WFL   Arousal/Participation Alert;Cooperative   Attention Within functional limits   Orientation Level Oriented X4  (grossly oriented to month/year)   Memory Within functional limits   Following Commands Follows one step commands without difficulty   Comments Pleasant and agreeable. Questionable safety. VC for problem solving/pacing   Assessment   Limitation Decreased ADL status;Decreased Safe judgement during ADL;Decreased endurance;Decreased self-care trans;Decreased high-level ADLs  (balance, fxnl mobility, act ana, fxnl reach, and standing ana, safety awareness, pacing, and problem solving)   Prognosis Good   Assessment Pt is a 89 y.o. female seen for OT evaluation s/p admit to [facility] on 12/22/2024 w/Parotiditis. Prior to admission, pt was living with son in a 1 level house, (I) with ADLs, (A) with IADLs, SPC for mobility, (+) falls, (-) . Personal and environmental factors affecting patient at time of evaluation include advanced age, current habits and behavioral patterns, inaccessible home environment, difficulty completing ADLs, and difficulty completing IADLs. Personal factors supporting patient at time of evaluation include (I) PLOF, supportive son who is a paid caregiver, attitude towards recovery, and FFSU. Based upon this evaluation, pt is functioning below baseline.  Pt will benefit from continued skilled inpatient OT to maximize safety, level of independence and overall performance in ADLs, functional transfers, functional mobility to return to functional baseline/highest level of function.   Goals   Patient Goals to go home   LTG Time Frame 10-14   Long Term Goal #1 see goals below   Plan   Treatment Interventions ADL retraining;Functional transfer training;Endurance training;Equipment evaluation/education;Compensatory technique education;Continued evaluation;Energy conservation   Goal Expiration Date 01/09/25   OT Treatment Day 0   OT Frequency 3-5x/wk   Discharge Recommendation   Rehab Resource Intensity Level, OT III (Minimum Resource Intensity)  (increased social support for ADLs/IADLs PTA)   AM-PAC Daily Activity Inpatient   Lower Body Dressing 3   Bathing 3   Toileting 3   Upper Body Dressing 4   Grooming 4   Eating 4   Daily Activity Raw Score 21   Daily Activity Standardized Score (Calc for Raw Score >=11) 44.27   AM-PAC Applied Cognition Inpatient   Following a Speech/Presentation 4   Understanding Ordinary Conversation 4   Taking Medications 4   Remembering Where Things Are Placed or Put Away 4   Remembering List of 4-5 Errands 3   Taking Care of Complicated Tasks 3   Applied Cognition Raw Score 22   Applied Cognition Standardized Score 47.83   End of Consult   Patient Position at End of Consult Bedside chair;Bed/Chair alarm activated;All needs within reach   Nurse Communication Nurse aware of consult     GOALS:    *Goals established to promote patient goal of to go home today:      *Patient will perform grooming tasks standing at sink with (I) in order to increase overall independence    *LB ADL with (I) for inc'd independence with self care    *Toileting with (I) for clothing management and hygiene to increase hygiene/thoroughness in order to reduce caregiver burden    *Patient will verbalize and demonstrate use of energy conservation/deep breathing techniques and  work simplification skills during functional activities with no verbal cues.     *ADL transfers with (I) for inc'd independence with ADLs/purposeful tasks    *Bed mobility- (I) for inc'd independence to manage own comfort and initiate EOB & OOB purposeful tasks    *Patient will increase functional mobility to and from bathroom with least restrictive assistive device independently to increase independence with toileting    *Increase stand tolerance x 3-5  m for inc'd tolerance with standing purposeful tasks including grooming/self-care    *Patient will improve functional activity tolerance to 20 minutes of sustained functional tasks to increase participation in basic self-care and decrease assistance level.     The patient's raw score on the -PAC Daily Activity Inpatient Short Form is 21. A raw score of greater than or equal to 19 suggests the patient may benefit from discharge to home. Please also refer to the recommendation of the Occupational Therapist for safe discharge planning.      NIMA Carter, OTR/L    [place] License [id number]  [place] License [id number]

## 2024-12-30 NOTE — DISCHARGE SUMMARY
Discharge Summary - Hospitalist   Name: Helen M Schwab 89 y.o. female I MRN: [id number]  Unit/Bed#: S -01 I Date of Admission: 12/22/2024   Date of Service: 12/30/2024 I Hospital Day: 8     Assessment & Plan  Parotiditis  Complicated by facial cellulitis and left masseter myositis. Afebrile, no signs of systemic illness. Lactic acid and procal within normal range   Presenting with 3 days history worsening facial swelling, erythema, and firmness and associated trismus  CT soft tissue neck showing acute left parotitis secondary to a 2 mm obstructing parotid duct sialolith. Reactive secondary left side facial cellulitis and left masseter myositis noted. No discrete collection. Normal subglottic airway  Patient protecting and maintaining her airway at this time.  She is saturating in the upper 90s on room air.  She reports improvement in trismus and denies secretions.  Transitioned from Unasyn to Augmentin for 7 day antibiotics course  Complicated by Blood culture 1 out of 2 showing Gram + cocci. Repeat blood cultures show no growth at 4 days.   Echo shows LVEF 65%, diastolic function Grade II relaxation. Aortic valve shows 7.0 mm x 6.0 mm small, sessile, echogenic mass on the aortic valve  Third set of blood cultures show no growth at 72 hours. Pt afebrile and CBC stable.  Pt had scheduled MICHAEL for 12/31. Cardiology reviewed CT scans with left upper pharynx internal swelling. Pt reported mild difficulty with swallowing to cardiology on evaluation.  Cardiology reviewed with pt that given her advanced age and these issues, she would be high risk for MICHAEL and risk of upper pharyngeal laceration exists. Pt elected to not have procedure after hearing of risks.    Plan:  Completed 7 day antibiotics course (IV Unasyn -> oral Augmentin Q12H).   Surveillance blood cultures in 2 weeks and again in 4 weeks. If blood cultures positive, reconsider MICHAEL in a couple weeks after pt's swelling improves.  Sialadenitis protocol:  Warm  "compresses to the affected area.   Sialogogues (sour candies, lemon, citrus, vinegar).   NSAID such as Ibuprofen for pain relief and to reduce inflammation.   Continue hydration  Parotitis: Massage the gland from angle of mandible working anteriorly along the cheek towards the oral commissure to facilitate drainage through the ductal opening  ENT outpatient follow-up in 2-4 weeks  Cardiology outpatient follow-up in 1-2 weeks. Recommend follow-up 2D echo  ID outpatient follow-up in 5 weeks  Positive blood culture  Blood culture 1 out of 2 showing Gram + cocci. Repeat blood cultures show no growth at 24 hours.   Infectious diseases overall suspicion is that blood culture represents a contaminant    Plan:  Per ID, would not start antibiotics for this issue. Completed last dose of Augmentin for parotiditis 12/28  See rest of plan under \"parotiditis\".  Surveillance blood cultures as above. ID follow-up outpatient in 5 weeks  Abnormal echocardiogram  Echo shows LVEF 65%, diastolic function Grade II relaxation. Aortic valve shows 7.0 mm x 6.0 mm small, sessile, echogenic mass on the aortic valve.  TTE notable for changes at the aortic valve which could represent focal calcification, fibroblastoma or vegetation. ID has lower suspicion for true endocarditis  Cardiology reviewed with pt that given her advanced age and issues with left pharyngeal swelling and swallowing difficulty, she would be high risk for MICHAEL and risk of upper pharyngeal laceration exists. Pt elected to not have procedure after hearing of risks.    Plan:  See \"Parotiditis\"  Recommend patient follow-up with cardiologist for follow-up 2D echo   Ulcerative colitis with complication (HCC)  Patient reports abdominal pain, nausea and stool incontinence, colitis flare up in the setting of antibiotic use.   Colitis flare improved after completion of antibiotics inpatient    Plan:  Continue home mesalamine 800 mg twice daily  Fibromyalgia  Will continue " methylprednisolone 2 mg daily in the morning as patient   Follow-up with PCP outpatient    Plan:  Continue methylprednisolone 2 mg daily   Hypertension  Confirmed with patient that she takes 50 mg Toprol-XL daily not 100 mg daily as reported in the chart  Patient also takes home clonidine 0.1 mg daily as needed for high blood pressure.  Patient takes blood pressure nightly at home and states it has been well-controlled for the past month  Initially was hypertensive when she arrived, no associated symptoms such as flash pulm edema, headache, or visual disturbance  Patient's had elevated blood pressure readings to systolic 180's-190's since admission    Plan:  Continue home metoprolol succinate 50 mg daily  Continue home Clonidine 0.1 mg daily as needed  Follow-up with cardiology outpatient  Gastroesophageal reflux  Continue pantoprazole 40 mg early morning      Medical Problems       Resolved Problems  Date Reviewed: 5/1/2023   None       Discharging Physician / Practitioner: Elaine Zhao MD  PCP: Shant Lucas MD  Admission Date:   Admission Orders (From admission, onward)       Ordered        12/22/24 1243  INPATIENT ADMISSION  Once                          Discharge Date: 12/30/24    Consultations During Hospital Stay:  Infectious Disease, ENT    Procedures Performed:   None    Significant Findings / Test Results:     CT soft tissue neck with contrast   Final Result by Gurpreet Hurley MD (12/22 1223)         1. Acute left parotitis secondary to a 2 mm obstructing parotid duct sialolith (series 301, image 28). Reactive secondary left side facial cellulitis and left masseter myositis noted. No discrete collection. Further clinical assessment advised.            Workstation performed: [id number]            Echo 12/25/2024: LVEF 65%. Aortic Valve: There is a 0.7 cm x 0.6 cm small, sessile, echogenic mass on the aortic aspect of the noncoronary cusp of the aortic valve. 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   Blood cultures:  Set #1: 1/2 MRSA.  2/2 no growth after 5 days  Set #2: No growth after 5 days  Set #3: No growth after 4 days    Incidental Findings:   None     Test Results Pending at Discharge (will require follow up):   None     Outpatient Tests Requested:  Repeat 2D echo with cardiology    Complications:  1/2 blood cultures MRSA positive    Reason for Admission: left parotiditis    Hospital Course:   Helen M Schwab is a 89 y.o. female patient who originally presented to the hospital on 12/22/2024 due to left facial swelling, erythema and associated trismus and dysphagia.  Patient presented to ED in no respiratory distress and saturating in 90s on room air.  In the ED, CT of the face showed acute parotiditis with 2 mm obstructing parotid duct stone. Pt was also found to have left-sided facial cellulitis and left masseter myositis on imaging. Patient was started on IV Unasyn and ENT was consulted.  ENT recommended sialadenitis protocol in addition to antibiotics. Patient admitted to [facility] service. Pt's hospital course was complicated by 1 of 2 blood cultures from admission growing isolated MRSA. Patient had 2D echo ordered that was notable for changes at the aortic valve, which could represent focal vegetation. Infectious disease was consulted and recommended to continue 7-day antibiotic course and to obtain MICHAEL. Second and third set of repeat blood cultures were negative. Pt had scheduled MICHAEL for 12/31. Cardiology reviewed pt's CT scans showing left upper pharynx internal swelling. Pt also reported mild difficulty with swallowing to cardiology on cardiology's pre-evaluation.  Cardiology reviewed with pt that given her advanced age and these issues, she would be high risk for MICHAEL and risk of upper pharyngeal laceration exists. Pt elected to not have procedure after hearing of risks. Cardiology, Infectious disease and SLIM discussed and came to agreement  "to have patient obtain surveillance blood cultures 2 weeks and 4 weeks after discharge. If surveillance blood cultures are positive, reconsider MICHAEL in a couple weeks after pt's swelling improves.  Patient also recommended to follow-up with cardiologist for follow-up 2D echo.  On 12/31/2024, patient deemed medically stable for discharge home with close follow-up with cardiology, ID and ENT.      Please see above list of diagnoses and related plan for additional information.     Condition at Discharge: stable    Discharge Day Visit / Exam:   Subjective:  No acute overnight events. Saw and examined patient at bedside this morning.  Patient is in no acute distress.  Patient states that she is not experiencing left-sided jaw tenderness and is not having difficulty breathing.  Patient has not had episodes of loose stools for the past couple of days.  She denies chest pain and shortness of breath.  Vitals: Blood Pressure: 117/75 (12/30/24 0731)  Pulse: 81 (12/30/24 0731)  Temperature: 98.1 °F (36.7 °C) (12/30/24 0731)  Temp Source: Oral (12/29/24 2100)  Respirations: 18 (12/30/24 0731)  Height: 5' 2\" (157.5 cm) (12/25/24 0801)  Weight - Scale: 57.6 kg (127 lb) (12/25/24 0801)  SpO2: 95 % (12/30/24 0900)  Physical Exam  HENT:      Head: Normocephalic.      Comments: No tenderness to palpation of left submandibular region  Resolved erythema in the left submandibular region          Mouth/Throat:      Pharynx: Oropharynx is clear.   Eyes:      Conjunctiva/sclera: Conjunctivae normal.   Cardiovascular:      Rate and Rhythm: Normal rate and regular rhythm.      Pulses: Normal pulses.      Heart sounds: Normal heart sounds.   Pulmonary:      Effort: Pulmonary effort is normal. No respiratory distress.      Breath sounds: Normal breath sounds. No wheezing.   Abdominal:      General: Abdomen is flat. Bowel sounds are normal. There is no distension.      Tenderness: There is no abdominal tenderness. There is no guarding or rebound. "   Musculoskeletal:         General: No swelling or tenderness. Normal range of motion.      Cervical back: Normal range of motion.      Right lower leg: No edema.      Left lower leg: No edema.   Skin:     General: Skin is warm.   Neurological:      Mental Status: She is alert.          Discussion with Family: Updated  (son) via phone.    Discharge instructions/Information to patient and family:   See after visit summary for information provided to patient and family.      Provisions for Follow-Up Care:  See after visit summary for information related to follow-up care and any pertinent home health orders.      Mobility at time of Discharge:   Basic Mobility Inpatient Raw Score: 13  JH-HLM Goal: 4: Move to chair/commode  JH-HLM Achieved: 6: Walk 10 steps or more  HLM Goal achieved. Continue to encourage appropriate mobility.     Disposition:   Home with [facility] Services (Reminder: Complete face to face encounter)    Planned Readmission: No    Discharge Medications:  See after visit summary for reconciled discharge medications provided to patient and/or family.      Administrative Statements   Discharge Statement:  I have spent a total time of 45 minutes in caring for this patient on the day of the visit/encounter. >30 minutes of time was spent on: Impressions, Counseling / Coordination of care, Documenting in the medical record, Reviewing / ordering tests, medicine, procedures  , and Communicating with other healthcare professionals .    **Please Note: This note may have been constructed using a voice recognition system**

## 2024-12-30 NOTE — ASSESSMENT & PLAN NOTE
Patient presented with acute onset of worsening facial swelling and CT imaging confirmed findings involving the left parotid gland with surrounding inflammation.  She has overall clinically improved on Unasyn which would cover the majority of organisms present within the mouth and that typically lead to this issue.  Course now complicated by positive blood culture and abnormal echo as below.  Completed antibiotics on 12/28 and reports feeling well today.  No further antibiotics for this issue  Continue to trend fever curve/vitals  Repeat CBC/chemistry tomorrow to monitor stability off antibiotic  Follow-up pending blood cultures for completeness  Obtain formal transesophageal echo when possible  Continue supportive care otherwise as per ENT  Additional supportive cares per primary  Additional interventions pending clinical course and imaging  Further antibiotic plans once cardiac imaging available.

## 2024-12-30 NOTE — ASSESSMENT & PLAN NOTE
1 out of 2 blood cultures from admission isolated MRSA.  Patient without any lingering or significant symptoms leading up to admission and abrupt onset of left-sided facial swelling as above was reason for presentation.  Repeat blood cultures remain negative and patient has not been on appropriate therapy but yet her other clinical parameters have improved.  My overall suspicion is that this culture represents a contaminant.  She is without any intravascular devices or localizing symptoms otherwise.  Unfortunately echo was obtained and is abnormal as below.  After discussion with patient's cardiologist and patient again she is agreeable to further cardiac imaging.  Plan for transesophageal echo today.  Further repeat cultures remain negative.  Hold off on antibiotics for this issue  Trend fever curve/vitals  Repeat CBC/chemistry tomorrow  Follow-up transesophageal echo report  Follow-up pending blood cultures otherwise  Additional supportive care per primary

## 2024-12-30 NOTE — ASSESSMENT & PLAN NOTE
Confirmed with patient that she takes 50 mg Toprol-XL daily not 100 mg daily as reported in the chart  Patient also takes home clonidine 0.1 mg daily as needed for high blood pressure.  Patient takes blood pressure nightly at home and states it has been well-controlled for the past month  Initially was hypertensive when she arrived, no associated symptoms such as flash pulm edema, headache, or visual disturbance  Patient's had elevated blood pressure readings to systolic 180's-190's since admission    Plan:  Continue home metoprolol succinate 50 mg daily  Continue home Clonidine 0.1 mg daily as needed  Follow-up with cardiology outpatient

## 2024-12-30 NOTE — PROGRESS NOTES
Progress Note - Infectious Disease   Name: Helen M Schwab 89 y.o. female I MRN: [id number]  Unit/Bed#: S -01 I Date of Admission: 12/22/2024   Date of Service: 12/30/2024 I Hospital Day: 8     Assessment & Plan  Parotiditis  Patient presented with acute onset of worsening facial swelling and CT imaging confirmed findings involving the left parotid gland with surrounding inflammation.  She has overall clinically improved on Unasyn which would cover the majority of organisms present within the mouth and that typically lead to this issue.  Course now complicated by positive blood culture and abnormal echo as below.  Completed antibiotics on 12/28 and reports feeling well today.  No further antibiotics for this issue  Continue to trend fever curve/vitals  Repeat CBC/chemistry tomorrow to monitor stability off antibiotic  Follow-up pending blood cultures for completeness  Obtain formal transesophageal echo when possible  Continue supportive care otherwise as per ENT  Additional supportive cares per primary  Additional interventions pending clinical course and imaging  Further antibiotic plans once cardiac imaging available.  Positive blood culture  1 out of 2 blood cultures from admission isolated MRSA.  Patient without any lingering or significant symptoms leading up to admission and abrupt onset of left-sided facial swelling as above was reason for presentation.  Repeat blood cultures remain negative and patient has not been on appropriate therapy but yet her other clinical parameters have improved.  My overall suspicion is that this culture represents a contaminant.  She is without any intravascular devices or localizing symptoms otherwise.  Unfortunately echo was obtained and is abnormal as below.  After discussion with patient's cardiologist and patient again she is agreeable to further cardiac imaging.  Plan for transesophageal echo today.  Further repeat cultures remain negative.  Hold off on antibiotics for  this issue  Trend fever curve/vitals  Repeat CBC/chemistry tomorrow  Follow-up transesophageal echo report  Follow-up pending blood cultures otherwise  Additional supportive care per primary  Abnormal echocardiogram  Patient had 2D echo ordered in the setting of 1 out of 2 blood cultures being positive.  Echo notable for changes at the aortic valve which could represent focal calcification, fibroblastoma or vegetation.  Clinically my suspicion is lower for true endocarditis.  Unfortunately no other images available for comparison.  Patient given her age is uncertain if she wishes to pursue additional imaging and treatment.  Discussed with her cardiologist at her request and given their agreement she is agreeable to imaging, there is suspicion also remains low.  Maintain off antibiotics  Will follow-up pending cultures  Follow-up transesophageal echo when possible  We will determine need for further antibiotics based on results    Above plan discussed briefly with the patient at bedside  Above plan discussed in detail with primary service resident who is aware of plans to maintain off antibiotics and following up echo results    ID consult service will continue to follow.    Antibiotics:  Off systemic antibiotic day 2    24 Hour events:  Yesterday and overnight notes reviewed and no acute events noted    Subjective:  Patient seen at bedside this morning and she denies having any nausea, vomiting, chest pain or shortness of breath.  Is aware of plans for imaging today and is ready for imaging.    Objective:  Vitals:  Temp:  [97.9 °F (36.6 °C)-99.2 °F (37.3 °C)] 98.1 °F (36.7 °C)  HR:  [80-83] 81  Resp:  [16-18] 18  BP: (117-182)/(54-80) 117/75  SpO2:  [95 %-96 %] 95 %  Temp (24hrs), Av.4 °F (36.9 °C), Min:97.9 °F (36.6 °C), Max:99.2 °F (37.3 °C)  Current: Temperature: 98.1 °F (36.7 °C)    Physical Exam:   General Appearance:  Alert, interactive, nontoxic, no acute distress.   Throat: Oropharynx moist without  lesions.    Lungs:   Clear to auscultation bilaterally; no wheezes, rhonchi or rales; respirations unlabored on room air   Heart:  RRR; no murmur, rub or gallop noted   Abdomen:   Soft, non-tender, non-distended, positive bowel sounds.     Extremities: No clubbing, cyanosis or edema   Skin: No new rashes or lesions. No new draining wounds noted.       Labs, Imaging, & Other studies:   All pertinent labs and imaging studies in PACS were personally reviewed as below.  Results from last 7 days   Lab Units 12/29/24  0528 12/28/24  0554 12/27/24  0356   WBC Thousand/uL 9.81 9.83 9.69   HEMOGLOBIN g/dL 11.4* 11.8 11.8   PLATELETS Thousands/uL 413* 394* 372     Results from last 7 days   Lab Units 12/30/24  0518   POTASSIUM mmol/L 3.6   CHLORIDE mmol/L 107   CO2 mmol/L 26   BUN mg/dL 12   CREATININE mg/dL 0.40*   EGFR ml/min/1.73sq m 90   CALCIUM mg/dL 8.8     Results from last 7 days   Lab Units 12/27/24  0401 12/23/24  1616   BLOOD CULTURE  No Growth at 48 hrs.  No Growth at 48 hrs. No Growth After 5 Days.  No Growth After 5 Days.       Lab interpretation/comments: Chemistry unremarkable    Imaging interpretation/comments: MICHAEL pending today    Culture data: Multiple repeat blood cultures now remain without growth    External notes: None

## 2024-12-30 NOTE — ASSESSMENT & PLAN NOTE
Patient reports abdominal pain, nausea and stool incontinence, colitis flare up in the setting of antibiotic use.   Colitis flare improved after completion of antibiotics inpatient    Plan:  Continue home mesalamine 800 mg twice daily

## 2024-12-30 NOTE — HOSPITAL COURSE
Left facial swelling, erythema and associated trismus and dysphagia.  Patient presented to ED in no respiratory distress and saturating in 90s on room air.  In the ED CT of the face showed acute parotitis with 2 mm obstructing parotid duct stone.  Reactive left-sided facial cellulitis and left masseter myositis noticed.  Patient was started on IV Unasyn and ENT was consulted.  ENT recommended warm compress, massage and pain control in addition to antibiotics.  Patient admitted to [facility] service. Pt's hospital course was complicated by 1 of 2 blood cultures from admission grew isolated MRSA.  Patient had 2D echo ordered that was notable for changes at the aortic valve which could represent focal vegetation. Infectious disease was consulted and recommended to continue 7-day antibiotic course and to obtain MIHCAEL. Second and third set of blood cultures were negative.

## 2024-12-30 NOTE — ASSESSMENT & PLAN NOTE
"Blood culture 1 out of 2 showing Gram + cocci. Repeat blood cultures show no growth at 24 hours.   Infectious diseases overall suspicion is that blood culture represents a contaminant    Plan:  Per ID, would not start antibiotics for this issue. Completed last dose of Augmentin for parotiditis 12/28  See rest of plan under \"parotiditis\".  Surveillance blood cultures as above. ID follow-up outpatient in 5 weeks  "

## 2024-12-30 NOTE — ASSESSMENT & PLAN NOTE
"Echo shows LVEF 65%, diastolic function Grade II relaxation. Aortic valve shows 7.0 mm x 6.0 mm small, sessile, echogenic mass on the aortic valve.  TTE notable for changes at the aortic valve which could represent focal calcification, fibroblastoma or vegetation. ID has lower suspicion for true endocarditis  Cardiology reviewed with pt that given her advanced age and issues with left pharyngeal swelling and swallowing difficulty, she would be high risk for MICHAEL and risk of upper pharyngeal laceration exists. Pt elected to not have procedure after hearing of risks.    Plan:  See \"Parotiditis\"  Recommend patient follow-up with cardiologist for follow-up 2D echo   "

## 2024-12-30 NOTE — SPEECH THERAPY NOTE
Speech Language/Pathology    Speech/Language Pathology Progress Note    Patient Name: Helen M Schwab  Today's Date: 12/30/2024     Problem List  Principal Problem:    Parotiditis  Active Problems:    Ulcerative colitis with complication (HCC)    Gastroesophageal reflux    Fibromyalgia    Hypertension    Abnormal echocardiogram    Positive blood culture       Past Medical History  Past Medical History:   Diagnosis Date    Arthritis     Bell's palsy 1940    Colitis     GERD (gastroesophageal reflux disease)     Hemorrhoids     History of transfusion     Hx of colonoscopy 1996, 1998, 2000, 2002, 2003, 2007, 2012    Hypertension     Ulcerative colitis (HCC)         Past Surgical History  Past Surgical History:   Procedure Laterality Date    BACK SURGERY  1971    ruptured disk    BACK SURGERY  1972    bone fusion lumbar spine    BLADDER REPAIR  2002    BLADDER REPAIR  2012    BREAST SURGERY  2011    CARDIAC SURGERY  2014    stent    CARPAL TUNNEL RELEASE  08/14/2014    CATARACT EXTRACTION Right 2015    CHOLECYSTECTOMY      COLONOSCOPY      EYE SURGERY Left     GALLBLADDER SURGERY  1995    GANGLION CYST EXCISION  2004    left wrist    HERNIA REPAIR  2001    HERNIA REPAIR  2003    MASTECTOMY  2012    OVARY SURGERY  1984    PARTIAL HYSTERECTOMY  1964    AL EXC B9 LESION MRGN XCP SK TG T/A/L 0.5 CM/< Left 4/27/2021    Procedure: Excision pyogenic granuloma left ring finger;  Surgeon: Shane Lincoln MD;  [facility]: [facility];  Service: Orthopedics    AL EXC LESION TDN SHTH/JT CAPSL HAND/FNGR Right 10/9/2018    Procedure: EXCISION GANGLION CYST RIGHT SMALL FINGER;  Surgeon: Shane Lincoln MD;  [facility];  Service: Orthopedics    AL NDSC WRST SURG W/RLS TRANSVRS CARPL LIGM Left 8/10/2021    Procedure: Left endoscopic carpal tunnel release;  Surgeon: Shane Lincoln MD;  [facility];  Service: Orthopedics    AL TENDON SHEATH INCISION Right 4/5/2016    Procedure: INDEX TRIGGER FINGER AND RING  TRIGGER FINGER RELEASE ;  Surgeon: Shane Lincoln MD;  [facility];  Service: Orthopedics    VT TENDON SHEATH INCISION Left 10/16/2018    Procedure: RELEASE TRIGGER FINGER - LEFT INDEX;  Surgeon: Shane Lincoln MD;  [facility];  Service: Orthopedics    VT TENDON SHEATH INCISION Left 8/10/2021    Procedure: Left long and ring finger trigger finger release;  Surgeon: Shane Lincoln MD;  [facility];  Service: Orthopedics    SPLENECTOMY, PARTIAL  1995    TONSILLECTOMY  1939    WRIST SURGERY Right 1952         Subjective:  Pt alert and upright in chair. Decreased participation.     Objective:  Pt seen for f/u dysphagia tx. Minimal trials of regular solids/thin liquids initiated. Oral and pharyngeal stage WNL. No facial swelling noted. Timely mastication. No overt s/s of aspiration. Pt reporting no current difficulty with regular/thins.     Assessment:  Pt tolerating regular/thins without difficulty. Oral and pharyngeal stage WNL. No overt s/s of aspiration.     Plan/Recommendations:  Continue with regular/thin liquid diet. No further needs identified at this time. No further f/u needed. Discussed in detail with pt. Please re consult with any concerns/changes.

## 2024-12-30 NOTE — ASSESSMENT & PLAN NOTE
Patient had 2D echo ordered in the setting of 1 out of 2 blood cultures being positive.  Echo notable for changes at the aortic valve which could represent focal calcification, fibroblastoma or vegetation.  Clinically my suspicion is lower for true endocarditis.  Unfortunately no other images available for comparison.  Patient given her age is uncertain if she wishes to pursue additional imaging and treatment.  Discussed with her cardiologist at her request and given their agreement she is agreeable to imaging, there is suspicion also remains low.  Maintain off antibiotics  Will follow-up pending cultures  Follow-up transesophageal echo when possible  We will determine need for further antibiotics based on results

## 2024-12-30 NOTE — PLAN OF CARE
Problem: Potential for Falls  Goal: Patient will remain free of falls  Description: INTERVENTIONS:  - Educate patient/family on patient safety including physical limitations  - Instruct patient to call for assistance with activity   - Consult OT/PT to assist with strengthening/mobility   - Keep Call bell within reach  - Keep bed low and locked with side rails adjusted as appropriate  - Keep care items and personal belongings within reach  - Initiate and maintain comfort rounds  - Make Fall Risk Sign visible to staff  - Offer Toileting every  Hours, in advance of need  - Initiate/Maintain alarm  - Obtain necessary fall risk management equipment:   - Apply yellow socks and bracelet for high fall risk patients  - Consider moving patient to room near nurses station  Outcome: Progressing     Problem: PAIN - ADULT  Goal: Verbalizes/displays adequate comfort level or baseline comfort level  Description: Interventions:  - Encourage patient to monitor pain and request assistance  - Assess pain using appropriate pain scale  - Administer analgesics based on type and severity of pain and evaluate response  - Implement non-pharmacological measures as appropriate and evaluate response  - Consider cultural and social influences on pain and pain management  - Notify physician/advanced practitioner if interventions unsuccessful or patient reports new pain  Outcome: Progressing     Problem: INFECTION - ADULT  Goal: Absence or prevention of progression during hospitalization  Description: INTERVENTIONS:  - Assess and monitor for signs and symptoms of infection  - Monitor lab/diagnostic results  - Monitor all insertion sites, i.e. indwelling lines, tubes, and drains  - Monitor endotracheal if appropriate and nasal secretions for changes in amount and color  - [place] appropriate cooling/warming therapies per order  - Administer medications as ordered  - Instruct and encourage patient and family to use good hand hygiene technique  -  Identify and instruct in appropriate isolation precautions for identified infection/condition  Outcome: Progressing  Goal: Absence of fever/infection during neutropenic period  Description: INTERVENTIONS:  - Monitor WBC    Outcome: Progressing     Problem: SAFETY ADULT  Goal: Patient will remain free of falls  Description: INTERVENTIONS:  - Educate patient/family on patient safety including physical limitations  - Instruct patient to call for assistance with activity   - Consult OT/PT to assist with strengthening/mobility   - Keep Call bell within reach  - Keep bed low and locked with side rails adjusted as appropriate  - Keep care items and personal belongings within reach  - Initiate and maintain comfort rounds  - Make Fall Risk Sign visible to staff  - Offer Toileting every  Hours, in advance of need  - Initiate/Maintain alarm  - Obtain necessary fall risk management equipment:   - Apply yellow socks and bracelet for high fall risk patients  - Consider moving patient to room near nurses station  Outcome: Progressing  Goal: Maintain or return to baseline ADL function  Description: INTERVENTIONS:  -  Assess patient's ability to carry out ADLs; assess patient's baseline for ADL function and identify physical deficits which impact ability to perform ADLs (bathing, care of mouth/teeth, toileting, grooming, dressing, etc.)  - Assess/evaluate cause of self-care deficits   - Assess range of motion  - Assess patient's mobility; develop plan if impaired  - Assess patient's need for assistive devices and provide as appropriate  - Encourage maximum independence but intervene and supervise when necessary  - Involve family in performance of ADLs  - Assess for home care needs following discharge   - Consider OT consult to assist with ADL evaluation and planning for discharge  - Provide patient education as appropriate  Outcome: Progressing  Goal: Maintains/Returns to pre admission functional level  Description:  INTERVENTIONS:  - Perform AM-PAC 6 Click Basic Mobility/ Daily Activity assessment daily.  - Set and communicate daily mobility goal to care team and patient/family/caregiver.   - Collaborate with rehabilitation services on mobility goals if consulted  - Perform Range of Motion  times a day.  - Reposition patient every  hours.  - Dangle patient  times a day  - Stand patient  times a day  - Ambulate patient  times a day  - Out of bed to chair  times a day   - Out of bed for meals  times a day  - Out of bed for toileting  - Record patient progress and toleration of activity level   Outcome: Progressing     Problem: GASTROINTESTINAL - ADULT  Goal: Minimal or absence of nausea and/or vomiting  Description: INTERVENTIONS:  - Administer IV fluids if ordered to ensure adequate hydration  - Maintain NPO status until nausea and vomiting are resolved  - Nasogastric tube if ordered  - Administer ordered antiemetic medications as needed  - Provide nonpharmacologic comfort measures as appropriate  - Advance diet as tolerated, if ordered  - Consider nutrition services referral to assist patient with adequate nutrition and appropriate food choices  Outcome: Progressing  Goal: Maintains or returns to baseline bowel function  Description: INTERVENTIONS:  - Assess bowel function  - Encourage oral fluids to ensure adequate hydration  - Administer IV fluids if ordered to ensure adequate hydration  - Administer ordered medications as needed  - Encourage mobilization and activity  - Consider nutritional services referral to assist patient with adequate nutrition and appropriate food choices  Outcome: Progressing  Goal: Maintains adequate nutritional intake  Description: INTERVENTIONS:  - Monitor percentage of each meal consumed  - Identify factors contributing to decreased intake, treat as appropriate  - Assist with meals as needed  - Monitor I&O, weight, and lab values if indicated  - Obtain nutrition services referral as  needed  Outcome: Progressing  Goal: Establish and maintain optimal ostomy function  Description: INTERVENTIONS:  - Assess bowel function  - Encourage oral fluids to ensure adequate hydration  - Administer IV fluids if ordered to ensure adequate hydration   - Administer ordered medications as needed  - Encourage mobilization and activity  - Nutrition services referral to assist patient with appropriate food choices  - Assess stoma site  - Consider wound care consult   Outcome: Progressing  Goal: Oral mucous membranes remain intact  Description: INTERVENTIONS  - Assess oral mucosa and hygiene practices  - Implement preventative oral hygiene regimen  - Implement oral medicated treatments as ordered  - Initiate Nutrition services referral as needed  Outcome: Progressing

## 2024-12-30 NOTE — DISCHARGE INSTR - AVS FIRST PAGE
Dear Helen M Schwab,     It was our pleasure to care for you here at [facility].  It is our hope that we were always able to exceed the expected standards for your care during your stay.  You were hospitalized due to left parotiditis.  You were cared for on the south third floor by Elaine Zhao MD under the service of Hamzah Yan MD with the [facility] who covers for your primary care physician (PCP), Shant Lucas MD, while you were hospitalized.  If you have any questions or concerns related to this hospitalization, you may contact us at .  For follow up as well as any medication refills, we recommend that you follow up with your primary care physician.  A registered nurse will reach out to you by phone within a few days after your discharge to answer any additional questions that you may have after going home.  However, at this time we provide for you here, the most important instructions / recommendations at discharge:     Notable Medication Adjustments -   Take over the counter probiotics for diarrhea.  Please follow-up with your primary care provider regarding your blood pressure medication management.  Please continue the rest of your home medications as prescribed.  Testing Required after Discharge -   Obtain surveillance blood cultures 2 weeks and 4 weeks after discharge  Recommend patient follow-up with cardiologist for follow-up 2D echo  Cardiology evaluation appreciated  Important follow up information -   Please follow-up with your primary care provider within 1 week of discharge.  Please follow up with your outpatient cardiologist, Dr. Vasquez, in 1-2 weeks.  Please follow up with [facility] (Dr. Evans) in 2-4 weeks.  Plan for follow-up in [facility] Infect[facility] Disease office in 5 weeks to follow-up cultures/echo/progress  Other Instructions -   Sialadenitis protocol:  Warm compresses to the affected area.    Sialogogues (sour candies, lemon, citrus, vinegar).   NSAID such as Ibuprofen for pain relief and to reduce inflammation.   Continue hydration  Parotitis: Massage the gland from angle of mandible working anteriorly along the cheek towards the oral commissure to facilitate drainage through the ductal opening  ENT outpatient follow-up in 2-4 weeks  Return to the hospital if you begin to experience high fever, chest pain, or difficulty breathing.  Please continue to take your blood pressure daily at home.  Please review this entire after visit summary as additional general instructions including medication list, appointments, activity, diet, any pertinent wound care, and other additional recommendations from your care team that may be provided for you.      Sincerely,     Elaine Zhao MD

## 2024-12-31 VITALS
WEIGHT: 127 LBS | DIASTOLIC BLOOD PRESSURE: 81 MMHG | SYSTOLIC BLOOD PRESSURE: 172 MMHG | HEIGHT: 62 IN | BODY MASS INDEX: 23.37 KG/M2 | TEMPERATURE: 97.8 F | OXYGEN SATURATION: 96 % | RESPIRATION RATE: 18 BRPM | HEART RATE: 74 BPM

## 2024-12-31 PROCEDURE — 99233 SBSQ HOSP IP/OBS HIGH 50: CPT | Performed by: INTERNAL MEDICINE

## 2024-12-31 RX ORDER — METOPROLOL SUCCINATE 100 MG/1
50 TABLET, EXTENDED RELEASE ORAL DAILY
Qty: 30 TABLET | Refills: 0 | Status: ON HOLD | OUTPATIENT
Start: 2024-12-31

## 2024-12-31 RX ADMIN — ACETAMINOPHEN 975 MG: 325 TABLET, FILM COATED ORAL at 14:34

## 2024-12-31 RX ADMIN — MESALAMINE 800 MG: 400 CAPSULE, DELAYED RELEASE ORAL at 09:15

## 2024-12-31 RX ADMIN — METHYLPREDNISOLONE 2 MG: 4 TABLET ORAL at 09:15

## 2024-12-31 RX ADMIN — Medication 250 MG: at 09:15

## 2024-12-31 RX ADMIN — METOPROLOL SUCCINATE 50 MG: 50 TABLET, EXTENDED RELEASE ORAL at 09:15

## 2024-12-31 RX ADMIN — Medication 5000 UNITS: at 09:15

## 2024-12-31 NOTE — PLAN OF CARE
Problem: Potential for Falls  Goal: Patient will remain free of falls  Description: INTERVENTIONS:  - Educate patient/family on patient safety including physical limitations  - Instruct patient to call for assistance with activity   - Consult OT/PT to assist with strengthening/mobility   - Keep Call bell within reach  - Keep bed low and locked with side rails adjusted as appropriate  - Keep care items and personal belongings within reach  - Initiate and maintain comfort rounds  - Make Fall Risk Sign visible to staff  - Offer Toileting every 2 Hours, in advance of need  - Initiate/Maintain alarm  - Obtain necessary fall risk management equipment:   - Apply yellow socks and bracelet for high fall risk patients  - Consider moving patient to room near nurses station  Outcome: Progressing     Problem: PAIN - ADULT  Goal: Verbalizes/displays adequate comfort level or baseline comfort level  Description: Interventions:  - Encourage patient to monitor pain and request assistance  - Assess pain using appropriate pain scale  - Administer analgesics based on type and severity of pain and evaluate response  - Implement non-pharmacological measures as appropriate and evaluate response  - Consider cultural and social influences on pain and pain management  - Notify physician/advanced practitioner if interventions unsuccessful or patient reports new pain  Outcome: Progressing     Problem: INFECTION - ADULT  Goal: Absence or prevention of progression during hospitalization  Description: INTERVENTIONS:  - Assess and monitor for signs and symptoms of infection  - Monitor lab/diagnostic results  - Monitor all insertion sites, i.e. indwelling lines, tubes, and drains  - Monitor endotracheal if appropriate and nasal secretions for changes in amount and color  - [place] appropriate cooling/warming therapies per order  - Administer medications as ordered  - Instruct and encourage patient and family to use good hand hygiene  technique  - Identify and instruct in appropriate isolation precautions for identified infection/condition  Outcome: Progressing  Goal: Absence of fever/infection during neutropenic period  Description: INTERVENTIONS:  - Monitor WBC    Outcome: Progressing     Problem: SAFETY ADULT  Goal: Patient will remain free of falls  Description: INTERVENTIONS:  - Educate patient/family on patient safety including physical limitations  - Instruct patient to call for assistance with activity   - Consult OT/PT to assist with strengthening/mobility   - Keep Call bell within reach  - Keep bed low and locked with side rails adjusted as appropriate  - Keep care items and personal belongings within reach  - Initiate and maintain comfort rounds  - Make Fall Risk Sign visible to staff  - Offer Toileting every  Hours, in advance of need  - Initiate/Maintain alarm  - Obtain necessary fall risk management equipment:   - Apply yellow socks and bracelet for high fall risk patients  - Consider moving patient to room near nurses station  Outcome: Progressing  Goal: Maintain or return to baseline ADL function  Description: INTERVENTIONS:  -  Assess patient's ability to carry out ADLs; assess patient's baseline for ADL function and identify physical deficits which impact ability to perform ADLs (bathing, care of mouth/teeth, toileting, grooming, dressing, etc.)  - Assess/evaluate cause of self-care deficits   - Assess range of motion  - Assess patient's mobility; develop plan if impaired  - Assess patient's need for assistive devices and provide as appropriate  - Encourage maximum independence but intervene and supervise when necessary  - Involve family in performance of ADLs  - Assess for home care needs following discharge   - Consider OT consult to assist with ADL evaluation and planning for discharge  - Provide patient education as appropriate  Outcome: Progressing  Goal: Maintains/Returns to pre admission functional level  Description:  INTERVENTIONS:  - Perform AM-PAC 6 Click Basic Mobility/ Daily Activity assessment daily.  - Set and communicate daily mobility goal to care team and patient/family/caregiver.   - Collaborate with rehabilitation services on mobility goals if consulted  - Perform Range of Motion 2 times a day.  - Reposition patient every 2 hours.  - Dangle patient 2 times a day  - Stand patient 2 times a day  - Ambulate patient 2 times a day  - Out of bed to chair 2 times a day   - Out of bed for meals 2 times a day  - Out of bed for toileting  - Record patient progress and toleration of activity level   Outcome: Progressing     Problem: Nutrition/Hydration-ADULT  Goal: Nutrient/Hydration intake appropriate for improving, restoring or maintaining nutritional needs  Description: Monitor and assess patient's nutrition/hydration status for malnutrition. Collaborate with interdisciplinary team and initiate plan and interventions as ordered.  Monitor patient's weight and dietary intake as ordered or per policy. Utilize nutrition screening tool and intervene as necessary. Determine patient's food preferences and provide high-protein, high-caloric foods as appropriate.     INTERVENTIONS:  - Monitor oral intake, urinary output, labs, and treatment plans  - Assess nutrition and hydration status and recommend course of action  - Evaluate amount of meals eaten  - Assist patient with eating if necessary   - Allow adequate time for meals  - Recommend/ encourage appropriate diets, oral nutritional supplements, and vitamin/mineral supplements  - Order, calculate, and assess calorie counts as needed  - Recommend, monitor, and adjust tube feedings and TPN/PPN based on assessed needs  - Assess need for intravenous fluids  - Provide specific nutrition/hydration education as appropriate  - Include patient/family/caregiver in decisions related to nutrition  Outcome: Progressing     Problem: Prexisting or High Potential for Compromised Skin  Integrity  Goal: Skin integrity is maintained or improved  Description: INTERVENTIONS:  - Identify patients at risk for skin breakdown  - Assess and monitor skin integrity  - Assess and monitor nutrition and hydration status  - Monitor labs   - Assess for incontinence   - Turn and reposition patient  - Assist with mobility/ambulation  - Relieve pressure over bony prominences  - Avoid friction and shearing  - Provide appropriate hygiene as needed including keeping skin clean and dry  - Evaluate need for skin moisturizer/barrier cream  - Collaborate with interdisciplinary team   - Patient/family teaching  - Consider wound care consult   Outcome: Progressing     Problem: RESPIRATORY - ADULT  Goal: Achieves optimal ventilation and oxygenation  Description: INTERVENTIONS:  - Assess for changes in respiratory status  - Assess for changes in mentation and behavior  - Position to facilitate oxygenation and minimize respiratory effort  - Oxygen administered by appropriate delivery if ordered  - Initiate smoking cessation education as indicated  - Encourage broncho-pulmonary hygiene including cough, deep breathe, Incentive Spirometry  - Assess the need for suctioning and aspirate as needed  - Assess and instruct to report SOB or any respiratory difficulty  - Respiratory Therapy support as indicated  Outcome: Progressing     Problem: GASTROINTESTINAL - ADULT  Goal: Minimal or absence of nausea and/or vomiting  Description: INTERVENTIONS:  - Administer IV fluids if ordered to ensure adequate hydration  - Maintain NPO status until nausea and vomiting are resolved  - Nasogastric tube if ordered  - Administer ordered antiemetic medications as needed  - Provide nonpharmacologic comfort measures as appropriate  - Advance diet as tolerated, if ordered  - Consider nutrition services referral to assist patient with adequate nutrition and appropriate food choices  Outcome: Progressing  Goal: Maintains or returns to baseline bowel  function  Description: INTERVENTIONS:  - Assess bowel function  - Encourage oral fluids to ensure adequate hydration  - Administer IV fluids if ordered to ensure adequate hydration  - Administer ordered medications as needed  - Encourage mobilization and activity  - Consider nutritional services referral to assist patient with adequate nutrition and appropriate food choices  Outcome: Progressing  Goal: Maintains adequate nutritional intake  Description: INTERVENTIONS:  - Monitor percentage of each meal consumed  - Identify factors contributing to decreased intake, treat as appropriate  - Assist with meals as needed  - Monitor I&O, weight, and lab values if indicated  - Obtain nutrition services referral as needed  Outcome: Progressing  Goal: Establish and maintain optimal ostomy function  Description: INTERVENTIONS:  - Assess bowel function  - Encourage oral fluids to ensure adequate hydration  - Administer IV fluids if ordered to ensure adequate hydration   - Administer ordered medications as needed  - Encourage mobilization and activity  - Nutrition services referral to assist patient with appropriate food choices  - Assess stoma site  - Consider wound care consult   Outcome: Progressing  Goal: Oral mucous membranes remain intact  Description: INTERVENTIONS  - Assess oral mucosa and hygiene practices  - Implement preventative oral hygiene regimen  - Implement oral medicated treatments as ordered  - Initiate Nutrition services referral as needed  Outcome: Progressing

## 2024-12-31 NOTE — PROGRESS NOTES
Progress Note - Infectious Disease   Name: Helen M Schwab 89 y.o. female I MRN: [id number]  Unit/Bed#: S -01 I Date of Admission: 12/22/2024   Date of Service: 12/31/2024 I Hospital Day: 9     Assessment & Plan  Parotiditis  Patient presented with acute onset of worsening facial swelling and CT imaging confirmed findings involving the left parotid gland with surrounding inflammation.  She has overall clinically improved on Unasyn which would cover the majority of organisms present within the mouth and that typically lead to this issue.  Course now complicated by positive blood culture and abnormal echo as below.  Completed antibiotics on 12/28 and reports feeling well today.  No further antibiotics for this issue  Trend fever curve/WBC while admitted  Surveillance blood cultures as below  Follow-up with ENT  Follow-up with cardiology  Plans for follow-up in our office as below  Additional supportive care/discharge per primary  Positive blood culture  1 out of 2 blood cultures from admission isolated MRSA.  Patient without any lingering or significant symptoms leading up to admission and abrupt onset of left-sided facial swelling as above was reason for presentation.  Repeat blood cultures remain negative and patient has not been on appropriate therapy but yet her other clinical parameters have improved.  My overall suspicion is that this culture represents a contaminant.  She is without any intravascular devices or localizing symptoms otherwise.  Unfortunately echo was obtained and is abnormal as below.  Discussed case in detail with cardiology and with primary service and the risk associated with MICHAEL reviewed.  Given clinical suspicion is overall low we are all in agreement for surveillance blood cultures and monitoring 2D echo as outpatient.  No antibiotics for this issue  Will obtain surveillance blood cultures 2 weeks and 4 weeks after discharge  Recommend patient follow-up with cardiologist for follow-up  2D echo  Plan for follow-up in ID office in 5 weeks to follow-up cultures/echo/progress  Cardiology evaluation appreciated  Trend fever curve/WBC while admitted  Follow-up pending blood cultures while admitted  ID office staff notified of follow-up needs  Abnormal echocardiogram  Patient had 2D echo ordered in the setting of 1 out of 2 blood cultures being positive.  Echo notable for changes at the aortic valve which could represent focal calcification, fibroblastoma or vegetation.  Clinically my suspicion is lower for true endocarditis.  Case reviewed in detail with cardiologist including risk of MICHAEL and 2D echo again reviewed.  Plan for surveillance as above now.  Maintain off antibiotics  Will follow-up pending cultures  Surveillance blood cultures as above  Follow-up in our office  Follow-up in cardiology office for repeat 2D echo  Supportive care/discharge per primary    Above plan discussed in detail with the patient at bedside  Above plan discussed in detail with cardiology attending and primary service who are aware of plans for surveillance blood cultures, follow-up and clearance otherwise from an ID standpoint    ID consult service will sign off at this time.  Please call if questions.    Antibiotics:  None    24 Hour events:  Yesterday and overnight notes reviewed and no acute events noted.    Subjective:  Patient seen at bedside and she denied having any nausea, vomiting, chest pain or shortness of breath.  She is overall feeling well.  Was able to discuss her case later today with cardiology attending as well as with primary service.  Unfortunately given her imaging there is a risk associated with performing transesophageal echo and again it is uncertain if truly more detail will be gained from the image.  We reviewed her clinical picture which overall seems very low risk.  We were all in agreement including the patient to obtaining surveillance blood cultures at this point and follow-up as outpatient  with her cardiologist for surveillance echo.  If echo largely stable and surveillance blood cultures remain negative then it is unlikely that further cardiac imaging would be of benefit or add to this patient's clinical picture.  She overall seems very clear about her comorbid conditions and limited interventions she wishes to pursue given her age.  She is hopeful to go home today.  Discussed with primary.    Objective:  Vitals:  Temp:  [97.4 °F (36.3 °C)-97.8 °F (36.6 °C)] 97.8 °F (36.6 °C)  HR:  [74-83] 74  Resp:  [18] 18  BP: (107-176)/(71-85) 172/81  SpO2:  [96 %] 96 %  Temp (24hrs), Av.6 °F (36.4 °C), Min:97.4 °F (36.3 °C), Max:97.8 °F (36.6 °C)  Current: Temperature: 97.8 °F (36.6 °C)    Physical Exam:   General Appearance:  Alert, interactive, nontoxic, no acute distress.   Throat: Oropharynx moist without lesions.    Lungs:   Clear to auscultation bilaterally; no wheezes, rhonchi or rales; respirations unlabored on room air   Heart:  RRR; no murmur, rub or gallop noted   Abdomen:   Soft, non-tender, non-distended, positive bowel sounds.     Extremities: No clubbing, cyanosis or edema   Skin: No new rashes or lesions. No new draining wounds noted.       Labs, Imaging, & Other studies:   All pertinent labs and imaging studies in PACS were personally reviewed as below.  Results from last 7 days   Lab Units 24  0528 24  0554 24  0356   WBC Thousand/uL 9.81 9.83 9.69   HEMOGLOBIN g/dL 11.4* 11.8 11.8   PLATELETS Thousands/uL 413* 394* 372     Results from last 7 days   Lab Units 24  0518   POTASSIUM mmol/L 3.6   CHLORIDE mmol/L 107   CO2 mmol/L 26   BUN mg/dL 12   CREATININE mg/dL 0.40*   EGFR ml/min/1.73sq m 90   CALCIUM mg/dL 8.8     Results from last 7 days   Lab Units 24  0401   BLOOD CULTURE  No Growth After 4 Days.  No Growth After 4 Days.       Lab interpretation/comments: No leukocytosis recently.    Imaging interpretation/comments: No new imaging    Culture data:  Multiple surveillance blood cultures remain negative    External notes: None

## 2024-12-31 NOTE — ASSESSMENT & PLAN NOTE
Patient presented with acute onset of worsening facial swelling and CT imaging confirmed findings involving the left parotid gland with surrounding inflammation.  She has overall clinically improved on Unasyn which would cover the majority of organisms present within the mouth and that typically lead to this issue.  Course now complicated by positive blood culture and abnormal echo as below.  Completed antibiotics on 12/28 and reports feeling well today.  No further antibiotics for this issue  Trend fever curve/WBC while admitted  Surveillance blood cultures as below  Follow-up with ENT  Follow-up with cardiology  Plans for follow-up in our office as below  Additional supportive care/discharge per primary

## 2024-12-31 NOTE — ASSESSMENT & PLAN NOTE
1 out of 2 blood cultures from admission isolated MRSA.  Patient without any lingering or significant symptoms leading up to admission and abrupt onset of left-sided facial swelling as above was reason for presentation.  Repeat blood cultures remain negative and patient has not been on appropriate therapy but yet her other clinical parameters have improved.  My overall suspicion is that this culture represents a contaminant.  She is without any intravascular devices or localizing symptoms otherwise.  Unfortunately echo was obtained and is abnormal as below.  Discussed case in detail with cardiology and with primary service and the risk associated with MICHAEL reviewed.  Given clinical suspicion is overall low we are all in agreement for surveillance blood cultures and monitoring 2D echo as outpatient.  No antibiotics for this issue  Will obtain surveillance blood cultures 2 weeks and 4 weeks after discharge  Recommend patient follow-up with cardiologist for follow-up 2D echo  Plan for follow-up in ID office in 5 weeks to follow-up cultures/echo/progress  Cardiology evaluation appreciated  Trend fever curve/WBC while admitted  Follow-up pending blood cultures while admitted  ID office staff notified of follow-up needs   Patient

## 2024-12-31 NOTE — CASE MANAGEMENT
Case Management Discharge Planning Note    Patient name Helen M Schwab Location S /S -01 MRN [id number]  : 1932 Date 2024       Current Admission Date: 2024  Current Admission Diagnosis:Parotiditis   Patient Active Problem List    Diagnosis Date Noted Date Diagnosed    Abnormal echocardiogram 2024     Positive blood culture 2024     Parotiditis 2024     Fibromyalgia 2024     Hypertension 2024     Urinary incontinence 2021     Gastroesophageal reflux 2021     Carpal tunnel syndrome on left 08/10/2021     Back pain 2021     Ambulatory dysfunction 2021     Ulcerative colitis with complication (HCC) 2021     History of fusion of lumbar spine 2021     Mass of right finger 08/15/2018     Trigger finger, left index finger 08/15/2018       LOS (days): 9  Geometric Mean LOS (GMLOS) (days): 2.8  Days to GMLOS:-6.3     OBJECTIVE:  Risk of Unplanned Readmission Score: 9.54         Current admission status: Inpatient   Preferred Pharmacy:   [id number] - [place], [place] - [address] [address]  Phone: [phone] Fax: [phone]    Primary Care Provider: Shant Lucas MD    Primary Insurance: MEDICARE  Secondary Insurance: [facility] [employer] PROGRAM    DISCHARGE DETAILS:                                                                                     IMM reviewed with patient, patient agrees with discharge determination.  IMM Given (Date):: 24  IMM Given to:: Patient

## 2025-01-01 LAB
BACTERIA BLD CULT: NORMAL
BACTERIA BLD CULT: NORMAL

## 2025-01-02 ENCOUNTER — HOME CARE VISIT (OUTPATIENT)
Dept: HOME HEALTH SERVICES | Facility: HOME HEALTHCARE | Age: OVER 89
End: 2025-01-02
Payer: MEDICARE

## 2025-01-02 ENCOUNTER — TELEPHONE (OUTPATIENT)
Dept: GASTROENTEROLOGY | Facility: CLINIC | Age: OVER 89
End: 2025-01-02

## 2025-01-02 VITALS
TEMPERATURE: 98.6 F | SYSTOLIC BLOOD PRESSURE: 122 MMHG | OXYGEN SATURATION: 97 % | DIASTOLIC BLOOD PRESSURE: 70 MMHG | RESPIRATION RATE: 18 BRPM | HEART RATE: 70 BPM

## 2025-01-02 DIAGNOSIS — R78.81 POSITIVE BLOOD CULTURE: Primary | ICD-10-CM

## 2025-01-02 DIAGNOSIS — R93.1 ABNORMAL ECHOCARDIOGRAM: ICD-10-CM

## 2025-01-02 PROCEDURE — 400013 VN SOC

## 2025-01-02 PROCEDURE — G0299 HHS/HOSPICE OF RN EA 15 MIN: HCPCS

## 2025-01-02 PROCEDURE — 10330081 VN NO-PAY CLAIM PROCEDURE

## 2025-01-03 ENCOUNTER — HOME CARE VISIT (OUTPATIENT)
Dept: HOME HEALTH SERVICES | Facility: HOME HEALTHCARE | Age: OVER 89
End: 2025-01-03
Payer: MEDICARE

## 2025-01-03 ENCOUNTER — TELEPHONE (OUTPATIENT)
Dept: INFECTIOUS DISEASES | Facility: CLINIC | Age: OVER 89
End: 2025-01-03

## 2025-01-03 NOTE — TELEPHONE ENCOUNTER
Called and spoke with patient today.   Patient aware of blood culture order. Also reminded patient of follow up appointment at this time.   Informed patient if there are any further questions or concerns to call the office   Patient verbalizes understanding at this time.

## 2025-01-03 NOTE — CASE COMMUNICATION
1/2/25 -  As per order in Epic - Obtain blood cultures x 2 sites -  ( R and L arm) - To obtain on 1/11/25 and 1/25/25  Results to Dr. Avinash Whyte, Infectious disease

## 2025-01-05 ENCOUNTER — HOME CARE VISIT (OUTPATIENT)
Dept: HOME HEALTH SERVICES | Facility: HOME HEALTHCARE | Age: OVER 89
End: 2025-01-05
Payer: MEDICARE

## 2025-01-07 ENCOUNTER — HOME CARE VISIT (OUTPATIENT)
Dept: HOME HEALTH SERVICES | Facility: HOME HEALTHCARE | Age: OVER 89
End: 2025-01-07
Payer: MEDICARE

## 2025-01-07 VITALS
OXYGEN SATURATION: 98 % | HEART RATE: 68 BPM | DIASTOLIC BLOOD PRESSURE: 70 MMHG | RESPIRATION RATE: 20 BRPM | SYSTOLIC BLOOD PRESSURE: 130 MMHG | TEMPERATURE: 98 F

## 2025-01-07 PROCEDURE — G0152 HHCP-SERV OF OT,EA 15 MIN: HCPCS

## 2025-01-07 PROCEDURE — G0299 HHS/HOSPICE OF RN EA 15 MIN: HCPCS

## 2025-01-08 ENCOUNTER — HOME CARE VISIT (OUTPATIENT)
Dept: HOME HEALTH SERVICES | Facility: HOME HEALTHCARE | Age: OVER 89
End: 2025-01-08
Payer: MEDICARE

## 2025-01-08 VITALS — OXYGEN SATURATION: 97 % | HEART RATE: 70 BPM | DIASTOLIC BLOOD PRESSURE: 64 MMHG | SYSTOLIC BLOOD PRESSURE: 128 MMHG

## 2025-01-08 PROCEDURE — G0151 HHCP-SERV OF PT,EA 15 MIN: HCPCS

## 2025-01-09 PROCEDURE — G0180 MD CERTIFICATION HHA PATIENT: HCPCS | Performed by: HOSPITALIST

## 2025-01-11 ENCOUNTER — APPOINTMENT (INPATIENT)
Dept: CT IMAGING | Facility: HOSPITAL | Age: OVER 89
DRG: 155 | End: 2025-01-11
Payer: MEDICARE

## 2025-01-11 ENCOUNTER — HOME CARE VISIT (OUTPATIENT)
Dept: HOME HEALTH SERVICES | Facility: HOME HEALTHCARE | Age: OVER 89
End: 2025-01-11
Payer: MEDICARE

## 2025-01-11 ENCOUNTER — HOSPITAL ENCOUNTER (INPATIENT)
Facility: HOSPITAL | Age: OVER 89
LOS: 5 days | Discharge: HOME | DRG: 155 | End: 2025-01-16
Attending: EMERGENCY MEDICINE | Admitting: STUDENT IN AN ORGANIZED HEALTH CARE EDUCATION/TRAINING PROGRAM
Payer: MEDICARE

## 2025-01-11 DIAGNOSIS — K11.20 PAROTIDITIS: Primary | ICD-10-CM

## 2025-01-11 PROBLEM — K51.90 ULCERATIVE COLITIS WITHOUT COMPLICATIONS (HCC): Status: ACTIVE | Noted: 2021-06-08

## 2025-01-11 LAB
ALBUMIN SERPL BCG-MCNC: 4 G/DL (ref 3.5–5)
ALP SERPL-CCNC: 61 U/L (ref 34–104)
ALT SERPL W P-5'-P-CCNC: 14 U/L (ref 7–52)
ANION GAP SERPL CALCULATED.3IONS-SCNC: 7 MMOL/L (ref 4–13)
AST SERPL W P-5'-P-CCNC: 37 U/L (ref 13–39)
BASOPHILS # BLD AUTO: 0.04 THOUSANDS/ΜL (ref 0–0.1)
BASOPHILS NFR BLD AUTO: 0 % (ref 0–1)
BILIRUB SERPL-MCNC: 0.86 MG/DL (ref 0.2–1)
BUN SERPL-MCNC: 19 MG/DL (ref 5–25)
CALCIUM SERPL-MCNC: 9.4 MG/DL (ref 8.4–10.2)
CHLORIDE SERPL-SCNC: 101 MMOL/L (ref 96–108)
CO2 SERPL-SCNC: 29 MMOL/L (ref 21–32)
CREAT SERPL-MCNC: 0.62 MG/DL (ref 0.6–1.3)
EOSINOPHIL # BLD AUTO: 0.03 THOUSAND/ΜL (ref 0–0.61)
EOSINOPHIL NFR BLD AUTO: 0 % (ref 0–6)
ERYTHROCYTE [DISTWIDTH] IN BLOOD BY AUTOMATED COUNT: 15.4 % (ref 11.6–15.1)
GFR SERPL CREATININE-BSD FRML MDRD: 78 ML/MIN/1.73SQ M
GLUCOSE SERPL-MCNC: 107 MG/DL (ref 65–140)
HCT VFR BLD AUTO: 37.1 % (ref 34.8–46.1)
HGB BLD-MCNC: 12.3 G/DL (ref 11.5–15.4)
IMM GRANULOCYTES # BLD AUTO: 0.06 THOUSAND/UL (ref 0–0.2)
IMM GRANULOCYTES NFR BLD AUTO: 0 % (ref 0–2)
LYMPHOCYTES # BLD AUTO: 1.87 THOUSANDS/ΜL (ref 0.6–4.47)
LYMPHOCYTES NFR BLD AUTO: 12 % (ref 14–44)
MCH RBC QN AUTO: 32.4 PG (ref 26.8–34.3)
MCHC RBC AUTO-ENTMCNC: 33.2 G/DL (ref 31.4–37.4)
MCV RBC AUTO: 98 FL (ref 82–98)
MONOCYTES # BLD AUTO: 1.4 THOUSAND/ΜL (ref 0.17–1.22)
MONOCYTES NFR BLD AUTO: 9 % (ref 4–12)
NEUTROPHILS # BLD AUTO: 12.85 THOUSANDS/ΜL (ref 1.85–7.62)
NEUTS SEG NFR BLD AUTO: 79 % (ref 43–75)
NRBC BLD AUTO-RTO: 0 /100 WBCS
PLATELET # BLD AUTO: 527 THOUSANDS/UL (ref 149–390)
PMV BLD AUTO: 10.4 FL (ref 8.9–12.7)
POTASSIUM SERPL-SCNC: 4.4 MMOL/L (ref 3.5–5.3)
PROT SERPL-MCNC: 7.3 G/DL (ref 6.4–8.4)
RBC # BLD AUTO: 3.8 MILLION/UL (ref 3.81–5.12)
SODIUM SERPL-SCNC: 137 MMOL/L (ref 135–147)
WBC # BLD AUTO: 16.25 THOUSAND/UL (ref 4.31–10.16)

## 2025-01-11 PROCEDURE — 99285 EMERGENCY DEPT VISIT HI MDM: CPT | Performed by: EMERGENCY MEDICINE

## 2025-01-11 PROCEDURE — 87205 SMEAR GRAM STAIN: CPT

## 2025-01-11 PROCEDURE — 85025 COMPLETE CBC W/AUTO DIFF WBC: CPT | Performed by: EMERGENCY MEDICINE

## 2025-01-11 PROCEDURE — 80053 COMPREHEN METABOLIC PANEL: CPT | Performed by: EMERGENCY MEDICINE

## 2025-01-11 PROCEDURE — 87077 CULTURE AEROBIC IDENTIFY: CPT

## 2025-01-11 PROCEDURE — 70491 CT SOFT TISSUE NECK W/DYE: CPT

## 2025-01-11 PROCEDURE — 87147 CULTURE TYPE IMMUNOLOGIC: CPT

## 2025-01-11 PROCEDURE — 87186 SC STD MICRODIL/AGAR DIL: CPT

## 2025-01-11 PROCEDURE — 99221 1ST HOSP IP/OBS SF/LOW 40: CPT | Performed by: STUDENT IN AN ORGANIZED HEALTH CARE EDUCATION/TRAINING PROGRAM

## 2025-01-11 PROCEDURE — 87070 CULTURE OTHR SPECIMN AEROBIC: CPT

## 2025-01-11 PROCEDURE — 99284 EMERGENCY DEPT VISIT MOD MDM: CPT

## 2025-01-11 PROCEDURE — 36415 COLL VENOUS BLD VENIPUNCTURE: CPT

## 2025-01-11 PROCEDURE — 99223 1ST HOSP IP/OBS HIGH 75: CPT | Performed by: INTERNAL MEDICINE

## 2025-01-11 RX ORDER — KETOROLAC TROMETHAMINE 30 MG/ML
15 INJECTION, SOLUTION INTRAMUSCULAR; INTRAVENOUS EVERY 6 HOURS PRN
Status: DISPENSED | OUTPATIENT
Start: 2025-01-11 | End: 2025-01-14

## 2025-01-11 RX ORDER — CLONIDINE HYDROCHLORIDE 0.1 MG/1
0.1 TABLET ORAL DAILY PRN
Status: DISCONTINUED | OUTPATIENT
Start: 2025-01-11 | End: 2025-01-16 | Stop reason: HOSPADM

## 2025-01-11 RX ORDER — ENOXAPARIN SODIUM 100 MG/ML
40 INJECTION SUBCUTANEOUS DAILY
Status: DISCONTINUED | OUTPATIENT
Start: 2025-01-12 | End: 2025-01-16 | Stop reason: HOSPADM

## 2025-01-11 RX ORDER — BIMATOPROST 0.3 MG/ML
1 SOLUTION/ DROPS OPHTHALMIC DAILY
Status: DISCONTINUED | OUTPATIENT
Start: 2025-01-12 | End: 2025-01-16 | Stop reason: HOSPADM

## 2025-01-11 RX ORDER — DIPHENOXYLATE HYDROCHLORIDE AND ATROPINE SULFATE 2.5; .025 MG/1; MG/1
1 TABLET ORAL 4 TIMES DAILY PRN
Status: DISCONTINUED | OUTPATIENT
Start: 2025-01-11 | End: 2025-01-16 | Stop reason: HOSPADM

## 2025-01-11 RX ORDER — ASPIRIN 81 MG/1
81 TABLET ORAL DAILY
Status: DISCONTINUED | OUTPATIENT
Start: 2025-01-12 | End: 2025-01-16 | Stop reason: HOSPADM

## 2025-01-11 RX ORDER — PANTOPRAZOLE SODIUM 40 MG/1
40 TABLET, DELAYED RELEASE ORAL
Status: DISCONTINUED | OUTPATIENT
Start: 2025-01-12 | End: 2025-01-16 | Stop reason: HOSPADM

## 2025-01-11 RX ORDER — AMLODIPINE BESYLATE 5 MG/1
5 TABLET ORAL
Status: DISCONTINUED | OUTPATIENT
Start: 2025-01-12 | End: 2025-01-16 | Stop reason: HOSPADM

## 2025-01-11 RX ORDER — ACETAMINOPHEN 325 MG/1
650 TABLET ORAL EVERY 6 HOURS PRN
Status: DISCONTINUED | OUTPATIENT
Start: 2025-01-11 | End: 2025-01-16 | Stop reason: HOSPADM

## 2025-01-11 RX ORDER — HYDROMORPHONE HCL/PF 1 MG/ML
0.5 SYRINGE (ML) INJECTION EVERY 6 HOURS PRN
Status: DISCONTINUED | OUTPATIENT
Start: 2025-01-11 | End: 2025-01-16 | Stop reason: HOSPADM

## 2025-01-11 RX ORDER — ONDANSETRON 2 MG/ML
4 INJECTION INTRAMUSCULAR; INTRAVENOUS EVERY 4 HOURS PRN
Status: DISCONTINUED | OUTPATIENT
Start: 2025-01-11 | End: 2025-01-16 | Stop reason: HOSPADM

## 2025-01-11 RX ORDER — MESALAMINE 400 MG/1
800 CAPSULE, DELAYED RELEASE ORAL 2 TIMES DAILY
Status: DISCONTINUED | OUTPATIENT
Start: 2025-01-11 | End: 2025-01-16 | Stop reason: HOSPADM

## 2025-01-11 RX ORDER — METOPROLOL SUCCINATE 50 MG/1
100 TABLET, EXTENDED RELEASE ORAL DAILY
Status: DISCONTINUED | OUTPATIENT
Start: 2025-01-12 | End: 2025-01-11

## 2025-01-11 RX ORDER — METHYLPREDNISOLONE 4 MG/1
2 TABLET ORAL
Status: DISCONTINUED | OUTPATIENT
Start: 2025-01-12 | End: 2025-01-16 | Stop reason: HOSPADM

## 2025-01-11 RX ORDER — ACETAMINOPHEN 325 MG/1
975 TABLET ORAL ONCE
Status: COMPLETED | OUTPATIENT
Start: 2025-01-11 | End: 2025-01-11

## 2025-01-11 RX ORDER — METOPROLOL SUCCINATE 50 MG/1
50 TABLET, EXTENDED RELEASE ORAL DAILY
Status: DISCONTINUED | OUTPATIENT
Start: 2025-01-12 | End: 2025-01-16 | Stop reason: HOSPADM

## 2025-01-11 RX ORDER — MINERAL OIL AND PETROLATUM 150; 830 MG/G; MG/G
OINTMENT OPHTHALMIC
Status: DISCONTINUED | OUTPATIENT
Start: 2025-01-11 | End: 2025-01-16 | Stop reason: HOSPADM

## 2025-01-11 RX ADMIN — VANCOMYCIN HYDROCHLORIDE 1500 MG: 5 INJECTION, POWDER, LYOPHILIZED, FOR SOLUTION INTRAVENOUS at 17:15

## 2025-01-11 RX ADMIN — MESALAMINE 800 MG: 400 CAPSULE, DELAYED RELEASE ORAL at 20:47

## 2025-01-11 RX ADMIN — AMPICILLIN AND SULBACTAM 3 G: 10; 5 INJECTION, POWDER, FOR SOLUTION INTRAVENOUS at 16:29

## 2025-01-11 RX ADMIN — ACETAMINOPHEN 975 MG: 325 TABLET, FILM COATED ORAL at 16:35

## 2025-01-11 RX ADMIN — IOHEXOL 85 ML: 350 INJECTION, SOLUTION INTRAVENOUS at 21:37

## 2025-01-11 NOTE — ED NOTES
Dinner tray ordered for patient at this time     Zoe Akers RN  01/11/25 [id number]    
ENT at bedside     Zoe Akers RN  01/11/25 2448    
Patient started IV to expedite first nurse protocol. Advised to let staff know if she leaves so IV can be removed. Pt verbalized understanding.       Mainor Robles RN  01/11/25 3592    
Per inpatient provider and ENT, no blood cultures needed.      Zoe Akers RN  01/11/25 7422    
Provider at bedside.     Zoe Akers RN  01/11/25 8723    
Pt. Ambulated to restroom with her walker without issues or assistance.  Pt has normal gait.     Nabila Narvaez  01/11/25 1558    
Oriented - self; Oriented - place; Oriented - time

## 2025-01-11 NOTE — PROGRESS NOTES
Helen M Schwab is a 89 y.o. female who is currently ordered Vancomycin IV with management by the Pharmacy Consult service.  Relevant clinical data and objective / subjective history reviewed.  Vancomycin Assessment:  Indication and Goal AUC/Trough: Soft tissue (goal -600, trough >10)  Clinical Status: New  Micro:     Renal Function:  SCr: 0.62 mg/dL  CrCl: 39.0 mL/min  Renal replacement: Not on dialysis  Days of Therapy: 1  Current Dose: 1500 mg IV once for loading dose  Vancomycin Plan:  New Dosin mg IV every 24 hours  Estimated AUC: 475 mcg*hr/mL  Estimated Trough: 12.1 mcg/mL  Next Level:  at 0600  Renal Function Monitoring: Daily BMP and UOP  Pharmacy will continue to follow closely for s/sx of nephrotoxicity, infusion reactions and appropriateness of therapy.  BMP and CBC will be ordered per protocol. We will continue to follow the patient’s culture results and clinical progress daily.    Eliza Shepard, Pharmacist

## 2025-01-11 NOTE — ASSESSMENT & PLAN NOTE
Follow-up with PCP outpatient  Chronic and stable  Plan:  Continue home methylprednisolone 2 mg daily

## 2025-01-11 NOTE — ASSESSMENT & PLAN NOTE
Patient with recent admission for L parotiditis due to obstructing sialolith with reactive left sided cellulitis/masseter myositis 12/22-12/30/2024. Completed 7 day course of antibiotics. Course was complicated by positive blood cultures thought to be contaminant and an echogenic mass on aortic valve. At time of discharge was due for surveillance blood cultures at 2 and 4 weeks, also recommended to have follow up echo with cardiology as patient was high risk for MICHAEL during admission.   On current admission: vitally stable with hypertension, afebrile, leukocytosis to 16.25    Plan:  Cancelled blood cultures  CT soft tissue neck with contrast  F/U MRSA nares, wound cultures  ENT consulted, recommendations appreciated  Continue IV unasyn and vancomycin   Sialadenitis protocol with warm compresses, sialogogues, NSAIDs for inflammation and hydration  For parotitis: massage gland from angle of mandible working anteriorly to facilitate drainage  During prior admission, cardiology and ID on board, consider re-consulting pending clinical course  Trend fever, cbc  Continuous pulse ox

## 2025-01-11 NOTE — CASE COMMUNICATION
1.11.25    Spoke with pt and son who states pts left side of face is swelling today.   Pt feels this has been getting worse through the morning.   Also states the left ear is very painful and pt took tylenol for same.    Son states there is no redness in face but the previous time this happened the redness and swelling appear rapidly.   pt denies any problems with breathing or swallowing.  Son is driving pt to [facility] for e valuation.

## 2025-01-11 NOTE — ED ATTENDING ATTESTATION
1/11/2025  I, Carlos Barreto DO, saw and evaluated the patient. I have discussed the patient with the resident/non-physician practitioner and agree with the resident's/non-physician practitioner's findings, Plan of Care, and MDM as documented in the resident's/non-physician practitioner's note, except where noted. All available labs and Radiology studies were reviewed.  I was present for key portions of any procedure(s) performed by the resident/non-physician practitioner and I was immediately available to provide assistance.       At this point I agree with the current assessment done in the Emergency Department.  I have conducted an independent evaluation of this patient a history and physical is as follows:            1. Parotiditis            MDM  Number of Diagnoses or Management Options  Parotiditis  Diagnosis management comments:       Initial ED assessment:   89-year-old female, left sided facial swelling, history of parotitis, recent admission 2 weeks ago, resolved, just recurred today    Pathology at risk for includes but is not limited to:   Recurrent parotid infection, consider early development of abscess    Initial ED plan:   IV antibiotics, IV pain control, admission, ENT consultation        Final ED summary/disposition:   After evaluation and workup in the emergency department, admitted to internal medicine service, ENT consulted.               Time reflects when diagnosis was documented in both MDM as applicable and the Disposition within this note       Time User Action Codes Description Comment    1/11/2025  3:27 PM Julianne Bass Add [K11.20] Parotiditis           ED Disposition       ED Disposition   Admit    Condition   Stable    Date/Time   Sat Jan 11, 2025  3:55 PM    Comment   Case was discussed with Dr. Currie and the patient's admission status was agreed to be Admission Status: inpatient status to the service of Dr. Khan .               Follow-up Information    None                        Chief Complaint   Patient presents with    Facial Swelling     Patient here for re-eval of left sided facial swelling from Dx of acute parotiditis with 2 mm obstructing parotid duct stone from end of December. Pt referred to ENT and F/U bloodwork on Monday, reports 7 day IV Abx given while admitted. Today noticed swelling came back, painful, radiates to left ear. Denies drainage/fevers/chills/trouble swallowing.             89-year-old female, left-sided parotid gland swelling and erythema history of parotitis on that side.,  Recent admission 2 weeks ago for similar, resolved with antibiotics but now has recurred.  Patient with increasing pain throughout the day, occasional tactile fever and chills but no documented fevers.,  No falls no injury no trauma.,  Difficulty chewing on that side due to discomfort.                    Visit Vitals  /65 (BP Location: Right arm)   Pulse 67   Temp 97.8 °F (36.6 °C) (Oral)   Resp 18   SpO2 95%   OB Status Hysterectomy   Smoking Status Former        Physical Exam  Vitals reviewed.   Constitutional:       General: She is not in acute distress.     Appearance: She is well-developed. She is not diaphoretic.   HENT:      Head: Normocephalic and atraumatic.      Right Ear: External ear normal.      Left Ear: External ear normal.      Nose: Nose normal.      Mouth/Throat:      Comments: Left facial swelling over left parotid gland region.  Erythematous tender  Eyes:      General:         Right eye: No discharge.         Left eye: No discharge.      Pupils: Pupils are equal, round, and reactive to light.   Neck:      Trachea: No tracheal deviation.   Cardiovascular:      Rate and Rhythm: Normal rate and regular rhythm.      Heart sounds: Normal heart sounds. No murmur heard.  Pulmonary:      Effort: Pulmonary effort is normal. No respiratory distress.      Breath sounds: Normal breath sounds. No stridor.   Abdominal:      General: There is no distension.       Palpations: Abdomen is soft.      Tenderness: There is no abdominal tenderness. There is no guarding or rebound.   Musculoskeletal:         General: Normal range of motion.      Cervical back: Normal range of motion and neck supple.   Skin:     General: Skin is warm and dry.      Coloration: Skin is not pale.      Findings: No erythema.   Neurological:      General: No focal deficit present.      Mental Status: She is alert and oriented to person, place, and time.                       Medications   ampicillin-sulbactam (UNASYN) 3 g in sodium chloride 0.9 % 100 mL IVPB (has no administration in time range)   vancomycin (VANCOCIN) 1500 mg in sodium chloride 0.9% 250 mL IVPB (has no administration in time range)   acetaminophen (TYLENOL) tablet 975 mg (has no administration in time range)             Labs Reviewed   CBC AND DIFFERENTIAL - Abnormal       Result Value Ref Range Status    WBC 16.25 (*) 4.31 - 10.16 Thousand/uL Final    RBC 3.80 (*) 3.81 - 5.12 Million/uL Final    Hemoglobin 12.3  11.5 - 15.4 g/dL Final    Hematocrit 37.1  34.8 - 46.1 % Final    MCV 98  82 - 98 fL Final    MCH 32.4  26.8 - 34.3 pg Final    MCHC 33.2  31.4 - 37.4 g/dL Final    RDW 15.4 (*) 11.6 - 15.1 % Final    MPV 10.4  8.9 - 12.7 fL Final    Platelets 527 (*) 149 - 390 Thousands/uL Final    nRBC 0  /100 WBCs Final    Segmented % 79 (*) 43 - 75 % Final    Immature Grans % 0  0 - 2 % Final    Lymphocytes % 12 (*) 14 - 44 % Final    Monocytes % 9  4 - 12 % Final    Eosinophils Relative 0  0 - 6 % Final    Basophils Relative 0  0 - 1 % Final    Absolute Neutrophils 12.85 (*) 1.85 - 7.62 Thousands/µL Final    Absolute Immature Grans 0.06  0.00 - 0.20 Thousand/uL Final    Absolute Lymphocytes 1.87  0.60 - 4.47 Thousands/µL Final    Absolute Monocytes 1.40 (*) 0.17 - 1.22 Thousand/µL Final    Eosinophils Absolute 0.03  0.00 - 0.61 Thousand/µL Final    Basophils Absolute 0.04  0.00 - 0.10 Thousands/µL Final   BLOOD CULTURE   BLOOD CULTURE    COMPREHENSIVE METABOLIC PANEL    Sodium 137  135 - 147 mmol/L Final    Potassium 4.4  3.5 - 5.3 mmol/L Final    Comment: Moderately Hemolyzed:Results may be affected.    Chloride 101  96 - 108 mmol/L Final    CO2 29  21 - 32 mmol/L Final    ANION GAP 7  4 - 13 mmol/L Final    BUN 19  5 - 25 mg/dL Final    Creatinine 0.62  0.60 - 1.30 mg/dL Final    Comment: Standardized to IDMS reference method    Glucose 107  65 - 140 mg/dL Final    Comment: If the patient is fasting, the ADA then defines impaired fasting glucose as > 100 mg/dL and diabetes as > or equal to 123 mg/dL.    Calcium 9.4  8.4 - 10.2 mg/dL Final    AST 37  13 - 39 U/L Final    Comment: Moderately Hemolyzed:Results may be affected.    ALT 14  7 - 52 U/L Final    Comment: Specimen collection should occur prior to Sulfasalazine administration due to the potential for falsely depressed results.     Alkaline Phosphatase 61  34 - 104 U/L Final    Total Protein 7.3  6.4 - 8.4 g/dL Final    Comment: Moderately Hemolyzed:Results may be affected.    Albumin 4.0  3.5 - 5.0 g/dL Final    Comment: Moderately Hemolyzed:Results may be affected.    Total Bilirubin 0.86  0.20 - 1.00 mg/dL Final    Comment: Use of this assay is not recommended for patients undergoing treatment with eltrombopag due to the potential for falsely elevated results.  N-acetyl-p-benzoquinone imine (metabolite of Acetaminophen) will generate erroneously low results in samples for patients that have taken an overdose of Acetaminophen.    eGFR 78  ml/min/1.73sq m Final    Narrative:     National Kid[employer] Disease Foundation guidelines for Chronic Kidney Disease (CKD):     Stage 1 with normal or high GFR (GFR > 90 mL/min/1.73 square meters)    Stage 2 Mild CKD (GFR = 60-89 mL/min/1.73 square meters)    Stage 3A Moderate CKD (GFR = 45-59 mL/min/1.73 square meters)    Stage 3B Moderate CKD (GFR = 30-44 mL/min/1.73 square meters)    Stage 4 Severe CKD (GFR = 15-29 mL/min/1.73 square meters)    Stage 5  End Stage CKD (GFR <15 mL/min/1.73 square meters)  Note: GFR calculation is accurate only with a steady state creatinine         No orders to display                  Procedures

## 2025-01-11 NOTE — H&P
H&P - Hospitalist   Name: Helen M Schwab 89 y.o. female I MRN: [id number]  Unit/Bed#: ED-06 I Date of Admission: 1/11/2025   Date of Service: 1/11/2025 I Hospital Day: 0     Assessment & Plan  Parotiditis  Patient with recent admission for L parotiditis due to obstructing sialolith with reactive left sided cellulitis/masseter myositis 12/22-12/30/2024. Completed 7 day course of antibiotics. Course was complicated by positive blood cultures thought to be contaminant and an echogenic mass on aortic valve. At time of discharge was due for surveillance blood cultures at 2 and 4 weeks, also recommended to have follow up echo with cardiology as patient was high risk for MICHAEL during admission.   On current admission: vitally stable with hypertension, afebrile, leukocytosis to 16.25    Plan:  Cancelled blood cultures  CT soft tissue neck with contrast  F/U MRSA nares, wound cultures  ENT consulted, recommendations appreciated  Continue IV unasyn and vancomycin   Sialadenitis protocol with warm compresses, sialogogues, NSAIDs for inflammation and hydration  For parotitis: massage gland from angle of mandible working anteriorly to facilitate drainage  During prior admission, cardiology and ID on board, consider re-consulting pending clinical course  Trend fever, cbc  Continuous pulse ox  Ulcerative colitis without complications (HCC)  Chronic and stable  Continue home mesalamine 800 mg BID  Gastroesophageal reflux  Start Pantoprazole 40 mg AM inpatient  Fibromyalgia  Follow-up with PCP outpatient  Chronic and stable  Plan:  Continue home methylprednisolone 2 mg daily   Hypertension  Continue home amlodipine 5 mg HS, metoprolol succinate 50 mg daily and clonidine 0.1 prn      VTE Pharmacologic Prophylaxis: VTE Score: 4 Moderate Risk (Score 3-4) - Pharmacological DVT Prophylaxis Ordered: enoxaparin (Lovenox).  Code Status: Level 3  Discussion with family: Updated  (son) via phone.    Anticipated Length of Stay:  Patient will be admitted on an inpatient basis with an anticipated length of stay of greater than 2 midnights secondary to parotitis.    History of Present Illness   Chief Complaint: Cheek pain    Helen M Schwab is a 89 y.o. female with a PMH of longstanding ulcerative colitis, GERD, hypertension who presents with recurrent facial swelling. She was recently hospitalized for a similar episode of swelling in which she was diagnosed with parotitis. She denies drooling or decreased ability to tolerate secretions. No airway concerns. She denies fever, chills, nausea, vomiting, choking episodes, SOB, chest pain. She endorses a sore throat and dry mouth.     In the ED, her BP was 175/78, followed by 142/65 .She is afebrile. Labs notable only for a white count of 16. Started on vancomycin and IV unasyn in the ED. Blood cultures were deferred, as her last blood cultures were negative and she shows no signs of bacteremia at present. Wound cultures were obtained. MRSA nares pending. ENT evaluated the patient in the ED and managed to express pus from the patient's mouth.     Review of Systems   Constitutional:  Negative for appetite change, chills, fatigue and fever.   HENT:  Positive for ear pain and facial swelling. Negative for congestion, dental problem, drooling, hearing loss, mouth sores, sinus pain, sore throat and trouble swallowing.    Respiratory:  Negative for cough, chest tightness and shortness of breath.    Cardiovascular:  Negative for chest pain, palpitations and leg swelling.   Gastrointestinal:  Negative for abdominal pain.   Genitourinary:  Negative for difficulty urinating and dysuria.   Musculoskeletal:  Positive for neck pain.   Neurological:  Negative for speech difficulty.       Historical Information   Past Medical History:   Diagnosis Date    Arthritis     Bell's palsy 1940    Colitis     GERD (gastroesophageal reflux disease)     Hemorrhoids     History of transfusion     Hx of colonoscopy 1996, 1998,  2000, 2002, 2003, 2007, 2012    Hypertension     Ulcerative colitis (HCC)      Past Surgical History:   Procedure Laterality Date    BACK SURGERY  1971    ruptured disk    BACK SURGERY  1972    bone fusion lumbar spine    BLADDER REPAIR  2002    BLADDER REPAIR  2012    BREAST SURGERY  2011    CARDIAC SURGERY  2014    stent    CARPAL TUNNEL RELEASE  08/14/2014    CATARACT EXTRACTION Right 2015    CHOLECYSTECTOMY      COLONOSCOPY      EYE SURGERY Left     GALLBLADDER SURGERY  1995    GANGLION CYST EXCISION  2004    left wrist    HERNIA REPAIR  2001    HERNIA REPAIR  2003    MASTECTOMY  2012    OVARY SURGERY  1984    PARTIAL HYSTERECTOMY  1964    VA EXC B9 LESION MRGN XCP SK TG T/A/L 0.5 CM/< Left 4/27/2021    Procedure: Excision pyogenic granuloma left ring finger;  Surgeon: Shane Lincoln MD;  [facility];  Service: Orthopedics    VA EXC LESION TDN SHTH/JT CAPSL HAND/FNGR Right 10/9/2018    Procedure: EXCISION GANGLION CYST RIGHT SMALL FINGER;  Surgeon: Shane Lincoln MD;  [facility];  Service: Orthopedics    VA NDSC WRST SURG W/RLS TRANSVRS CARPL LIGM Left 8/10/2021    Procedure: Left endoscopic carpal tunnel release;  Surgeon: Shane Lincoln MD;  [facility];  Service: Orthopedics    VA TENDON SHEATH INCISION Right 4/5/2016    Procedure: INDEX TRIGGER FINGER AND RING TRIGGER FINGER RELEASE ;  Surgeon: Shane Lincoln MD;  [facility];  Service: Orthopedics    VA TENDON SHEATH INCISION Left 10/16/2018    Procedure: RELEASE TRIGGER FINGER - LEFT INDEX;  Surgeon: Shane Lincoln MD;  [facility];  Service: Orthopedics    VA TENDON SHEATH INCISION Left 8/10/2021    Procedure: Left long and ring finger trigger finger release;  Surgeon: Shane Lincoln MD;  [facility];  Service: Orthopedics    SPLENECTOMY, PARTIAL  1995    TONSILLECTOMY  1939    WRIST SURGERY Right 1952     Social History     Tobacco Use    Smoking status: Former     Current  packs/day: 0.00     Types: Cigarettes     Quit date: 1950     Years since quittin.0    Smokeless tobacco: Never   Vaping Use    Vaping status: Never Used   Substance and Sexual Activity    Alcohol use: Yes     Comment: seldom    Drug use: No    Sexual activity: Never     E-Cigarette/Vaping    E-Cigarette Use Never User      E-Cigarette/Vaping Substances       Social History:  Marital Status: /Civil Union   Occupation: Retired  Patient Pre-hospital Living Situation: Home with son  Patient Pre-hospital Level of Mobility: walks with walker, able to ambulate independently but unsteady  Patient Pre-hospital Diet Restrictions: none    Meds/Allergies   I have reviewed home medications with patient personally.  Prior to Admission medications    Medication Sig Start Date End Date Taking? Authorizing Provider   acetaminophen (Tylenol 8 Hour) 650 mg CR tablet Take 1 tablet (650 mg total) by mouth every 8 (eight) hours 8/10/21  Yes Shane Lincoln MD   amLODIPine (NORVASC) 5 mg tablet Take 5 mg by mouth daily at bedtime.   Yes Historical Provider, MD   aspirin (ECOTRIN LOW STRENGTH) 81 mg EC tablet Take 81 mg by mouth daily   Yes Historical Provider, MD   cholecalciferol (VITAMIN D3) 1,000 units tablet Take 5,000 Units by mouth daily   Yes Historical Provider, MD   diphenoxylate-atropine (LOMOTIL) 2.5-0.025 mg per tablet TAKE 1 TABLET BY ORAL ROUTE 4 TIMES EVERY DAY AS NEEDED 21  Yes Historical Provider, MD TUCKER 0.01 % ophthalmic drops one drop to both eyes at bedtime. 18  Yes Historical Provider, MD   methylPREDNISolone (MEDROL) 2 MG tablet Take 2 mg by mouth daily with breakfast   Yes Historical Provider, MD   metoprolol succinate (TOPROL-XL) 100 mg 24 hr tablet Take 0.5 tablets (50 mg total) by mouth daily  Patient taking differently: Take 1 tablet by mouth daily 24  Yes Citlalli Emery MD   omeprazole (PriLOSEC) 20 mg delayed release capsule Take 20 mg by mouth daily 21  Yes Historical  "Provider, MD Lazar Petrolatum-Mineral Oil (artificial tears) Administer 1 application. to both eyes as needed for dry eyes   Yes Historical Provider, MD   busPIRone (BUSPAR) 10 mg tablet Take 10 mg by mouth as needed (anxiety)    Historical Provider, MD   cloNIDine (CATAPRES) 0.1 mg tablet Take 0.1 mg by mouth daily as needed for high blood pressure take 1 tablet as needed sbp >180 mm hg    Historical Provider, MD   mesalamine (DELZICOL) 400 mg TAKE 2 CAPSULES (800 MG TOTAL) BY MOUTH 2 (TWO) TIMES A DAY 4/10/24   Jaziel Cooper PA-C   Multiple Vitamins-Minerals (PRESERVISION AREDS 2 PO) Take 2 tablets by mouth daily.    Historical Provider, MD   NITROGLYCERIN PO Take by mouth  Patient not taking: Reported on 1/11/2025    Historical Provider, MD     Allergies   Allergen Reactions    Zithromax [Azithromycin] Swelling     tongue    Aspirin Other (See Comments)     Bleeding  Tolerates baby asapirin    Duloxetine Other (See Comments)     Upsets colitis     Effient [Prasugrel]     Keflex [Cephalexin] Other (See Comments)     unknow    Lasix [Furosemide] GI Intolerance    Lipitor [Atorvastatin]     Other      liptol and lipol cause GI upset  Adhesive tape  USE PAPER TAPE    Prednisone Other (See Comments)     \"I go berzerk!\"    Sulfamethizole Other (See Comments)     unkown    Latex Rash     Pt cannot remember       Objective :  Temp:  [97.8 °F (36.6 °C)] 97.8 °F (36.6 °C)  HR:  [67-77] 67  BP: (142-175)/(65-78) 142/65  Resp:  [18] 18  SpO2:  [95 %-97 %] 95 %  O2 Device: None (Room air)    Physical Exam  Constitutional:       Appearance: Normal appearance.   HENT:      Head:      Comments: Erythematous, raised, tender area with well defined borders ranging from corner of L eye to midway down L neck     Mouth/Throat:      Mouth: Mucous membranes are moist.   Eyes:      Extraocular Movements: Extraocular movements intact.      Conjunctiva/sclera: Conjunctivae normal.   Neck:      Comments: Reduced " ROM  Cardiovascular:      Rate and Rhythm: Normal rate and regular rhythm.      Heart sounds: Murmur (holosystolic) heard.   Pulmonary:      Effort: Pulmonary effort is normal. No respiratory distress.      Breath sounds: Normal breath sounds.   Abdominal:      General: Abdomen is flat. Bowel sounds are normal. There is no distension.      Palpations: Abdomen is soft.      Tenderness: There is no abdominal tenderness.   Musculoskeletal:      Cervical back: Tenderness present. No rigidity.      Right lower leg: No edema.      Left lower leg: No edema.   Skin:     Findings: Erythema present.   Neurological:      Mental Status: She is alert and oriented to person, place, and time.   Psychiatric:         Behavior: Behavior normal.          Lines/Drains:            Lab Results: I have reviewed the following results:  Results from last 7 days   Lab Units 01/11/25  1320   WBC Thousand/uL 16.25*   HEMOGLOBIN g/dL 12.3   HEMATOCRIT % 37.1   PLATELETS Thousands/uL 527*   SEGS PCT % 79*   LYMPHO PCT % 12*   MONO PCT % 9   EOS PCT % 0     Results from last 7 days   Lab Units 01/11/25  1320   SODIUM mmol/L 137   POTASSIUM mmol/L 4.4   CHLORIDE mmol/L 101   CO2 mmol/L 29   BUN mg/dL 19   CREATININE mg/dL 0.62   ANION GAP mmol/L 7   CALCIUM mg/dL 9.4   ALBUMIN g/dL 4.0   TOTAL BILIRUBIN mg/dL 0.86   ALK PHOS U/L 61   ALT U/L 14   AST U/L 37   GLUCOSE RANDOM mg/dL 107             Lab Results   Component Value Date    HGBA1C 5.6 12/05/2022           Administrative Statements   I have spent a total time of 60 minutes in caring for this patient on the day of the visit/encounter including Impressions, Counseling / Coordination of care, Documenting in the medical record, Reviewing / ordering tests, medicine, procedures  , Obtaining or reviewing history  , and Communicating with other healthcare professionals .    ** Please Note: This note has been constructed using a voice recognition system. **

## 2025-01-11 NOTE — CONSULTS
Consultation - [facility]/ENT   Helen M Schwab 89 y.o. female MRN: [id number]  Unit/Bed#: ED-06 Encounter: [id number]        Assessment:  Patient with recurrent parotitis. Less facial swelling than prior admission but now has pus expressed from the left parotid duct. Given recurrent nature, patient needs inpatient admission to medicine service for IV hydration, IV antibiotics covering MRSA, and MRSA nasal swab. Fu cultures of pus taken this afternoon. Also recommend warm compresses, parotid massage, Sialogogues, and oral hydration.     Plan:    Sialadenitis protocol:  1. Warm compresses to the affected area. Sialogogues (sour candies, lemon, citrus, vinegar). NSAID such as Ibuprofen for pain relief and to reduce inflammation. Aggressive hydration; encourage PO intake and IVF as tolerated. Continue IV antibiotic with adequate MRSA coverage    2. Parotitis: Massage the gland from angle of mandible working anteriorly along the cheek towards the oral commissure to facilitate drainage through the ductal opening    3. Fu cultures    4. MRSA nasal swab    Vanna Collier MD PGY-2  [facility]  Available on Epic Secure Chat.  Please contact ENT Resident easyfolio chat Role for any questions or concerns.      History of Present Illness   Physician Requesting Consult: Korina Khan MD  Reason for Consult / Principal Problem: parotitis  HPI: Helen M Schwab is a 89 y.o. year old female who presents with parotitis.     Admitted late December for parotitis. Returns today with 1 day history of recurrent left facial swelling. Patient said she was feeling well until last night when she began to have left facial pain and ear pain. Has noticed some swelling. She has been trying to drink more water and so the massages that she was instructed to.    Review of systems:  10 Point ROS was performed and negative except as above or otherwise noted in the medical record.    Historical Information   Past  Medical History:   Diagnosis Date    Arthritis     Bell's palsy 1940    Colitis     GERD (gastroesophageal reflux disease)     Hemorrhoids     History of transfusion     Hx of colonoscopy 1996, 1998, 2000, 2002, 2003, 2007, 2012    Hypertension     Ulcerative colitis (HCC)      Past Surgical History:   Procedure Laterality Date    BACK SURGERY  1971    ruptured disk    BACK SURGERY  1972    bone fusion lumbar spine    BLADDER REPAIR  2002    BLADDER REPAIR  2012    BREAST SURGERY  2011    CARDIAC SURGERY  2014    stent    CARPAL TUNNEL RELEASE  08/14/2014    CATARACT EXTRACTION Right 2015    CHOLECYSTECTOMY      COLONOSCOPY      EYE SURGERY Left     GALLBLADDER SURGERY  1995    GANGLION CYST EXCISION  2004    left wrist    HERNIA REPAIR  2001    HERNIA REPAIR  2003    MASTECTOMY  2012    OVARY SURGERY  1984    PARTIAL HYSTERECTOMY  1964    IA EXC B9 LESION MRGN XCP SK TG T/A/L 0.5 CM/< Left 4/27/2021    Procedure: Excision pyogenic granuloma left ring finger;  Surgeon: Shane Lincoln MD;  [facility];  Service: Orthopedics    IA EXC LESION TDN SHTH/JT CAPSL HAND/FNGR Right 10/9/2018    Procedure: EXCISION GANGLION CYST RIGHT SMALL FINGER;  Surgeon: Shane Lincoln MD;  [facility];  Service: Orthopedics    IA NDSC WRST SURG W/RLS TRANSVRS CARPL LIGM Left 8/10/2021    Procedure: Left endoscopic carpal tunnel release;  Surgeon: Shane Lincoln MD;  [facility];  Service: Orthopedics    IA TENDON SHEATH INCISION Right 4/5/2016    Procedure: INDEX TRIGGER FINGER AND RING TRIGGER FINGER RELEASE ;  Surgeon: Shane Lincoln MD;  [facility];  Service: Orthopedics    IA TENDON SHEATH INCISION Left 10/16/2018    Procedure: RELEASE TRIGGER FINGER - LEFT INDEX;  Surgeon: Shane Lincoln MD;  [facility];  Service: Orthopedics    IA TENDON SHEATH INCISION Left 8/10/2021    Procedure: Left long and ring finger trigger finger release;  Surgeon: Shane Lincoln MD;   "[facility];  Service: Orthopedics    SPLENECTOMY, PARTIAL  1995    TONSILLECTOMY  1939    WRIST SURGERY Right      Social History   Social History     Substance and Sexual Activity   Alcohol Use Yes    Comment: seldom     Social History     Substance and Sexual Activity   Drug Use No     Social History     Tobacco Use   Smoking Status Former    Current packs/day: 0.00    Types: Cigarettes    Quit date:     Years since quittin.0   Smokeless Tobacco Never     Family History: Family history non-contributory    Meds/Allergies   all current active meds have been reviewed    Allergies   Allergen Reactions    Zithromax [Azithromycin] Swelling     tongue    Aspirin Other (See Comments)     Bleeding  Tolerates baby asapirin    Duloxetine Other (See Comments)     Upsets colitis     Effient [Prasugrel]     Keflex [Cephalexin] Other (See Comments)     unknow    Lasix [Furosemide] GI Intolerance    Lipitor [Atorvastatin]     Other      liptol and lipol cause GI upset  Adhesive tape  USE PAPER TAPE    Prednisone Other (See Comments)     \"I go berzerk!\"    Sulfamethizole Other (See Comments)     unkown    Latex Rash     Pt cannot remember       Objective     Vitals:    25 1528   BP: 142/65   Pulse: 67   Resp: 18   Temp:    SpO2: 95%         Physical Exam   Constitutional: Cooperative, able to hear and answer questions without difficulty.    Voice: Normal voice quality.  Head/Face: Normocephalic, atraumatic.  No scars, masses or lesions. Mild skin erythema over left parotid gland  Ears: External ears normal.  No post-auricular erythema or tenderness.  Oral cavity: Mucosa dry, lips without lesions or masses.  Dilated left parotid duct with manual palpation pus is expressed, no stone is palpated.   Oropharynx: Uvula is midline, soft palate intact without lesion or mass.  Oropharyngeal inlet without obstruction.  Tonsils unremarkable.  Posterior pharyngeal wall clear. No masses or lesions.  Salivary " glands:  Left parotid gland is tender to palpation, mildly enlarged.   Pulmonary/Chest: Normal effort and rate. No respiratory distress. No stertor or stridor  Psychiatric: Normal mood and affect.      No intake or output data in the 24 hours ending 01/11/25 1629    Invasive Devices       Peripheral Intravenous Line  Duration             Peripheral IV 01/11/25 Left Antecubital <1 day                    Lab Results: I have personally reviewed pertinent lab results.    Imaging Studies: Results Review Statement: No pertinent imaging studies reviewed.  EKG, Pathology, and Other Studies: Results Review Statement: No pertinent imaging studies reviewed.    Code Status: Prior  Advance Directive and Living Will:      Power of :    POLST:      None

## 2025-01-12 ENCOUNTER — HOME CARE VISIT (OUTPATIENT)
Dept: HOME HEALTH SERVICES | Facility: HOME HEALTHCARE | Age: OVER 89
End: 2025-01-12
Payer: MEDICARE

## 2025-01-12 PROBLEM — E87.6 HYPOKALEMIA: Status: ACTIVE | Noted: 2025-01-12

## 2025-01-12 PROBLEM — E83.42 HYPOMAGNESEMIA: Status: ACTIVE | Noted: 2025-01-12

## 2025-01-12 LAB
ANION GAP SERPL CALCULATED.3IONS-SCNC: 7 MMOL/L (ref 4–13)
BASOPHILS # BLD AUTO: 0.04 THOUSANDS/ΜL (ref 0–0.1)
BASOPHILS NFR BLD AUTO: 0 % (ref 0–1)
BUN SERPL-MCNC: 12 MG/DL (ref 5–25)
CALCIUM SERPL-MCNC: 8.9 MG/DL (ref 8.4–10.2)
CHLORIDE SERPL-SCNC: 105 MMOL/L (ref 96–108)
CO2 SERPL-SCNC: 27 MMOL/L (ref 21–32)
CREAT SERPL-MCNC: 0.36 MG/DL (ref 0.6–1.3)
EOSINOPHIL # BLD AUTO: 0.1 THOUSAND/ΜL (ref 0–0.61)
EOSINOPHIL NFR BLD AUTO: 1 % (ref 0–6)
ERYTHROCYTE [DISTWIDTH] IN BLOOD BY AUTOMATED COUNT: 15.4 % (ref 11.6–15.1)
GFR SERPL CREATININE-BSD FRML MDRD: 93 ML/MIN/1.73SQ M
GLUCOSE SERPL-MCNC: 92 MG/DL (ref 65–140)
HCT VFR BLD AUTO: 33.4 % (ref 34.8–46.1)
HGB BLD-MCNC: 11.6 G/DL (ref 11.5–15.4)
IMM GRANULOCYTES # BLD AUTO: 0.09 THOUSAND/UL (ref 0–0.2)
IMM GRANULOCYTES NFR BLD AUTO: 1 % (ref 0–2)
LYMPHOCYTES # BLD AUTO: 3.04 THOUSANDS/ΜL (ref 0.6–4.47)
LYMPHOCYTES NFR BLD AUTO: 17 % (ref 14–44)
MAGNESIUM SERPL-MCNC: 1.6 MG/DL (ref 1.9–2.7)
MCH RBC QN AUTO: 33 PG (ref 26.8–34.3)
MCHC RBC AUTO-ENTMCNC: 34.7 G/DL (ref 31.4–37.4)
MCV RBC AUTO: 95 FL (ref 82–98)
MONOCYTES # BLD AUTO: 1.63 THOUSAND/ΜL (ref 0.17–1.22)
MONOCYTES NFR BLD AUTO: 9 % (ref 4–12)
NEUTROPHILS # BLD AUTO: 12.96 THOUSANDS/ΜL (ref 1.85–7.62)
NEUTS SEG NFR BLD AUTO: 72 % (ref 43–75)
NRBC BLD AUTO-RTO: 0 /100 WBCS
PHOSPHATE SERPL-MCNC: 3 MG/DL (ref 2.3–4.1)
PLATELET # BLD AUTO: 486 THOUSANDS/UL (ref 149–390)
PMV BLD AUTO: 10.3 FL (ref 8.9–12.7)
POTASSIUM SERPL-SCNC: 3.3 MMOL/L (ref 3.5–5.3)
RBC # BLD AUTO: 3.51 MILLION/UL (ref 3.81–5.12)
SODIUM SERPL-SCNC: 139 MMOL/L (ref 135–147)
WBC # BLD AUTO: 17.86 THOUSAND/UL (ref 4.31–10.16)

## 2025-01-12 PROCEDURE — 85025 COMPLETE CBC W/AUTO DIFF WBC: CPT

## 2025-01-12 PROCEDURE — 83735 ASSAY OF MAGNESIUM: CPT

## 2025-01-12 PROCEDURE — 87147 CULTURE TYPE IMMUNOLOGIC: CPT

## 2025-01-12 PROCEDURE — 80048 BASIC METABOLIC PNL TOTAL CA: CPT

## 2025-01-12 PROCEDURE — 87081 CULTURE SCREEN ONLY: CPT

## 2025-01-12 PROCEDURE — 99232 SBSQ HOSP IP/OBS MODERATE 35: CPT | Performed by: STUDENT IN AN ORGANIZED HEALTH CARE EDUCATION/TRAINING PROGRAM

## 2025-01-12 PROCEDURE — 87040 BLOOD CULTURE FOR BACTERIA: CPT

## 2025-01-12 PROCEDURE — 84100 ASSAY OF PHOSPHORUS: CPT

## 2025-01-12 RX ORDER — POTASSIUM CHLORIDE 29.8 MG/ML
40 INJECTION INTRAVENOUS 2 TIMES DAILY
Status: DISCONTINUED | OUTPATIENT
Start: 2025-01-12 | End: 2025-01-12

## 2025-01-12 RX ORDER — MAGNESIUM SULFATE HEPTAHYDRATE 40 MG/ML
4 INJECTION, SOLUTION INTRAVENOUS ONCE
Status: COMPLETED | OUTPATIENT
Start: 2025-01-12 | End: 2025-01-12

## 2025-01-12 RX ORDER — POTASSIUM CHLORIDE 14.9 MG/ML
20 INJECTION INTRAVENOUS
Status: COMPLETED | OUTPATIENT
Start: 2025-01-12 | End: 2025-01-12

## 2025-01-12 RX ORDER — POTASSIUM CHLORIDE 14.9 MG/ML
20 INJECTION INTRAVENOUS
Status: DISCONTINUED | OUTPATIENT
Start: 2025-01-12 | End: 2025-01-12

## 2025-01-12 RX ADMIN — AMPICILLIN SODIUM AND SULBACTAM SODIUM 3 G: 100; 50 INJECTION, POWDER, FOR SOLUTION INTRAVENOUS at 00:12

## 2025-01-12 RX ADMIN — HYDROMORPHONE HYDROCHLORIDE 0.5 MG: 1 INJECTION, SOLUTION INTRAMUSCULAR; INTRAVENOUS; SUBCUTANEOUS at 10:18

## 2025-01-12 RX ADMIN — POTASSIUM CHLORIDE 20 MEQ: 14.9 INJECTION, SOLUTION INTRAVENOUS at 21:10

## 2025-01-12 RX ADMIN — AMPICILLIN SODIUM AND SULBACTAM SODIUM 3 G: 100; 50 INJECTION, POWDER, FOR SOLUTION INTRAVENOUS at 14:30

## 2025-01-12 RX ADMIN — AMLODIPINE BESYLATE 5 MG: 5 TABLET ORAL at 21:09

## 2025-01-12 RX ADMIN — Medication: at 00:13

## 2025-01-12 RX ADMIN — METHYLPREDNISOLONE 2 MG: 4 TABLET ORAL at 08:14

## 2025-01-12 RX ADMIN — KETOROLAC TROMETHAMINE 15 MG: 30 INJECTION, SOLUTION INTRAMUSCULAR; INTRAVENOUS at 21:53

## 2025-01-12 RX ADMIN — AMPICILLIN SODIUM AND SULBACTAM SODIUM 3 G: 100; 50 INJECTION, POWDER, FOR SOLUTION INTRAVENOUS at 07:36

## 2025-01-12 RX ADMIN — SODIUM CHLORIDE 1000 MG: 0.9 INJECTION, SOLUTION INTRAVENOUS at 08:14

## 2025-01-12 RX ADMIN — BIMATOPROST 1 DROP: 0.3 SOLUTION/ DROPS OPHTHALMIC at 08:14

## 2025-01-12 RX ADMIN — MESALAMINE 800 MG: 400 CAPSULE, DELAYED RELEASE ORAL at 08:13

## 2025-01-12 RX ADMIN — METOPROLOL SUCCINATE 50 MG: 50 TABLET, EXTENDED RELEASE ORAL at 08:13

## 2025-01-12 RX ADMIN — ACETAMINOPHEN 650 MG: 325 TABLET, FILM COATED ORAL at 00:14

## 2025-01-12 RX ADMIN — PANTOPRAZOLE SODIUM 40 MG: 40 TABLET, DELAYED RELEASE ORAL at 05:06

## 2025-01-12 RX ADMIN — MAGNESIUM SULFATE HEPTAHYDRATE 4 G: 40 INJECTION, SOLUTION INTRAVENOUS at 14:55

## 2025-01-12 RX ADMIN — VANCOMYCIN HYDROCHLORIDE 1000 MG: 5 INJECTION, POWDER, LYOPHILIZED, FOR SOLUTION INTRAVENOUS at 17:00

## 2025-01-12 RX ADMIN — ASPIRIN 81 MG: 81 TABLET ORAL at 08:13

## 2025-01-12 RX ADMIN — AMPICILLIN SODIUM AND SULBACTAM SODIUM 3 G: 100; 50 INJECTION, POWDER, FOR SOLUTION INTRAVENOUS at 23:50

## 2025-01-12 RX ADMIN — KETOROLAC TROMETHAMINE 15 MG: 30 INJECTION, SOLUTION INTRAMUSCULAR; INTRAVENOUS at 08:13

## 2025-01-12 RX ADMIN — POTASSIUM CHLORIDE 20 MEQ: 14.9 INJECTION, SOLUTION INTRAVENOUS at 17:27

## 2025-01-12 RX ADMIN — POTASSIUM CHLORIDE 20 MEQ: 14.9 INJECTION, SOLUTION INTRAVENOUS at 14:56

## 2025-01-12 RX ADMIN — ENOXAPARIN SODIUM 40 MG: 40 INJECTION SUBCUTANEOUS at 08:13

## 2025-01-12 RX ADMIN — Medication 5000 UNITS: at 08:13

## 2025-01-12 NOTE — ASSESSMENT & PLAN NOTE
Follow-up with PCP outpatient. Chronic and stable.    Plan:  Continue home methylprednisolone 2 mg daily

## 2025-01-12 NOTE — CASE MANAGEMENT
Case Management Assessment & Discharge Planning Note    Patient name Helen M Schwab Location S /S -01 MRN [id number]  : 1932 Date 2025       Current Admission Date: 2025  Current Admission Diagnosis:Parotiditis   Patient Active Problem List    Diagnosis Date Noted Date Diagnosed    Abnormal echocardiogram 2024     Positive blood culture 2024     Parotiditis 2024     Fibromyalgia 2024     Hypertension 2024     Urinary incontinence 2021     Gastroesophageal reflux 2021     Carpal tunnel syndrome on left 08/10/2021     Back pain 2021     Ambulatory dysfunction 2021     Ulcerative colitis without complications (HCC) 2021     History of fusion of lumbar spine 2021     Mass of right finger 08/15/2018     Trigger finger, left index finger 08/15/2018       LOS (days): 1  Geometric Mean LOS (GMLOS) (days):   Days to GMLOS:     OBJECTIVE:  PATIENT READMITTED TO HOSPITAL  Risk of Unplanned Readmission Score: 9.97         Current admission status: Inpatient       Preferred Pharmacy:   [id number] - [place], [place] - [address]  [address] [address]  Phone: [phone] Fax: [phone]    Primary Care Provider: Shant Lucas MD    Primary Insurance: MEDICARE  Secondary Insurance: [employer]    ASSESSMENT:  Active Health Care Proxies       Venkata Crandall Alternate Health Care Agent - Son   Primary Phone: [phone] (Mobile)                           Readmission Root Cause  30 Day Readmission: Yes  During your hospital stay, did someone (provider, nurse, ) explain your care to you in a way you could understand?: Yes  Did you feel medically stable to leave the hospital?: Yes  Were you able to pay for your medication at the pharmacy?: Yes  Did you have reliable transportation to take you to your appointments?: Yes  During previous admission, was a post-acute recommendation  made?: No  Patient was readmitted due to: Parotiditis    Patient Information  Admitted from:: Home  Mental Status: Alert  During Assessment patient was accompanied by: Not accompanied during assessment  Assessment information provided by:: Patient  Primary Caregiver: Self  Support Systems: Son  County of Residence: [place]  What city do you live in?: [place]  Home entry access options. Select all that apply.: Stairs  Number of steps to enter home.: 1 (From garage)  Do the steps have railings?: No  Type of Current Residence: [facility]  Living Arrangements: Lives w/ Son  Is patient a ?: No    Activities of Daily Living Prior to Admission  Functional Status: Independent  Completes ADLs independently?: Yes  Ambulates independently?: Yes  Does patient use assisted devices?: Yes  Assisted Devices (DME) used: Bedside Commode, Straight Cane, Rollator, Shower Chair  Does patient currently own DME?: Yes  What DME does the patient currently own?: Bedside Commode, Straight Cane, Rollator, Shower Chair  Does patient have a history of Outpatient Therapy (PT/OT)?: Yes  Does the patient have a history of Short-Term Rehab?: No  Does patient have a history of HHC?: No  Does patient currently have HHC?: No         Patient Information Continued  Income Source: SSI/SSD  Does patient have prescription coverage?: Yes  Does patient receive dialysis treatments?: No  Does patient have a history of substance abuse?: No  Does patient have a history of Mental Health Diagnosis?: No         Means of Transportation  Means of Transport to Appts:: Family transport          DISCHARGE DETAILS:    Discharge planning discussed with:: Patient  Freedom of Choice: Yes      Other Referral/Resources/Interventions Provided:  Interventions:  (TBD)      Treatment Team Recommendation:  (TBD)  Discharge Destination Plan::  (TBD)  Transport at Discharge : Family

## 2025-01-12 NOTE — ASSESSMENT & PLAN NOTE
Patient's magnesium on 1/12 is 1.6. Checked patient's potassium, also low. Will replete electrolytes.    Plan:  Repleted with magnesium sulfate 4L IVPB

## 2025-01-12 NOTE — PROGRESS NOTES
Helen M Schwab is a 89 y.o. female who is currently ordered Vancomycin IV with management by the Pharmacy Consult service.  Relevant clinical data and objective / subjective history reviewed.  Vancomycin Assessment:  Indication and Goal AUC/Trough: Soft tissue (goal -600, trough >10)  Clinical Status: stable  Micro:     Renal Function:  SCr: 0.36 mg/dL  CrCl: 63 mL/min  Renal replacement: Not on dialysis  Days of Therapy: 2  Current Dose: 1000 mg every 24 hours  Vancomycin Plan:  New Dosin mg every 12 hours  Estimated AUC: 556 mcg*hr/mL  Estimated Trough: 14.4 mcg/mL  Next Level:  @ 0600  Renal Function Monitoring: Daily BMP and UOP  Pharmacy will continue to follow closely for s/sx of nephrotoxicity, infusion reactions and appropriateness of therapy.  BMP and CBC will be ordered per protocol. We will continue to follow the patient’s culture results and clinical progress daily.    Madhavi Adame, Pharmacist

## 2025-01-12 NOTE — PROGRESS NOTES
Otolaryngology [facility] Progress Note:    Subjective: Pt with parotitis. No acute events overnight.     WBC 17.9 (16.2)    Objective:   /64   Pulse 79   Temp 98.6 °F (37 °C) (Oral)   Resp 17   SpO2 95%     Physical Exam   Constitutional: Cooperative, able to hear and answer questions without difficulty.    Voice: Normal voice quality.  Head/Face: Normocephalic, atraumatic.  No scars, masses or lesions. Mild skin erythema over left parotid gland, pus expressed with palpation  Ears: External ears normal.  No post-auricular erythema or tenderness.  Oral cavity: Mucosa dry, lips without lesions or masses.    Oropharynx: Uvula is midline, soft palate intact without lesion or mass.  Oropharyngeal inlet without obstruction.  Tonsils unremarkable.  Posterior pharyngeal wall clear. No masses or lesions.  Salivary glands:  Left parotid gland is tender to palpation, mildly enlarged.   Pulmonary/Chest: Normal effort and rate. No respiratory distress. No stertor or stridor  Psychiatric: Normal mood and affect.    Assessment/Plan: Patient with parotitis. Pus expressed from gland. Concern for MRSA, abx currently covering with vancomycin.     Sialadenitis protocol:  1. Warm compresses to the affected area. Sialogogues (sour candies, lemon, citrus, vinegar). NSAID such as Ibuprofen for pain relief and to reduce inflammation. Aggressive hydration; encourage PO intake and IVF as tolerated. Continue IV antibiotic with adequate MRSA coverage     2. Parotitis: Massage the gland from angle of mandible working anteriorly along the cheek towards the oral commissure to facilitate drainage through the ductal opening     3. Fu cultures     4. MRSA nasal swab    5. Defer decision on blood cultures to primary team    6. Needs MRSA nasal culture    Dispo: inpatient

## 2025-01-12 NOTE — ASSESSMENT & PLAN NOTE
Patient with recent admission for L parotiditis due to obstructing sialolith with reactive left sided cellulitis/masseter myositis 12/22-12/30/2024. Completed 7 day course of antibiotics. Course was complicated by positive blood cultures thought to be contaminant and an echogenic mass on aortic valve. At time of discharge was due for surveillance blood cultures at 2 and 4 weeks, also recommended to have follow up echo with cardiology as patient was high risk for MICHAEL during admission.   On current admission: vitally stable with hypertension, afebrile, leukocytosis to 16.25. ED provider discussed with on-call ENT with recommendation of broadening of antibiotic regimen, currently on IV Unasyn/Vancomycin. Patient was seen by ENT in the ED with left-sided facial massage and expectorancy of pus and wound culture sent.   Patient did not have blood cultures drawn at time of admission, ordered today 1/12.  CT soft tissue neck with contrast: Persistent or recurrent left parotitis with adjacent cellulitis and left masseter myositis, new 8 mm abscess within the left parotid gland, and distal migration of 2 mm stone in the distal left parotid duct.     Plan:  Follow-up blood cultures  ENT following, recommendations appreciated:  Follow-up MRSA nares  Follow-up wound cultures  Warm compresses to the affected area  Sialogogues (sour candies, lemon, citrus, vinegar)  NSAID such as Ibuprofen for pain relief and to reduce inflammation  Aggressive hydration; encourage PO intake and IVF as tolerated  Continue IV antibiotic with adequate MRSA coverage   During prior admission, cardiology and ID on board, consider re-consulting pending clinical course  Trend fever, CBC  Continuous pulse ox

## 2025-01-12 NOTE — PROGRESS NOTES
Progress Note - Hospitalist   Name: Helen M Schwab 89 y.o. female I MRN: [id number]  Unit/Bed#: S -01 I Date of Admission: 1/11/2025   Date of Service: 1/12/2025 I Hospital Day: 1    Assessment & Plan  Parotiditis  Patient with recent admission for L parotiditis due to obstructing sialolith with reactive left sided cellulitis/masseter myositis 12/22-12/30/2024. Completed 7 day course of antibiotics. Course was complicated by positive blood cultures thought to be contaminant and an echogenic mass on aortic valve. At time of discharge was due for surveillance blood cultures at 2 and 4 weeks, also recommended to have follow up echo with cardiology as patient was high risk for MICHAEL during admission.   On current admission: vitally stable with hypertension, afebrile, leukocytosis to 16.25. ED provider discussed with on-call ENT with recommendation of broadening of antibiotic regimen, currently on IV Unasyn/Vancomycin. Patient was seen by ENT in the ED with left-sided facial massage and expectorancy of pus and wound culture sent.   Patient did not have blood cultures drawn at time of admission, ordered today 1/12.  CT soft tissue neck with contrast: Persistent or recurrent left parotitis with adjacent cellulitis and left masseter myositis, new 8 mm abscess within the left parotid gland, and distal migration of 2 mm stone in the distal left parotid duct.     Plan:  Follow-up blood cultures  ENT following, recommendations appreciated:  Follow-up MRSA nares  Follow-up wound cultures  Warm compresses to the affected area  Sialogogues (sour candies, lemon, citrus, vinegar)  NSAID such as Ibuprofen for pain relief and to reduce inflammation  Aggressive hydration; encourage PO intake and IVF as tolerated  Continue IV antibiotic with adequate MRSA coverage   During prior admission, cardiology and ID on board, consider re-consulting pending clinical course  Trend fever, CBC  Continuous pulse ox  Ulcerative colitis without  complications (HCC)  Chronic and stable.    Plan:   Continue home mesalamine 800 mg BID  Gastroesophageal reflux  Plan:  Start Pantoprazole 40 mg AM inpatient  Fibromyalgia  Follow-up with PCP outpatient. Chronic and stable.    Plan:  Continue home methylprednisolone 2 mg daily   Hypertension  Plan:  Continue home amlodipine 5 mg HS  Continue home metoprolol succinate 50 mg daily  Continue home clonidine 0.1 prn  Hypokalemia  Patient's potassium on 1/12 is 3.3. Checked patient's phosphorus and magnesium, magnesium also low. Will replete electrolytes.    Plan:  Repleted with potassium chloride 20 mEq IVPB  x 2 doses  Hypomagnesemia  Patient's magnesium on 1/12 is 1.6. Checked patient's potassium, also low. Will replete electrolytes.    Plan:  Repleted with magnesium sulfate 4L IVPB     VTE Pharmacologic Prophylaxis: VTE Score: 4 Moderate Risk (Score 3-4) - Pharmacological DVT Prophylaxis Ordered: enoxaparin (Lovenox).    Mobility:   [facility] Achieved: 7: Walk 25 feet or more  Continue to encourage appropriate mobility.    Patient Centered Rounds: I performed bedside rounds with nursing staff today.   Discussions with Specialists or Other Care Team Provider: ENT, Infectious Disease    Education and Discussions with Family / Patient: Updated  (son) via phone.    Current Length of Stay: 1 day(s)  Current Patient Status: Inpatient   Certification Statement: The patient will continue to require additional inpatient hospital stay due to IV antibiotic treatment  Discharge Plan: Anticipate discharge in >72 hrs to home.    Code Status: Level 3 - DNAR and DNI    Subjective   Patient was seen and examined at bedside, with no reported events overnight. Today, the patient states that she is still experiencing some tenderness to the left parotid. She notes that she is experiencing difficulty opening her mouth to eat and swallow some of her medications. Aside from the pain, patient denies any other complaints, and notes  that she slept relatively well last night. She denies any difficulty breathing, fever, chills, chest pain, headache, or any other complaints at this time.    Objective :  Temp:  [97.2 °F (36.2 °C)-98.6 °F (37 °C)] 98 °F (36.7 °C)  HR:  [67-79] 71  BP: (125-157)/(44-94) 125/44  Resp:  [17-18] 17  SpO2:  [94 %-96 %] 96 %  O2 Device: None (Room air)    There is no height or weight on file to calculate BMI.     Input and Output Summary (last 24 hours):     Intake/Output Summary (Last 24 hours) at 1/12/2025 1454  Last data filed at 1/11/2025 1659  Gross per 24 hour   Intake 100 ml   Output --   Net 100 ml     Physical Exam  Constitutional:       General: She is not in acute distress.     Appearance: She is not diaphoretic.   HENT:      Head: Normocephalic.      Jaw: Swelling (left) present.      Salivary Glands: Left salivary gland is diffusely enlarged and tender.      Comments: Left parotid gland is enlarged and tender to palpation.     Nose: Nose normal. No congestion.   Eyes:      General:         Right eye: Discharge (clear) present.         Left eye: Discharge (clear) present.  Cardiovascular:      Rate and Rhythm: Normal rate.      Pulses: Normal pulses.   Pulmonary:      Effort: Pulmonary effort is normal. No respiratory distress.      Breath sounds: Normal breath sounds. No wheezing.   Abdominal:      General: There is no distension.      Palpations: Abdomen is soft.      Tenderness: There is no abdominal tenderness.   Musculoskeletal:         General: Normal range of motion.      Cervical back: Tenderness present.      Right lower leg: No edema.      Left lower leg: No edema.   Skin:     General: Skin is warm and dry.      Findings: Erythema present.      Comments: Erythema present over left parotid gland.   Neurological:      General: No focal deficit present.      Mental Status: She is alert and oriented to person, place, and time.   Psychiatric:         Mood and Affect: Mood normal.         Behavior:  Behavior normal.     Lines/Drains:      Lab Results: I have reviewed the following results:   Results from last 7 days   Lab Units 01/12/25  0506   WBC Thousand/uL 17.86*   HEMOGLOBIN g/dL 11.6   HEMATOCRIT % 33.4*   PLATELETS Thousands/uL 486*   SEGS PCT % 72   LYMPHO PCT % 17   MONO PCT % 9   EOS PCT % 1     Results from last 7 days   Lab Units 01/12/25  0506 01/11/25  1320   SODIUM mmol/L 139 137   POTASSIUM mmol/L 3.3* 4.4   CHLORIDE mmol/L 105 101   CO2 mmol/L 27 29   BUN mg/dL 12 19   CREATININE mg/dL 0.36* 0.62   ANION GAP mmol/L 7 7   CALCIUM mg/dL 8.9 9.4   ALBUMIN g/dL  --  4.0   TOTAL BILIRUBIN mg/dL  --  0.86   ALK PHOS U/L  --  61   ALT U/L  --  14   AST U/L  --  37   GLUCOSE RANDOM mg/dL 92 107                       Recent Cultures (last 7 days):   Results from last 7 days   Lab Units 01/11/25  1620   GRAM STAIN RESULT  No Polys or Bacteria seen   WOUND CULTURE  Culture too young- will reincubate     Imaging Results Review: I reviewed radiology reports from this admission including: CT soft tissue neck.  Other Study Results Review: No additional pertinent studies reviewed.    Last 24 Hours Medication List:     Current Facility-Administered Medications:     acetaminophen (TYLENOL) tablet 650 mg, Q6H PRN    amLODIPine (NORVASC) tablet 5 mg, HS    ampicillin-sulbactam (UNASYN) 3 g in sodium chloride 0.9 % 100 mL IVPB, Q8H, Last Rate: 3 g (01/12/25 1430)    artificial tear ophthalmic ointment, HS PRN    aspirin (ECOTRIN LOW STRENGTH) EC tablet 81 mg, Daily    bimatoprost (LUMIGAN) 0.03 % ophthalmic drops 1 drop, Daily    Cholecalciferol (VITAMIN D3) tablet 5,000 Units, Daily    cloNIDine (CATAPRES) tablet 0.1 mg, Daily PRN    diphenoxylate-atropine (LOMOTIL) 2.5-0.025 mg per tablet 1 tablet, 4x Daily PRN    enoxaparin (LOVENOX) subcutaneous injection 40 mg, Daily    HYDROmorphone (DILAUDID) injection 0.5 mg, Q6H PRN    ketorolac (TORADOL) injection 15 mg, Q6H PRN    magnesium sulfate 4 g/100 mL IVPB  (premix) 4 g, Once    mesalamine (DELZICOL) delayed release capsule 800 mg, BID    methylprednisolone (MEDROL) tablet 2 mg, Daily With Breakfast    metoprolol succinate (TOPROL-XL) 24 hr tablet 50 mg, Daily    ondansetron (ZOFRAN) injection 4 mg, Q4H PRN    pantoprazole (PROTONIX) EC tablet 40 mg, Early Morning    potassium chloride 20 mEq IVPB (premix), Q2H    potassium chloride 20 mEq IVPB (premix), Q2H    vancomycin (VANCOCIN) 1000 mg in sodium chloride 0.9% 250 mL IVPB, Q12H    Administrative Statements   Today, Patient Was Seen By: Ai Gan MD    **Please Note: This note may have been constructed using a voice recognition system.**

## 2025-01-12 NOTE — ASSESSMENT & PLAN NOTE
Patient's potassium on 1/12 is 3.3. Checked patient's phosphorus and magnesium, magnesium also low. Will replete electrolytes.    Plan:  Repleted with potassium chloride 20 mEq IVPB  x 2 doses

## 2025-01-12 NOTE — ASSESSMENT & PLAN NOTE
Plan:  Continue home amlodipine 5 mg HS  Continue home metoprolol succinate 50 mg daily  Continue home clonidine 0.1 prn

## 2025-01-13 LAB
ALBUMIN SERPL BCG-MCNC: 3.3 G/DL (ref 3.5–5)
ALP SERPL-CCNC: 60 U/L (ref 34–104)
ALT SERPL W P-5'-P-CCNC: 9 U/L (ref 7–52)
ANION GAP SERPL CALCULATED.3IONS-SCNC: 6 MMOL/L (ref 4–13)
AST SERPL W P-5'-P-CCNC: 11 U/L (ref 13–39)
BASOPHILS # BLD AUTO: 0.04 THOUSANDS/ΜL (ref 0–0.1)
BASOPHILS NFR BLD AUTO: 0 % (ref 0–1)
BILIRUB SERPL-MCNC: 0.45 MG/DL (ref 0.2–1)
BUN SERPL-MCNC: 15 MG/DL (ref 5–25)
CALCIUM ALBUM COR SERPL-MCNC: 9.3 MG/DL (ref 8.3–10.1)
CALCIUM SERPL-MCNC: 8.7 MG/DL (ref 8.4–10.2)
CHLORIDE SERPL-SCNC: 108 MMOL/L (ref 96–108)
CO2 SERPL-SCNC: 25 MMOL/L (ref 21–32)
CREAT SERPL-MCNC: 0.37 MG/DL (ref 0.6–1.3)
EOSINOPHIL # BLD AUTO: 0.22 THOUSAND/ΜL (ref 0–0.61)
EOSINOPHIL NFR BLD AUTO: 1 % (ref 0–6)
ERYTHROCYTE [DISTWIDTH] IN BLOOD BY AUTOMATED COUNT: 15.8 % (ref 11.6–15.1)
GFR SERPL CREATININE-BSD FRML MDRD: 93 ML/MIN/1.73SQ M
GLUCOSE SERPL-MCNC: 96 MG/DL (ref 65–140)
HCT VFR BLD AUTO: 31.8 % (ref 34.8–46.1)
HGB BLD-MCNC: 10.7 G/DL (ref 11.5–15.4)
IMM GRANULOCYTES # BLD AUTO: 0.09 THOUSAND/UL (ref 0–0.2)
IMM GRANULOCYTES NFR BLD AUTO: 1 % (ref 0–2)
LYMPHOCYTES # BLD AUTO: 4.44 THOUSANDS/ΜL (ref 0.6–4.47)
LYMPHOCYTES NFR BLD AUTO: 26 % (ref 14–44)
MAGNESIUM SERPL-MCNC: 2.3 MG/DL (ref 1.9–2.7)
MCH RBC QN AUTO: 32.6 PG (ref 26.8–34.3)
MCHC RBC AUTO-ENTMCNC: 33.6 G/DL (ref 31.4–37.4)
MCV RBC AUTO: 97 FL (ref 82–98)
MONOCYTES # BLD AUTO: 1.34 THOUSAND/ΜL (ref 0.17–1.22)
MONOCYTES NFR BLD AUTO: 8 % (ref 4–12)
NEUTROPHILS # BLD AUTO: 10.92 THOUSANDS/ΜL (ref 1.85–7.62)
NEUTS SEG NFR BLD AUTO: 64 % (ref 43–75)
NRBC BLD AUTO-RTO: 0 /100 WBCS
PHOSPHATE SERPL-MCNC: 2.8 MG/DL (ref 2.3–4.1)
PLATELET # BLD AUTO: 396 THOUSANDS/UL (ref 149–390)
PMV BLD AUTO: 10.3 FL (ref 8.9–12.7)
POTASSIUM SERPL-SCNC: 4.1 MMOL/L (ref 3.5–5.3)
PROT SERPL-MCNC: 6 G/DL (ref 6.4–8.4)
RBC # BLD AUTO: 3.28 MILLION/UL (ref 3.81–5.12)
SODIUM SERPL-SCNC: 139 MMOL/L (ref 135–147)
VANCOMYCIN SERPL-MCNC: 21.8 UG/ML (ref 10–20)
WBC # BLD AUTO: 17.05 THOUSAND/UL (ref 4.31–10.16)

## 2025-01-13 PROCEDURE — 84100 ASSAY OF PHOSPHORUS: CPT

## 2025-01-13 PROCEDURE — 80053 COMPREHEN METABOLIC PANEL: CPT

## 2025-01-13 PROCEDURE — 83735 ASSAY OF MAGNESIUM: CPT

## 2025-01-13 PROCEDURE — 80202 ASSAY OF VANCOMYCIN: CPT

## 2025-01-13 PROCEDURE — G0545 PR INHERENT VISIT TO INPT: HCPCS | Performed by: INTERNAL MEDICINE

## 2025-01-13 PROCEDURE — 87205 SMEAR GRAM STAIN: CPT | Performed by: DENTIST

## 2025-01-13 PROCEDURE — 87070 CULTURE OTHR SPECIMN AEROBIC: CPT | Performed by: DENTIST

## 2025-01-13 PROCEDURE — 85025 COMPLETE CBC W/AUTO DIFF WBC: CPT

## 2025-01-13 PROCEDURE — 99232 SBSQ HOSP IP/OBS MODERATE 35: CPT | Performed by: STUDENT IN AN ORGANIZED HEALTH CARE EDUCATION/TRAINING PROGRAM

## 2025-01-13 PROCEDURE — 99223 1ST HOSP IP/OBS HIGH 75: CPT | Performed by: INTERNAL MEDICINE

## 2025-01-13 PROCEDURE — 87147 CULTURE TYPE IMMUNOLOGIC: CPT | Performed by: DENTIST

## 2025-01-13 PROCEDURE — 87186 SC STD MICRODIL/AGAR DIL: CPT | Performed by: DENTIST

## 2025-01-13 RX ADMIN — VANCOMYCIN HYDROCHLORIDE 1000 MG: 5 INJECTION, POWDER, LYOPHILIZED, FOR SOLUTION INTRAVENOUS at 05:37

## 2025-01-13 RX ADMIN — AMPICILLIN SODIUM AND SULBACTAM SODIUM 3 G: 100; 50 INJECTION, POWDER, FOR SOLUTION INTRAVENOUS at 08:39

## 2025-01-13 RX ADMIN — METHYLPREDNISOLONE 2 MG: 4 TABLET ORAL at 08:25

## 2025-01-13 RX ADMIN — BIMATOPROST 1 DROP: 0.3 SOLUTION/ DROPS OPHTHALMIC at 08:39

## 2025-01-13 RX ADMIN — AMLODIPINE BESYLATE 5 MG: 5 TABLET ORAL at 21:23

## 2025-01-13 RX ADMIN — ENOXAPARIN SODIUM 40 MG: 40 INJECTION SUBCUTANEOUS at 08:29

## 2025-01-13 RX ADMIN — VANCOMYCIN HYDROCHLORIDE 750 MG: 1 INJECTION, POWDER, LYOPHILIZED, FOR SOLUTION INTRAVENOUS at 21:23

## 2025-01-13 RX ADMIN — Medication 5000 UNITS: at 08:29

## 2025-01-13 RX ADMIN — METOPROLOL SUCCINATE 50 MG: 50 TABLET, EXTENDED RELEASE ORAL at 08:25

## 2025-01-13 RX ADMIN — ASPIRIN 81 MG: 81 TABLET ORAL at 08:25

## 2025-01-13 RX ADMIN — PANTOPRAZOLE SODIUM 40 MG: 40 TABLET, DELAYED RELEASE ORAL at 05:37

## 2025-01-13 NOTE — ASSESSMENT & PLAN NOTE
Patient with recent admission for L parotiditis due to obstructing sialolith with reactive left sided cellulitis/masseter myositis 12/22-12/30/2024. Completed 7 day course of antibiotics. Course was complicated by positive blood cultures thought to be contaminant and an echogenic mass on aortic valve. At time of discharge was due for surveillance blood cultures at 2 and 4 weeks, also recommended to have follow up echo with cardiology as patient was high risk for MICHAEL during admission.   -On current admission: vitally stable with hypertension, afebrile, leukocytosis to 16.25. ED provider discussed with on-call ENT with recommendation of broadening of antibiotic regimen, currently on IV Unasyn/Vancomycin. Patient was seen by ENT in the ED with left-sided facial massage and expectorancy of pus and wound culture sent.   Patient did not have blood cultures drawn at time of admission, ordered 1/12.  CT soft tissue neck with contrast: Persistent or recurrent left parotitis with adjacent cellulitis and left masseter myositis, new 8 mm abscess within the left parotid gland, and distal migration of 2 mm stone in the distal left parotid duct.     Plan:    Follow-up blood cultures: Pending 1/13  ENT following, recommendations appreciated:  Follow-up MRSA nares: Pending 1/13  wound cultures: Staph aureus on first growth.  Warm compresses to the affected area  Sialogogues (sour candies, lemon, citrus, vinegar)  NSAID such as Ibuprofen for pain relief and to reduce inflammation  Aggressive hydration; encourage PO intake and IVF as tolerated  Continue IV antibiotic with adequate MRSA coverage   ID consulted, pending recommendation  Continue Unasyn and vancomycin IV.  Trend fever, CBC  Continuous pulse ox

## 2025-01-13 NOTE — CONSULTS
Consultation - Infectious Disease   Name: Helen M Schwab 89 y.o. female I MRN: [id number]  Unit/Bed#: S -01 I Date of Admission: 1/11/2025   Date of Service: 1/13/2025 I Hospital Day: 2   Inpatient consult to Infectious Diseases  Consult performed by: Julianne Quintero PA-C  Consult ordered by: Ai Gan MD        Physician Requesting Evaluation: Kaz Jimenez DO   Reason for Evaluation / Principal Problem: parotiditis     Assessment & Plan  Parotiditis  1/11/25 Left parotid culture was obtained by ENT's expressed pus. It is growing Staph aureus. Patient with MRSA + hx  1/12/2025 CT soft tissue neck: Asymmetric enlargement-uremia of the left parotid gland with surrounding inflammatory changes.  Previously identified 2 mm sialolith in the left parotid duct has moved distally.  There is a new 4 x 8 mm hypodensity within the left parotid gland suspicious for collection  -continue IV Vancomycin pending final cultures  -Pharmacy on consult for Vancomycin dosing management.  -discontinued Unasyn.  -follow-up cultures and adjust antibiotics accordingly  -monitor temperature and hemodynamics  -serial exam  -ENT following   -additional inventions pending clinical course  -monitoring serial CBC and BMP for treatment response and any developing toxicities  -may need follow up imaging  Positive blood culture  1 out of 6 blood cultures from December admission isolated MRSA.  Overall suspicion was for contaminant.  She is without any intravascular devices. Unfortunately echo was obtained and is abnormal as below.  Discussed case in detail with cardiology and with primary service and the risk associated with MICHAEL reviewed.  Plan was for surveillance blood cultures and monitoring 2D echo as outpatient.  -Continue antibiotic for parotiditis as above  -Follow up 1/12/25 blood cultures, though obtained after Vancomycin initiated.   -Will plan for surveillance blood cultures again as an outpatient after parotiditis  managed.  Abnormal echocardiogram  Patient had 12/25/24 2D echo ordered in the setting of 1 out of 2 blood cultures being positive.  Echo notable for changes at the aortic valve which could represent focal calcification, fibroblastoma or vegetation.  With clinical low suspicious for endocarditis, case reviewed in detail with cardiologist, plan is for surveillance and outpatient TTE is planned for February 2025  -continue IV Vancomycin for as above  -Will follow-up pending cultures  -Surveillance blood cultures as above  -Follow-up in cardiology office for repeat 2D echo  Ulcerative colitis without complications (HCC)  -monitor symptoms  -symptomatic management per primary care team  -advancing diet as tolerated  Gastroesophageal reflux  -monitor symptoms  -symptomatic management per primary care team  -advancing diet as tolerated  Fibromyalgia  -monitor symptoms  -symptomatic management per primary care team    I have discussed with patient, RN, ENT team, and Dr. Jimenez's primary care team regarding the above plan to continue Vancomycin and Unasyn and monitor closely. They agree with the plan.    Antibiotics:  Vancomycin/Unasyn D3    History of Present Illness   Helen M Schwab is a 89 y.o. year old female with longstanding ulcerative colitis, GERD, hypertension, and prior parotiditis managed with 1 week of Augmentin 2 weeks ago who presented to [facility] ER 1/11/25 with recurrent facial swelling. In the ED, white count was 16. Patient was started on vancomycin and IV unasyn. 1/12/25 blood cultures were drawn after doses of Vancomycin/Unasyn and are negative to date. Left parotid cultures were obtained by ENT's expressing pus from gland. It is growing Staph aureus. MRSA nares pending. Infectious Disease is now being consulted regarding evaluation and management of parotiditis.  Patient states her prior parotid swelling was fully resolved until this past weekend. She denies fever, chills, sweats, N/V/D, CP, SOB, cough, or  dysuria. Medications were reviewed and some drying agents such as clonidine and lomotil are on patient's active list.  A complete review of systems is negative other than that noted in the HPI.    I have reviewed the patient's PMH, PSH, Social History, Family History, Meds, and Allergies  Historical Information   Past Medical History:   Diagnosis Date    Arthritis     Bell's palsy 1940    Colitis     GERD (gastroesophageal reflux disease)     Hemorrhoids     History of transfusion     Hx of colonoscopy 1996, 1998, 2000, 2002, 2003, 2007, 2012    Hypertension     Ulcerative colitis (HCC)      Past Surgical History:   Procedure Laterality Date    BACK SURGERY  1971    ruptured disk    BACK SURGERY  1972    bone fusion lumbar spine    BLADDER REPAIR  2002    BLADDER REPAIR  2012    BREAST SURGERY  2011    CARDIAC SURGERY  2014    stent    CARPAL TUNNEL RELEASE  08/14/2014    CATARACT EXTRACTION Right 2015    CHOLECYSTECTOMY      COLONOSCOPY      EYE SURGERY Left     GALLBLADDER SURGERY  1995    GANGLION CYST EXCISION  2004    left wrist    HERNIA REPAIR  2001    HERNIA REPAIR  2003    MASTECTOMY  2012    OVARY SURGERY  1984    PARTIAL HYSTERECTOMY  1964    NM EXC B9 LESION MRGN XCP SK TG T/A/L 0.5 CM/< Left 4/27/2021    Procedure: Excision pyogenic granuloma left ring finger;  Surgeon: Shane Lincoln MD;  Location: [facility];  Service: Orthopedics    NM EXC LESION TDN SHTH/JT CAPSL HAND/FNGR Right 10/9/2018    Procedure: EXCISION GANGLION CYST RIGHT SMALL FINGER;  Surgeon: Shane Lincoln MD;  Location: [facility];  Service: Orthopedics    NM NDSC WRST SURG W/RLS TRANSVRS CARPL LIGM Left 8/10/2021    Procedure: Left endoscopic carpal tunnel release;  Surgeon: Shane Lincoln MD;  Location: [facility];  Service: Orthopedics    NM TENDON SHEATH INCISION Right 4/5/2016    Procedure: INDEX TRIGGER FINGER AND RING TRIGGER FINGER RELEASE ;  Surgeon: Shane Lincoln MD;  Location: [facility];  Service:  Orthopedics    ME TENDON SHEATH INCISION Left 10/16/2018    Procedure: RELEASE TRIGGER FINGER - LEFT INDEX;  Surgeon: Shane Lincoln MD;  Location: [facility];  Service: Orthopedics    ME TENDON SHEATH INCISION Left 8/10/2021    Procedure: Left long and ring finger trigger finger release;  Surgeon: Shane Lincoln MD;  Location: [facility];  Service: Orthopedics    SPLENECTOMY, PARTIAL  1995    TONSILLECTOMY  193    WRIST SURGERY Right      Social History     Tobacco Use    Smoking status: Former     Current packs/day: 0.00     Types: Cigarettes     Quit date: 1950     Years since quittin.0    Smokeless tobacco: Never   Vaping Use    Vaping status: Never Used   Substance and Sexual Activity    Alcohol use: Yes     Comment: seldom    Drug use: No    Sexual activity: Never     E-Cigarette/Vaping    E-Cigarette Use Never User      E-Cigarette/Vaping Substances         Objective :  Temp:  [97.8 °F (36.6 °C)-98 °F (36.7 °C)] 97.8 °F (36.6 °C)  HR:  [71-84] 84  BP: (125-166)/(44-78) 166/78  Resp:  [17] 17  SpO2:  [94 %-96 %] 96 %  O2 Device: None (Room air)    General Appearance:  89 year old elderly female sitting comfortably in chair, appearing well, nontoxic, and in no distress   Head:  Normocephalic, without obvious abnormality, atraumatic   Eyes:  Conjunctiva pink and sclera anicteric, both eyes   Nose: Nares normal, mucosa normal, no drainage   Throat: Oropharynx moist without lesions. No left parotid fluctuance on exam, mild tenderness and erythema fading slowly as depicted below   Neck: Supple, symmetrical, no adenopathy, no tenderness/mass/nodules   Back:   Symmetric, no curvature, ROM normal, no CVA tenderness   Lungs:   Clear to auscultation bilaterally, respirations unlabored   Chest Wall:  No tenderness or deformity   Heart:  RRR; no murmur, rub or gallop   Abdomen:   Soft, non-tender, non-distended, positive bowel sounds    Extremities: No cyanosis, clubbing or edema   Skin: No rashes or  lesions. No draining wounds noted. IV site nontender   Lymph nodes: Cervical, supraclavicular nodes normal   Neurologic: Alert and oriented times 3, extremity strength 5/5 and symmetric         Lab Results: I have reviewed the following results:  Results from last 7 days   Lab Units 01/13/25  0553 01/12/25  0506 01/11/25  1320   WBC Thousand/uL 17.05* 17.86* 16.25*   HEMOGLOBIN g/dL 10.7* 11.6 12.3   PLATELETS Thousands/uL 396* 486* 527*     Results from last 7 days   Lab Units 01/13/25  0553 01/12/25  0506 01/11/25  1320   SODIUM mmol/L 139 139 137   POTASSIUM mmol/L 4.1 3.3* 4.4   CHLORIDE mmol/L 108 105 101   CO2 mmol/L 25 27 29   BUN mg/dL 15 12 19   CREATININE mg/dL 0.37* 0.36* 0.62   EGFR ml/min/1.73sq m 93 93 78   CALCIUM mg/dL 8.7 8.9 9.4   AST U/L 11*  --  37   ALT U/L 9  --  14   ALK PHOS U/L 60  --  61   ALBUMIN g/dL 3.3*  --  4.0     Results from last 7 days   Lab Units 01/12/25  1444 01/11/25  1620   BLOOD CULTURE  Received in Microbiology Lab. Culture in Progress.  Received in Microbiology Lab. Culture in Progress.  --    GRAM STAIN RESULT   --  No Polys or Bacteria seen   WOUND CULTURE   --  1+ Growth of Staphylococcus aureus*  2+ Growth of                       Imaging Results Review: I personally reviewed the following image studies in PACS and associated radiology reports: CT soft tissue neck. My interpretation of the radiology images/reports is: 1/12/2025 CT soft tissue neck: Asymmetric enlargement-uremia of the left parotid gland with surrounding inflammatory changes.  Previously identified 2 mm sialolith in the left parotid duct has moved distally.  There is a new 4 x 8 mm hypodensity within the left parotid gland suspicious for collection..  Other Study Results Review: No additional pertinent studies reviewed.

## 2025-01-13 NOTE — ASSESSMENT & PLAN NOTE
1/11/25 Left parotid culture was obtained by ENT's expressed pus. It is growing Staph aureus. Patient with MRSA + hx  1/12/2025 CT soft tissue neck: Asymmetric enlargement-uremia of the left parotid gland with surrounding inflammatory changes.  Previously identified 2 mm sialolith in the left parotid duct has moved distally.  There is a new 4 x 8 mm hypodensity within the left parotid gland suspicious for collection  -continue IV Vancomycin pending final cultures  -Pharmacy on consult for Vancomycin dosing management.  -discontinued Unasyn.  -follow-up cultures and adjust antibiotics accordingly  -monitor temperature and hemodynamics  -serial exam  -ENT following   -additional inventions pending clinical course  -monitoring serial CBC and BMP for treatment response and any developing toxicities  -may need follow up imaging

## 2025-01-13 NOTE — PROGRESS NOTES
Otolaryngology [facility] Progress Note:    Subjective: Pt with left sided parotitis. No acute events overnight.     WBC 17.9 from 16.2.    Objective:   /70   Pulse 78   Temp 97.9 °F (36.6 °C)   Resp 17   SpO2 94%     Physical Exam   Constitutional: Cooperative, able to hear and answer questions without difficulty.    Voice: Normal voice quality.  Head/Face: Normocephalic, atraumatic.  No scars, masses or lesions. Mild skin erythema over left parotid gland, pus expressed with palpation  Ears: External ears normal.  No post-auricular erythema or tenderness.  Oral cavity: Mucosa dry, lips without lesions or masses.    Oropharynx: Uvula is midline, soft palate intact without lesion or mass.  Oropharyngeal inlet without obstruction.  Tonsils unremarkable.  Posterior pharyngeal wall clear. No masses or lesions.  Salivary glands:  Left parotid gland is tender to palpation, mildly enlarged. Continued purulence noted from parotid papilla.   Pulmonary/Chest: Normal effort and rate. No respiratory distress. No stertor or stridor  Psychiatric: Normal mood and affect.    Assessment/Plan: Patient with parotitis. Pus expressed from gland. Concern for MRSA, abx currently covering with vancomycin.     Sialadenitis protocol:  1. Warm compresses to the affected area. Sialogogues (sour candies, lemon, citrus, vinegar). NSAID such as Ibuprofen for pain relief and to reduce inflammation. Aggressive hydration; encourage PO intake and IVF as tolerated. Continue IV antibiotic with adequate MRSA coverage.     2. Parotitis: Massage the gland from angle of mandible working anteriorly along the cheek towards the oral commissure to facilitate drainage through the ductal opening     3. F/u cultures on new swab for left parotitis from today.      4. MRSA nasal swab culture in process.    5. Defer decision on blood cultures to primary team.      Salvador Wolf BDS, DMD, MPA, MD   PGY-1  Otorhinolaryngology - Head & Neck Surgery  Please  "contact ENT Resident Epic Secure Chat Role for any questions or concerns.    ** Please Note: Portions of the record may have been created with voice recognition software. Occasional wrong word or \"sound a like\" substitutions may have occurred due to the inherent limitations of voice recognition software. There may also be notations and random deletions of words or characters from malfunctioning software. Read the chart carefully and recognize, using context, where substitutions/deletions have occurred.**  "

## 2025-01-13 NOTE — ASSESSMENT & PLAN NOTE
1 out of 6 blood cultures from December admission isolated MRSA.  Overall suspicion was for contaminant.  She is without any intravascular devices. Unfortunately echo was obtained and is abnormal as below.  Discussed case in detail with cardiology and with primary service and the risk associated with MICHAEL reviewed.  Plan was for surveillance blood cultures and monitoring 2D echo as outpatient.  -Continue antibiotic for parotiditis as above  -Follow up 1/12/25 blood cultures, though obtained after Vancomycin initiated.   -Will plan for surveillance blood cultures again as an outpatient after parotiditis managed.

## 2025-01-13 NOTE — ASSESSMENT & PLAN NOTE
Patient had 12/25/24 2D echo ordered in the setting of 1 out of 2 blood cultures being positive.  Echo notable for changes at the aortic valve which could represent focal calcification, fibroblastoma or vegetation.  With clinical low suspicious for endocarditis, case reviewed in detail with cardiologist, plan is for surveillance and outpatient TTE is planned for February 2025  -continue IV Vancomycin for as above  -Will follow-up pending cultures  -Surveillance blood cultures as above  -Follow-up in cardiology office for repeat 2D echo

## 2025-01-13 NOTE — PROGRESS NOTES
Helen M Schwab is a 89 y.o. female who is currently ordered Vancomycin IV with management by the Pharmacy Consult service.  Relevant clinical data and objective / subjective history reviewed.  Vancomycin Assessment:  Indication and Goal AUC/Trough: Soft tissue (goal -600, trough >10)  Micro:     Renal Function:  SCr: 0.37 mg/dL  CrCl: 76 mL/min  Renal replacement: Not on dialysis  Days of Therapy: 3  Current Dose: 1 g IV every 12 hours  Vancomycin Plan:  New Dosin mg IV every 12 hours  Estimated AUC: 473 mcg*hr/mL  Estimated Trough: 12.9 mcg/mL  Next Level:  at 0600  Renal Function Monitoring: Daily BMP and UOP  Pharmacy will continue to follow closely for s/sx of nephrotoxicity, infusion reactions and appropriateness of therapy.  BMP and CBC will be ordered per protocol. We will continue to follow the patient’s culture results and clinical progress daily.    Julianne Vargas

## 2025-01-13 NOTE — PROGRESS NOTES
Progress Note - Internal Medicine   Name: Helen M Schwab 89 y.o. female I MRN: [id number]  Unit/Bed#: S -01 I Date of Admission: 1/11/2025   Date of Service: 1/13/2025 I Hospital Day: 2    Assessment & Plan  Parotiditis  Patient with recent admission for L parotiditis due to obstructing sialolith with reactive left sided cellulitis/masseter myositis 12/22-12/30/2024. Completed 7 day course of antibiotics. Course was complicated by positive blood cultures thought to be contaminant and an echogenic mass on aortic valve. At time of discharge was due for surveillance blood cultures at 2 and 4 weeks, also recommended to have follow up echo with cardiology as patient was high risk for MICHAEL during admission.   -On current admission: vitally stable with hypertension, afebrile, leukocytosis to 16.25. ED provider discussed with on-call ENT with recommendation of broadening of antibiotic regimen, currently on IV Unasyn/Vancomycin. Patient was seen by ENT in the ED with left-sided facial massage and expectorancy of pus and wound culture sent.   Patient did not have blood cultures drawn at time of admission, ordered 1/12.  CT soft tissue neck with contrast: Persistent or recurrent left parotitis with adjacent cellulitis and left masseter myositis, new 8 mm abscess within the left parotid gland, and distal migration of 2 mm stone in the distal left parotid duct.     Plan:    Follow-up blood cultures: Pending 1/13  ENT following, recommendations appreciated:  Follow-up MRSA nares: Pending 1/13  wound cultures: Staph aureus on first growth.  Warm compresses to the affected area  Sialogogues (sour candies, lemon, citrus, vinegar)  NSAID such as Ibuprofen for pain relief and to reduce inflammation  Aggressive hydration; encourage PO intake and IVF as tolerated  Continue IV antibiotic with adequate MRSA coverage   ID consulted, pending recommendation  Continue Unasyn and vancomycin IV.  Trend fever, CBC  Continuous pulse  ox  Ulcerative colitis without complications (HCC)  Chronic and stable.    Plan:   Continue home mesalamine 800 mg BID  Gastroesophageal reflux  Plan:  Start Pantoprazole 40 mg AM inpatient  Fibromyalgia  Follow-up with PCP outpatient. Chronic and stable.    Plan:  Continue home methylprednisolone 2 mg daily   Hypertension  Plan:  Continue home amlodipine 5 mg HS  Continue home metoprolol succinate 50 mg daily  Continue home clonidine 0.1 prn  Hypokalemia  Patient's potassium on 1/12 is 3.3. Checked patient's phosphorus and magnesium, magnesium also low. Will replete electrolytes.    Plan:  Repleted with potassium chloride 20 mEq IVPB  x 2 doses  Hypomagnesemia  Patient's magnesium on 1/12 is 1.6. Checked patient's potassium, also low. Will replete electrolytes.    Plan:  Repleted with magnesium sulfate 4L IVPB       Subjective   Patient seen and examined at bedside. No acute events reported overnight.  Patient is feeling better today, with mild tenderness over the left salivary glands and erythema at the left jaw neck area.  I examined the patient while ENT was taking their wound culture.  They recommended continuing the current IV treatment and following up on the cultures.  Pain has been well-controlled with the current regimen.  No fever, chills, dysphagia, chest pain, shortness of breath, lower leg edema, abdominal pain, pain or burning while urination.    Objective :  Temp:  [97.9 °F (36.6 °C)-98.6 °F (37 °C)] 97.9 °F (36.6 °C)  HR:  [71-79] 78  BP: (125-158)/(44-70) 158/70  Resp:  [17] 17  SpO2:  [94 %-96 %] 94 %  O2 Device: None (Room air)    I/O         01/11 0701  01/12 0700 01/12 0701  01/13 0700    P.O.  360    IV Piggyback 100     Total Intake 100 360    Urine  800    Total Output  800    Net +100 -440          Unmeasured Urine Occurrence 3 x 1 x          Weights:        There is no height or weight on file to calculate BMI.  Weight (last 2 days)       None            Physical Exam  Vitals and nursing  note reviewed.   Constitutional:       General: She is not in acute distress.     Appearance: Normal appearance. She is well-developed. She is not ill-appearing.   HENT:      Head: Normocephalic and atraumatic.      Jaw: Tenderness and swelling present.      Salivary Glands: Left salivary gland is diffusely enlarged and tender.   Eyes:      General: No scleral icterus.     Conjunctiva/sclera: Conjunctivae normal.   Cardiovascular:      Rate and Rhythm: Normal rate and regular rhythm.      Heart sounds: No murmur heard.  Pulmonary:      Effort: Pulmonary effort is normal. No respiratory distress.      Breath sounds: Normal breath sounds.   Abdominal:      Palpations: Abdomen is soft.      Tenderness: There is no abdominal tenderness.   Musculoskeletal:         General: No swelling.      Cervical back: Neck supple.   Skin:     General: Skin is warm and dry.      Capillary Refill: Capillary refill takes less than 2 seconds.   Neurological:      Mental Status: She is alert.   Psychiatric:         Mood and Affect: Mood normal.           Lab Results: I have reviewed the following results:  Recent Labs     01/11/25  1320 01/12/25  0506 01/13/25  0553   WBC 16.25* 17.86* 17.05*   HGB 12.3 11.6 10.7*   HCT 37.1 33.4* 31.8*   * 486* 396*   SODIUM 137 139  --    K 4.4 3.3*  --     105  --    CO2 29 27  --    BUN 19 12  --    CREATININE 0.62 0.36*  --    GLUC 107 92  --    MG  --  1.6*  --    PHOS  --  3.0  --    AST 37  --   --    ALT 14  --   --    ALB 4.0  --   --    TBILI 0.86  --   --    ALKPHOS 61  --   --        Imaging Results Review: I reviewed radiology reports from this admission including: CT head.      Currently Ordered Meds:   Current Facility-Administered Medications:     acetaminophen (TYLENOL) tablet 650 mg, Q6H PRN    amLODIPine (NORVASC) tablet 5 mg, HS    ampicillin-sulbactam (UNASYN) 3 g in sodium chloride 0.9 % 100 mL IVPB, Q8H, Last Rate: 3 g (01/12/25 9640)    artificial tear ophthalmic  ointment, HS PRN    aspirin (ECOTRIN LOW STRENGTH) EC tablet 81 mg, Daily    bimatoprost (LUMIGAN) 0.03 % ophthalmic drops 1 drop, Daily    Cholecalciferol (VITAMIN D3) tablet 5,000 Units, Daily    cloNIDine (CATAPRES) tablet 0.1 mg, Daily PRN    diphenoxylate-atropine (LOMOTIL) 2.5-0.025 mg per tablet 1 tablet, 4x Daily PRN    enoxaparin (LOVENOX) subcutaneous injection 40 mg, Daily    HYDROmorphone (DILAUDID) injection 0.5 mg, Q6H PRN    ketorolac (TORADOL) injection 15 mg, Q6H PRN    mesalamine (DELZICOL) delayed release capsule 800 mg, BID    methylprednisolone (MEDROL) tablet 2 mg, Daily With Breakfast    metoprolol succinate (TOPROL-XL) 24 hr tablet 50 mg, Daily    ondansetron (ZOFRAN) injection 4 mg, Q4H PRN    pantoprazole (PROTONIX) EC tablet 40 mg, Early Morning    vancomycin (VANCOCIN) 1000 mg in sodium chloride 0.9% 250 mL IVPB, Q12H  VTE Pharmacologic Prophylaxis: Enoxaparin (Lovenox)      Administrative Statements   Portions of the record may have been created with voice recognition software.

## 2025-01-13 NOTE — ED PROVIDER NOTES
Time reflects when diagnosis was documented in both MDM as applicable and the Disposition within this note       Time User Action Codes Description Comment    1/11/2025  3:27 PM Julianne Bass Add [K11.20] Parotiditis           ED Disposition       ED Disposition   Admit    Condition   Stable    Date/Time   Sat Jan 11, 2025  3:55 PM    Comment   Case was discussed with Dr. Currie and the patient's admission status was agreed to be Admission Status: inpatient status to the service of Dr. Khan .               Assessment & Plan       Medical Decision Making  Patient is a 89 yo F presenting with worsening facial swelling and pain in setting of recent parotitis. Clinical picture is most consistent with parotitis that is now refractory to other treatment and has some overlying cellulitis. Her airway is intact and she is able to tolerate PO intake though admits to decreased fluid intake. Given recent admission, refractory symptoms, ENT was consulted who evaluated the patient and was able to drain a small amount of purulent material from local swelling concerning for abscess. She was given IV antibiotics including MRSA coverage this time and case was discussed with [facility]. She was admitted ins table condition for further workup and management.     Problems Addressed:  Parotiditis: acute illness or injury    Amount and/or Complexity of Data Reviewed  Labs: ordered.    Risk  OTC drugs.  Decision regarding hospitalization.             Medications   amLODIPine (NORVASC) tablet 5 mg (has no administration in time range)   aspirin (ECOTRIN LOW STRENGTH) EC tablet 81 mg (has no administration in time range)   Cholecalciferol (VITAMIN D3) tablet 5,000 Units (has no administration in time range)   cloNIDine (CATAPRES) tablet 0.1 mg (has no administration in time range)   diphenoxylate-atropine (LOMOTIL) 2.5-0.025 mg per tablet 1 tablet (has no administration in time range)   bimatoprost (LUMIGAN) 0.03 % ophthalmic drops 1 drop  (has no administration in time range)   mesalamine (DELZICOL) delayed release capsule 800 mg (has no administration in time range)   methylprednisolone (MEDROL) tablet 2 mg (has no administration in time range)   pantoprazole (PROTONIX) EC tablet 40 mg (has no administration in time range)   artificial tear ophthalmic ointment (has no administration in time range)   enoxaparin (LOVENOX) subcutaneous injection 40 mg (has no administration in time range)   metoprolol succinate (TOPROL-XL) 24 hr tablet 50 mg (has no administration in time range)   ampicillin-sulbactam (UNASYN) 3 g in sodium chloride 0.9 % 100 mL IVPB (has no administration in time range)   acetaminophen (TYLENOL) tablet 650 mg (has no administration in time range)   ondansetron (ZOFRAN) injection 4 mg (has no administration in time range)   ketorolac (TORADOL) injection 15 mg (has no administration in time range)   HYDROmorphone (DILAUDID) injection 0.5 mg (has no administration in time range)   ampicillin-sulbactam (UNASYN) 3 g in sodium chloride 0.9 % 100 mL IVPB (0 g Intravenous Stopped 1/11/25 1659)   vancomycin (VANCOCIN) 1500 mg in sodium chloride 0.9% 250 mL IVPB (1,500 mg Intravenous New Bag 1/11/25 1715)   acetaminophen (TYLENOL) tablet 975 mg (975 mg Oral Given 1/11/25 1635)       ED Risk Strat Scores                                              History of Present Illness       Chief Complaint   Patient presents with    Facial Swelling     Patient here for re-eval of left sided facial swelling from Dx of acute parotiditis with 2 mm obstructing parotid duct stone from end of December. Pt referred to ENT and F/U bloodwork on Monday, reports 7 day IV Abx given while admitted. Today noticed swelling came back, painful, radiates to left ear. Denies drainage/fevers/chills/trouble swallowing.       Past Medical History:   Diagnosis Date    Arthritis     Bell's palsy 1940    Colitis     GERD (gastroesophageal reflux disease)     Hemorrhoids      History of transfusion     Hx of colonoscopy 1996, 1998, 2000, 2002, 2003, 2007, 2012    Hypertension     Ulcerative colitis (HCC)       Past Surgical History:   Procedure Laterality Date    BACK SURGERY  1971    ruptured disk    BACK SURGERY  1972    bone fusion lumbar spine    BLADDER REPAIR  2002    BLADDER REPAIR  2012    BREAST SURGERY  2011    CARDIAC SURGERY  2014    stent    CARPAL TUNNEL RELEASE  08/14/2014    CATARACT EXTRACTION Right 2015    CHOLECYSTECTOMY      COLONOSCOPY      EYE SURGERY Left     GALLBLADDER SURGERY  1995    GANGLION CYST EXCISION  2004    left wrist    HERNIA REPAIR  2001    HERNIA REPAIR  2003    MASTECTOMY  2012    OVARY SURGERY  1984    PARTIAL HYSTERECTOMY  1964    MT EXC B9 LESION MRGN XCP SK TG T/A/L 0.5 CM/< Left 4/27/2021    Procedure: Excision pyogenic granuloma left ring finger;  Surgeon: Shane Lincoln MD;  [facility]: [facility];  Service: Orthopedics    MT EXC LESION TDN SHTH/JT CAPSL HAND/FNGR Right 10/9/2018    Procedure: EXCISION GANGLION CYST RIGHT SMALL FINGER;  Surgeon: Shane Lincoln MD;  Location: [facility];  Service: Orthopedics    MT NDSC WRST SURG W/RLS TRANSVRS CARPL LIGM Left 8/10/2021    Procedure: Left endoscopic carpal tunnel release;  Surgeon: Shane Lincoln MD;  Location: [facility];  Service: Orthopedics    MT TENDON SHEATH INCISION Right 4/5/2016    Procedure: INDEX TRIGGER FINGER AND RING TRIGGER FINGER RELEASE ;  Surgeon: Shane Lincoln MD;  Location: [facility];  Service: Orthopedics    MT TENDON SHEATH INCISION Left 10/16/2018    Procedure: RELEASE TRIGGER FINGER - LEFT INDEX;  Surgeon: Shane Linclon MD;  [facility]: [facility];  Service: Orthopedics    MT TENDON SHEATH INCISION Left 8/10/2021    Procedure: Left long and ring finger trigger finger release;  Surgeon: Shane Lincoln MD;  [facility];  Service: Orthopedics    SPLENECTOMY, PARTIAL  1995    TONSILLECTOMY  1939    WRIST SURGERY Right 1952      Family  History   Problem Relation Age of Onset    Cancer Mother     Breast cancer Sister       Social History     Tobacco Use    Smoking status: Former     Current packs/day: 0.00     Types: Cigarettes     Quit date: 1950     Years since quittin.0    Smokeless tobacco: Never   Vaping Use    Vaping status: Never Used   Substance Use Topics    Alcohol use: Yes     Comment: seldom    Drug use: No      E-Cigarette/Vaping    E-Cigarette Use Never User       E-Cigarette/Vaping Substances      I have reviewed and agree with the history as documented.     HPI    Patient is a 89 yo F with recent admission for parotitis presenting with similar symptoms. She was admitted and discharged approximately 10 days ago after being diagnosed. She received IV unasyn and transitioned to augmentin, completed course. Symptoms did resolve completely but today she woke up and face was swollen and painful again. She states that this feels similar to when she was admitted previously. Has not yet had a chance to follow with ENT outpatient.    Review of Systems   Constitutional:  Negative for chills and fever.   HENT:  Positive for facial swelling. Negative for ear pain and sore throat.         Facial soreness in front of left ear   Eyes:  Negative for pain and visual disturbance.   Respiratory:  Negative for cough and shortness of breath.    Cardiovascular:  Negative for chest pain and palpitations.   Gastrointestinal:  Negative for abdominal pain and vomiting.   Genitourinary:  Negative for dysuria and hematuria.   Musculoskeletal:  Negative for arthralgias and back pain.   Skin:  Negative for color change and rash.   Neurological:  Negative for seizures and syncope.   All other systems reviewed and are negative.          Objective       ED Triage Vitals [25 1307]   Temperature Pulse Blood Pressure Respirations SpO2 Patient Position - Orthostatic VS   97.8 °F (36.6 °C) 77 (!) 175/78 18 97 % Sitting      Temp Source Heart Rate Source BP  Location FiO2 (%) Pain Score    Oral Monitor Right arm -- 8      Vitals      Date and Time Temp Pulse SpO2 Resp BP Pain Score FACES Pain Rating User   01/12/25 2153 -- -- -- -- -- 7 -- CO   01/12/25 2109 -- -- -- -- 158/70 -- -- CO   01/12/25 2107 97.9 °F (36.6 °C) 78 94 % -- 158/70 -- -- DII   01/12/25 1444 98 °F (36.7 °C) 71 96 % -- 125/44 -- -- DII   01/12/25 1018 -- -- -- -- -- 8 -- CO   01/12/25 0813 -- -- -- -- -- 7 -- CO   01/12/25 0655 -- -- -- 17 -- -- -- CO   01/12/25 0655 98.6 °F (37 °C) 79 95 % -- 157/64 -- -- [facility]   01/12/25 0014 -- -- -- -- -- 8 --    01/11/25 2100 -- -- -- -- -- No Pain --    01/11/25 2002 97.2 °F (36.2 °C) 68 94 % 17 -- -- -- [facility]   01/11/25 1922 -- 67 96 % -- 141/60 -- -- [facility]   01/11/25 1735 -- 69 95 % 18 132/94 -- --    01/11/25 1635 -- -- -- -- -- 8 --    01/11/25 1528 -- 67 95 % 18 142/65 8 --    01/11/25 1307 97.8 °F (36.6 °C) 77 97 % 18 175/78 8 -- DO            Physical Exam  Vitals and nursing note reviewed.   Constitutional:       General: She is not in acute distress.     Appearance: She is well-developed.   HENT:      Head: Normocephalic.      Comments: Left sided facial swelling with overlying erythema, tenderness anterior to left ear. No trismus.      Mouth/Throat:      Mouth: Mucous membranes are moist.      Pharynx: Oropharynx is clear.      Comments: No dental abscess or sign of dental infection  Eyes:      Conjunctiva/sclera: Conjunctivae normal.   Cardiovascular:      Rate and Rhythm: Normal rate and regular rhythm.      Heart sounds: No murmur heard.  Pulmonary:      Effort: Pulmonary effort is normal. No respiratory distress.      Breath sounds: Normal breath sounds.   Abdominal:      Palpations: Abdomen is soft.      Tenderness: There is no abdominal tenderness.   Musculoskeletal:         General: No swelling.      Cervical back: No tenderness.   Lymphadenopathy:      Cervical: Cervical adenopathy present.   Skin:     General: Skin is warm and dry.    Neurological:      Mental Status: She is alert.   Psychiatric:         Mood and Affect: Mood normal.         Results Reviewed       Procedure Component Value Units Date/Time    Wound culture and Gram stain [[id number]] Collected: 01/11/25 1620    Lab Status: Preliminary result Specimen: Wound from Head Updated: 01/12/25 1347     Wound Culture Culture too young- will reincubate     Gram Stain Result No Polys or Bacteria seen    Basic metabolic panel [[id number]]  (Abnormal) Collected: 01/12/25 0506    Lab Status: Final result Specimen: Blood from Arm, Right Updated: 01/12/25 0538     Sodium 139 mmol/L      Potassium 3.3 mmol/L      Chloride 105 mmol/L      CO2 27 mmol/L      ANION GAP 7 mmol/L      BUN 12 mg/dL      Creatinine 0.36 mg/dL      Glucose 92 mg/dL      Calcium 8.9 mg/dL      eGFR 93 ml/min/1.73sq m     Narrative:      National Kidney Disease Foundation guidelines for Chronic Kidney Disease (CKD):     Stage 1 with normal or high GFR (GFR > 90 mL/min/1.73 square meters)    Stage 2 Mild CKD (GFR = 60-89 mL/min/1.73 square meters)    Stage 3A Moderate CKD (GFR = 45-59 mL/min/1.73 square meters)    Stage 3B Moderate CKD (GFR = 30-44 mL/min/1.73 square meters)    Stage 4 Severe CKD (GFR = 15-29 mL/min/1.73 square meters)    Stage 5 End Stage CKD (GFR <15 mL/min/1.73 square meters)  Note: GFR calculation is accurate only with a steady state creatinine    CBC and differential [[id number]]  (Abnormal) Collected: 01/12/25 0506    Lab Status: Final result Specimen: Blood from Arm, Right Updated: 01/12/25 0524     WBC 17.86 Thousand/uL      RBC 3.51 Million/uL      Hemoglobin 11.6 g/dL      Hematocrit 33.4 %      MCV 95 fL      MCH 33.0 pg      MCHC 34.7 g/dL      RDW 15.4 %      MPV 10.3 fL      Platelets 486 Thousands/uL      nRBC 0 /100 WBCs      Segmented % 72 %      Immature Grans % 1 %      Lymphocytes % 17 %      Monocytes % 9 %      Eosinophils Relative 1 %      Basophils Relative 0 %      Absolute Neutrophils  12.96 Thousands/µL      Absolute Immature Grans 0.09 Thousand/uL      Absolute Lymphocytes 3.04 Thousands/µL      Absolute Monocytes 1.63 Thousand/µL      Eosinophils Absolute 0.10 Thousand/µL      Basophils Absolute 0.04 Thousands/µL     Platelet count [[id number]]     Lab Status: No result Specimen: Blood     MRSA culture [[id number]]     Lab Status: No result Specimen: Elina     Comprehensive metabolic panel [[id number]] Collected: 01/11/25 1320    Lab Status: Final result Specimen: Blood from Arm, Left Updated: 01/11/25 1352     Sodium 137 mmol/L      Potassium 4.4 mmol/L      Chloride 101 mmol/L      CO2 29 mmol/L      ANION GAP 7 mmol/L      BUN 19 mg/dL      Creatinine 0.62 mg/dL      Glucose 107 mg/dL      Calcium 9.4 mg/dL      AST 37 U/L      ALT 14 U/L      Alkaline Phosphatase 61 U/L      Total Protein 7.3 g/dL      Albumin 4.0 g/dL      Total Bilirubin 0.86 mg/dL      eGFR 78 ml/min/1.73sq m     Narrative:      National Kidney Disease Foundation guidelines for Chronic Kidney Disease (CKD):     Stage 1 with normal or high GFR (GFR > 90 mL/min/1.73 square meters)    Stage 2 Mild CKD (GFR = 60-89 mL/min/1.73 square meters)    Stage 3A Moderate CKD (GFR = 45-59 mL/min/1.73 square meters)    Stage 3B Moderate CKD (GFR = 30-44 mL/min/1.73 square meters)    Stage 4 Severe CKD (GFR = 15-29 mL/min/1.73 square meters)    Stage 5 End Stage CKD (GFR <15 mL/min/1.73 square meters)  Note: GFR calculation is accurate only with a steady state creatinine    CBC and differential [[id number]]  (Abnormal) Collected: 01/11/25 1320    Lab Status: Final result Specimen: Blood from Arm, Left Updated: 01/11/25 1336     WBC 16.25 Thousand/uL      RBC 3.80 Million/uL      Hemoglobin 12.3 g/dL      Hematocrit 37.1 %      MCV 98 fL      MCH 32.4 pg      MCHC 33.2 g/dL      RDW 15.4 %      MPV 10.4 fL      Platelets 527 Thousands/uL      nRBC 0 /100 WBCs      Segmented % 79 %      Immature Grans % 0 %      Lymphocytes % 12 %       Monocytes % 9 %      Eosinophils Relative 0 %      Basophils Relative 0 %      Absolute Neutrophils 12.85 Thousands/µL      Absolute Immature Grans 0.06 Thousand/uL      Absolute Lymphocytes 1.87 Thousands/µL      Absolute Monocytes 1.40 Thousand/µL      Eosinophils Absolute 0.03 Thousand/µL      Basophils Absolute 0.04 Thousands/µL             CT soft tissue neck w contrast   Final Interpretation by E. Alec Schoenberger, MD (01/12 0901)      Persistent or recurrent left parotitis with adjacent cellulitis and  left masseter myositis. New 8 mm abscess within the left parotid gland   Distal migration of 2 mm stone in the distal left parotid duct.         The study was marked in EPIC for immediate notification.      Workstation performed: [id number]             Procedures    ED Medication and Procedure Management   Prior to Admission Medications   Prescriptions Last Dose Informant Patient Reported? Taking?   LUMIGAN 0.01 % ophthalmic drops 1/11/2025 Self Yes Yes   Sig: one drop to both eyes at bedtime.   Multiple Vitamins-Minerals (PRESERVISION AREDS 2 PO) Unknown  Yes No   Sig: Take 2 tablets by mouth daily.   NITROGLYCERIN PO Unknown Self Yes No   Sig: Take by mouth   Patient not taking: Reported on 1/11/2025   White Petrolatum-Mineral Oil (artificial tears) 1/11/2025 Self Yes Yes   Sig: Administer 1 application. to both eyes as needed for dry eyes   acetaminophen (Tylenol 8 Hour) 650 mg CR tablet 1/11/2025 Self No Yes   Sig: Take 1 tablet (650 mg total) by mouth every 8 (eight) hours   amLODIPine (NORVASC) 5 mg tablet 1/11/2025  Yes Yes   Sig: Take 5 mg by mouth daily at bedtime.   aspirin (ECOTRIN LOW STRENGTH) 81 mg EC tablet 1/11/2025 Self Yes Yes   Sig: Take 81 mg by mouth daily   busPIRone (BUSPAR) 10 mg tablet Unknown Self Yes No   Sig: Take 10 mg by mouth as needed (anxiety)   cholecalciferol (VITAMIN D3) 1,000 units tablet 1/11/2025 Self Yes Yes   Sig: Take 5,000 Units by mouth daily   cloNIDine (CATAPRES)  0.1 mg tablet Unknown Self Yes No   Sig: Take 0.1 mg by mouth daily as needed for high blood pressure take 1 tablet as needed sbp >180 mm hg   diphenoxylate-atropine (LOMOTIL) 2.5-0.025 mg per tablet 1/11/2025 Self Yes Yes   Sig: TAKE 1 TABLET BY ORAL ROUTE 4 TIMES EVERY DAY AS NEEDED   mesalamine (DELZICOL) 400 mg Unknown  No No   Sig: TAKE 2 CAPSULES (800 MG TOTAL) BY MOUTH 2 (TWO) TIMES A DAY   methylPREDNISolone (MEDROL) 2 MG tablet 1/11/2025  Yes Yes   Sig: Take 2 mg by mouth daily with breakfast   metoprolol succinate (TOPROL-XL) 100 mg 24 hr tablet 1/11/2025  No Yes   Sig: Take 0.5 tablets (50 mg total) by mouth daily   Patient taking differently: Take 1 tablet by mouth daily   omeprazole (PriLOSEC) 20 mg delayed release capsule 1/11/2025 Self Yes Yes   Sig: Take 20 mg by mouth daily      Facility-Administered Medications: None     Current Discharge Medication List        CONTINUE these medications which have NOT CHANGED    Details   acetaminophen (Tylenol 8 Hour) 650 mg CR tablet Take 1 tablet (650 mg total) by mouth every 8 (eight) hours  Qty: 15 tablet, Refills: 0    Associated Diagnoses: Carpal tunnel syndrome on left      amLODIPine (NORVASC) 5 mg tablet Take 5 mg by mouth daily at bedtime.      aspirin (ECOTRIN LOW STRENGTH) 81 mg EC tablet Take 81 mg by mouth daily      cholecalciferol (VITAMIN D3) 1,000 units tablet Take 5,000 Units by mouth daily      diphenoxylate-atropine (LOMOTIL) 2.5-0.025 mg per tablet TAKE 1 TABLET BY ORAL ROUTE 4 TIMES EVERY DAY AS NEEDED    Comments: PRN diarrhea      LUMIGAN 0.01 % ophthalmic drops one drop to both eyes at bedtime.  Refills: 3    Comments: one drop to both eyes at bedtime.      methylPREDNISolone (MEDROL) 2 MG tablet Take 2 mg by mouth daily with breakfast      metoprolol succinate (TOPROL-XL) 100 mg 24 hr tablet Take 0.5 tablets (50 mg total) by mouth daily  Qty: 30 tablet, Refills: 0    Associated Diagnoses: Hypertension      omeprazole (PriLOSEC) 20 mg  delayed release capsule Take 20 mg by mouth daily      White Petrolatum-Mineral Oil (artificial tears) Administer 1 application. to both eyes as needed for dry eyes      busPIRone (BUSPAR) 10 mg tablet Take 10 mg by mouth as needed (anxiety)      cloNIDine (CATAPRES) 0.1 mg tablet Take 0.1 mg by mouth daily as needed for high blood pressure take 1 tablet as needed sbp >180 mm hg      mesalamine (DELZICOL) 400 mg TAKE 2 CAPSULES (800 MG TOTAL) BY MOUTH 2 (TWO) TIMES A DAY  Qty: 120 capsule, Refills: 5    Associated Diagnoses: Ulcerative colitis with complication, unspecified location (HCC)      Multiple Vitamins-Minerals (PRESERVISION AREDS 2 PO) Take 2 tablets by mouth daily.      NITROGLYCERIN PO Take by mouth           No discharge procedures on file.  ED SEPSIS DOCUMENTATION   Time reflects when diagnosis was documented in both MDM as applicable and the Disposition within this note       Time User Action Codes Description Comment    1/11/2025  3:27 PM Julianne Bass [K11.20] Parotiditis                  Julianne Bass DO  01/13/25 0715

## 2025-01-13 NOTE — PLAN OF CARE
Problem: PAIN - ADULT  Goal: Verbalizes/displays adequate comfort level or baseline comfort level  Description: Interventions:  - Encourage patient to monitor pain and request assistance  - Assess pain using appropriate pain scale  - Administer analgesics based on type and severity of pain and evaluate response  - Implement non-pharmacological measures as appropriate and evaluate response  - Consider cultural and social influences on pain and pain management  - Notify physician/advanced practitioner if interventions unsuccessful or patient reports new pain  Outcome: Progressing     Problem: INFECTION - ADULT  Goal: Absence or prevention of progression during hospitalization  Description: INTERVENTIONS:  - Assess and monitor for signs and symptoms of infection  - Monitor lab/diagnostic results  - Monitor all insertion sites, i.e. indwelling lines, tubes, and drains  - Monitor endotracheal if appropriate and nasal secretions for changes in amount and color  - [place] appropriate cooling/warming therapies per order  - Administer medications as ordered  - Instruct and encourage patient and family to use good hand hygiene technique  - Identify and instruct in appropriate isolation precautions for identified infection/condition  Outcome: Progressing  Goal: Absence of fever/infection during neutropenic period  Description: INTERVENTIONS:  - Monitor WBC    Outcome: Progressing     Problem: SAFETY ADULT  Goal: Patient will remain free of falls  Description: INTERVENTIONS:  - Educate patient/family on patient safety including physical limitations  - Instruct patient to call for assistance with activity   - Consult OT/PT to assist with strengthening/mobility   - Keep Call bell within reach  - Keep bed low and locked with side rails adjusted as appropriate  - Keep care items and personal belongings within reach  - Initiate and maintain comfort rounds  - Make Fall Risk Sign visible to staff  - Offer Toileting every 2 Hours,  in advance of need  - Initiate/Maintain alarm  - Obtain necessary fall risk management equipment:   - Apply yellow socks and bracelet for high fall risk patients  - Consider moving patient to room near nurses station  Outcome: Progressing  Goal: Maintain or return to baseline ADL function  Description: INTERVENTIONS:  -  Assess patient's ability to carry out ADLs; assess patient's baseline for ADL function and identify physical deficits which impact ability to perform ADLs (bathing, care of mouth/teeth, toileting, grooming, dressing, etc.)  - Assess/evaluate cause of self-care deficits   - Assess range of motion  - Assess patient's mobility; develop plan if impaired  - Assess patient's need for assistive devices and provide as appropriate  - Encourage maximum independence but intervene and supervise when necessary  - Involve family in performance of ADLs  - Assess for home care needs following discharge   - Consider OT consult to assist with ADL evaluation and planning for discharge  - Provide patient education as appropriate  Outcome: Progressing  Goal: Maintains/Returns to pre admission functional level  Description: INTERVENTIONS:  - Perform AM-PAC 6 Click Basic Mobility/ Daily Activity assessment daily.  - Set and communicate daily mobility goal to care team and patient/family/caregiver.   - Collaborate with rehabilitation services on mobility goals if consulted  - Perform Range of Motion 2 times a day.  - Reposition patient every 2 hours.  - Dangle patient 2 times a day  - Stand patient 2 times a day  - Ambulate patient 2 times a day  - Out of bed to chair 2 times a day   - Out of bed for meals 2 times a day  - Out of bed for toileting  - Record patient progress and toleration of activity level   Outcome: Progressing     Problem: DISCHARGE PLANNING  Goal: Discharge to home or other facility with appropriate resources  Description: INTERVENTIONS:  - Identify barriers to discharge w/patient and caregiver  -  Arrange for needed discharge resources and transportation as appropriate  - Identify discharge learning needs (meds, wound care, etc.)  - Arrange for interpretive services to assist at discharge as needed  - Refer to Case Management Department for coordinating discharge planning if the patient needs post-hospital services based on physician/advanced practitioner order or complex needs related to functional status, cognitive ability, or social support system  Outcome: Progressing     Problem: Knowledge Deficit  Goal: Patient/family/caregiver demonstrates understanding of disease process, treatment plan, medications, and discharge instructions  Description: Complete learning assessment and assess knowledge base.  Interventions:  - Provide teaching at level of understanding  - Provide teaching via preferred learning methods  Outcome: Progressing

## 2025-01-14 PROBLEM — D64.9 ANEMIA: Status: ACTIVE | Noted: 2025-01-14

## 2025-01-14 LAB
ALBUMIN SERPL BCG-MCNC: 3.5 G/DL (ref 3.5–5)
ALP SERPL-CCNC: 60 U/L (ref 34–104)
ALT SERPL W P-5'-P-CCNC: 15 U/L (ref 7–52)
ANION GAP SERPL CALCULATED.3IONS-SCNC: 8 MMOL/L (ref 4–13)
AST SERPL W P-5'-P-CCNC: 19 U/L (ref 13–39)
BACTERIA WND AEROBE CULT: ABNORMAL
BASOPHILS # BLD AUTO: 0.05 THOUSANDS/ΜL (ref 0–0.1)
BASOPHILS NFR BLD AUTO: 0 % (ref 0–1)
BILIRUB SERPL-MCNC: 0.39 MG/DL (ref 0.2–1)
BUN SERPL-MCNC: 13 MG/DL (ref 5–25)
CALCIUM SERPL-MCNC: 9.2 MG/DL (ref 8.4–10.2)
CHLORIDE SERPL-SCNC: 105 MMOL/L (ref 96–108)
CO2 SERPL-SCNC: 28 MMOL/L (ref 21–32)
CREAT SERPL-MCNC: 0.36 MG/DL (ref 0.6–1.3)
EOSINOPHIL # BLD AUTO: 0.48 THOUSAND/ΜL (ref 0–0.61)
EOSINOPHIL NFR BLD AUTO: 4 % (ref 0–6)
ERYTHROCYTE [DISTWIDTH] IN BLOOD BY AUTOMATED COUNT: 15.7 % (ref 11.6–15.1)
GFR SERPL CREATININE-BSD FRML MDRD: 93 ML/MIN/1.73SQ M
GLUCOSE SERPL-MCNC: 86 MG/DL (ref 65–140)
GRAM STN SPEC: ABNORMAL
HCT VFR BLD AUTO: 33.4 % (ref 34.8–46.1)
HGB BLD-MCNC: 11.2 G/DL (ref 11.5–15.4)
IMM GRANULOCYTES # BLD AUTO: 0.05 THOUSAND/UL (ref 0–0.2)
IMM GRANULOCYTES NFR BLD AUTO: 0 % (ref 0–2)
LYMPHOCYTES # BLD AUTO: 4.46 THOUSANDS/ΜL (ref 0.6–4.47)
LYMPHOCYTES NFR BLD AUTO: 36 % (ref 14–44)
MAGNESIUM SERPL-MCNC: 1.9 MG/DL (ref 1.9–2.7)
MCH RBC QN AUTO: 32.2 PG (ref 26.8–34.3)
MCHC RBC AUTO-ENTMCNC: 33.5 G/DL (ref 31.4–37.4)
MCV RBC AUTO: 96 FL (ref 82–98)
MONOCYTES # BLD AUTO: 1.15 THOUSAND/ΜL (ref 0.17–1.22)
MONOCYTES NFR BLD AUTO: 9 % (ref 4–12)
MRSA NOSE QL CULT: ABNORMAL
MRSA NOSE QL CULT: ABNORMAL
NEUTROPHILS # BLD AUTO: 6.31 THOUSANDS/ΜL (ref 1.85–7.62)
NEUTS SEG NFR BLD AUTO: 51 % (ref 43–75)
NRBC BLD AUTO-RTO: 0 /100 WBCS
PLATELET # BLD AUTO: 434 THOUSANDS/UL (ref 149–390)
PMV BLD AUTO: 10.5 FL (ref 8.9–12.7)
POTASSIUM SERPL-SCNC: 3.4 MMOL/L (ref 3.5–5.3)
PROT SERPL-MCNC: 6.7 G/DL (ref 6.4–8.4)
RBC # BLD AUTO: 3.48 MILLION/UL (ref 3.81–5.12)
SODIUM SERPL-SCNC: 141 MMOL/L (ref 135–147)
WBC # BLD AUTO: 12.5 THOUSAND/UL (ref 4.31–10.16)

## 2025-01-14 PROCEDURE — 83735 ASSAY OF MAGNESIUM: CPT

## 2025-01-14 PROCEDURE — G0545 PR INHERENT VISIT TO INPT: HCPCS | Performed by: INTERNAL MEDICINE

## 2025-01-14 PROCEDURE — 99232 SBSQ HOSP IP/OBS MODERATE 35: CPT | Performed by: INTERNAL MEDICINE

## 2025-01-14 PROCEDURE — 80053 COMPREHEN METABOLIC PANEL: CPT

## 2025-01-14 PROCEDURE — 99232 SBSQ HOSP IP/OBS MODERATE 35: CPT | Performed by: STUDENT IN AN ORGANIZED HEALTH CARE EDUCATION/TRAINING PROGRAM

## 2025-01-14 PROCEDURE — 85025 COMPLETE CBC W/AUTO DIFF WBC: CPT | Performed by: INTERNAL MEDICINE

## 2025-01-14 PROCEDURE — 99233 SBSQ HOSP IP/OBS HIGH 50: CPT | Performed by: INTERNAL MEDICINE

## 2025-01-14 RX ORDER — POTASSIUM CHLORIDE 20MEQ/15ML
40 LIQUID (ML) ORAL ONCE
Status: DISCONTINUED | OUTPATIENT
Start: 2025-01-14 | End: 2025-01-14

## 2025-01-14 RX ORDER — POTASSIUM CHLORIDE 1500 MG/1
40 TABLET, EXTENDED RELEASE ORAL ONCE
Status: COMPLETED | OUTPATIENT
Start: 2025-01-14 | End: 2025-01-14

## 2025-01-14 RX ADMIN — VANCOMYCIN HYDROCHLORIDE 750 MG: 1 INJECTION, POWDER, LYOPHILIZED, FOR SOLUTION INTRAVENOUS at 09:00

## 2025-01-14 RX ADMIN — POTASSIUM CHLORIDE 40 MEQ: 1500 TABLET, EXTENDED RELEASE ORAL at 11:32

## 2025-01-14 RX ADMIN — PANTOPRAZOLE SODIUM 40 MG: 40 TABLET, DELAYED RELEASE ORAL at 05:12

## 2025-01-14 RX ADMIN — ASPIRIN 81 MG: 81 TABLET ORAL at 08:32

## 2025-01-14 RX ADMIN — VANCOMYCIN HYDROCHLORIDE 750 MG: 1 INJECTION, POWDER, LYOPHILIZED, FOR SOLUTION INTRAVENOUS at 21:03

## 2025-01-14 RX ADMIN — BIMATOPROST 1 DROP: 0.3 SOLUTION/ DROPS OPHTHALMIC at 21:04

## 2025-01-14 RX ADMIN — AMLODIPINE BESYLATE 5 MG: 5 TABLET ORAL at 21:03

## 2025-01-14 RX ADMIN — METOPROLOL SUCCINATE 50 MG: 50 TABLET, EXTENDED RELEASE ORAL at 08:31

## 2025-01-14 RX ADMIN — Medication 5000 UNITS: at 08:35

## 2025-01-14 RX ADMIN — METHYLPREDNISOLONE 2 MG: 4 TABLET ORAL at 08:31

## 2025-01-14 NOTE — ASSESSMENT & PLAN NOTE
Echo 2D done on 12/25/2024 showed aortic valve calcification, low suspicious for endocarditis.  Continue IV vancomycin.  Follow-up with cardiology outpatient.

## 2025-01-14 NOTE — PROGRESS NOTES
Helen M Schwab is a 89 y.o. female who is currently ordered Vancomycin IV with management by the Pharmacy Consult service.  Relevant clinical data and objective / subjective history reviewed.  Vancomycin Assessment:  Indication and Goal AUC/Trough: Soft tissue (goal -600, trough >10)  Clinical Status: stable  Micro:     Renal Function:  SCr: 0.36 mg/dL  CrCl: 78 mL/min  Renal replacement: Not on dialysis  Days of Therapy: 4  Current Dose: 750 mg IV q12h  Vancomycin Plan:  New Dosing: continue current dose  Estimated AUC: 456 mcg*hr/mL  Estimated Trough: 12.2 mcg/mL  Next Level: 01/20 at 0600  Renal Function Monitoring: Daily BMP and UOP  Pharmacy will continue to follow closely for s/sx of nephrotoxicity, infusion reactions and appropriateness of therapy.  BMP and CBC will be ordered per protocol. We will continue to follow the patient’s culture results and clinical progress daily.    Philipp Coffey, Pharmacist

## 2025-01-14 NOTE — PROGRESS NOTES
Progress Note - Hospitalist   Name: Helen M Schwab 89 y.o. female I MRN: [id number]  Unit/Bed#: S -01 I Date of Admission: 1/11/2025   Date of Service: 1/14/2025 I Hospital Day: 3    Assessment & Plan  Parotiditis  Patient with recent admission for L parotiditis due to obstructing sialolith with reactive left sided cellulitis/masseter myositis 12/22-12/30/2024. Completed 7 day course of antibiotics. Course was complicated by positive blood cultures thought to be contaminant and an echogenic mass on aortic valve. At time of discharge was due for surveillance blood cultures at 2 and 4 weeks, also recommended to have follow up echo with cardiology as patient was high risk for MICHAEL during admission.   -On current admission: vitally stable with hypertension, afebrile, leukocytosis to 16.25. ED provider discussed with on-call ENT with recommendation of broadening of antibiotic regimen, currently on IV Unasyn/Vancomycin. Patient was seen by ENT in the ED with left-sided facial massage and expectorancy of pus and wound culture sent.   Patient did not have blood cultures drawn at time of admission, ordered 1/12.  CT soft tissue neck with contrast: Persistent or recurrent left parotitis with adjacent cellulitis and left masseter myositis, new 8 mm abscess within the left parotid gland, and distal migration of 2 mm stone in the distal left parotid duct.     Plan:    blood cultures: No growth after 24 hours, waiting for 72-hour growth  MRSA nasal swab: Positive  Wound culture collected on 1/13: Grew positive for Staphylococcus aureus  Wound culture collected on 1/11: Grew MRSA and Candida albicans  ENT following, recommendations appreciated:   MRSA nares: Positive  wound cultures: Staph aureus on first growth.  Warm compresses to the affected area  Sialogogues (sour candies, lemon, citrus, vinegar)  NSAID such as Ibuprofen for pain relief and to reduce inflammation  Aggressive hydration; encourage PO intake and IVF as  tolerated    ID consulted: Continue vancomycin alone, discontinue Unasyn, trend labs and vitals, follow-up on wound and blood cultures, anticipate 3 weeks of antibiotics therapy with transition to oral antibiotics shortly, ID will continue to follow-up.  Continue IV vancomycin  Trend fever, CBC  Continuous pulse ox  Ulcerative colitis without complications (HCC)  Chronic and stable.    Plan:   Continue home mesalamine 800 mg BID  Gastroesophageal reflux  Plan:  Start Pantoprazole 40 mg AM inpatient  Fibromyalgia  Follow-up with PCP outpatient. Chronic and stable.    Plan:  Continue home methylprednisolone 2 mg daily   Hypertension  Plan:  Continue home amlodipine 5 mg HS  Continue home metoprolol succinate 50 mg daily  Continue home clonidine 0.1 prn  Hypokalemia  Patient's potassium on 1/12 is 3.3. Checked patient's phosphorus and magnesium, magnesium also low. Will replete electrolytes.    Plan:  Repleted with potassium chloride 20 mEq IVPB  x 2 doses  Hypomagnesemia  Patient's magnesium on 1/12 is 1.6. Checked patient's potassium, also low. Will replete electrolytes.    Plan:  Repleted with magnesium sulfate 4L IVPB   Abnormal echocardiogram  Echo 2D done on 12/25/2024 showed aortic valve calcification, low suspicious for endocarditis.  Continue IV vancomycin.  Follow-up with cardiology outpatient.  Positive blood culture  Patient tested positive for MRSA for 1 out of 6 blood cultures during her December admission.  Infectious disease consulted, recommendation appreciated  Anemia  Recent Labs     01/12/25  0506 01/13/25  0553 01/14/25  0505   HGB 11.6 10.7* 11.2*     Trend CBC    VTE Pharmacologic Prophylaxis: VTE Score: 4 Moderate Risk (Score 3-4) - Pharmacological DVT Prophylaxis Ordered: enoxaparin (Lovenox).    Mobility:   Basic Mobility Inpatient Raw Score: 18  JH-HLM Goal: 6: Walk 10 steps or more  JH-HLM Achieved: 4: Move to chair/commode  JH-HLM Goal achieved. Continue to encourage appropriate  mobility.    Patient Centered Rounds: I performed bedside rounds with nursing staff today.   Discussions with Specialists or Other Care Team Provider:     Education and Discussions with Family / Patient: Updated  (son) via phone.    Current Length of Stay: 3 day(s)  Current Patient Status: Inpatient   Certification Statement: The patient will continue to require additional inpatient hospital stay due to continue IV treatment and pending 72-hour blood culture  Discharge Plan: Anticipate discharge in 24-48 hrs to home.    Code Status: Level 3 - DNAR and DNI    Subjective   Was examined at bedside.  Patient was alert and oriented and no events reported overnight.  Patient is feeling much better today, she is still complaining of mild tenderness in the left jaw.  Erythema over the left face side and neck decrease.  Patient is not having any other complaints, pain is well-controlled over the current regimen.  He is having normal bowel movements and no problems with urination.  ENT saw the patient this morning and they signed off.  Infectious disease saw the patient, they recommended discontinuing Unasyn and continue treatment with vancomycin.  They will follow-up with patient and recommendations.    Objective :  Temp:  [98.1 °F (36.7 °C)-98.4 °F (36.9 °C)] 98.1 °F (36.7 °C)  HR:  [69-79] 69  BP: (126-154)/() 154/92  Resp:  [17-18] 18  SpO2:  [94 %-97 %] 94 %  O2 Device: None (Room air)    There is no height or weight on file to calculate BMI.     Input and Output Summary (last 24 hours):     Intake/Output Summary (Last 24 hours) at 1/14/2025 1435  Last data filed at 1/14/2025 1313  Gross per 24 hour   Intake 320 ml   Output 1600 ml   Net -1280 ml       Physical Exam  Vitals and nursing note reviewed.   Constitutional:       General: She is not in acute distress.     Appearance: Normal appearance. She is well-developed. She is not ill-appearing.   HENT:      Head: Normocephalic and atraumatic.       Jaw: Tenderness and swelling present.      Salivary Glands: Left salivary gland is diffusely enlarged and tender.   Eyes:      General: No scleral icterus.     Conjunctiva/sclera: Conjunctivae normal.   Cardiovascular:      Rate and Rhythm: Normal rate and regular rhythm.      Heart sounds: No murmur heard.  Pulmonary:      Effort: Pulmonary effort is normal. No respiratory distress.      Breath sounds: Normal breath sounds.   Abdominal:      Palpations: Abdomen is soft.      Tenderness: There is no abdominal tenderness.   Musculoskeletal:         General: No swelling.      Cervical back: Neck supple.   Skin:     General: Skin is warm and dry.      Capillary Refill: Capillary refill takes less than 2 seconds.   Neurological:      Mental Status: She is alert.   Psychiatric:         Mood and Affect: Mood normal.       Patient was examined at bedside.  Patient is still doing well.  There was events reported overnight.  Her left jaw is still mildly tender, however the erythema to decrease on the left side and the neck area.  ID saw the patient and discontinued Unasyn but continued vancomycin.  Blood culture showed no growth.  Patient denies headaches, fever, chills, chest pain, shortness of breath, abdominal pain any GI or  symptoms.    Lines/Drains:              Lab Results: I have reviewed the following results:   Results from last 7 days   Lab Units 01/14/25  0505   WBC Thousand/uL 12.50*   HEMOGLOBIN g/dL 11.2*   HEMATOCRIT % 33.4*   PLATELETS Thousands/uL 434*   SEGS PCT % 51   LYMPHO PCT % 36   MONO PCT % 9   EOS PCT % 4     Results from last 7 days   Lab Units 01/14/25  0505   SODIUM mmol/L 141   POTASSIUM mmol/L 3.4*   CHLORIDE mmol/L 105   CO2 mmol/L 28   BUN mg/dL 13   CREATININE mg/dL 0.36*   ANION GAP mmol/L 8   CALCIUM mg/dL 9.2   ALBUMIN g/dL 3.5   TOTAL BILIRUBIN mg/dL 0.39   ALK PHOS U/L 60   ALT U/L 15   AST U/L 19   GLUCOSE RANDOM mg/dL 86                       Recent Cultures (last 7 days):    Results from last 7 days   Lab Units 01/13/25  0833 01/12/25  1444 01/11/25  1620   BLOOD CULTURE   --  No Growth at 24 hrs.  No Growth at 24 hrs.  --    GRAM STAIN RESULT  1+ Polys*  Rare Gram positive cocci in clusters*  Rare Gram positive cocci in pairs*  --  No Polys or Bacteria seen   WOUND CULTURE  4+ Growth of Staphylococcus aureus*  --  1+ Growth of Methicillin Resistant Staphylococcus aureus*  1 colony Candida albicans*  2+ Growth of       Imaging Results Review: No pertinent imaging studies reviewed.  Other Study Results Review: No additional pertinent studies reviewed.    Last 24 Hours Medication List:     Current Facility-Administered Medications:     acetaminophen (TYLENOL) tablet 650 mg, Q6H PRN    amLODIPine (NORVASC) tablet 5 mg, HS    artificial tear ophthalmic ointment, HS PRN    aspirin (ECOTRIN LOW STRENGTH) EC tablet 81 mg, Daily    bimatoprost (LUMIGAN) 0.03 % ophthalmic drops 1 drop, Daily    Cholecalciferol (VITAMIN D3) tablet 5,000 Units, Daily    cloNIDine (CATAPRES) tablet 0.1 mg, Daily PRN    diphenoxylate-atropine (LOMOTIL) 2.5-0.025 mg per tablet 1 tablet, 4x Daily PRN    enoxaparin (LOVENOX) subcutaneous injection 40 mg, Daily    HYDROmorphone (DILAUDID) injection 0.5 mg, Q6H PRN    ketorolac (TORADOL) injection 15 mg, Q6H PRN    mesalamine (DELZICOL) delayed release capsule 800 mg, BID    methylprednisolone (MEDROL) tablet 2 mg, Daily With Breakfast    metoprolol succinate (TOPROL-XL) 24 hr tablet 50 mg, Daily    ondansetron (ZOFRAN) injection 4 mg, Q4H PRN    pantoprazole (PROTONIX) EC tablet 40 mg, Early Morning    vancomycin 750 mg in sodium chloride 0.9% 250 mL, Q12H    Administrative Statements   Today, Patient Was Seen By: Jordi Gama MD    **Please Note: This note may have been constructed using a voice recognition system.**

## 2025-01-14 NOTE — ASSESSMENT & PLAN NOTE
1/11/25 Left parotid culture of pus MRSA sensitive to tetracycline, 1 col candida likely colonizer  1/12/2025 CT soft tissue neck: Asymmetric enlargement-uremia of the left parotid gland with surrounding inflammatory changes.  Previously identified 2 mm sialolith in the left parotid duct has moved distally.  There is a new 4 x 8 mm hypodensity within the left parotid gland suspicious for collection  -continues IV Vancomycin pending final cultures  -Pharmacy on consult for Vancomycin dosing management.  -follow-up final cultures and adjust antibiotics accordingly  -monitor temperature and hemodynamics  -serial exam  -ENT following   -additional inventions pending clinical course  -monitoring serial CBC and BMP for treatment response and any developing toxicities  -may need follow up imaging

## 2025-01-14 NOTE — PROGRESS NOTES
Otolaryngology [facility] Progress Note:    Subjective: Pt with left sided parotitis. No acute events overnight.     WBC - 12.5 from 17.05    Objective:   /100   Pulse 79   Temp 98.4 °F (36.9 °C) (Oral)   Resp 17   SpO2 96%     Physical Exam   Constitutional: Cooperative, able to hear and answer questions without difficulty.    Voice: Normal voice quality.  Head/Face: Normocephalic, atraumatic.  No scars, masses or lesions. Mild skin erythema over left parotid gland.  Ears: External ears normal.  No post-auricular erythema or tenderness.  Oral cavity: Mucosa dry, lips without lesions or masses.    Oropharynx: Uvula is midline, soft palate intact without lesion or mass.  Oropharyngeal inlet without obstruction.  Tonsils unremarkable.  Posterior pharyngeal wall clear. No masses or lesions.  Salivary glands:  Left parotid gland is tender to palpation, mildly enlarged. Continued purulence noted from parotid papilla.   Pulmonary/Chest: Normal effort and rate. No respiratory distress. No stertor or stridor  Psychiatric: Normal mood and affect.    Assessment/Plan: Patient with parotitis, improving with IV antibiotics. Decreased amount of purulence expressed from parotid papilla. Concern for MRSA, abx currently covering with vancomycin.     Sialadenitis protocol:  1. Warm compresses to the affected area. Sialogogues (sour candies, lemon, citrus, vinegar). NSAID such as Ibuprofen for pain relief and to reduce inflammation. Aggressive hydration; encourage PO intake and IVF as tolerated. Continue IV antibiotic with adequate MRSA coverage. Appreciate ID recs.    2. Parotitis: Massage the gland from angle of mandible working anteriorly along the cheek towards the oral commissure to facilitate drainage through the ductal opening.     3. ENT will sign off at this time, call with any questions, thank you.      Salvador Wolf, NARCISOS, DMD, MPA, MD   PGY-1  Otorhinolaryngology - Head & Neck Surgery  Please contact ENT  "Resident Epic Secure Chat Role for any questions or concerns.    ** Please Note: Portions of the record may have been created with voice recognition software. Occasional wrong word or \"sound a like\" substitutions may have occurred due to the inherent limitations of voice recognition software. There may also be notations and random deletions of words or characters from malfunctioning software. Read the chart carefully and recognize, using context, where substitutions/deletions have occurred.**  "

## 2025-01-14 NOTE — PLAN OF CARE
Problem: PAIN - ADULT  Goal: Verbalizes/displays adequate comfort level or baseline comfort level  Description: Interventions:  - Encourage patient to monitor pain and request assistance  - Assess pain using appropriate pain scale  - Administer analgesics based on type and severity of pain and evaluate response  - Implement non-pharmacological measures as appropriate and evaluate response  - Consider cultural and social influences on pain and pain management  - Notify physician/advanced practitioner if interventions unsuccessful or patient reports new pain  Outcome: Progressing     Problem: INFECTION - ADULT  Goal: Absence or prevention of progression during hospitalization  Description: INTERVENTIONS:  - Assess and monitor for signs and symptoms of infection  - Monitor lab/diagnostic results  - Monitor all insertion sites, i.e. indwelling lines, tubes, and drains  - Monitor endotracheal if appropriate and nasal secretions for changes in amount and color  - Tillman appropriate cooling/warming therapies per order  - Administer medications as ordered  - Instruct and encourage patient and family to use good hand hygiene technique  - Identify and instruct in appropriate isolation precautions for identified infection/condition  Outcome: Progressing  Goal: Absence of fever/infection during neutropenic period  Description: INTERVENTIONS:  - Monitor WBC    Outcome: Progressing     Problem: SAFETY ADULT  Goal: Patient will remain free of falls  Description: INTERVENTIONS:  - Educate patient/family on patient safety including physical limitations  - Instruct patient to call for assistance with activity   - Consult OT/PT to assist with strengthening/mobility   - Keep Call bell within reach  - Keep bed low and locked with side rails adjusted as appropriate  - Keep care items and personal belongings within reach  - Initiate and maintain comfort rounds  - Make Fall Risk Sign visible to staff  - Offer Toileting every 2 Hours,  in advance of need  - Initiate/Maintain alarm  - Obtain necessary fall risk management equipment:   - Apply yellow socks and bracelet for high fall risk patients  - Consider moving patient to room near nurses station  Outcome: Progressing  Goal: Maintain or return to baseline ADL function  Description: INTERVENTIONS:  -  Assess patient's ability to carry out ADLs; assess patient's baseline for ADL function and identify physical deficits which impact ability to perform ADLs (bathing, care of mouth/teeth, toileting, grooming, dressing, etc.)  - Assess/evaluate cause of self-care deficits   - Assess range of motion  - Assess patient's mobility; develop plan if impaired  - Assess patient's need for assistive devices and provide as appropriate  - Encourage maximum independence but intervene and supervise when necessary  - Involve family in performance of ADLs  - Assess for home care needs following discharge   - Consider OT consult to assist with ADL evaluation and planning for discharge  - Provide patient education as appropriate  Outcome: Progressing  Goal: Maintains/Returns to pre admission functional level  Description: INTERVENTIONS:  - Perform AM-PAC 6 Click Basic Mobility/ Daily Activity assessment daily.  - Set and communicate daily mobility goal to care team and patient/family/caregiver.   - Collaborate with rehabilitation services on mobility goals if consulted  - Perform Range of Motion 2 times a day.  - Reposition patient every 2 hours.  - Dangle patient 2 times a day  - Stand patient 2 times a day  - Ambulate patient 2 times a day  - Out of bed to chair 2 times a day   - Out of bed for meals 2 times a day  - Out of bed for toileting  - Record patient progress and toleration of activity level   Outcome: Progressing     Problem: DISCHARGE PLANNING  Goal: Discharge to home or other facility with appropriate resources  Description: INTERVENTIONS:  - Identify barriers to discharge w/patient and caregiver  -  Arrange for needed discharge resources and transportation as appropriate  - Identify discharge learning needs (meds, wound care, etc.)  - Arrange for interpretive services to assist at discharge as needed  - Refer to Case Management Department for coordinating discharge planning if the patient needs post-hospital services based on physician/advanced practitioner order or complex needs related to functional status, cognitive ability, or social support system  Outcome: Progressing     Problem: Knowledge Deficit  Goal: Patient/family/caregiver demonstrates understanding of disease process, treatment plan, medications, and discharge instructions  Description: Complete learning assessment and assess knowledge base.  Interventions:  - Provide teaching at level of understanding  - Provide teaching via preferred learning methods  Outcome: Progressing     Problem: Prexisting or High Potential for Compromised Skin Integrity  Goal: Skin integrity is maintained or improved  Description: INTERVENTIONS:  - Identify patients at risk for skin breakdown  - Assess and monitor skin integrity  - Assess and monitor nutrition and hydration status  - Monitor labs   - Assess for incontinence   - Turn and reposition patient  - Assist with mobility/ambulation  - Relieve pressure over bony prominences  - Avoid friction and shearing  - Provide appropriate hygiene as needed including keeping skin clean and dry  - Evaluate need for skin moisturizer/barrier cream  - Collaborate with interdisciplinary team   - Patient/family teaching  - Consider wound care consult   Outcome: Progressing

## 2025-01-14 NOTE — ASSESSMENT & PLAN NOTE
Patient with recent admission for L parotiditis due to obstructing sialolith with reactive left sided cellulitis/masseter myositis 12/22-12/30/2024. Completed 7 day course of antibiotics. Course was complicated by positive blood cultures thought to be contaminant and an echogenic mass on aortic valve. At time of discharge was due for surveillance blood cultures at 2 and 4 weeks, also recommended to have follow up echo with cardiology as patient was high risk for MICHAEL during admission.   -On current admission: vitally stable with hypertension, afebrile, leukocytosis to 16.25. ED provider discussed with on-call ENT with recommendation of broadening of antibiotic regimen, currently on IV Unasyn/Vancomycin. Patient was seen by ENT in the ED with left-sided facial massage and expectorancy of pus and wound culture sent.   Patient did not have blood cultures drawn at time of admission, ordered 1/12.  CT soft tissue neck with contrast: Persistent or recurrent left parotitis with adjacent cellulitis and left masseter myositis, new 8 mm abscess within the left parotid gland, and distal migration of 2 mm stone in the distal left parotid duct.     Plan:    blood cultures: No growth after 24 hours, waiting for 72-hour growth  MRSA nasal swab: Positive  Wound culture collected on 1/13: Grew positive for Staphylococcus aureus  Wound culture collected on 1/11: Grew MRSA and Candida albicans  ENT following, recommendations appreciated:   MRSA nares: Positive  wound cultures: Staph aureus on first growth.  Warm compresses to the affected area  Sialogogues (sour candies, lemon, citrus, vinegar)  NSAID such as Ibuprofen for pain relief and to reduce inflammation  Aggressive hydration; encourage PO intake and IVF as tolerated    ID consulted: Continue vancomycin alone, discontinue Unasyn, trend labs and vitals, follow-up on wound and blood cultures, anticipate 3 weeks of antibiotics therapy with transition to oral antibiotics shortly, ID  will continue to follow-up.  Continue IV vancomycin  Trend fever, CBC  Continuous pulse ox

## 2025-01-14 NOTE — ASSESSMENT & PLAN NOTE
Patient tested positive for MRSA for 1 out of 6 blood cultures during her December admission.  Infectious disease consulted, recommendation appreciated

## 2025-01-14 NOTE — PROGRESS NOTES
Progress Note - Infectious Disease   Name: Helen M Schwab 89 y.o. female I MRN: [id number]  Unit/Bed#: S -01 I Date of Admission: 1/11/2025   Date of Service: 1/14/2025 I Hospital Day: 3    Assessment & Plan  Parotiditis  1/11/25 Left parotid culture of pus MRSA sensitive to tetracycline, 1 col candida likely colonizer  1/12/2025 CT soft tissue neck: Asymmetric enlargement-uremia of the left parotid gland with surrounding inflammatory changes.  Previously identified 2 mm sialolith in the left parotid duct has moved distally.  There is a new 4 x 8 mm hypodensity within the left parotid gland suspicious for collection  -continues IV Vancomycin pending final cultures  -Pharmacy on consult for Vancomycin dosing management.  -follow-up final cultures and adjust antibiotics accordingly  -monitor temperature and hemodynamics  -serial exam  -ENT following   -additional inventions pending clinical course  -monitoring serial CBC and BMP for treatment response and any developing toxicities  -may need follow up imaging  Positive blood culture  1 out of 6 blood cultures from December admission isolated MRSA.  Overall suspicion was for contaminant.  She is without any intravascular devices. Unfortunately echo was obtained and is abnormal as below.  Discussed case in detail with cardiology and with primary service and the risk associated with MICHAEL reviewed.  Plan was for surveillance blood cultures and monitoring 2D echo as outpatient.  -Continue antibiotic for parotiditis as above  -Follow up 1/12/25 blood cultures, though obtained after Vancomycin initiated.   -Will plan for surveillance blood cultures again as an outpatient after parotiditis managed.  Abnormal echocardiogram  Patient had 12/25/24 2D echo ordered in the setting of 1 out of 2 blood cultures being positive.  Echo notable for changes at the aortic valve which could represent focal calcification, fibroblastoma or vegetation.  With clinical low suspicious for  endocarditis, case reviewed in detail with cardiologist, plan is for surveillance and outpatient TTE is planned for February 2025  -continue IV Vancomycin for as above  -Will follow-up pending cultures  -Surveillance blood cultures as above  -Follow-up in cardiology office for repeat 2D echo  Ulcerative colitis without complications (HCC)  -monitor symptoms  -symptomatic management per primary care team  -advancing diet as tolerated  Gastroesophageal reflux  -monitor symptoms  -symptomatic management per primary care team  -advancing diet as tolerated  Fibromyalgia  -monitor symptoms  -symptomatic management per primary care team          I have discussed with patient, RN, and Dr. Fuentes's primary care team regarding the above plan to continue Vancomycin and Unasyn and monitor closely. They agree with the plan.     Antibiotics:  Vancomycin D4    Subjective   Patient has no fever, chills, sweats; no nausea, vomiting, diarrhea; no cough, shortness of breath; no pain. No new symptoms.    Objective :  Temp:  [98.1 °F (36.7 °C)-98.4 °F (36.9 °C)] 98.1 °F (36.7 °C)  HR:  [69-79] 69  BP: (126-154)/() 154/92  Resp:  [17-18] 18  SpO2:  [94 %-97 %] 94 %  O2 Device: None (Room air)    General:  No acute distress, sitting comfortably in chair  Psychiatric:  Awake and alert  Pulmonary:  Normal respiratory excursion without accessory muscle use  Abdomen:  Soft, nontender  Extremities:  No edema  Skin:  No rashes, left arm IV site nontender  Left parotid swelling, tenderness and left sided facial erythema all down.         Lab Results: I have reviewed the following results:  Results from last 7 days   Lab Units 01/14/25  0505 01/13/25  0553 01/12/25  0506   WBC Thousand/uL 12.50* 17.05* 17.86*   HEMOGLOBIN g/dL 11.2* 10.7* 11.6   PLATELETS Thousands/uL 434* 396* 486*     Results from last 7 days   Lab Units 01/14/25  0505 01/13/25  0553 01/12/25  0506 01/11/25  1320   SODIUM mmol/L 141 139 139 137   POTASSIUM mmol/L 3.4* 4.1  3.3* 4.4   CHLORIDE mmol/L 105 108 105 101   CO2 mmol/L 28 25 27 29   BUN mg/dL 13 15 12 19   CREATININE mg/dL 0.36* 0.37* 0.36* 0.62   EGFR ml/min/1.73sq m 93 93 93 78   CALCIUM mg/dL 9.2 8.7 8.9 9.4   AST U/L 19 11*  --  37   ALT U/L 15 9  --  14   ALK PHOS U/L 60 60  --  61   ALBUMIN g/dL 3.5 3.3*  --  4.0     Results from last 7 days   Lab Units 01/13/25  0833 01/12/25  1839 01/12/25  1444 01/11/25  1620   BLOOD CULTURE   --   --  No Growth at 24 hrs.  No Growth at 24 hrs.  --    GRAM STAIN RESULT  1+ Polys*  Rare Gram positive cocci in clusters*  Rare Gram positive cocci in pairs*  --   --  No Polys or Bacteria seen   WOUND CULTURE  4+ Growth of Staphylococcus aureus*  --   --  1+ Growth of Methicillin Resistant Staphylococcus aureus*  1 colony Candida albicans*  2+ Growth of   MRSA CULTURE ONLY   --  Methicillin Resistant Staphylococcus aureus isolated*  This patient requires contact isolation precautions per [place] law. Contact precautions are not required in [place] for nasal surveillance cultures.  --   --                      Imaging Results Review: No pertinent imaging studies reviewed.  Other Study Results Review: No additional pertinent studies reviewed.

## 2025-01-15 PROBLEM — D64.9 ANEMIA: Status: RESOLVED | Noted: 2025-01-14 | Resolved: 2025-01-15

## 2025-01-15 LAB
ANION GAP SERPL CALCULATED.3IONS-SCNC: 9 MMOL/L (ref 4–13)
BACTERIA WND AEROBE CULT: ABNORMAL
BASOPHILS # BLD AUTO: 0.03 THOUSANDS/ΜL (ref 0–0.1)
BASOPHILS NFR BLD AUTO: 0 % (ref 0–1)
BUN SERPL-MCNC: 14 MG/DL (ref 5–25)
CALCIUM SERPL-MCNC: 9.4 MG/DL (ref 8.4–10.2)
CHLORIDE SERPL-SCNC: 104 MMOL/L (ref 96–108)
CO2 SERPL-SCNC: 25 MMOL/L (ref 21–32)
CREAT SERPL-MCNC: 0.4 MG/DL (ref 0.6–1.3)
EOSINOPHIL # BLD AUTO: 0.36 THOUSAND/ΜL (ref 0–0.61)
EOSINOPHIL NFR BLD AUTO: 3 % (ref 0–6)
ERYTHROCYTE [DISTWIDTH] IN BLOOD BY AUTOMATED COUNT: 15.2 % (ref 11.6–15.1)
GFR SERPL CREATININE-BSD FRML MDRD: 90 ML/MIN/1.73SQ M
GLUCOSE SERPL-MCNC: 112 MG/DL (ref 65–140)
GRAM STN SPEC: ABNORMAL
HCT VFR BLD AUTO: 35.5 % (ref 34.8–46.1)
HGB BLD-MCNC: 11.8 G/DL (ref 11.5–15.4)
IMM GRANULOCYTES # BLD AUTO: 0.04 THOUSAND/UL (ref 0–0.2)
IMM GRANULOCYTES NFR BLD AUTO: 0 % (ref 0–2)
LYMPHOCYTES # BLD AUTO: 5.02 THOUSANDS/ΜL (ref 0.6–4.47)
LYMPHOCYTES NFR BLD AUTO: 46 % (ref 14–44)
MAGNESIUM SERPL-MCNC: 1.8 MG/DL (ref 1.9–2.7)
MCH RBC QN AUTO: 31.9 PG (ref 26.8–34.3)
MCHC RBC AUTO-ENTMCNC: 33.2 G/DL (ref 31.4–37.4)
MCV RBC AUTO: 96 FL (ref 82–98)
MONOCYTES # BLD AUTO: 1.05 THOUSAND/ΜL (ref 0.17–1.22)
MONOCYTES NFR BLD AUTO: 9 % (ref 4–12)
NEUTROPHILS # BLD AUTO: 4.78 THOUSANDS/ΜL (ref 1.85–7.62)
NEUTS SEG NFR BLD AUTO: 42 % (ref 43–75)
NRBC BLD AUTO-RTO: 0 /100 WBCS
PLATELET # BLD AUTO: 464 THOUSANDS/UL (ref 149–390)
PMV BLD AUTO: 10.6 FL (ref 8.9–12.7)
POTASSIUM SERPL-SCNC: 3.5 MMOL/L (ref 3.5–5.3)
RBC # BLD AUTO: 3.7 MILLION/UL (ref 3.81–5.12)
SODIUM SERPL-SCNC: 138 MMOL/L (ref 135–147)
WBC # BLD AUTO: 11.28 THOUSAND/UL (ref 4.31–10.16)

## 2025-01-15 PROCEDURE — 99232 SBSQ HOSP IP/OBS MODERATE 35: CPT | Performed by: INTERNAL MEDICINE

## 2025-01-15 PROCEDURE — 85025 COMPLETE CBC W/AUTO DIFF WBC: CPT

## 2025-01-15 PROCEDURE — G0545 PR INHERENT VISIT TO INPT: HCPCS | Performed by: INTERNAL MEDICINE

## 2025-01-15 PROCEDURE — 80048 BASIC METABOLIC PNL TOTAL CA: CPT | Performed by: INTERNAL MEDICINE

## 2025-01-15 PROCEDURE — 83735 ASSAY OF MAGNESIUM: CPT

## 2025-01-15 RX ORDER — DOXYCYCLINE 100 MG/1
100 CAPSULE ORAL EVERY 12 HOURS SCHEDULED
Status: DISCONTINUED | OUTPATIENT
Start: 2025-01-15 | End: 2025-01-16 | Stop reason: HOSPADM

## 2025-01-15 RX ORDER — MAGNESIUM SULFATE HEPTAHYDRATE 40 MG/ML
2 INJECTION, SOLUTION INTRAVENOUS ONCE
Status: COMPLETED | OUTPATIENT
Start: 2025-01-15 | End: 2025-01-15

## 2025-01-15 RX ADMIN — ENOXAPARIN SODIUM 40 MG: 40 INJECTION SUBCUTANEOUS at 08:13

## 2025-01-15 RX ADMIN — ASPIRIN 81 MG: 81 TABLET ORAL at 08:13

## 2025-01-15 RX ADMIN — DOXYCYCLINE 100 MG: 100 CAPSULE ORAL at 11:58

## 2025-01-15 RX ADMIN — METHYLPREDNISOLONE 2 MG: 4 TABLET ORAL at 08:13

## 2025-01-15 RX ADMIN — DOXYCYCLINE 100 MG: 100 CAPSULE ORAL at 22:01

## 2025-01-15 RX ADMIN — BIMATOPROST 1 DROP: 0.3 SOLUTION/ DROPS OPHTHALMIC at 08:14

## 2025-01-15 RX ADMIN — MAGNESIUM SULFATE IN WATER FOR 2 G: 40 INJECTION INTRAVENOUS at 10:24

## 2025-01-15 RX ADMIN — PANTOPRAZOLE SODIUM 40 MG: 40 TABLET, DELAYED RELEASE ORAL at 04:37

## 2025-01-15 RX ADMIN — METOPROLOL SUCCINATE 50 MG: 50 TABLET, EXTENDED RELEASE ORAL at 08:13

## 2025-01-15 RX ADMIN — VANCOMYCIN HYDROCHLORIDE 750 MG: 1 INJECTION, POWDER, LYOPHILIZED, FOR SOLUTION INTRAVENOUS at 08:13

## 2025-01-15 RX ADMIN — AMLODIPINE BESYLATE 5 MG: 5 TABLET ORAL at 21:51

## 2025-01-15 RX ADMIN — Medication 5000 UNITS: at 08:13

## 2025-01-15 NOTE — ASSESSMENT & PLAN NOTE
Patient with recent admission for L parotiditis due to obstructing sialolith with reactive left sided cellulitis/masseter myositis 12/22-12/30/2024. Completed 7 day course of antibiotics. Course was complicated by positive blood cultures thought to be contaminant and an echogenic mass on aortic valve. At time of discharge was due for surveillance blood cultures at 2 and 4 weeks, also recommended to have follow up echo with cardiology as patient was high risk for MICHAEL during admission.   -On current admission: vitally stable with hypertension, afebrile, leukocytosis to 16.25. ED provider discussed with on-call ENT with recommendation of broadening of antibiotic regimen, currently on IV Unasyn/Vancomycin. Patient was seen by ENT in the ED with left-sided facial massage and expectorancy of pus and wound culture sent.   Patient did not have blood cultures drawn at time of admission, ordered 1/12.  CT soft tissue neck with contrast: Persistent or recurrent left parotitis with adjacent cellulitis and left masseter myositis, new 8 mm abscess within the left parotid gland, and distal migration of 2 mm stone in the distal left parotid duct.     Plan:    blood cultures: No growth after 48 hours, waiting for 72-hour growth  MRSA nasal swab: Positive  Wound culture collected on 1/13: Grew positive for Staphylococcus aureus  Wound culture collected on 1/11: Grew MRSA and Candida albicans  ENT following, recommendations appreciated:   MRSA nares: Positive  wound cultures: 1+ MRSA, 1 colony candida albicans, 2+ growth skin manjinder.susceptible to vanc which patient is currently on  Warm compresses to the affected area  Sialogogues (sour candies, lemon, citrus, vinegar)  NSAID such as Ibuprofen for pain relief and to reduce inflammation  Aggressive hydration; encourage PO intake and IVF as tolerated    ID consulted: switch from vanc to doxy, likely dc 1/15, trend labs and vitals, follow-up on wound and blood cultures, anticipate 3  weeks of antibiotics through 1/31. ID will continue to follow-up in 2-3 weeks, will need repeat blood cultures 2 weeks after completing antibiotics. Will need weekly labs CBC and creatinine as outpatient. Needs close follow up with ENT  Continue IV vancomycin  Trend fever, CBC  Continuous pulse ox

## 2025-01-15 NOTE — PROGRESS NOTES
Vancomycin IV Pharmacy-to-Dose Consultation    Helen M Schwab is a 89 y.o. female who was receiving Vancomycin IV with management by the Pharmacy Consult service for treatment of Soft tissue (goal -600, trough >10)  .       The patient's Vancomycin therapy has been completed / discontinued. Thank you for allowing us to take part in this patient's care. Pharmacy will sign-off now; please call or re-consult if there are any questions.        Anastasiya Koehler, Pharmacist

## 2025-01-15 NOTE — ASSESSMENT & PLAN NOTE
Patient's magnesium on 1/12 is 1.6. Will replete electrolytes as needed    Plan:  Repleted with magnesium sulfate 2g IVPB 1/15

## 2025-01-15 NOTE — PROGRESS NOTES
Progress Note - Hospitalist   Name: Helen M Schwab 89 y.o. female I MRN: [id number]  Unit/Bed#: S -01 I Date of Admission: 1/11/2025   Date of Service: 1/15/2025 I Hospital Day: 4    Assessment & Plan  Parotiditis  Patient with recent admission for L parotiditis due to obstructing sialolith with reactive left sided cellulitis/masseter myositis 12/22-12/30/2024. Completed 7 day course of antibiotics. Course was complicated by positive blood cultures thought to be contaminant and an echogenic mass on aortic valve. At time of discharge was due for surveillance blood cultures at 2 and 4 weeks, also recommended to have follow up echo with cardiology as patient was high risk for MICHAEL during admission.   -On current admission: vitally stable with hypertension, afebrile, leukocytosis to 16.25. ED provider discussed with on-call ENT with recommendation of broadening of antibiotic regimen, currently on IV Unasyn/Vancomycin. Patient was seen by ENT in the ED with left-sided facial massage and expectorancy of pus and wound culture sent.   Patient did not have blood cultures drawn at time of admission, ordered 1/12.  CT soft tissue neck with contrast: Persistent or recurrent left parotitis with adjacent cellulitis and left masseter myositis, new 8 mm abscess within the left parotid gland, and distal migration of 2 mm stone in the distal left parotid duct.     Plan:    blood cultures: No growth after 48 hours, waiting for 72-hour growth  MRSA nasal swab: Positive  Wound culture collected on 1/13: Grew positive for Staphylococcus aureus  Wound culture collected on 1/11: Grew MRSA and Candida albicans  ENT following, recommendations appreciated:   MRSA nares: Positive  wound cultures: 1+ MRSA, 1 colony candida albicans, 2+ growth skin manjinder.susceptible to vanc which patient is currently on  Warm compresses to the affected area  Sialogogues (sour candies, lemon, citrus, vinegar)  NSAID such as Ibuprofen for pain relief and to  reduce inflammation  Aggressive hydration; encourage PO intake and IVF as tolerated    ID consulted: switch from vanc to doxy, likely dc 1/15, trend labs and vitals, follow-up on wound and blood cultures, anticipate 3 weeks of antibiotics through 1/31. ID will continue to follow-up in 2-3 weeks, will need repeat blood cultures 2 weeks after completing antibiotics. Will need weekly labs CBC and creatinine as outpatient. Needs close follow up with ENT  Continue IV vancomycin  Trend fever, CBC  Continuous pulse ox  Ulcerative colitis without complications (HCC)  Chronic and stable.    Plan:   Continue home mesalamine 800 mg BID  Gastroesophageal reflux  Plan:  Continue Pantoprazole 40 mg AM inpatient  Fibromyalgia  Follow-up with PCP outpatient. Chronic and stable.    Plan:  Continue home methylprednisolone 2 mg daily   Hypertension  Plan:  Continue home amlodipine 5 mg HS  Continue home metoprolol succinate 50 mg daily  Continue home clonidine 0.1 prn  Hypokalemia  Patient's potassium on 1/12 is 3.3. Checked patient's phosphorus and magnesium, magnesium also low. Will replete electrolytes.    Plan:  Repleted with potassium chloride 20 mEq IVPB  x 2 doses  Hypomagnesemia  Patient's magnesium on 1/12 is 1.6. Will replete electrolytes as needed    Plan:  Repleted with magnesium sulfate 2g IVPB 1/15  Abnormal echocardiogram  Echo 2D done on 12/25/2024 showed aortic valve calcification, low suspicious for endocarditis.  Continue IV vancomycin.  Follow-up with cardiology outpatient.  Positive blood culture  Patient tested positive for MRSA for 1 out of 6 blood cultures during her December admission.  Infectious disease consulted, recommendation appreciated  Anemia  Recent Labs     01/13/25  0553 01/14/25  0505 01/15/25  0448   HGB 10.7* 11.2* 11.8     Trend CBC    VTE Pharmacologic Prophylaxis: VTE Score: 4 Moderate Risk (Score 3-4) - Pharmacological DVT Prophylaxis Ordered: enoxaparin (Lovenox).    Mobility:   Basic  Mobility Inpatient Raw Score: 18  JH-HLM Goal: 6: Walk 10 steps or more  JH-HLM Achieved: 7: Walk 25 feet or more  JH-HLM Goal achieved. Continue to encourage appropriate mobility.    Patient Centered Rounds: I performed bedside rounds with nursing staff today.   Discussions with Specialists or Other Care Team Provider: ID, nursing, case management, attending    Education and Discussions with Family / Patient: Updated  (son) via phone.    Current Length of Stay: 4 day(s)  Current Patient Status: Inpatient   Certification Statement: The patient will continue to require additional inpatient hospital stay due to antibiotics for parotiditis, blood cultures  Discharge Plan: Anticipate discharge in 24-48 hrs to home.    Code Status: Level 3 - DNAR and DNI    Subjective   Patient seen at bedside this morning. She woke up and says she is continuing to feel better and trying to eat the sour candies. No overnight events.     Objective :  Temp:  [97.3 °F (36.3 °C)-98.2 °F (36.8 °C)] 97.3 °F (36.3 °C)  HR:  [69-77] 75  BP: (141-177)/(70-92) 177/88  Resp:  [18] 18  SpO2:  [92 %-95 %] 95 %  O2 Device: None (Room air)    There is no height or weight on file to calculate BMI.     Input and Output Summary (last 24 hours):     Intake/Output Summary (Last 24 hours) at 1/15/2025 0549  Last data filed at 1/15/2025 0240  Gross per 24 hour   Intake 400 ml   Output 1650 ml   Net -1250 ml       Physical Exam  Constitutional:       General: She is not in acute distress.     Appearance: Normal appearance. She is not ill-appearing.   HENT:      Head: Normocephalic and atraumatic.      Jaw: Swelling present.      Salivary Glands: Left salivary gland is tender.      Comments: Improving since yesterday     Right Ear: External ear normal.      Left Ear: External ear normal.      Nose: Nose normal.      Mouth/Throat:      Mouth: Mucous membranes are moist.      Pharynx: Oropharynx is clear.   Eyes:      Conjunctiva/sclera:  Conjunctivae normal.   Cardiovascular:      Rate and Rhythm: Normal rate and regular rhythm.      Pulses: Normal pulses.      Heart sounds: Normal heart sounds.   Pulmonary:      Effort: Pulmonary effort is normal.      Breath sounds: Normal breath sounds.   Abdominal:      General: Abdomen is flat. Bowel sounds are normal.      Palpations: Abdomen is soft.   Musculoskeletal:         General: No swelling.   Skin:     General: Skin is warm and dry.      Capillary Refill: Capillary refill takes less than 2 seconds.   Neurological:      General: No focal deficit present.      Mental Status: She is alert and oriented to person, place, and time.   Psychiatric:         Mood and Affect: Mood normal.         Behavior: Behavior normal.         Lab Results: I have reviewed the following results:   Results from last 7 days   Lab Units 01/15/25  0448   WBC Thousand/uL 11.28*   HEMOGLOBIN g/dL 11.8   HEMATOCRIT % 35.5   PLATELETS Thousands/uL 464*   SEGS PCT % 42*   LYMPHO PCT % 46*   MONO PCT % 9   EOS PCT % 3     Results from last 7 days   Lab Units 01/14/25  0505   SODIUM mmol/L 141   POTASSIUM mmol/L 3.4*   CHLORIDE mmol/L 105   CO2 mmol/L 28   BUN mg/dL 13   CREATININE mg/dL 0.36*   ANION GAP mmol/L 8   CALCIUM mg/dL 9.2   ALBUMIN g/dL 3.5   TOTAL BILIRUBIN mg/dL 0.39   ALK PHOS U/L 60   ALT U/L 15   AST U/L 19   GLUCOSE RANDOM mg/dL 86                       Recent Cultures (last 7 days):   Results from last 7 days   Lab Units 01/13/25  0833 01/12/25  1444 01/11/25  1620   BLOOD CULTURE   --  No Growth at 48 hrs.  No Growth at 48 hrs.  --    GRAM STAIN RESULT  1+ Polys*  Rare Gram positive cocci in clusters*  Rare Gram positive cocci in pairs*  --  No Polys or Bacteria seen   WOUND CULTURE  4+ Growth of Staphylococcus aureus*  --  1+ Growth of Methicillin Resistant Staphylococcus aureus*  1 colony Candida albicans*  2+ Growth of       Imaging Results Review: No pertinent imaging studies reviewed.  Other Study Results  Review: No additional pertinent studies reviewed.    Last 24 Hours Medication List:     Current Facility-Administered Medications:     acetaminophen (TYLENOL) tablet 650 mg, Q6H PRN    amLODIPine (NORVASC) tablet 5 mg, HS    artificial tear ophthalmic ointment, HS PRN    aspirin (ECOTRIN LOW STRENGTH) EC tablet 81 mg, Daily    bimatoprost (LUMIGAN) 0.03 % ophthalmic drops 1 drop, Daily    Cholecalciferol (VITAMIN D3) tablet 5,000 Units, Daily    cloNIDine (CATAPRES) tablet 0.1 mg, Daily PRN    diphenoxylate-atropine (LOMOTIL) 2.5-0.025 mg per tablet 1 tablet, 4x Daily PRN    enoxaparin (LOVENOX) subcutaneous injection 40 mg, Daily    HYDROmorphone (DILAUDID) injection 0.5 mg, Q6H PRN    mesalamine (DELZICOL) delayed release capsule 800 mg, BID    methylprednisolone (MEDROL) tablet 2 mg, Daily With Breakfast    metoprolol succinate (TOPROL-XL) 24 hr tablet 50 mg, Daily    ondansetron (ZOFRAN) injection 4 mg, Q4H PRN    pantoprazole (PROTONIX) EC tablet 40 mg, Early Morning    vancomycin 750 mg in sodium chloride 0.9% 250 mL, Q12H    Administrative Statements   Today, Patient Was Seen By: Luma Sosa, DO  I have spent a total time of 60 minutes in caring for this patient on the day of the visit/encounter including Diagnostic results, Prognosis, Risks and benefits of tx options, Instructions for management, Patient and family education, Importance of tx compliance, Risk factor reductions, Impressions, Counseling / Coordination of care, Documenting in the medical record, Reviewing / ordering tests, medicine, procedures  , Obtaining or reviewing history  , and Communicating with other healthcare professionals .    **Please Note: This note may have been constructed using a voice recognition system.**

## 2025-01-15 NOTE — PROGRESS NOTES
Helen M Schwab is a 89 y.o. female who is currently ordered Vancomycin IV with management by the Pharmacy Consult service.  Relevant clinical data and objective / subjective history reviewed.  Vancomycin Assessment:  Indication and Goal AUC/Trough: Soft tissue (goal -600, trough >10)  Micro:       Renal Function:  SCr: 0.4 mg/dL  CrCl: 70.2 mL/min  Renal replacement: Not on dialysis  Days of Therapy: 5  Current Dose: 750 mg IV every 12 hours  Vancomycin Plan:  New Dosing: continue current dose  Estimated AUC: 509 mcg*hr/mL  Estimated Trough: 14.3 mcg/mL  Next Level: 1/20 at 0600  Renal Function Monitoring: Daily BMP and UOP  Pharmacy will continue to follow closely for s/sx of nephrotoxicity, infusion reactions and appropriateness of therapy.  BMP and CBC will be ordered per protocol. We will continue to follow the patient’s culture results and clinical progress daily.    Miriam Sanchez, Pharmacist

## 2025-01-15 NOTE — PROGRESS NOTES
Progress Note - Infectious Disease   Name: Helen M Schwab 89 y.o. female I MRN: [id number]  Unit/Bed#: S -01 I Date of Admission: 1/11/2025   Date of Service: 1/15/2025 I Hospital Day: 4    Assessment & Plan  Parotiditis  1/11/25 Left parotid culture of pus MRSA sensitive to tetracycline, 1 col candida likely colonizer  1/12/2025 CT soft tissue neck: Asymmetric enlargement-uremia of the left parotid gland with surrounding inflammatory changes.  Previously identified 2 mm sialolith in the left parotid duct has moved distally.  There was 4 x 8 mm hypodensity within the left parotid gland suspicious for collection.  -discontinue IV Vancomycin   -transition to PO Doxycycline 100 mg q 12 hours to complete total 3 weeks of antibiotic therapy through 1/31/25  -monitor temperature and hemodynamics  -serial exam  -ENT following prn  -monitoring serial CBC and BMP for treatment response and any developing toxicities  -patient will follow up with me 2/4/25 11:30 am in [place] [facility] office  Positive blood culture  Recent 1 out of 6 blood cultures from December admission isolated MRSA.  Overall suspicion was for contaminant.  She is without any intravascular devices. Unfortunately echo was abnormal as below.  Discussed case in detail with cardiology and with primary service and the risk associated with MICHAEL reviewed.  Plan was for surveillance blood cultures and monitoring 2D echo as outpatient.  -Continue antibiotic for parotiditis as above  -Follow up 1/12/25 blood cultures, though obtained after Vancomycin initiated.   -Will plan for surveillance blood cultures again as an outpatient after parotiditis managed.  Abnormal echocardiogram  Patient had 12/25/24 2D echo ordered in the setting of 1 out of 2 blood cultures being positive.  Echo notable for changes at the aortic valve which could represent focal calcification, fibroblastoma or vegetation.  With clinical low suspicious for endocarditis, case reviewed in detail with  cardiologist, plan is for surveillance and outpatient TTE is planned for February 2025  -antibiotics as above  -Retime surveillance blood cultures as above  -Follow-up in cardiology office for repeat 2D echo  Ulcerative colitis without complications (HCC)  -monitor symptoms  -symptomatic management per primary care team  -advancing diet as tolerated  Gastroesophageal reflux  -monitor symptoms  -symptomatic management per primary care team  -advancing diet as tolerated  Fibromyalgia  -monitor symptoms  -symptomatic management per primary care team          I have discussed with patient, RN, and Dr. Fuentes's primary care team regarding the above plan to transition to doxycycline and monitor for tolerance. They agree with the plan.     Antibiotics:  Vancomycin D5    Subjective   Patient has no fever, chills, sweats; no nausea, vomiting, diarrhea; no cough, shortness of breath; no pain. No new symptoms. Coloring at bedside    Objective :  Temp:  [97.3 °F (36.3 °C)-98.2 °F (36.8 °C)] 97.8 °F (36.6 °C)  HR:  [75-77] 76  BP: (141-177)/(70-90) 161/90  Resp:  [18] 18  SpO2:  [92 %-95 %] 95 %  O2 Device: None (Room air)    General:  No acute distress, sitting comfortably at bedside coloring  Psychiatric:  Awake and alert  Pulmonary:  Normal respiratory excursion without accessory muscle use  Abdomen:  Soft, nontender  Extremities:  No edema  Skin:  No rashes, left arm IV site nontender  Left parotid swelling, tenderness and left sided facial erythema all down.         Lab Results: I have reviewed the following results:  Results from last 7 days   Lab Units 01/15/25  0448 01/14/25  0505 01/13/25  0553   WBC Thousand/uL 11.28* 12.50* 17.05*   HEMOGLOBIN g/dL 11.8 11.2* 10.7*   PLATELETS Thousands/uL 464* 434* 396*     Results from last 7 days   Lab Units 01/15/25  0825 01/14/25  0505 01/13/25  0553 01/12/25  0506 01/11/25  1320   SODIUM mmol/L 138 141 139   < > 137   POTASSIUM mmol/L 3.5 3.4* 4.1   < > 4.4   CHLORIDE mmol/L 104  105 108   < > 101   CO2 mmol/L 25 28 25   < > 29   BUN mg/dL 14 13 15   < > 19   CREATININE mg/dL 0.40* 0.36* 0.37*   < > 0.62   EGFR ml/min/1.73sq m 90 93 93   < > 78   CALCIUM mg/dL 9.4 9.2 8.7   < > 9.4   AST U/L  --  19 11*  --  37   ALT U/L  --  15 9  --  14   ALK PHOS U/L  --  60 60  --  61   ALBUMIN g/dL  --  3.5 3.3*  --  4.0    < > = values in this interval not displayed.     Results from last 7 days   Lab Units 01/13/25  0833 01/12/25  1839 01/12/25  1444 01/11/25  1620   BLOOD CULTURE   --   --  No Growth at 48 hrs.  No Growth at 48 hrs.  --    GRAM STAIN RESULT  1+ Polys*  Rare Gram positive cocci in clusters*  Rare Gram positive cocci in pairs*  --   --  No Polys or Bacteria seen   WOUND CULTURE  4+ Growth of Methicillin Resistant Staphylococcus aureus*  --   --  1+ Growth of Methicillin Resistant Staphylococcus aureus*  1 colony Candida albicans*  2+ Growth of   MRSA CULTURE ONLY   --  Methicillin Resistant Staphylococcus aureus isolated*  This patient requires contact isolation precautions per [place] law. Contact precautions are not required in [place] for nasal surveillance cultures.  --   --                      Imaging Results Review: No pertinent imaging studies reviewed.  Other Study Results Review: No additional pertinent studies reviewed.

## 2025-01-15 NOTE — ASSESSMENT & PLAN NOTE
Recent 1 out of 6 blood cultures from December admission isolated MRSA.  Overall suspicion was for contaminant.  She is without any intravascular devices. Unfortunately echo was abnormal as below.  Discussed case in detail with cardiology and with primary service and the risk associated with MICHAEL reviewed.  Plan was for surveillance blood cultures and monitoring 2D echo as outpatient.  -Continue antibiotic for parotiditis as above  -Follow up 1/12/25 blood cultures, though obtained after Vancomycin initiated.   -Will plan for surveillance blood cultures again as an outpatient after parotiditis managed.

## 2025-01-15 NOTE — ASSESSMENT & PLAN NOTE
Patient had 12/25/24 2D echo ordered in the setting of 1 out of 2 blood cultures being positive.  Echo notable for changes at the aortic valve which could represent focal calcification, fibroblastoma or vegetation.  With clinical low suspicious for endocarditis, case reviewed in detail with cardiologist, plan is for surveillance and outpatient TTE is planned for February 2025  -antibiotics as above  -Retime surveillance blood cultures as above  -Follow-up in cardiology office for repeat 2D echo

## 2025-01-16 VITALS
HEART RATE: 78 BPM | DIASTOLIC BLOOD PRESSURE: 83 MMHG | TEMPERATURE: 97.9 F | SYSTOLIC BLOOD PRESSURE: 169 MMHG | RESPIRATION RATE: 18 BRPM | OXYGEN SATURATION: 95 %

## 2025-01-16 PROBLEM — E87.6 HYPOKALEMIA: Status: RESOLVED | Noted: 2025-01-12 | Resolved: 2025-01-16

## 2025-01-16 PROBLEM — E83.42 HYPOMAGNESEMIA: Status: RESOLVED | Noted: 2025-01-12 | Resolved: 2025-01-16

## 2025-01-16 LAB
ANION GAP SERPL CALCULATED.3IONS-SCNC: 10 MMOL/L (ref 4–13)
BASOPHILS # BLD AUTO: 0.03 THOUSANDS/ΜL (ref 0–0.1)
BASOPHILS NFR BLD AUTO: 0 % (ref 0–1)
BUN SERPL-MCNC: 15 MG/DL (ref 5–25)
CALCIUM SERPL-MCNC: 9.2 MG/DL (ref 8.4–10.2)
CHLORIDE SERPL-SCNC: 104 MMOL/L (ref 96–108)
CO2 SERPL-SCNC: 25 MMOL/L (ref 21–32)
CREAT SERPL-MCNC: 0.35 MG/DL (ref 0.6–1.3)
EOSINOPHIL # BLD AUTO: 0.42 THOUSAND/ΜL (ref 0–0.61)
EOSINOPHIL NFR BLD AUTO: 4 % (ref 0–6)
ERYTHROCYTE [DISTWIDTH] IN BLOOD BY AUTOMATED COUNT: 15 % (ref 11.6–15.1)
GFR SERPL CREATININE-BSD FRML MDRD: 94 ML/MIN/1.73SQ M
GLUCOSE SERPL-MCNC: 89 MG/DL (ref 65–140)
HCT VFR BLD AUTO: 35.4 % (ref 34.8–46.1)
HGB BLD-MCNC: 12.2 G/DL (ref 11.5–15.4)
IMM GRANULOCYTES # BLD AUTO: 0.03 THOUSAND/UL (ref 0–0.2)
IMM GRANULOCYTES NFR BLD AUTO: 0 % (ref 0–2)
LYMPHOCYTES # BLD AUTO: 4.81 THOUSANDS/ΜL (ref 0.6–4.47)
LYMPHOCYTES NFR BLD AUTO: 47 % (ref 14–44)
MAGNESIUM SERPL-MCNC: 2.1 MG/DL (ref 1.9–2.7)
MCH RBC QN AUTO: 32.8 PG (ref 26.8–34.3)
MCHC RBC AUTO-ENTMCNC: 34.5 G/DL (ref 31.4–37.4)
MCV RBC AUTO: 95 FL (ref 82–98)
MONOCYTES # BLD AUTO: 1.08 THOUSAND/ΜL (ref 0.17–1.22)
MONOCYTES NFR BLD AUTO: 11 % (ref 4–12)
NEUTROPHILS # BLD AUTO: 3.93 THOUSANDS/ΜL (ref 1.85–7.62)
NEUTS SEG NFR BLD AUTO: 38 % (ref 43–75)
NRBC BLD AUTO-RTO: 0 /100 WBCS
PLATELET # BLD AUTO: 458 THOUSANDS/UL (ref 149–390)
PMV BLD AUTO: 10.1 FL (ref 8.9–12.7)
POTASSIUM SERPL-SCNC: 3.5 MMOL/L (ref 3.5–5.3)
RBC # BLD AUTO: 3.72 MILLION/UL (ref 3.81–5.12)
SODIUM SERPL-SCNC: 139 MMOL/L (ref 135–147)
WBC # BLD AUTO: 10.3 THOUSAND/UL (ref 4.31–10.16)

## 2025-01-16 PROCEDURE — 85025 COMPLETE CBC W/AUTO DIFF WBC: CPT | Performed by: INTERNAL MEDICINE

## 2025-01-16 PROCEDURE — 83735 ASSAY OF MAGNESIUM: CPT | Performed by: INTERNAL MEDICINE

## 2025-01-16 PROCEDURE — 80048 BASIC METABOLIC PNL TOTAL CA: CPT | Performed by: INTERNAL MEDICINE

## 2025-01-16 PROCEDURE — 99232 SBSQ HOSP IP/OBS MODERATE 35: CPT | Performed by: INTERNAL MEDICINE

## 2025-01-16 PROCEDURE — 99239 HOSP IP/OBS DSCHRG MGMT >30: CPT | Performed by: INTERNAL MEDICINE

## 2025-01-16 RX ORDER — DOXYCYCLINE 100 MG/1
100 CAPSULE ORAL EVERY 12 HOURS SCHEDULED
Qty: 28 CAPSULE | Refills: 0 | Status: CANCELLED | OUTPATIENT
Start: 2025-01-17 | End: 2025-01-31

## 2025-01-16 RX ORDER — DOXYCYCLINE 100 MG/1
100 CAPSULE ORAL EVERY 12 HOURS SCHEDULED
Qty: 28 CAPSULE | Refills: 0 | Status: SHIPPED | OUTPATIENT
Start: 2025-01-17 | End: 2025-01-31

## 2025-01-16 RX ADMIN — ENOXAPARIN SODIUM 40 MG: 40 INJECTION SUBCUTANEOUS at 08:42

## 2025-01-16 RX ADMIN — METHYLPREDNISOLONE 2 MG: 4 TABLET ORAL at 08:43

## 2025-01-16 RX ADMIN — Medication 5000 UNITS: at 08:42

## 2025-01-16 RX ADMIN — BIMATOPROST 1 DROP: 0.3 SOLUTION/ DROPS OPHTHALMIC at 08:42

## 2025-01-16 RX ADMIN — ASPIRIN 81 MG: 81 TABLET ORAL at 08:42

## 2025-01-16 RX ADMIN — DOXYCYCLINE 100 MG: 100 CAPSULE ORAL at 08:42

## 2025-01-16 RX ADMIN — METOPROLOL SUCCINATE 50 MG: 50 TABLET, EXTENDED RELEASE ORAL at 08:42

## 2025-01-16 NOTE — ASSESSMENT & PLAN NOTE
1/11/25 Left parotid culture of pus MRSA sensitive to tetracycline, 1 col candida likely colonizer  1/12/2025 CT soft tissue neck: Asymmetric enlargement-uremia of the left parotid gland with surrounding inflammatory changes.  Previously identified 2 mm sialolith in the left parotid duct has moved distally.  There was 4 x 8 mm hypodensity within the left parotid gland suspicious for collection.  -Transitioned to PO Doxycycline 100 mg q 12 hours to complete total 3 weeks of antibiotic therapy through 1/31/25  -monitor temperature and hemodynamics  -serial exam  -ENT following prn  -monitoring serial CBC and BMP for treatment response and any developing toxicities  -patient will follow up with me 2/4/25 11:30 am in [facility] office

## 2025-01-16 NOTE — PROGRESS NOTES
Progress Note - Infectious Disease   Name: Helen M Schwab 89 y.o. female I MRN: [id number]  Unit/Bed#: S -01 I Date of Admission: 1/11/2025   Date of Service: 1/16/2025 I Hospital Day: 5    Assessment & Plan  Parotiditis  1/11/25 Left parotid culture of pus MRSA sensitive to tetracycline, 1 col candida likely colonizer  1/12/2025 CT soft tissue neck: Asymmetric enlargement-uremia of the left parotid gland with surrounding inflammatory changes.  Previously identified 2 mm sialolith in the left parotid duct has moved distally.  There was 4 x 8 mm hypodensity within the left parotid gland suspicious for collection.  -Transitioned to PO Doxycycline 100 mg q 12 hours to complete total 3 weeks of antibiotic therapy through 1/31/25  -monitor temperature and hemodynamics  -serial exam  -ENT following prn  -monitoring serial CBC and BMP for treatment response and any developing toxicities  -patient will follow up with me 2/4/25 11:30 am in [facility] office  Positive blood culture  Recent 1 out of 6 blood cultures from December admission isolated MRSA.  Overall suspicion was for contaminant.  She is without any intravascular devices. Unfortunately echo was abnormal as below.  Discussed case in detail with cardiology and with primary service and the risk associated with MICHAEL reviewed.  Plan was for surveillance blood cultures and monitoring 2D echo as outpatient.  -Continue antibiotic for parotiditis as above  -Follow up 1/12/25 blood cultures, though obtained after Vancomycin initiated.   -Will plan for surveillance blood cultures again as an outpatient after parotiditis managed.  Abnormal echocardiogram  Patient had 12/25/24 2D echo ordered in the setting of 1 out of 2 blood cultures being positive.  Echo notable for changes at the aortic valve which could represent focal calcification, fibroblastoma or vegetation.  With clinical low suspicious for endocarditis, case reviewed in detail with cardiologist, plan is for  surveillance and outpatient TTE is planned for February 2025  -antibiotics as above  -Retime surveillance blood cultures as above  -Follow-up in cardiology office for repeat 2D echo  Ulcerative colitis without complications (HCC)  -monitor symptoms  -symptomatic management per primary care team  -advancing diet as tolerated  Gastroesophageal reflux  -monitor symptoms  -symptomatic management per primary care team  -advancing diet as tolerated  Fibromyalgia  -monitor symptoms  -symptomatic management per primary care team      I have discussed with patient, RN, and Dr. Fuentes's primary care team regarding the above plan to transition to doxycycline and monitored for tolerance. They agree with the plan.     Antibiotics:  Doxycycline - abx D6    Subjective   Patient has no fever, chills, sweats; no nausea, vomiting, diarrhea; no cough, shortness of breath; no pain. No new symptoms. Tolerating doxycycline well and is so pleased    Objective :  Temp:  [97.3 °F (36.3 °C)-97.9 °F (36.6 °C)] 97.9 °F (36.6 °C)  HR:  [71-78] 78  BP: (131-169)/(74-93) 169/83  SpO2:  [95 %-96 %] 95 %  O2 Device: None (Room air)    General:  No acute distress, sitting comfortably at bedside  Psychiatric:  Awake and alert  Pulmonary:  Normal respiratory excursion without accessory muscle use  Abdomen:  Soft, nontender  Extremities:  No edema  Skin:  No rashes, left arm IV site nontender  Left parotid swelling, tenderness and left sided facial erythema all down.         Lab Results: I have reviewed the following results:  Results from last 7 days   Lab Units 01/16/25  0352 01/15/25  0448 01/14/25  0505   WBC Thousand/uL 10.30* 11.28* 12.50*   HEMOGLOBIN g/dL 12.2 11.8 11.2*   PLATELETS Thousands/uL 458* 464* 434*     Results from last 7 days   Lab Units 01/16/25  0352 01/15/25  0825 01/14/25  0505 01/13/25  0553 01/12/25  0506 01/11/25  1320   SODIUM mmol/L 139 138 141 139   < > 137   POTASSIUM mmol/L 3.5 3.5 3.4* 4.1   < > 4.4   CHLORIDE mmol/L  104 104 105 108   < > 101   CO2 mmol/L 25 25 28 25   < > 29   BUN mg/dL 15 14 13 15   < > 19   CREATININE mg/dL 0.35* 0.40* 0.36* 0.37*   < > 0.62   EGFR ml/min/1.73sq m 94 90 93 93   < > 78   CALCIUM mg/dL 9.2 9.4 9.2 8.7   < > 9.4   AST U/L  --   --  19 11*  --  37   ALT U/L  --   --  15 9  --  14   ALK PHOS U/L  --   --  60 60  --  61   ALBUMIN g/dL  --   --  3.5 3.3*  --  4.0    < > = values in this interval not displayed.     Results from last 7 days   Lab Units 01/13/25  0833 01/12/25  1839 01/12/25  1444 01/11/25  1620   BLOOD CULTURE   --   --  No Growth at 72 hrs.  No Growth at 72 hrs.  --    GRAM STAIN RESULT  1+ Polys*  Rare Gram positive cocci in clusters*  Rare Gram positive cocci in pairs*  --   --  No Polys or Bacteria seen   WOUND CULTURE  4+ Growth of Methicillin Resistant Staphylococcus aureus*  --   --  1+ Growth of Methicillin Resistant Staphylococcus aureus*  1 colony Candida albicans*  2+ Growth of   MRSA CULTURE ONLY   --  Methicillin Resistant Staphylococcus aureus isolated*  This patient requires contact isolation precautions per [place] law. Contact precautions are not required in [place] for nasal surveillance cultures.  --   --                      Imaging Results Review: No pertinent imaging studies reviewed.  Other Study Results Review: No additional pertinent studies reviewed.

## 2025-01-16 NOTE — INCIDENTAL FINDINGS
The following findings require follow up:  Radiographic finding   Finding: CT head and neck:  Persistent or recurrent left parotitis with adjacent cellulitis and  left masseter myositis. New 8 mm abscess within the left parotid gland   Distal migration of 2 mm stone in the distal left parotid duct.    Follow up required: Infectious disease   Follow up should be done within 3 week(s)    Please notify the following clinician to assist with the follow up:   Dr. Santana    Incidental finding results were discussed with the Patient by Jordi Gama MD on 01/16/25.   They expressed understanding and all questions answered.

## 2025-01-16 NOTE — DISCHARGE INSTR - AVS FIRST PAGE
Dear Helen M Schwab,     It was our pleasure to care for you here at [facility].  It is our hope that we were always able to exceed the expected standards for your care during your stay.  You were hospitalized due to Parotitis .  You were cared for on the medical surgical floor by Jordi Gama MD under the service of Melchor Logan* with the [facility] who covers for your primary care physician (PCP), Shant Lucas MD, while you were hospitalized.  If you have any questions or concerns related to this hospitalization, you may contact us at .  For follow up as well as any medication refills, we recommend that you follow up with your primary care physician.  A registered nurse will reach out to you by phone within a few days after your discharge to answer any additional questions that you may have after going home.  However, at this time we provide for you here, the most important instructions / recommendations at discharge:     Notable Medication Adjustments -   Please take doxycycline 2 times daily starting 10/17/2025 until 1/31/2025.  Take doxycycline with 8 ounces of water and sit up for 30 minutes.   Please continue you regular home medication.  Do not take Imodium for diarrhea symptoms  Testing Required after Discharge -   CBC  CMP  Blood cultures 2-week after you finish the antibiotics course  ** Please contact your PCP to request testing orders for any of the testing recommended here **  Important follow up information -   Follow-up with ENT, please schedule an appointment   Follow-up with infectious disease, appointment scheduled on 02/04/2025  Please call and see your PCP within 1 week after discharge.  Other Instructions -   Please call your PCP or infectious disease doctor if you develop diarrhea symptoms.  Please call or return to the emergency department for return symptoms, or fever, headaches, severe diarrhea, chest  pain.  Please review this entire after visit summary as additional general instructions including medication list, appointments, activity, diet, any pertinent wound care, and other additional recommendations from your care team that may be provided for you.      Sincerely,     Jordi Gama MD

## 2025-01-16 NOTE — ASSESSMENT & PLAN NOTE
Echo 2D done on 12/25/2024 showed aortic valve calcification, low suspicious for endocarditis.  Follow-up with cardiology outpatient.

## 2025-01-16 NOTE — ASSESSMENT & PLAN NOTE
Patient with recent admission for L parotiditis due to obstructing sialolith with reactive left sided cellulitis/masseter myositis 12/22-12/30/2024. Completed 7 day course of antibiotics. Course was complicated by positive blood cultures thought to be contaminant and an echogenic mass on aortic valve. At time of discharge was due for surveillance blood cultures at 2 and 4 weeks, also recommended to have follow up echo with cardiology as patient was high risk for MICHAEL during admission.   -On current admission: vitally stable with hypertension, afebrile, leukocytosis to 16.25. ED provider discussed with on-call ENT with recommendation of broadening of antibiotic regimen, currently on IV Unasyn/Vancomycin. Patient was seen by ENT in the ED with left-sided facial massage and expectorancy of pus and wound culture sent.   Patient did not have blood cultures drawn at time of admission, ordered 1/12.  CT soft tissue neck with contrast: Persistent or recurrent left parotitis with adjacent cellulitis and left masseter myositis, new 8 mm abscess within the left parotid gland, and distal migration of 2 mm stone in the distal left parotid duct.     Plan:    blood cultures: No growth after 72 hours  MRSA nasal swab: Positive  Wound culture collected on 1/13: Grew positive for Staphylococcus aureus  Wound culture collected on 1/11: Grew MRSA and Candida albicans  ENT following, recommendations appreciated:   MRSA nares: Positive  wound cultures: 1+ MRSA, 1 colony candida albicans, 2+ growth skin manjinder.susceptible to vanc which patient is currently on  Warm compresses to the affected area  Sialogogues (sour candies, lemon, citrus, vinegar)  NSAID such as Ibuprofen for pain relief and to reduce inflammation  Aggressive hydration; encourage PO intake and IVF as tolerated    ID consulted: switch from vanc to oral doxy, trend labs and vitals, follow-up on wound and blood cultures, anticipate 3 weeks of antibiotics through 1/31. ID will  continue to follow-up in 2-3 weeks, will need repeat blood cultures 2 weeks after completing antibiotics. Will need weekly labs CBC and creatinine as outpatient. Needs close follow up with ENT  Patient was discharged on oral doxycycline 100 mg twice daily until 1/31.  Follow-up with infectious disease and PCP.

## 2025-01-16 NOTE — DISCHARGE SUMMARY
Discharge Summary - Hospitalist   Name: Helen M Schwab 89 y.o. female I MRN: [id number]  Unit/Bed#: S -01 I Date of Admission: 1/11/2025   Date of Service: 1/16/2025 I Hospital Day: 5     Assessment & Plan  Parotiditis  Patient with recent admission for L parotiditis due to obstructing sialolith with reactive left sided cellulitis/masseter myositis 12/22-12/30/2024. Completed 7 day course of antibiotics. Course was complicated by positive blood cultures thought to be contaminant and an echogenic mass on aortic valve. At time of discharge was due for surveillance blood cultures at 2 and 4 weeks, also recommended to have follow up echo with cardiology as patient was high risk for MICHAEL during admission.   -On current admission: vitally stable with hypertension, afebrile, leukocytosis to 16.25. ED provider discussed with on-call ENT with recommendation of broadening of antibiotic regimen, currently on IV Unasyn/Vancomycin. Patient was seen by ENT in the ED with left-sided facial massage and expectorancy of pus and wound culture sent.   Patient did not have blood cultures drawn at time of admission, ordered 1/12.  CT soft tissue neck with contrast: Persistent or recurrent left parotitis with adjacent cellulitis and left masseter myositis, new 8 mm abscess within the left parotid gland, and distal migration of 2 mm stone in the distal left parotid duct.     Plan:    blood cultures: No growth after 72 hours  MRSA nasal swab: Positive  Wound culture collected on 1/13: Grew positive for Staphylococcus aureus  Wound culture collected on 1/11: Grew MRSA and Candida albicans  ENT following, recommendations appreciated:   MRSA nares: Positive  wound cultures: 1+ MRSA, 1 colony candida albicans, 2+ growth skin manjinder.susceptible to vanc which patient is currently on  Warm compresses to the affected area  Sialogogues (sour candies, lemon, citrus, vinegar)  NSAID such as Ibuprofen for pain relief and to reduce  inflammation  Aggressive hydration; encourage PO intake and IVF as tolerated    ID consulted: switch from vanc to oral doxy, trend labs and vitals, follow-up on wound and blood cultures, anticipate 3 weeks of antibiotics through 1/31. ID will continue to follow-up in 2-3 weeks, will need repeat blood cultures 2 weeks after completing antibiotics. Will need weekly labs CBC and creatinine as outpatient. Needs close follow up with ENT  Patient was discharged on oral doxycycline 100 mg twice daily until 1/31.  Follow-up with infectious disease and PCP.  Ulcerative colitis without complications (HCC)  Chronic and stable.    Plan:   Continue home mesalamine 800 mg BID  Gastroesophageal reflux  Plan:  Continue Pantoprazole 40 mg AM inpatient  Fibromyalgia  Follow-up with PCP outpatient. Chronic and stable.    Plan:  Continue home methylprednisolone 2 mg daily   Hypertension  Plan:  Continue home amlodipine 5 mg HS  Continue home metoprolol succinate 50 mg daily  Continue home clonidine 0.1 prn  Abnormal echocardiogram  Echo 2D done on 12/25/2024 showed aortic valve calcification, low suspicious for endocarditis.  Follow-up with cardiology outpatient.  Positive blood culture  Patient tested positive for MRSA for 1 out of 6 blood cultures during her December admission.  Infectious disease consulted, recommendation appreciated     Medical Problems       Resolved Problems  Date Reviewed: 1/16/2025          Resolved    Hypokalemia 1/16/2025     Resolved by  Melchor Fuentes MD    Hypomagnesemia 1/16/2025     Resolved by  Melchor Fuentes MD    Anemia 1/15/2025     Resolved by  Melchor Fuentes MD        Discharging Physician / Practitioner: Jordi Gama MD  PCP: Shant Lucas MD  Admission Date:   Admission Orders (From admission, onward)       Ordered        01/11/25 1555  INPATIENT ADMISSION  Once                          Discharge Date: 01/16/25    Consultations During Hospital  Stay:  ENT  Infectious disease  Pharmacy    Procedures Performed:   None    Significant Findings / Test Results:   Head and neck CT scan :persistent or recurrent left parotitis with adjacent cellulitis and  left masseter myositis. New 8 mm abscess within the left parotid gland   Distal migration of 2 mm stone in the distal left parotid duct.    MRSA culture positive  Wound culture grew MRSA and Candida albicans    Incidental Findings:   Head and neck CT scan persistent or recurrent left parotitis with adjacent cellulitis and  left masseter myositis. New 8 mm abscess within the left parotid gland   Distal migration of 2 mm stone in the distal left parotid duct.    I reviewed the above mentioned incidental findings with the patient and/or family and they expressed understanding.    Test Results Pending at Discharge (will require follow up):   None     Outpatient Tests Requested:  CBC, CMP, blood cultures 2 weeks after finishing antibiotic course    Complications: None    Reason for Admission: Parotitis    Hospital Course:   Helen M Schwab is a 89 y.o. female patient who originally presented to the hospital on 1/11/2025 due to left jaw pain and swelling considering for recurrent parotitis.  Patient has a PMH of UC, GERD, HTN, recurrent parotitis.  She was recently hospitalized for similar symptoms at the end of December.  On her current presentation she had pain and swelling in her left jaw with left facial and neck erythema suspected for cellulitis.  Patient denied fever, chills, drooling, nausea or vomiting, chest pain, shortness of breath.  Patient was admitted, ENT consulted. blood, wound, MRSA cultures were obtained.  Patient was started on IV antibiotics with vancomycin and Unasyn.  Supportive care with warm compressors, pain medication, aggressive hydration, gland massage.  Infectious disease were consulted, wound culture came back positive for MRSA.  Blood culture showed no growth after 72 hours.  ID follow-up  the patient, antibiotics were adjusted accordingly, Unasyn was discontinued after 3 days.  IV vancomycin was discontinued after 5-day course.  Doxycycline 100 mg twice daily oral was started as patient tolerated.  Patient was stable to be discharged to continue 3-week antibiotics course until 1/31.  Patient will follow-up with infectious disease and ENT outpatient.  Patient also was advised to get labs of CBC, CMP, and blood cultures as recommended.  Patient was advised to return for any worsening symptoms or return of the current infection.  Patient was also advised to call her PCP if signs or symptoms of diarrhea before initiating Imodium.        Please see above list of diagnoses and related plan for additional information.     Condition at Discharge: fair    Discharge Day Visit / Exam:   Subjective:   Patient was examined at bedside.  Patient was alert oriented x 3.  No reports overnight by nursing.  Patient is feeling much better today, there is mild to none tenderness over the left jaw.  Erythema has resolved almost completely.  Patient was able to tolerate oral doxycycline without issues.  Infectious diseases consulted and recommended outpatient treatment.  Patient denies headaches, fever, chills, chest pain, SOB, abdominal pain, diarrhea or constipation, burning or pain while urination, lower leg edema.  Patient is currently stable and will be discharged today.    Vitals: Blood Pressure: 169/83 (01/16/25 0714)  Pulse: 78 (01/16/25 0714)  Temperature: 97.9 °F (36.6 °C) (01/16/25 0714)  Temp Source: Oral (01/15/25 0739)  Respirations: 18 (01/15/25 0739)  SpO2: 95 % (01/16/25 0714)  Physical Exam  Vitals and nursing note reviewed.   Constitutional:       General: She is not in acute distress.     Appearance: Normal appearance. She is well-developed. She is not ill-appearing.   HENT:      Head: Normocephalic and atraumatic.      Nose: No congestion.      Mouth/Throat:      Mouth: Mucous membranes are moist.       Pharynx: No oropharyngeal exudate or posterior oropharyngeal erythema.   Eyes:      General: No scleral icterus.     Conjunctiva/sclera: Conjunctivae normal.   Cardiovascular:      Rate and Rhythm: Normal rate and regular rhythm.      Pulses: Normal pulses.      Heart sounds: No murmur heard.  Pulmonary:      Effort: Pulmonary effort is normal. No respiratory distress.      Breath sounds: Normal breath sounds. No wheezing.   Abdominal:      Palpations: Abdomen is soft.      Tenderness: There is no abdominal tenderness. There is no guarding.   Musculoskeletal:         General: No swelling.      Cervical back: Neck supple. No rigidity or tenderness.      Right lower leg: No edema.      Left lower leg: No edema.   Lymphadenopathy:      Cervical: No cervical adenopathy.   Skin:     General: Skin is warm and dry.      Capillary Refill: Capillary refill takes less than 2 seconds.   Neurological:      Mental Status: She is alert and oriented to person, place, and time.      Motor: No weakness.   Psychiatric:         Mood and Affect: Mood normal.          Discussion with Family: Updated  (son) via phone.    Discharge instructions/Information to patient and family:   See after visit summary for information provided to patient and family.      Provisions for Follow-Up Care:  See after visit summary for information related to follow-up care and any pertinent home health orders.      Mobility at time of Discharge:   Basic Mobility Inpatient Raw Score: 18  JH-HLM Goal: 6: Walk 10 steps or more  JH-HLM Achieved: 6: Walk 10 steps or more  HLM Goal achieved. Continue to encourage appropriate mobility.     Disposition:   Home    Planned Readmission: none    Discharge Medications:  See after visit summary for reconciled discharge medications provided to patient and/or family.      Administrative Statements   **Please Note: This note may have been constructed using a voice recognition system**

## 2025-01-16 NOTE — CASE MANAGEMENT
Case Management Discharge Planning Note    Patient name Helen M Schwab Location S /S -01 MRN [id number]  : 1932 Date 2025       Current Admission Date: 2025  Current Admission Diagnosis:Parotiditis   Patient Active Problem List    Diagnosis Date Noted Date Diagnosed    Abnormal echocardiogram 2024     Positive blood culture 2024     Parotiditis 2024     Fibromyalgia 2024     Hypertension 2024     Urinary incontinence 2021     Gastroesophageal reflux 2021     Carpal tunnel syndrome on left 08/10/2021     Back pain 2021     Ambulatory dysfunction 2021     Ulcerative colitis without complications (HCC) 2021     History of fusion of lumbar spine 2021     Mass of right finger 08/15/2018     Trigger finger, left index finger 08/15/2018       LOS (days): 5  Geometric Mean LOS (GMLOS) (days): 2.8  Days to GMLOS:-2     OBJECTIVE:  Risk of Unplanned Readmission Score: 14.02         Current admission status: Inpatient   Preferred Pharmacy:   [facility]/pharmacy #[id number] - [place], [place] - [address] [address]  Phone: [phone] Fax: [phone]    Primary Care Provider: Shant Lucas MD    Primary Insurance: MEDICARE  Secondary Insurance: [facility] [place] [employer]    DISCHARGE DETAILS:                                                                                     IMM reviewed with patient, patient agrees with discharge determination.  IMM Given (Date):: 25  IMM Given to:: Patient

## 2025-01-17 ENCOUNTER — TELEPHONE (OUTPATIENT)
Dept: INFECTIOUS DISEASES | Facility: CLINIC | Age: OVER 89
End: 2025-01-17

## 2025-01-17 DIAGNOSIS — K11.20 PAROTIDITIS: Primary | ICD-10-CM

## 2025-01-17 LAB — BACTERIA BLD CULT: NORMAL

## 2025-01-17 NOTE — PROGRESS NOTES
OPAT NOTE    AP ONLY CAMPUSES ARE: [facility], [facility], and [facility].   In these cases, physician is only cosigning notes.    Supervising/Discharge provider: Isabell      Diagnosis: Parotiditis     Drug: Doxycycline     Dose/Route/Frequency: 100mg PO Q12    Labs/Frequency: CBCD Cre weekly     End Date: 1/31    Infusion/[facility]/SNF contact:ANT[facility]     Next appointment: 2/4      MA assigned: Renée

## 2025-01-17 NOTE — TELEPHONE ENCOUNTER
Called [facility] and spoke with Shena today.   Shena confirms they are resuming care with patient. Shena confirms weekly labs.     Called and spoke with patient today.   Went over antibiotic plan, weekly labs and follow up appointment. Informed patient VITALYREYNA does have her weekly labs orders and they will coordinate with patient for visit.   Informed patient if there are any further questions or concerns to call the office   Patient verbalizes understanding at this time.

## 2025-01-18 ENCOUNTER — HOME CARE VISIT (OUTPATIENT)
Dept: HOME HEALTH SERVICES | Facility: HOME HEALTHCARE | Age: OVER 89
End: 2025-01-18
Payer: MEDICARE

## 2025-01-18 VITALS
SYSTOLIC BLOOD PRESSURE: 128 MMHG | DIASTOLIC BLOOD PRESSURE: 75 MMHG | HEART RATE: 71 BPM | OXYGEN SATURATION: 98 % | RESPIRATION RATE: 18 BRPM | TEMPERATURE: 97 F

## 2025-01-18 LAB — BACTERIA BLD CULT: NORMAL

## 2025-01-18 PROCEDURE — G0299 HHS/HOSPICE OF RN EA 15 MIN: HCPCS

## 2025-01-22 ENCOUNTER — APPOINTMENT (OUTPATIENT)
Dept: LAB | Facility: MEDICAL CENTER | Age: OVER 89
End: 2025-01-22
Payer: MEDICARE

## 2025-01-22 ENCOUNTER — HOME CARE VISIT (OUTPATIENT)
Dept: HOME HEALTH SERVICES | Facility: HOME HEALTHCARE | Age: OVER 89
End: 2025-01-22
Payer: MEDICARE

## 2025-01-22 VITALS
OXYGEN SATURATION: 97 % | SYSTOLIC BLOOD PRESSURE: 128 MMHG | DIASTOLIC BLOOD PRESSURE: 52 MMHG | RESPIRATION RATE: 16 BRPM | HEART RATE: 62 BPM | TEMPERATURE: 98.2 F

## 2025-01-22 DIAGNOSIS — K11.20 PAROTIDITIS: ICD-10-CM

## 2025-01-22 DIAGNOSIS — E78.1 HYPERTRIGLYCERIDEMIA: ICD-10-CM

## 2025-01-22 DIAGNOSIS — E78.00 HYPERCHOLESTEREMIA: ICD-10-CM

## 2025-01-22 DIAGNOSIS — I10 ESSENTIAL HYPERTENSION, BENIGN: ICD-10-CM

## 2025-01-22 DIAGNOSIS — Z79.899 LONG TERM USE OF DRUG: ICD-10-CM

## 2025-01-22 DIAGNOSIS — R70.0 ELEVATED SEDIMENTATION RATE: ICD-10-CM

## 2025-01-22 DIAGNOSIS — I25.10 CORONARY ARTERY DISEASE, UNSPECIFIED VESSEL OR LESION TYPE, UNSPECIFIED WHETHER ANGINA PRESENT, UNSPECIFIED WHETHER NATIVE OR TRANSPLANTED HEART: ICD-10-CM

## 2025-01-22 DIAGNOSIS — I15.9 SECONDARY HYPERTENSION: ICD-10-CM

## 2025-01-22 LAB
ALBUMIN SERPL BCG-MCNC: 3.9 G/DL (ref 3.5–5)
ALP SERPL-CCNC: 75 U/L (ref 34–104)
ALT SERPL W P-5'-P-CCNC: 14 U/L (ref 7–52)
ANION GAP SERPL CALCULATED.3IONS-SCNC: 9 MMOL/L (ref 4–13)
AST SERPL W P-5'-P-CCNC: 17 U/L (ref 13–39)
BASOPHILS # BLD AUTO: 0.04 THOUSANDS/ΜL (ref 0–0.1)
BASOPHILS NFR BLD AUTO: 0 % (ref 0–1)
BILIRUB SERPL-MCNC: 0.34 MG/DL (ref 0.2–1)
BUN SERPL-MCNC: 22 MG/DL (ref 5–25)
CALCIUM SERPL-MCNC: 9.9 MG/DL (ref 8.4–10.2)
CHLORIDE SERPL-SCNC: 100 MMOL/L (ref 96–108)
CO2 SERPL-SCNC: 30 MMOL/L (ref 21–32)
CREAT SERPL-MCNC: 0.63 MG/DL (ref 0.6–1.3)
EOSINOPHIL # BLD AUTO: 0.52 THOUSAND/ΜL (ref 0–0.61)
EOSINOPHIL NFR BLD AUTO: 3 % (ref 0–6)
ERYTHROCYTE [DISTWIDTH] IN BLOOD BY AUTOMATED COUNT: 14.8 % (ref 11.6–15.1)
ERYTHROCYTE [SEDIMENTATION RATE] IN BLOOD: 47 MM/HOUR (ref 0–29)
GFR SERPL CREATININE-BSD FRML MDRD: 78 ML/MIN/1.73SQ M
GLUCOSE SERPL-MCNC: 102 MG/DL (ref 65–140)
HCT VFR BLD AUTO: 41.6 % (ref 34.8–46.1)
HGB BLD-MCNC: 13.4 G/DL (ref 11.5–15.4)
IMM GRANULOCYTES # BLD AUTO: 0.07 THOUSAND/UL (ref 0–0.2)
IMM GRANULOCYTES NFR BLD AUTO: 0 % (ref 0–2)
LYMPHOCYTES # BLD AUTO: 2.97 THOUSANDS/ΜL (ref 0.6–4.47)
LYMPHOCYTES NFR BLD AUTO: 19 % (ref 14–44)
MCH RBC QN AUTO: 32.2 PG (ref 26.8–34.3)
MCHC RBC AUTO-ENTMCNC: 32.2 G/DL (ref 31.4–37.4)
MCV RBC AUTO: 100 FL (ref 82–98)
MONOCYTES # BLD AUTO: 1.11 THOUSAND/ΜL (ref 0.17–1.22)
MONOCYTES NFR BLD AUTO: 7 % (ref 4–12)
NEUTROPHILS # BLD AUTO: 11.29 THOUSANDS/ΜL (ref 1.85–7.62)
NEUTS SEG NFR BLD AUTO: 71 % (ref 43–75)
NRBC BLD AUTO-RTO: 0 /100 WBCS
PLATELET # BLD AUTO: 498 THOUSANDS/UL (ref 149–390)
PMV BLD AUTO: 11 FL (ref 8.9–12.7)
POTASSIUM SERPL-SCNC: 4.1 MMOL/L (ref 3.5–5.3)
PROT SERPL-MCNC: 6.9 G/DL (ref 6.4–8.4)
RBC # BLD AUTO: 4.16 MILLION/UL (ref 3.81–5.12)
SODIUM SERPL-SCNC: 139 MMOL/L (ref 135–147)
WBC # BLD AUTO: 16 THOUSAND/UL (ref 4.31–10.16)

## 2025-01-22 PROCEDURE — 85652 RBC SED RATE AUTOMATED: CPT

## 2025-01-22 PROCEDURE — G0299 HHS/HOSPICE OF RN EA 15 MIN: HCPCS

## 2025-01-22 PROCEDURE — 36415 COLL VENOUS BLD VENIPUNCTURE: CPT

## 2025-01-22 PROCEDURE — 85025 COMPLETE CBC W/AUTO DIFF WBC: CPT

## 2025-01-22 PROCEDURE — 80053 COMPREHEN METABOLIC PANEL: CPT

## 2025-01-28 ENCOUNTER — HOME CARE VISIT (OUTPATIENT)
Dept: HOME HEALTH SERVICES | Facility: HOME HEALTHCARE | Age: OVER 89
End: 2025-01-28
Payer: MEDICARE

## 2025-01-28 ENCOUNTER — APPOINTMENT (OUTPATIENT)
Dept: LAB | Facility: MEDICAL CENTER | Age: OVER 89
End: 2025-01-28
Payer: MEDICARE

## 2025-01-28 VITALS
SYSTOLIC BLOOD PRESSURE: 128 MMHG | DIASTOLIC BLOOD PRESSURE: 58 MMHG | RESPIRATION RATE: 16 BRPM | OXYGEN SATURATION: 97 % | HEART RATE: 68 BPM | TEMPERATURE: 98.2 F

## 2025-01-28 DIAGNOSIS — K11.20 PAROTIDITIS: ICD-10-CM

## 2025-01-28 LAB
BASOPHILS # BLD AUTO: 0.04 THOUSANDS/ΜL (ref 0–0.1)
BASOPHILS NFR BLD AUTO: 0 % (ref 0–1)
EOSINOPHIL # BLD AUTO: 0.73 THOUSAND/ΜL (ref 0–0.61)
EOSINOPHIL NFR BLD AUTO: 5 % (ref 0–6)
ERYTHROCYTE [DISTWIDTH] IN BLOOD BY AUTOMATED COUNT: 15.5 % (ref 11.6–15.1)
HCT VFR BLD AUTO: 39.9 % (ref 34.8–46.1)
HGB BLD-MCNC: 13.1 G/DL (ref 11.5–15.4)
IMM GRANULOCYTES # BLD AUTO: 0.09 THOUSAND/UL (ref 0–0.2)
IMM GRANULOCYTES NFR BLD AUTO: 1 % (ref 0–2)
LYMPHOCYTES # BLD AUTO: 2.47 THOUSANDS/ΜL (ref 0.6–4.47)
LYMPHOCYTES NFR BLD AUTO: 17 % (ref 14–44)
MCH RBC QN AUTO: 32.4 PG (ref 26.8–34.3)
MCHC RBC AUTO-ENTMCNC: 32.8 G/DL (ref 31.4–37.4)
MCV RBC AUTO: 99 FL (ref 82–98)
MONOCYTES # BLD AUTO: 1.15 THOUSAND/ΜL (ref 0.17–1.22)
MONOCYTES NFR BLD AUTO: 8 % (ref 4–12)
NEUTROPHILS # BLD AUTO: 10.44 THOUSANDS/ΜL (ref 1.85–7.62)
NEUTS SEG NFR BLD AUTO: 69 % (ref 43–75)
NRBC BLD AUTO-RTO: 0 /100 WBCS
PLATELET # BLD AUTO: 506 THOUSANDS/UL (ref 149–390)
PMV BLD AUTO: 10.8 FL (ref 8.9–12.7)
RBC # BLD AUTO: 4.04 MILLION/UL (ref 3.81–5.12)
WBC # BLD AUTO: 14.92 THOUSAND/UL (ref 4.31–10.16)

## 2025-01-28 PROCEDURE — 85025 COMPLETE CBC W/AUTO DIFF WBC: CPT

## 2025-01-28 PROCEDURE — 36415 COLL VENOUS BLD VENIPUNCTURE: CPT

## 2025-01-28 PROCEDURE — G0299 HHS/HOSPICE OF RN EA 15 MIN: HCPCS

## 2025-01-28 PROCEDURE — 82565 ASSAY OF CREATININE: CPT

## 2025-01-29 LAB
CREAT SERPL-MCNC: 0.56 MG/DL (ref 0.6–1.3)
GFR SERPL CREATININE-BSD FRML MDRD: 81 ML/MIN/1.73SQ M

## 2025-02-04 ENCOUNTER — OFFICE VISIT (OUTPATIENT)
Age: OVER 89
End: 2025-02-04
Payer: MEDICARE

## 2025-02-04 ENCOUNTER — HOME CARE VISIT (OUTPATIENT)
Dept: HOME HEALTH SERVICES | Facility: HOME HEALTHCARE | Age: OVER 89
End: 2025-02-04
Payer: MEDICARE

## 2025-02-04 VITALS
HEART RATE: 78 BPM | WEIGHT: 127 LBS | DIASTOLIC BLOOD PRESSURE: 72 MMHG | SYSTOLIC BLOOD PRESSURE: 140 MMHG | RESPIRATION RATE: 16 BRPM | OXYGEN SATURATION: 97 % | HEIGHT: 62 IN | TEMPERATURE: 97.5 F | BODY MASS INDEX: 23.37 KG/M2

## 2025-02-04 DIAGNOSIS — K11.20 PAROTIDITIS: Primary | ICD-10-CM

## 2025-02-04 DIAGNOSIS — M79.7 FIBROMYALGIA: ICD-10-CM

## 2025-02-04 DIAGNOSIS — K51.80 OTHER ULCERATIVE COLITIS WITHOUT COMPLICATION (HCC): ICD-10-CM

## 2025-02-04 DIAGNOSIS — R93.1 ABNORMAL ECHOCARDIOGRAM: ICD-10-CM

## 2025-02-04 DIAGNOSIS — R78.81 POSITIVE BLOOD CULTURE: ICD-10-CM

## 2025-02-04 DIAGNOSIS — K21.9 GASTROESOPHAGEAL REFLUX DISEASE, UNSPECIFIED WHETHER ESOPHAGITIS PRESENT: ICD-10-CM

## 2025-02-04 PROCEDURE — 99215 OFFICE O/P EST HI 40 MIN: CPT | Performed by: PHYSICIAN ASSISTANT

## 2025-02-04 NOTE — ASSESSMENT & PLAN NOTE
1/11/25 Left parotid culture of pus MRSA sensitive to tetracycline, 1 col candida likely colonizer  1/12/2025 CT soft tissue neck: Asymmetric enlargement-uremia of the left parotid gland with surrounding inflammatory changes.  Previously identified 2 mm sialolith in the left parotid duct has moved distally.  There was 4 x 8 mm hypodensity within the left parotid gland suspicious for collection. 1/28/25 CBC 14.92 K  -Transitioned to PO Doxycycline 100 mg q 12 hours to complete total 3 weeks of antibiotic therapy through 1/31/25  -However, patient completed Doxycycline about 12 days total antibiotics around 1/22/25 due to GI intolerance  -serial exam  -ENT follow up prn outpatient  -monitoring serial CBC and BMP for treatment response and any developing toxicities  -patient will follow up with me prn hereafter  Orders:    CBC and differential; Future

## 2025-02-04 NOTE — ASSESSMENT & PLAN NOTE
Recent 1 out of 6 blood cultures from December admission isolated MRSA.  Overall suspicion was for contaminant.  She is without any intravascular devices. Unfortunately echo was abnormal as below.  Discussed case in detail with cardiology and with primary service and the risk associated with MICHAEL reviewed.  Plan was for surveillance blood cultures and monitoring 2D echo as outpatient.  -Completed antibiotic for parotiditis as above  -Will plan for surveillance blood cultures for 2/5/25 to be drawn by [facility].  Orders:    Blood culture; Future    Blood culture; Future

## 2025-02-04 NOTE — PROGRESS NOTES
Name: Helen M Schwab      : 1932      MRN: [id number]  Encounter Provider: Julianne Skinner PA-C  Encounter Date: 2025   Encounter department: [facility] INFECTIOUS DISEASE [facility]  :  Assessment & Plan  Parotiditis  25 Left parotid culture of pus MRSA sensitive to tetracycline, 1 col candida likely colonizer  2025 CT soft tissue neck: Asymmetric enlargement-uremia of the left parotid gland with surrounding inflammatory changes.  Previously identified 2 mm sialolith in the left parotid duct has moved distally.  There was 4 x 8 mm hypodensity within the left parotid gland suspicious for collection. 25 CBC 14.92 K  -Transitioned to PO Doxycycline 100 mg q 12 hours to complete total 3 weeks of antibiotic therapy through 25  -However, patient completed Doxycycline about 12 days total antibiotics around 25 due to GI intolerance  -serial exam  -ENT follow up prn outpatient  -monitoring serial CBC and BMP for treatment response and any developing toxicities  -patient will follow up with me prn hereafter  Orders:    CBC and differential; Future    Positive blood culture  Recent 1 out of 6 blood cultures from December admission isolated MRSA.  Overall suspicion was for contaminant.  She is without any intravascular devices. Unfortunately echo was abnormal as below.  Discussed case in detail with cardiology and with primary service and the risk associated with MICHAEL reviewed.  Plan was for surveillance blood cultures and monitoring 2D echo as outpatient.  -Completed antibiotic for parotiditis as above  -Will plan for surveillance blood cultures for 25 to be drawn by [facility].  Orders:    Blood culture; Future    Blood culture; Future    Abnormal echocardiogram  Patient had 24 2D echo ordered in the setting of 1 out of 2 blood cultures being positive.  Echo notable for changes at the aortic valve which could represent focal calcification, fibroblastoma or vegetation.  With  "clinical low suspicious for endocarditis, case reviewed in detail with cardiologist, plan is for surveillance and outpatient TTE is planned for February 2025  -antibiotics as above  -Retime surveillance blood cultures as above  -Follow-up in cardiology office for repeat 2D echo       Other ulcerative colitis without complication (HCC)  -monitor symptoms  -symptomatic management per primary care team  -advancing diet as tolerated       Gastroesophageal reflux disease, unspecified whether esophagitis present  -monitor symptoms  -symptomatic management per primary care team  -advancing diet as tolerated       Fibromyalgia  -monitor symptoms  -symptomatic management per primary care team         I have discussed with patient, son, and Leola ORELLANA regarding the above plan to check surveillance blood cultures tomorrow and continue monitoring off additional antibiotics. They agree with the plan.     Antibiotics:  Doxycycline -completed about 12 days total around 1/22/25 due to GI intolerance    History of Present Illness   Patient here for outpatient follow up visit for recurrent parotitis treated with doxycycline.    Doxycycline -completed about 12 days total around 1/22/25 due to GI intolerance.  Now off doxycycline about 2 weeks, patient has no fever, chills, sweats; no nausea, vomiting, diarrhea; no cough, shortness of breath; no parotid swelling or pain. Her bowels are not back to normal yet, but getting there. She is feeling nearly back to herself and her knitting and washing the dishes    ROS:  A complete review of systems is negative other than that noted above in the HPI.         Objective   /72   Pulse 78   Temp 97.5 °F (36.4 °C)   Resp 16   Ht 5' 2\" (1.575 m)   Wt 57.6 kg (127 lb)   SpO2 97%   BMI 23.23 kg/m²      General: Alert, interactive, appearing well, nontoxic, no acute distress, ambulatory in office with rollator  Throat: Oropharynx moist without lesions.   HEEN:  Lungs: Clear to auscultation " bilaterally; no audible wheezes, rhonchi or rales; respirations unlabored  Heart: RRR; no murmur, rub or gallop  Abdomen: Soft, non-tender, non-distended, positive bowel sounds.    Extremities: No clubbing, cyanosis or edema  Skin: No new rashes or lesions. No new draining wounds.    Lab Results: I have personally reviewed pertinent labs.  Lab Results   Component Value Date    K 4.1 01/22/2025     01/22/2025    CO2 30 01/22/2025    BUN 22 01/22/2025    CREATININE 0.56 (L) 01/28/2025    GLUF 83 01/24/2024    CALCIUM 9.9 01/22/2025    CORRECTEDCA 9.3 01/13/2025    AST 17 01/22/2025    ALT 14 01/22/2025    ALKPHOS 75 01/22/2025    EGFR 81 01/28/2025     Lab Results   Component Value Date    WBC 14.92 (H) 01/28/2025    HGB 13.1 01/28/2025    HCT 39.9 01/28/2025    MCV 99 (H) 01/28/2025     (H) 01/28/2025     Lab Results   Component Value Date    ESR 47 (H) 01/22/2025       Radiology Results Review : No pertinent imaging studies reviewed.    Administrative Statements   I have spent a total time of 40 minutes in caring for this patient on the day of the visit/encounter including Diagnostic results, Prognosis, Risks and benefits of tx options, Instructions for management, Patient and family education, Importance of tx compliance, Risk factor reductions, Impressions, Counseling / Coordination of care, Documenting in the medical record, Reviewing / ordering tests, medicine, procedures  , Obtaining or reviewing history  , and Communicating with other healthcare professionals .

## 2025-02-04 NOTE — ASSESSMENT & PLAN NOTE
-monitor symptoms  -symptomatic management per primary care team  -advancing diet as tolerated

## 2025-02-05 ENCOUNTER — LAB REQUISITION (OUTPATIENT)
Dept: LAB | Facility: HOSPITAL | Age: OVER 89
End: 2025-02-05
Payer: MEDICARE

## 2025-02-05 ENCOUNTER — HOME CARE VISIT (OUTPATIENT)
Dept: HOME HEALTH SERVICES | Facility: HOME HEALTHCARE | Age: OVER 89
End: 2025-02-05
Payer: MEDICARE

## 2025-02-05 VITALS
DIASTOLIC BLOOD PRESSURE: 78 MMHG | TEMPERATURE: 98.3 F | HEART RATE: 62 BPM | SYSTOLIC BLOOD PRESSURE: 146 MMHG | RESPIRATION RATE: 20 BRPM

## 2025-02-05 DIAGNOSIS — R78.81 BACTEREMIA: ICD-10-CM

## 2025-02-05 DIAGNOSIS — K11.20 SIALOADENITIS, UNSPECIFIED: ICD-10-CM

## 2025-02-05 LAB
BASOPHILS # BLD AUTO: 0.03 THOUSANDS/ΜL (ref 0–0.1)
BASOPHILS NFR BLD AUTO: 0 % (ref 0–1)
EOSINOPHIL # BLD AUTO: 0.82 THOUSAND/ΜL (ref 0–0.61)
EOSINOPHIL NFR BLD AUTO: 7 % (ref 0–6)
ERYTHROCYTE [DISTWIDTH] IN BLOOD BY AUTOMATED COUNT: 15 % (ref 11.6–15.1)
HCT VFR BLD AUTO: 38.8 % (ref 34.8–46.1)
HGB BLD-MCNC: 13.2 G/DL (ref 11.5–15.4)
IMM GRANULOCYTES # BLD AUTO: 0.05 THOUSAND/UL (ref 0–0.2)
IMM GRANULOCYTES NFR BLD AUTO: 0 % (ref 0–2)
LYMPHOCYTES # BLD AUTO: 2.25 THOUSANDS/ΜL (ref 0.6–4.47)
LYMPHOCYTES NFR BLD AUTO: 18 % (ref 14–44)
MCH RBC QN AUTO: 32.5 PG (ref 26.8–34.3)
MCHC RBC AUTO-ENTMCNC: 34 G/DL (ref 31.4–37.4)
MCV RBC AUTO: 96 FL (ref 82–98)
MONOCYTES # BLD AUTO: 1.34 THOUSAND/ΜL (ref 0.17–1.22)
MONOCYTES NFR BLD AUTO: 11 % (ref 4–12)
NEUTROPHILS # BLD AUTO: 8.17 THOUSANDS/ΜL (ref 1.85–7.62)
NEUTS SEG NFR BLD AUTO: 64 % (ref 43–75)
NRBC BLD AUTO-RTO: 0 /100 WBCS
PLATELET # BLD AUTO: 515 THOUSANDS/UL (ref 149–390)
PMV BLD AUTO: 11 FL (ref 8.9–12.7)
RBC # BLD AUTO: 4.06 MILLION/UL (ref 3.81–5.12)
WBC # BLD AUTO: 12.66 THOUSAND/UL (ref 4.31–10.16)

## 2025-02-05 PROCEDURE — 87040 BLOOD CULTURE FOR BACTERIA: CPT | Performed by: PHYSICIAN ASSISTANT

## 2025-02-05 PROCEDURE — G0299 HHS/HOSPICE OF RN EA 15 MIN: HCPCS

## 2025-02-05 PROCEDURE — 85025 COMPLETE CBC W/AUTO DIFF WBC: CPT | Performed by: PHYSICIAN ASSISTANT

## 2025-02-10 ENCOUNTER — HOME CARE VISIT (OUTPATIENT)
Dept: HOME HEALTH SERVICES | Facility: HOME HEALTHCARE | Age: OVER 89
End: 2025-02-10
Payer: MEDICARE

## 2025-02-10 VITALS
TEMPERATURE: 97.7 F | HEART RATE: 81 BPM | DIASTOLIC BLOOD PRESSURE: 62 MMHG | OXYGEN SATURATION: 97 % | SYSTOLIC BLOOD PRESSURE: 138 MMHG

## 2025-02-10 LAB
BACTERIA BLD CULT: NORMAL
BACTERIA BLD CULT: NORMAL

## 2025-02-10 PROCEDURE — G0299 HHS/HOSPICE OF RN EA 15 MIN: HCPCS

## 2025-02-21 DIAGNOSIS — I10 HYPERTENSION: ICD-10-CM

## 2025-02-21 RX ORDER — METOPROLOL SUCCINATE 100 MG/1
50 TABLET, EXTENDED RELEASE ORAL DAILY
Qty: 45 TABLET | Refills: 1 | OUTPATIENT
Start: 2025-02-21

## 2025-06-02 ENCOUNTER — OFFICE VISIT (OUTPATIENT)
Dept: GASTROENTEROLOGY | Facility: CLINIC | Age: OVER 89
End: 2025-06-02
Payer: MEDICARE

## 2025-06-02 VITALS
HEIGHT: 61 IN | BODY MASS INDEX: 21.52 KG/M2 | DIASTOLIC BLOOD PRESSURE: 80 MMHG | WEIGHT: 114 LBS | SYSTOLIC BLOOD PRESSURE: 157 MMHG | HEART RATE: 61 BPM

## 2025-06-02 DIAGNOSIS — K51.919 ULCERATIVE COLITIS WITH COMPLICATION, UNSPECIFIED LOCATION (HCC): Primary | ICD-10-CM

## 2025-06-02 DIAGNOSIS — K21.9 GASTROESOPHAGEAL REFLUX DISEASE, UNSPECIFIED WHETHER ESOPHAGITIS PRESENT: ICD-10-CM

## 2025-06-02 DIAGNOSIS — R74.8 ELEVATED ALKALINE PHOSPHATASE LEVEL: ICD-10-CM

## 2025-06-02 PROCEDURE — 99214 OFFICE O/P EST MOD 30 MIN: CPT | Performed by: PHYSICIAN ASSISTANT

## 2025-06-02 RX ORDER — MESALAMINE 400 MG/1
800 CAPSULE, DELAYED RELEASE ORAL 2 TIMES DAILY
Qty: 120 CAPSULE | Refills: 5 | Status: SHIPPED | OUTPATIENT
Start: 2025-06-02

## 2025-06-02 NOTE — PROGRESS NOTES
Name: Helen M Schwab      : 1932      MRN: [id number]  Encounter Provider: Allie Baer PA-C  Encounter Date: 2025   Encounter department: [facility] GASTROENTEROLOGY [place] [id number]  :  Assessment & Plan  Ulcerative colitis with complication, unspecified location (HCC)  Patient with ulcerative colitis, patient's symptoms are very well-controlled on 800 mg of mesalamine twice a day and she states she may cut back further but she does not believe she can stop this completely due to exacerbation of her symptoms       Gastroesophageal reflux disease, unspecified whether esophagitis present  Patient with GERD, reflux symptoms controlled on omeprazole 20 mg       Elevated alkaline phosphatase level  Patient with history of elevated alkaline phosphatase with no recent elevation on blood work earlier this year, will repeat labs in a few months to monitor, isoenzymes were normal in past    We will see in 1 year unless otherwise indicated           History of Present Illness     Helen M Schwab is a 89 y.o. female who presents with longstanding ulcerative colitis which has very well controlled on 800 mg of mesalamine twice a day.   She likewise has gastroesophageal reflux which is controlled on omeprazole 20 mg a day.  She has no particular gastrointestinal complaints at this time.  She does not want any more colonoscopies due to age. Patient had colonoscopy last in  which had shown internal hemorrhoids as well as prior partial left colectomy but random biopsies were negative at that time.  She currently states she has been doing well from a GI standpoint and she tried to  cut back on her mesalamine but she states that when she tried to go off the medication she had exacerbation in her symptoms.  She reports that she can at least try to take 2-3 a day but she was just interested in cutting back on some of her medication and she still taking the omeprazole.  She states that she has  "diarrhea if she has some dietary indiscretion and she was hospitalized multiple times earlier this year due to parotitis and facial cellulitis and she took antibiotics at that time but she states that she did not have exacerbation of her symptoms of diarrhea.  Patient had blood work in April of this year which did not include LFTs but she does have history of elevated alkaline phosphatase with normal isoenzymes last year.              HPI    Review of Systems A complete review of systems is negative other than that noted above in the HPI.      Current Medications[1]  Objective   /80 (BP Location: Left arm, Patient Position: Sitting, Cuff Size: Standard)   Pulse 61   Ht 5' 1\" (1.549 m)   Wt 51.7 kg (114 lb)   BMI 21.54 kg/m²     Physical Exam   Gen-alert, nad  Heart-s1/s2  Lungs-CTA b/l  Abd-soft positive bowel sounds nontender nondistended      Lab Results: I personally reviewed relevant lab results.                    [1]   Current Outpatient Medications   Medication Sig Dispense Refill    acetaminophen (Tylenol 8 Hour) 650 mg CR tablet Take 1 tablet (650 mg total) by mouth every 8 (eight) hours 15 tablet 0    amLODIPine (NORVASC) 5 mg tablet Take 5 mg by mouth daily at bedtime      aspirin (ECOTRIN LOW STRENGTH) 81 mg EC tablet Take 81 mg by mouth in the morning.      bimatoprost (LUMIGAN) 0.03 % ophthalmic drops Administer 1 drop to both eyes in the morning.      busPIRone (BUSPAR) 10 mg tablet Take 10 mg by mouth daily as needed (anxiety)      cholecalciferol (VITAMIN D3) 1,000 units tablet Take 5,000 Units by mouth in the morning.      cloNIDine (CATAPRES) 0.1 mg tablet Take 0.1 mg by mouth daily as needed for high blood pressure take 1 tablet PO as needed sbp >180 mm hg      diphenoxylate-atropine (LOMOTIL) 2.5-0.025 mg per tablet Take by mouth      mesalamine (DELZICOL) 400 mg TAKE 2 CAPSULES (800 MG TOTAL) BY MOUTH 2 (TWO) TIMES A  capsule 5    methylPREDNISolone (MEDROL) 2 MG tablet Take 4 " mg by mouth daily with breakfast take 1/2 tablet (2mg total) PO daily      metoprolol succinate (TOPROL-XL) 100 mg 24 hr tablet Take 0.5 tablets (50 mg total) by mouth daily 30 tablet 0    Multiple Vitamins-Minerals (PRESERVISION AREDS 2 PO) Take 2 tablets by mouth in the morning.      olopatadine HCl (Pataday) 0.2 % opth drops Administer 1 drop to both eyes daily as needed (dry eye)      omeprazole (PriLOSEC) 20 mg delayed release capsule Take 20 mg by mouth in the morning.       No current facility-administered medications for this visit.

## (undated) DEVICE — GLOVE SRG BIOGEL 7.5

## (undated) DEVICE — PADDING CAST 4 IN  COTTON STRL

## (undated) DEVICE — STRL COTTON TIP APPLCTR 6IN PK: Brand: CARDINAL HEALTH

## (undated) DEVICE — DISPOSABLE EQUIPMENT COVER: Brand: SMALL TOWEL DRAPE

## (undated) DEVICE — RETROGRADE KNIFE BOX OF 6: Brand: ECTRA

## (undated) DEVICE — ADHESIVE SKN CLSR HISTOACRYL FLEX 0.5ML LF

## (undated) DEVICE — CHLORAPREP HI-LITE 26ML ORANGE

## (undated) DEVICE — SPONGE PVP SCRUB WING STERILE

## (undated) DEVICE — GLOVE INDICATOR PI UNDERGLOVE SZ 8 BLUE

## (undated) DEVICE — SUT PROLENE 4-0 PS-2 18 IN 8682G

## (undated) DEVICE — OCCLUSIVE GAUZE STRIP,3% BISMUTH TRIBROMOPHENATE IN PETROLATUM BLEND: Brand: XEROFORM

## (undated) DEVICE — STERILE BETHLEHEM PLASTIC HAND: Brand: CARDINAL HEALTH

## (undated) DEVICE — GAUZE SPONGES,16 PLY: Brand: CURITY

## (undated) DEVICE — CUFF TOURNIQUET 18 X 5.5 IN QUICK CONNECT 2BLA

## (undated) DEVICE — INTENDED FOR TISSUE SEPARATION, AND OTHER PROCEDURES THAT REQUIRE A SHARP SURGICAL BLADE TO PUNCTURE OR CUT.: Brand: BARD-PARKER SAFETY BLADES SIZE 15, STERILE

## (undated) DEVICE — BANDAGE FINGER/KNUCKLE 1.75 X 2 IN LF

## (undated) DEVICE — CUFF TOURNIQUET 18 X 4 IN QUICK CONNECT DISP 1 BLADDER

## (undated) DEVICE — ACE WRAP 4 IN STERILE

## (undated) DEVICE — ACE WRAP 4 IN UNSTERILE

## (undated) DEVICE — ACE WRAP 3 IN STERILE

## (undated) DEVICE — NEEDLE 25G X 1 1/2